# Patient Record
Sex: FEMALE | Race: BLACK OR AFRICAN AMERICAN | NOT HISPANIC OR LATINO | Employment: OTHER | URBAN - NONMETROPOLITAN AREA
[De-identification: names, ages, dates, MRNs, and addresses within clinical notes are randomized per-mention and may not be internally consistent; named-entity substitution may affect disease eponyms.]

---

## 2017-01-01 ENCOUNTER — HOSPITAL ENCOUNTER (OUTPATIENT)
Dept: OTHER | Facility: HOSPITAL | Age: 42
Setting detail: RADIATION/ONCOLOGY SERIES
Discharge: HOME | End: 2017-01-20
Attending: INTERNAL MEDICINE | Admitting: INTERNAL MEDICINE

## 2017-01-06 LAB
ALBUMIN SERPL-MCNC: 3.7 GM/DL (ref 3.4–4.8)
ALP SERPL-CCNC: 129 U/L (ref 38–126)
ALT SERPL-CCNC: 82 U/L (ref 9–52)
ANION GAP SERPL CALCULATED.3IONS-SCNC: 9 MMOL/L (ref 5–15)
AST SERPL-CCNC: 55 U/L (ref 14–36)
BASOPHILS # BLD AUTO: 0.01 X1000/UL (ref 0–0.2)
BASOPHILS NFR BLD AUTO: 0.1 % (ref 0–2)
BILIRUB SERPL-MCNC: 0.3 MG/DL (ref 0.2–1.3)
BUN SERPL-MCNC: 13 MG/DL (ref 7–21)
CALCIUM SERPL-MCNC: 8 MG/DL (ref 8.4–10.2)
CHLORIDE SERPL-SCNC: 103 MMOL/L (ref 95–110)
CO2 SERPL-SCNC: 29 MMOL/L (ref 22–31)
CONV AUTO IG#: 0.08 X1000/UL (ref 0.01–0.02)
CONV AUTO IG%: 1.2 % (ref 0–0.5)
CREAT SERPL-MCNC: 0.9 MG/DL (ref 0.5–1)
EOSINOPHIL # BLD AUTO: 0.04 X1000/UL (ref 0–0.7)
EOSINOPHIL NFR BLD AUTO: 0.6 % (ref 0–7)
ERYTHROCYTE [DISTWIDTH] IN BLOOD: 14.5 % (ref 11.5–14.5)
GLUCOSE SERPL-MCNC: 103 MG/DL (ref 60–100)
GRANULOCYTES # BLD AUTO: 4.3 X1000/UL (ref 2–8.6)
GRANULOCYTES NFR BLD AUTO: 64.3 % (ref 37–80)
HCT VFR BLD CALC: 32.5 % (ref 35–45)
HGB BLD-MCNC: 10.9 GM/DL (ref 12–15.5)
LYMPHOCYTES # BLD AUTO: 1.59 X1000/UL (ref 0.6–4.2)
LYMPHOCYTES NFR BLD AUTO: 23.8 % (ref 10–50)
MCH RBC QN: 29.4 PG (ref 26–34)
MCHC RBC-ENTMCNC: 33.5 GM/DL (ref 31.4–36)
MCV RBC: 87.6 FL (ref 80–98)
MONOCYTES # BLD AUTO: 0.67 X1000/UL (ref 0–0.9)
MONOCYTES NFR BLD AUTO: 10 % (ref 0–12)
PLATELET # BLD: 191 X1000/MM3 (ref 150–450)
PMV BLD: 11.3 FL (ref 8–12)
POTASSIUM SERPL-SCNC: 3.6 MMOL/L (ref 3.5–5.1)
PROT SERPL-MCNC: 6.7 GM/DL (ref 6.3–8.6)
RBC # BLD: 3.71 MEGA/MM3 (ref 3.77–5.16)
SODIUM SERPL-SCNC: 141 MMOL/L (ref 137–145)
WBC # BLD: 6.7 X1000/UL (ref 3.2–9.8)

## 2017-01-09 RX ORDER — INSULIN DETEMIR 100 [IU]/ML
INJECTION, SOLUTION SUBCUTANEOUS
Qty: 10 ML | Refills: 0 | Status: SHIPPED | OUTPATIENT
Start: 2017-01-09 | End: 2017-02-17 | Stop reason: SDUPTHER

## 2017-01-12 LAB
ALBUMIN SERPL-MCNC: 4.1 GM/DL (ref 3.4–4.8)
ALP SERPL-CCNC: 114 U/L (ref 38–126)
ALT SERPL-CCNC: 54 U/L (ref 9–52)
ANION GAP SERPL CALCULATED.3IONS-SCNC: 14 MMOL/L (ref 5–15)
AST SERPL-CCNC: 21 U/L (ref 14–36)
BASOPHILS # BLD AUTO: 0.03 X1000/UL (ref 0–0.2)
BASOPHILS NFR BLD AUTO: 0.3 % (ref 0–2)
BILIRUB SERPL-MCNC: 0.6 MG/DL (ref 0.2–1.3)
BUN SERPL-MCNC: 27 MG/DL (ref 7–21)
CALCIUM SERPL-MCNC: 9.3 MG/DL (ref 8.4–10.2)
CHLORIDE SERPL-SCNC: 100 MMOL/L (ref 95–110)
CO2 SERPL-SCNC: 26 MMOL/L (ref 22–31)
CONV AUTO IG#: 0.12 X1000/UL (ref 0.01–0.02)
CONV AUTO IG%: 1 % (ref 0–0.5)
CREAT SERPL-MCNC: 1 MG/DL (ref 0.5–1)
EOSINOPHIL # BLD AUTO: 0.02 X1000/UL (ref 0–0.7)
EOSINOPHIL NFR BLD AUTO: 0.2 % (ref 0–7)
ERYTHROCYTE [DISTWIDTH] IN BLOOD: 14.4 % (ref 11.5–14.5)
GLUCOSE SERPL-MCNC: 94 MG/DL (ref 60–100)
GRANULOCYTES # BLD AUTO: 7.99 X1000/UL (ref 2–8.6)
GRANULOCYTES NFR BLD AUTO: 67.7 % (ref 37–80)
HCT VFR BLD CALC: 38.3 % (ref 35–45)
HGB BLD-MCNC: 13.1 GM/DL (ref 12–15.5)
LYMPHOCYTES # BLD AUTO: 2.62 X1000/UL (ref 0.6–4.2)
LYMPHOCYTES NFR BLD AUTO: 22.2 % (ref 10–50)
MCH RBC QN: 29.5 PG (ref 26–34)
MCHC RBC-ENTMCNC: 34.2 GM/DL (ref 31.4–36)
MCV RBC: 86.3 FL (ref 80–98)
MONOCYTES # BLD AUTO: 1.01 X1000/UL (ref 0–0.9)
MONOCYTES NFR BLD AUTO: 8.6 % (ref 0–12)
NRBC BLD AUTO-RTO: 0 % (ref 0–0.2)
NRBC SPEC MANUAL: 0 X1000/UL
PLATELET # BLD: 231 X1000/MM3 (ref 150–450)
PMV BLD: 11.2 FL (ref 8–12)
POTASSIUM SERPL-SCNC: 4 MMOL/L (ref 3.5–5.1)
PROT SERPL-MCNC: 7.2 GM/DL (ref 6.3–8.6)
RBC # BLD: 4.44 MEGA/MM3 (ref 3.77–5.16)
SODIUM SERPL-SCNC: 140 MMOL/L (ref 137–145)
WBC # BLD: 11.8 X1000/UL (ref 3.2–9.8)

## 2017-01-17 RX ORDER — IBUPROFEN 200 MG
CAPSULE ORAL
Qty: 90 TABLET | Refills: 0 | Status: SHIPPED | OUTPATIENT
Start: 2017-01-17 | End: 2017-02-15 | Stop reason: SDUPTHER

## 2017-01-19 ENCOUNTER — TELEPHONE (OUTPATIENT)
Dept: ONCOLOGY | Facility: HOSPITAL | Age: 42
End: 2017-01-19

## 2017-01-19 LAB
ALBUMIN SERPL-MCNC: 3.5 GM/DL (ref 3.4–4.8)
ALP SERPL-CCNC: 89 U/L (ref 38–126)
ALT SERPL-CCNC: 59 U/L (ref 9–52)
ANION GAP SERPL CALCULATED.3IONS-SCNC: 10 MMOL/L (ref 5–15)
AST SERPL-CCNC: 38 U/L (ref 14–36)
BASOPHILS # BLD AUTO: 0.02 X1000/UL (ref 0–0.2)
BASOPHILS NFR BLD AUTO: 0.3 % (ref 0–2)
BILIRUB SERPL-MCNC: 0.4 MG/DL (ref 0.2–1.3)
BUN SERPL-MCNC: 13 MG/DL (ref 7–21)
CALCIUM SERPL-MCNC: 7.9 MG/DL (ref 8.4–10.2)
CHLORIDE SERPL-SCNC: 106 MMOL/L (ref 95–110)
CO2 SERPL-SCNC: 25 MMOL/L (ref 22–31)
CONV AUTO IG#: 0.04 X1000/UL (ref 0.01–0.02)
CONV AUTO IG%: 0.6 % (ref 0–0.5)
CREAT SERPL-MCNC: 0.9 MG/DL (ref 0.5–1)
EOSINOPHIL # BLD AUTO: 0.11 X1000/UL (ref 0–0.7)
EOSINOPHIL NFR BLD AUTO: 1.7 % (ref 0–7)
ERYTHROCYTE [DISTWIDTH] IN BLOOD: 14.1 % (ref 11.5–14.5)
GLUCOSE SERPL-MCNC: 124 MG/DL (ref 60–100)
GRANULOCYTES # BLD AUTO: 4.62 X1000/UL (ref 2–8.6)
GRANULOCYTES NFR BLD AUTO: 69.3 % (ref 37–80)
HCT VFR BLD CALC: 33 % (ref 35–45)
HGB BLD-MCNC: 11 GM/DL (ref 12–15.5)
LYMPHOCYTES # BLD AUTO: 1.46 X1000/UL (ref 0.6–4.2)
LYMPHOCYTES NFR BLD AUTO: 21.9 % (ref 10–50)
MCH RBC QN: 29.6 PG (ref 26–34)
MCHC RBC-ENTMCNC: 33.3 GM/DL (ref 31.4–36)
MCV RBC: 88.9 FL (ref 80–98)
MONOCYTES # BLD AUTO: 0.41 X1000/UL (ref 0–0.9)
MONOCYTES NFR BLD AUTO: 6.2 % (ref 0–12)
NRBC BLD AUTO-RTO: 0 % (ref 0–0.2)
NRBC SPEC MANUAL: 0 X1000/UL
PLATELET # BLD: 168 X1000/MM3 (ref 150–450)
PMV BLD: 11.4 FL (ref 8–12)
POTASSIUM SERPL-SCNC: 3.7 MMOL/L (ref 3.5–5.1)
PROT SERPL-MCNC: 6.4 GM/DL (ref 6.3–8.6)
RBC # BLD: 3.71 MEGA/MM3 (ref 3.77–5.16)
SODIUM SERPL-SCNC: 141 MMOL/L (ref 137–145)
WBC # BLD: 6.7 X1000/UL (ref 3.2–9.8)

## 2017-01-23 ENCOUNTER — OFFICE VISIT (OUTPATIENT)
Dept: GASTROENTEROLOGY | Facility: CLINIC | Age: 42
End: 2017-01-23

## 2017-01-23 VITALS
HEIGHT: 65 IN | SYSTOLIC BLOOD PRESSURE: 140 MMHG | BODY MASS INDEX: 36.32 KG/M2 | WEIGHT: 218 LBS | DIASTOLIC BLOOD PRESSURE: 85 MMHG | HEART RATE: 91 BPM

## 2017-01-23 DIAGNOSIS — K74.69 OTHER CIRRHOSIS OF LIVER (HCC): ICD-10-CM

## 2017-01-23 DIAGNOSIS — K59.03 CONSTIPATION DUE TO OPIOID THERAPY: Primary | ICD-10-CM

## 2017-01-23 DIAGNOSIS — K21.9 GASTROESOPHAGEAL REFLUX DISEASE, ESOPHAGITIS PRESENCE NOT SPECIFIED: ICD-10-CM

## 2017-01-23 DIAGNOSIS — K62.5 HEMORRHAGE OF ANUS AND RECTUM: ICD-10-CM

## 2017-01-23 DIAGNOSIS — R93.5 ABNORMAL FINDINGS ON DIAGNOSTIC IMAGING OF ABDOMEN: ICD-10-CM

## 2017-01-23 DIAGNOSIS — K76.0 FATTY (CHANGE OF) LIVER, NOT ELSEWHERE CLASSIFIED: ICD-10-CM

## 2017-01-23 DIAGNOSIS — T40.2X5A CONSTIPATION DUE TO OPIOID THERAPY: Primary | ICD-10-CM

## 2017-01-23 DIAGNOSIS — E71.30 DISORDER OF FATTY-ACID METABOLISM: ICD-10-CM

## 2017-01-23 DIAGNOSIS — E61.1 IRON DEFICIENCY: ICD-10-CM

## 2017-01-23 PROCEDURE — 99204 OFFICE O/P NEW MOD 45 MIN: CPT | Performed by: PHYSICIAN ASSISTANT

## 2017-01-23 RX ORDER — POLYETHYLENE GLYCOL 3350 17 G/17G
17 POWDER, FOR SOLUTION ORAL ONCE
Qty: 250 G | Refills: 0 | Status: SHIPPED | OUTPATIENT
Start: 2017-01-23 | End: 2017-01-23

## 2017-01-23 RX ORDER — RANITIDINE 150 MG/1
TABLET ORAL
Refills: 1 | COMMUNITY
Start: 2016-12-20 | End: 2017-05-16 | Stop reason: SDUPTHER

## 2017-01-23 NOTE — PROGRESS NOTES
Chief Complaint   Patient presents with   • Rectal Bleeding   • Nausea   • Vomiting   • Diarrhea       Subjective    Andra Villareal is a 41 y.o. female. she is being seen for consultation today at the request of Dr. Waddell.    History of Present Illness    ENDO PROCEDURE ORDERED: EGD/colonoscopy with biopsies for nausea, vomiting, diarrhea, blood in the stool, sigmoid colitis, significant anemia, cirrhosis, history of metastatic breast cancer.    This 41-year-old disabled female was sent for consultation by Dr. Waddell who asked that the patient be worked in sooner because of her severe symptoms.  She has had nausea, vomiting, diarrhea with blood in her stool.  She states she's had episodic nausea, vomiting, diarrhea despite taking Protonix 40 mg daily, Zantac 150 ng twice a day.  She's had bright red blood in her stool multiple times, off and on for the past 2 weeks.  She has stopped taking her Xarelto but is still passed blood.  She denies any hemorrhoidal issues.  She is on iron as well as MiraLAX for constipation.  Her weight is been fairly stable.  She has never had a colonoscopy.    Patient has a history of metastatic breast cancer liver biopsy confirmed metastatic ductal carcinoma 1/29/15.  She had an initial biopsy on 1/28/15 showing metastatic adenocarcinoma.  She has a CT scan of the chest on 10/27/16 showed a mediastinal mass, cirrhosis.  She states she's not been told in the past that she had cirrhosis.  She had a stable bone scan 10/26/16.  She had a normal AFP, CA 27, 29 on 12/12/16.    She last saw Dr. Waddell for metastatic right breast cancer on 1/12/17.  She was initially diagnosed as stage III in 2011.  She was diagnosed with metastatic disease in March 2015.  She has been on multiple treatments.  CBC on that date showed white count of 11.8, hemoglobin 13.1, hematocrit 38.3.  231 platelets.    Patient went to the emergency room on 1/16/17 lipase was normal.  CMP showed potassium 3.3, glucose 111,  calcium 7.7, protein 5.9, albumin 3.2, otherwise normal.  INR 1.2.  Urinalysis was normal.  CT scan abdomen pelvis with contrast showed a lobular cirrhotic liver with small masses in the liver.  Contracted gallbladder.  Atrophic pancreas.  Enlarged spleen.  Posterior hysterectomy.  Thickened walls of the sigmoid colon, diverticulosis, normal appendix.  CBC at that time showed anemia with hemoglobin 11.3, hematocrit 34.2.  I did look at the CT scan images with the patient, she appears to have an enlarged nodular liver with some small defects.    Patient had laboratory on 1/19/17 CMP showed glucose 124, calcium 7.9, AST 38, ALT 59, otherwise normal.  CBC showed hemoglobin 11.0, hematocrit 33.0, 168 platelets.    Patient was a previous pack-a-day smoker for a few years, has not smoked in some time.  Denied alcohol, illicit substance use.  She's had a right mastectomy with TRAM flap reconstruction, prior to that she had a lumpectomy with lymph node dissection.  She's had port placement on several occasions.  Previous hysterectomy.  She currently has a PICC line.  She's had liver biopsy.  She has a history of pneumonia, asthma, diabetes, anxiety.    Family history of diabetes, heart disease, ulcers, unknown cancer, stroke, hypertension, nervous proms, allergies.  Father has high blood pressure, mesothelioma, diabetes, liver disease.  Mother is not known.  Lists no spouse.  One sister in good health.  5 children in good health.    A/P: Nausea, vomiting, abdominal pain, blood in stool, colitis, consider further metastatic disease, certainly at risk for an occult lesion.  She has increased risk given her breast cancer, and significant anemia.  Recommend EGD/colonoscopy with biopsies to further evaluate.  Given her cirrhosis would need to evaluate for possible varices.  Recommend further laboratories to evaluate for potential liver disease to include hepatitis diagnostic panel, Dominique fibro-sure, VITOR, AMA, SMA, alpha-1  antitrypsin, ceruloplasmin, iron studies.  We'll see her in follow-up after the above, further pending clinical course and the results of the above.    Thank you very much Dr. Waddell for this consultation and for allowing us to participate in the care of your patient.  We'll keep you informed.     The following portions of the patient's history were reviewed and updated as appropriate:   Past Medical History   Diagnosis Date   • Abnormal breathing sounds    • Abscess of skin      and / or subcutaneous tissue   • Acute bronchitis      unspecified   • Adult body mass index 30+      obesity-BMI 33   • Allergic rhinitis    • Anasarca    • Anxiety      currently on xanax   • Anxiety    • Asthenia    • Asthma      moderate persistent   • Asthmatic bronchitis    • Astigmatism    • Bleeding external hemorrhoids    • Body mass index (BMI) of 34.0-34.9 in adult    • Body mass index (BMI) of 35.0-35.9 in adult    • Body mass index (BMI) of 36.0-36.9 in adult    • Body mass index 40.0-44.9, adult      SEVERELY OBESE   • Cellulitis    • Chemotherapy induced nausea and vomiting    • Chronic back pain    • Chronic cough    • Chronic hypoxemic respiratory failure      on NC at 3 LPM   • Cough    • Depressive disorder    • Diabetes mellitus      no retinopathy   • Dyslipidemia    • Edema of lower extremity    • Encounter for follow-up examination after completed treatment for malignant neoplasm    • Epidermoid cyst of skin      excision with secondary intention healing   • Excessive and frequent menstruation with irregular cycle      Vaginal bleeding after 4 years of no bleeding secondary to chemotherapy for breast cancer; currently being treated for breast cancer for the second time, mets to bone and liver while on chemotherapy      • Flushing    • 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    • Gastroesophageal reflux disease    • H/O tubal ligation    • Hx of echocardiogram      w/ color flow, and w/o color flow   • Hyperglycemia     • Hypermetropia    • Hypertension    • Hypokalemia    • Impaired glucose tolerance      hgbalc 6.2   • Infection of skin      and /or subcutaneous tissue-around the catheter exit site   • Influenza      needs influenza immunization   • Irregular periods    • Lesion of lumbar spine      pain controlled with percocet and lidocaine patches   • Lumbago with sciatica    • Malignant neoplasm of female breast      s/p right mastectomy, mets to bone and liver on chemotherapy      • Menopausal flushing    • Muscle weakness    • Muscle weakness (generalized)    • Nonspecific interstitial pneumonia      symptomatically improved   • Pleural effusion      refractory massive right, most likely malignant effusion   • Primary atypical pneumonia    • Renal failure      acute drug-induced renal failure   • Sinus tachycardia    • Tobacco dependence syndrome    • Tobacco non-user    • Upper respiratory infection    • Wheezing      Past Surgical History   Procedure Laterality Date   • Other surgical history  05/05/2016     central line insertion , PICC line replacement   • Central venous catheter tunneled insertion single lumen       Placement of indwelling Pleurx catheter right   • Venous access device (port) insertion       Ultrasound guided right internal jugular Mediport placement under fluoroscopy   • Lung volume reduction     • Mastectomy       partial   • Pap smear     • Other surgical history       place breast clip percut   • Other surgical history       pulse oximetry   • Other surgical history       remove cc cath w/ sc port or pump, Removal of right internal jugular MediPort   • Other surgical history       spirometry   • Thoracentesis       aspiration of pleural space   • Other surgical history       tubal sterilization   • Hysterectomy       vaginal     Family History   Problem Relation Age of Onset   • Coronary artery disease Other    • Diabetes Other    • Hypertension Other      OB History     No data available     "    Allergies   Allergen Reactions   • Lisinopril Shortness Of Breath   • Flagyl [Metronidazole] Other (See Comments)     pancreatitis   • Fentanyl Anxiety     \"goes out of her mind\"   • Latex Rash     Social History     Social History   • Marital status:      Spouse name: N/A   • Number of children: N/A   • Years of education: N/A     Social History Main Topics   • Smoking status: Former Smoker   • Smokeless tobacco: Never Used   • Alcohol use No   • Drug use: No   • Sexual activity: Defer     Other Topics Concern   • None     Social History Narrative       Current Outpatient Prescriptions:   •  benzonatate (TESSALON) 100 MG capsule, Take 1 capsule by mouth 3 (Three) Times a Day As Needed for cough., Disp: 90 capsule, Rfl: 5  •  calcium carbonate (OS-ONELIA) 1250 (500 CA) MG tablet, TAKE 1 TABLET BY MOUTH THREE TIMES DAILY, Disp: 90 tablet, Rfl: 0  •  CARTIA  MG 24 hr capsule, Take 1 capsule by mouth Daily., Disp: 30 capsule, Rfl: 5  •  celecoxib (CeleBREX) 200 MG capsule, Take 1 capsule by mouth Daily., Disp: 90 capsule, Rfl: 5  •  cetirizine (zyrTEC) 10 MG tablet, Take 1 tablet by mouth Daily., Disp: 30 tablet, Rfl: 5  •  clonazePAM (KlonoPIN) 2 MG tablet, Take 1 tablet by mouth 2 (Two) Times a Day As Needed for anxiety., Disp: 60 tablet, Rfl: 2  •  ferrous sulfate 325 (65 FE) MG tablet, Take 1 tablet by mouth 3 (Three) Times a Day., Disp: 90 tablet, Rfl: 5  •  fluticasone (FLONASE) 50 MCG/ACT nasal spray, 2 sprays into each nostril Daily., Disp: 16 g, Rfl: 12  •  LEVEMIR 100 UNIT/ML injection, INJECT 8 UNITS UNDER THE SKIN EVERY NIGHT AT BEDTIME., Disp: 10 mL, Rfl: 0  •  magnesium oxide (MAGNESIUM-OXIDE) 400 (241.3 MG) MG tablet tablet, Take 1 tablet by mouth 2 (Two) Times a Day., Disp: 60 tablet, Rfl: 11  •  montelukast (SINGULAIR) 10 MG tablet, Take 1 tablet by mouth Every Night., Disp: 30 tablet, Rfl: 5  •  Naloxegol Oxalate (MOVANTIK) 25 MG tablet, Take 25 mg by mouth Daily., Disp: 30 tablet, Rfl: " "5  •  oxyCODONE-acetaminophen (PERCOCET)  MG per tablet, Take 1 tablet by mouth Every 8 (Eight) Hours As Needed for moderate pain (4-6)., Disp: 90 tablet, Rfl: 0  •  pantoprazole (PROTONIX) 40 MG EC tablet, Take 1 tablet by mouth Every Night., Disp: 90 tablet, Rfl: 3  •  polyethylene glycol (MIRALAX) powder, Take 3,350 g by mouth Daily., Disp: , Rfl:   •  potassium chloride (K-DUR,KLOR-CON) 20 MEQ CR tablet, TAKE 1 TABLET BY MOUTH TWICE DAILY, Disp: 60 tablet, Rfl: 0  •  promethazine (PHENERGAN) 25 MG tablet, Take 1 tablet by mouth Every 6 (Six) Hours As Needed for nausea or vomiting., Disp: 120 tablet, Rfl: 5  •  raNITIdine (ZANTAC) 150 MG tablet, TK 1 T PO BID, Disp: , Rfl: 1  •  venlafaxine XR (EFFEXOR-XR) 37.5 MG 24 hr capsule, Take 1 capsule by mouth Daily., Disp: 30 capsule, Rfl: 5  Review of Systems  Review of Systems   HENT: Negative for trouble swallowing.    Gastrointestinal: Positive for abdominal pain, anal bleeding, blood in stool, nausea and vomiting.   All other systems reviewed and are negative.         Objective      Visit Vitals   • /85 (BP Location: Left arm)   • Pulse 91   • Ht 65\" (165.1 cm)   • Wt 218 lb (98.9 kg)   • BMI 36.28 kg/m2     Physical Exam   Constitutional: She is oriented to person, place, and time. She appears well-developed and well-nourished. No distress.   PICC line in left. Chronically ill appearing   HENT:   Head: Normocephalic and atraumatic.   Eyes: EOM are normal. Pupils are equal, round, and reactive to light.   Neck: Normal range of motion.   Cardiovascular: Normal rate, regular rhythm and normal heart sounds.    Pulmonary/Chest: Effort normal and breath sounds normal.   Abdominal: Soft. Bowel sounds are normal. She exhibits no shifting dullness, no distension, no abdominal bruit, no ascites and no mass. There is no hepatosplenomegaly. There is tenderness. There is no rigidity, no rebound, no guarding and no CVA tenderness. No hernia. Hernia confirmed " negative in the ventral area.   Obese, mild diffuse, moderate epigastric, inferior to umbilicus   Musculoskeletal: Normal range of motion.   Neurological: She is alert and oriented to person, place, and time.   Skin: Skin is warm and dry.   Psychiatric: She has a normal mood and affect. Her behavior is normal. Judgment and thought content normal.   Nursing note and vitals reviewed.    Admission on 01/16/2017, Discharged on 01/16/2017   Component Date Value Ref Range Status   • WBC 01/16/2017 7.0  3.2 - 9.8 x1000/uL Final   • RBC 01/16/2017 3.86  3.77 - 5.16 mellissa/mm3 Final   • Hemoglobin 01/16/2017 11.3* 12.0 - 15.5 gm/dl Final   • Hematocrit 01/16/2017 34.2* 35.0 - 45.0 % Final   • MCV 01/16/2017 88.6  80.0 - 98.0 fl Final   • MCH 01/16/2017 29.3  26.0 - 34.0 pg Final   • MCHC 01/16/2017 33.0  31.4 - 36.0 gm/dl Final   • RDW 01/16/2017 14.5  11.5 - 14.5 % Final   • Platelets 01/16/2017 193  150 - 450 x1000/mm3 Final   • MPV 01/16/2017 12.1* 8.0 - 12.0 fl Final   • Neutrophil Rel % 01/16/2017 71.9  37.0 - 80.0 % Final   • Lymphocyte Rel % 01/16/2017 21.8  10.0 - 50.0 % Final   • Monocyte Rel % 01/16/2017 5.0  0.0 - 12.0 % Final   • Eosinophil Rel % 01/16/2017 0.6  0.0 - 7.0 % Final   • Basophil Rel % 01/16/2017 0.3  0.0 - 2.0 % Final   • Immature Granulocyte Rel % 01/16/2017 0.40  0.00 - 0.50 % Final   • Neutrophils Absolute 01/16/2017 5.06  2.00 - 8.60 x1000/uL Final   • Lymphocytes Absolute 01/16/2017 1.53  0.60 - 4.20 x1000/uL Final   • Monocytes Absolute 01/16/2017 0.35  0.00 - 0.90 x1000/uL Final   • Eosinophils Absolute 01/16/2017 0.04  0.00 - 0.70 x1000/uL Final   • Basophils Absolute 01/16/2017 0.02  0.00 - 0.20 x1000/uL Final   • Immature Granulocytes Absolute 01/16/2017 0.030* 0.005 - 0.022 x1000/uL Final   • nRBC 01/16/2017 0.0  0.0 - 0.2 % Final   • nRBC 01/16/2017 0.000  x1000/uL Final   • Sodium 01/16/2017 140  137 - 145 mmol/L Final   • Potassium 01/16/2017 3.3* 3.5 - 5.1 mmol/L Final   • Chloride  01/16/2017 101  95 - 110 mmol/L Final   • CO2 01/16/2017 31  22 - 31 mmol/L Final   • Anion Gap 01/16/2017 8.0  5.0 - 15.0 mmol/L Final   • Glucose 01/16/2017 111* 60 - 100 mg/dl Final   • BUN 01/16/2017 16  7 - 21 mg/dl Final   • Creatinine 01/16/2017 1.0  0.5 - 1.0 mg/dl Final   • GFR MDRD Non  01/16/2017 61  58 - 135 mL/min/1.73 sq.M Final    Comment: Invalid if creatinine is changing or the patient is on dialysis. Use AA  result if patient is -American, non AA result otherwise.     • GFR MDRD  01/16/2017 74  58 - 135 mL/min/1.73 sq.M Final   • Calcium 01/16/2017 7.7* 8.4 - 10.2 mg/dl Final   • Total Protein 01/16/2017 5.9* 6.3 - 8.6 gm/dl Final   • Albumin 01/16/2017 3.2* 3.4 - 4.8 gm/dl Final   • Total Bilirubin 01/16/2017 0.4  0.2 - 1.3 mg/dl Final   • Alkaline Phosphatase 01/16/2017 85  38 - 126 U/L Final   • AST (SGOT) 01/16/2017 33  14 - 36 U/L Final   • ALT (SGPT) 01/16/2017 44  9 - 52 U/L Final   • Lipase 01/16/2017 22* 23 - 300 U/L Final   • DOES A PREVIOUS ABORH EXIST? 01/16/2017 PREVIOUS TYPE ON FILE   Final   • ABO Type 01/16/2017 A   Final   • RH type 01/16/2017 POS   Final   • Antibody Screen Interpretation 01/16/2017 NEG   Final   • Protime 01/16/2017 15.3  11.1 - 15.3 seconds Final   • INR 01/16/2017 1.2  0.8 - 1.2 Final    Comment: Therapeutic range for most indications is 2.0 - 3.0 INR or 2.5 - 3.5 for  mechanical   heart valves.     • PTT 01/16/2017 30.9  20.0 - 40.3 seconds Final    The recommended Heparin therapeutic range is 68 - 97 seconds.   • Color, UA 01/16/2017 YELLOW  STRAW,YELLOW,DK YELLOW,ROGER Final   • Appearance 01/16/2017 CLEAR  CLEAR Final   • Specific Gravity, UA 01/16/2017 1.016  1.003 - 1.030 Final   • pH, UA 01/16/2017 7.5  5.0 - 9.0 pH Units Final    pH Normal: 5.0 - 9.0    • Leukocytes, UA 01/16/2017 NEGATIVE  NEGATIVE Final   • Nitrite, UA 01/16/2017 NEGATIVE  NEGATIVE Final   • Protein, UA 01/16/2017 NEGATIVE  NEGATIVE Final   •  Glucose, Urine 01/16/2017 NEGATIVE  NEGATIVE mg/dl Final   • Ketones, UA 01/16/2017 NEGATIVE  NEGATIVE Final   • Urobilinogen, UA 01/16/2017 0.2  0.2 EU/dl Final   • Blood, UA 01/16/2017 NEGATIVE  NEGATIVE Final     Assessment/Plan      1. Constipation due to opioid therapy    2. Iron deficiency    3. Hemorrhage of anus and rectum    4. Gastroesophageal reflux disease, esophagitis presence not specified    5. Abnormal findings on diagnostic imaging of abdomen    6. Other cirrhosis of liver    7. Fatty (change of) liver, not elsewhere classified     8. Disorder of fatty-acid metabolism     .   Andra was seen today for rectal bleeding, nausea, vomiting and diarrhea.    Diagnoses and all orders for this visit:    Constipation due to opioid therapy  -     Case request  -     polyethylene glycol (MIRALAX) powder; Take 17 g by mouth 1 (One) Time for 1 dose. Instructions per instruction sheet.    Iron deficiency  -     Case request  -     Iron and TIBC  -     Ferritin    Hemorrhage of anus and rectum  -     Case request  -     Iron and TIBC  -     Ferritin    Gastroesophageal reflux disease, esophagitis presence not specified  -     Case request    Abnormal findings on diagnostic imaging of abdomen  Comments:  sigmoid colitis  Orders:  -     Case request  -     Hepatitis Panel, Acute  -     BOWIE Fibrosure  -     Nuclear Antigen Antibody, IFA  -     Anti-Smooth Muscle Antibody Titer  -     Ceruloplasmin  -     Alpha - 1 - Antitrypsin    Other cirrhosis of liver  -     Hepatitis Panel, Acute  -     BOWIE Fibrosure  -     Nuclear Antigen Antibody, IFA  -     Anti-Smooth Muscle Antibody Titer  -     Ceruloplasmin  -     Alpha - 1 - Antitrypsin  -     Iron and TIBC  -     Ferritin    Fatty (change of) liver, not elsewhere classified   -     BOWIE Fibrosure    Disorder of fatty-acid metabolism   -     BOWIE Fibrosure        Orders placed during this encounter include:  Orders Placed This Encounter   Procedures   • Hepatitis Panel,  Acute   • BOWIE Fibrosure   • Nuclear Antigen Antibody, IFA   • Anti-Smooth Muscle Antibody Titer   • Ceruloplasmin   • Alpha - 1 - Antitrypsin   • Iron and TIBC   • Ferritin       Medications prescribed:  New Medications Ordered This Visit   Medications   • polyethylene glycol (MIRALAX) powder     Sig: Take 17 g by mouth 1 (One) Time for 1 dose. Instructions per instruction sheet.     Dispense:  250 g     Refill:  0     Discontinued Medications       Reason for Discontinue    potassium chloride (K-DUR,KLOR-CON) 20 MEQ CR tablet Duplicate order        Requested Prescriptions     Signed Prescriptions Disp Refills   • polyethylene glycol (MIRALAX) powder 250 g 0     Sig: Take 17 g by mouth 1 (One) Time for 1 dose. Instructions per instruction sheet.       Review and/or summary of lab tests, radiology, procedures, medications. Review and summary of old records and obtaining of history. The risks and benefits of my recommendations, as well as other treatment options were discussed with the patient today. Questions were answered.    Follow-up: Return if symptoms worsen or fail to improve, for After the above.           This document has been electronically signed by Jordin Oliveira PA-C on January 25, 2017 5:58 PM      Results for orders placed or performed in visit on 01/23/17   Iron and TIBC   Result Value Ref Range    Iron 19 (L) 37 - 170 mcg/dL    TIBC 304 265 - 497 mcg/dL    Iron Saturation 6 (L) 15 - 50 %   Ferritin   Result Value Ref Range    Ferritin 51.50 6.20 - 137.00 ng/mL   Results for orders placed or performed during the hospital encounter of 01/16/17   Urinalysis   Result Value Ref Range    Color, UA YELLOW STRAW,YELLOW,DK YELLOW,ROGER    Appearance CLEAR CLEAR    Specific Gravity, UA 1.016 1.003 - 1.030    pH, UA 7.5 5.0 - 9.0 pH Units    Leukocytes, UA NEGATIVE NEGATIVE    Nitrite, UA NEGATIVE NEGATIVE    Protein, UA NEGATIVE NEGATIVE    Glucose, Urine NEGATIVE NEGATIVE mg/dl    Ketones, UA NEGATIVE  NEGATIVE    Urobilinogen, UA 0.2 0.2 EU/dl    Blood, UA NEGATIVE NEGATIVE   aPTT   Result Value Ref Range    PTT 30.9 20.0 - 40.3 seconds   Protime-INR   Result Value Ref Range    Protime 15.3 11.1 - 15.3 seconds    INR 1.2 0.8 - 1.2   CBC & Differential   Result Value Ref Range    WBC 7.0 3.2 - 9.8 x1000/uL    RBC 3.86 3.77 - 5.16 mellissa/mm3    Hemoglobin 11.3 (L) 12.0 - 15.5 gm/dl    Hematocrit 34.2 (L) 35.0 - 45.0 %    MCV 88.6 80.0 - 98.0 fl    MCH 29.3 26.0 - 34.0 pg    MCHC 33.0 31.4 - 36.0 gm/dl    RDW 14.5 11.5 - 14.5 %    Platelets 193 150 - 450 x1000/mm3    MPV 12.1 (H) 8.0 - 12.0 fl    Neutrophil Rel % 71.9 37.0 - 80.0 %    Lymphocyte Rel % 21.8 10.0 - 50.0 %    Monocyte Rel % 5.0 0.0 - 12.0 %    Eosinophil Rel % 0.6 0.0 - 7.0 %    Basophil Rel % 0.3 0.0 - 2.0 %    Immature Granulocyte Rel % 0.40 0.00 - 0.50 %    Neutrophils Absolute 5.06 2.00 - 8.60 x1000/uL    Lymphocytes Absolute 1.53 0.60 - 4.20 x1000/uL    Monocytes Absolute 0.35 0.00 - 0.90 x1000/uL    Eosinophils Absolute 0.04 0.00 - 0.70 x1000/uL    Basophils Absolute 0.02 0.00 - 0.20 x1000/uL    Immature Granulocytes Absolute 0.030 (H) 0.005 - 0.022 x1000/uL    nRBC 0.0 0.0 - 0.2 %    nRBC 0.000 x1000/uL   Type & Screen   Result Value Ref Range    DOES A PREVIOUS ABORH EXIST? PREVIOUS TYPE ON FILE     ABO Type A     RH type POS     Antibody Screen Interpretation NEG    Lipase   Result Value Ref Range    Lipase 22 (L) 23 - 300 U/L   Comprehensive Metabolic Panel   Result Value Ref Range    Sodium 140 137 - 145 mmol/L    Potassium 3.3 (L) 3.5 - 5.1 mmol/L    Chloride 101 95 - 110 mmol/L    CO2 31 22 - 31 mmol/L    Anion Gap 8.0 5.0 - 15.0 mmol/L    Glucose 111 (H) 60 - 100 mg/dl    BUN 16 7 - 21 mg/dl    Creatinine 1.0 0.5 - 1.0 mg/dl    GFR MDRD Non  61 58 - 135 mL/min/1.73 sq.M    GFR MDRD  74 58 - 135 mL/min/1.73 sq.M    Calcium 7.7 (L) 8.4 - 10.2 mg/dl    Total Protein 5.9 (L) 6.3 - 8.6 gm/dl    Albumin 3.2 (L) 3.4  - 4.8 gm/dl    Total Bilirubin 0.4 0.2 - 1.3 mg/dl    Alkaline Phosphatase 85 38 - 126 U/L    AST (SGOT) 33 14 - 36 U/L    ALT (SGPT) 44 9 - 52 U/L   Results for orders placed or performed during the hospital encounter of 01/01/17   CBC & Differential   Result Value Ref Range    WBC 6.7 3.2 - 9.8 x1000/uL    RBC 3.71 (L) 3.77 - 5.16 mellissa/mm3    Hemoglobin 11.0 (L) 12.0 - 15.5 gm/dl    Hematocrit 33.0 (L) 35.0 - 45.0 %    MCV 88.9 80.0 - 98.0 fl    MCH 29.6 26.0 - 34.0 pg    MCHC 33.3 31.4 - 36.0 gm/dl    RDW 14.1 11.5 - 14.5 %    Platelets 168 150 - 450 x1000/mm3    MPV 11.4 8.0 - 12.0 fl    Neutrophil Rel % 69.3 37.0 - 80.0 %    Lymphocyte Rel % 21.9 10.0 - 50.0 %    Monocyte Rel % 6.2 0.0 - 12.0 %    Eosinophil Rel % 1.7 0.0 - 7.0 %    Basophil Rel % 0.3 0.0 - 2.0 %    Immature Granulocyte Rel % 0.60 (H) 0.00 - 0.50 %    Neutrophils Absolute 4.62 2.00 - 8.60 x1000/uL    Lymphocytes Absolute 1.46 0.60 - 4.20 x1000/uL    Monocytes Absolute 0.41 0.00 - 0.90 x1000/uL    Eosinophils Absolute 0.11 0.00 - 0.70 x1000/uL    Basophils Absolute 0.02 0.00 - 0.20 x1000/uL    Immature Granulocytes Absolute 0.040 (H) 0.005 - 0.022 x1000/uL    nRBC 0.0 0.0 - 0.2 %    nRBC 0.000 x1000/uL   CBC & Differential   Result Value Ref Range    WBC 11.8 (H) 3.2 - 9.8 x1000/uL    RBC 4.44 3.77 - 5.16 mellissa/mm3    Hemoglobin 13.1 12.0 - 15.5 gm/dl    Hematocrit 38.3 35.0 - 45.0 %    MCV 86.3 80.0 - 98.0 fl    MCH 29.5 26.0 - 34.0 pg    MCHC 34.2 31.4 - 36.0 gm/dl    RDW 14.4 11.5 - 14.5 %    Platelets 231 150 - 450 x1000/mm3    MPV 11.2 8.0 - 12.0 fl    Neutrophil Rel % 67.7 37.0 - 80.0 %    Lymphocyte Rel % 22.2 10.0 - 50.0 %    Monocyte Rel % 8.6 0.0 - 12.0 %    Eosinophil Rel % 0.2 0.0 - 7.0 %    Basophil Rel % 0.3 0.0 - 2.0 %    Immature Granulocyte Rel % 1.00 (H) 0.00 - 0.50 %    Neutrophils Absolute 7.99 2.00 - 8.60 x1000/uL    Lymphocytes Absolute 2.62 0.60 - 4.20 x1000/uL    Monocytes Absolute 1.01 (H) 0.00 - 0.90 x1000/uL     Eosinophils Absolute 0.02 0.00 - 0.70 x1000/uL    Basophils Absolute 0.03 0.00 - 0.20 x1000/uL    Immature Granulocytes Absolute 0.120 (H) 0.005 - 0.022 x1000/uL    nRBC 0.0 0.0 - 0.2 %    nRBC 0.000 x1000/uL     *Note: Due to a large number of results and/or encounters for the requested time period, some results have not been displayed. A complete set of results can be found in Results Review.

## 2017-01-23 NOTE — LETTER
January 25, 2017     Rosi Brown MD  [address] [place] [address] [place] [address]    Patient: Andra Villareal   YOB: 1975   Date of Visit: 1/23/2017       Dear Dr. Stephanie MD:    Thank you for referring Andra Villareal to me for evaluation. Below is a copy of my consult note.    If you have questions, please do not hesitate to call me. I look forward to following Andra along with you.         Sincerely,        Jordin Oliveira PA-C        CC: Joselito Waddell MD    Chief Complaint   Patient presents with   • Rectal Bleeding   • Nausea   • Vomiting   • Diarrhea       Subjective    Andra Villareal is a 41 y.o. female. she is being seen for consultation today at the request of Dr. Waddell.    History of Present Illness    ENDO PROCEDURE ORDERED: EGD/colonoscopy with biopsies for nausea, vomiting, diarrhea, blood in the stool, sigmoid colitis, significant anemia, cirrhosis, history of metastatic breast cancer.    This 41-year-old disabled female was sent for consultation by Dr. Waddell who asked that the patient be worked in sooner because of her severe symptoms.  She has had nausea, vomiting, diarrhea with blood in her stool.  She states she's had episodic nausea, vomiting, diarrhea despite taking Protonix 40 mg daily, Zantac 150 ng twice a day.  She's had bright red blood in her stool multiple times, off and on for the past 2 weeks.  She has stopped taking her Xarelto but is still passed blood.  She denies any hemorrhoidal issues.  She is on iron as well as MiraLAX for constipation.  Her weight is been fairly stable.  She has never had a colonoscopy.    Patient has a history of metastatic breast cancer liver biopsy confirmed metastatic ductal carcinoma 1/29/15.  She had an initial biopsy on 1/28/15 showing metastatic adenocarcinoma.  She has a CT scan of the chest on 10/27/16 showed a mediastinal mass, cirrhosis.  She states she's not been told in the past that she had cirrhosis.  She had a stable  bone scan 10/26/16.  She had a normal AFP, CA 27, 29 on 12/12/16.    She last saw Dr. Waddell for metastatic right breast cancer on 1/12/17.  She was initially diagnosed as stage III in 2011.  She was diagnosed with metastatic disease in March 2015.  She has been on multiple treatments.  CBC on that date showed white count of 11.8, hemoglobin 13.1, hematocrit 38.3.  231 platelets.    Patient went to the emergency room on 1/16/17 lipase was normal.  CMP showed potassium 3.3, glucose 111, calcium 7.7, protein 5.9, albumin 3.2, otherwise normal.  INR 1.2.  Urinalysis was normal.  CT scan abdomen pelvis with contrast showed a lobular cirrhotic liver with small masses in the liver.  Contracted gallbladder.  Atrophic pancreas.  Enlarged spleen.  Posterior hysterectomy.  Thickened walls of the sigmoid colon, diverticulosis, normal appendix.  CBC at that time showed anemia with hemoglobin 11.3, hematocrit 34.2.  I did look at the CT scan images with the patient, she appears to have an enlarged nodular liver with some small defects.    Patient had laboratory on 1/19/17 CMP showed glucose 124, calcium 7.9, AST 38, ALT 59, otherwise normal.  CBC showed hemoglobin 11.0, hematocrit 33.0, 168 platelets.    Patient was a previous pack-a-day smoker for a few years, has not smoked in some time.  Denied alcohol, illicit substance use.  She's had a right mastectomy with TRAM flap reconstruction, prior to that she had a lumpectomy with lymph node dissection.  She's had port placement on several occasions.  Previous hysterectomy.  She currently has a PICC line.  She's had liver biopsy.  She has a history of pneumonia, asthma, diabetes, anxiety.    Family history of diabetes, heart disease, ulcers, unknown cancer, stroke, hypertension, nervous proms, allergies.  Father has high blood pressure, mesothelioma, diabetes, liver disease.  Mother is not known.  Lists no spouse.  One sister in good health.  5 children in good health.    A/P:  Nausea, vomiting, abdominal pain, blood in stool, colitis, consider further metastatic disease, certainly at risk for an occult lesion.  She has increased risk given her breast cancer, and significant anemia.  Recommend EGD/colonoscopy with biopsies to further evaluate.  Given her cirrhosis would need to evaluate for possible varices.  Recommend further laboratories to evaluate for potential liver disease to include hepatitis diagnostic panel, Dominique fibro-sure, VITOR, AMA, SMA, alpha-1 antitrypsin, ceruloplasmin, iron studies.  We'll see her in follow-up after the above, further pending clinical course and the results of the above.    Thank you very much Dr. Waddell for this consultation and for allowing us to participate in the care of your patient.  We'll keep you informed.     The following portions of the patient's history were reviewed and updated as appropriate:   Past Medical History   Diagnosis Date   • Abnormal breathing sounds    • Abscess of skin      and / or subcutaneous tissue   • Acute bronchitis      unspecified   • Adult body mass index 30+      obesity-BMI 33   • Allergic rhinitis    • Anasarca    • Anxiety      currently on xanax   • Anxiety    • Asthenia    • Asthma      moderate persistent   • Asthmatic bronchitis    • Astigmatism    • Bleeding external hemorrhoids    • Body mass index (BMI) of 34.0-34.9 in adult    • Body mass index (BMI) of 35.0-35.9 in adult    • Body mass index (BMI) of 36.0-36.9 in adult    • Body mass index 40.0-44.9, adult      SEVERELY OBESE   • Cellulitis    • Chemotherapy induced nausea and vomiting    • Chronic back pain    • Chronic cough    • Chronic hypoxemic respiratory failure      on NC at 3 LPM   • Cough    • Depressive disorder    • Diabetes mellitus      no retinopathy   • Dyslipidemia    • Edema of lower extremity    • Encounter for follow-up examination after completed treatment for malignant neoplasm    • Epidermoid cyst of skin      excision with secondary  intention healing   • Excessive and frequent menstruation with irregular cycle      Vaginal bleeding after 4 years of no bleeding secondary to chemotherapy for breast cancer; currently being treated for breast cancer for the second time, mets to bone and liver while on chemotherapy      • Flushing    • 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    • Gastroesophageal reflux disease    • H/O tubal ligation    • Hx of echocardiogram      w/ color flow, and w/o color flow   • Hyperglycemia    • Hypermetropia    • Hypertension    • Hypokalemia    • Impaired glucose tolerance      hgbalc 6.2   • Infection of skin      and /or subcutaneous tissue-around the catheter exit site   • Influenza      needs influenza immunization   • Irregular periods    • Lesion of lumbar spine      pain controlled with percocet and lidocaine patches   • Lumbago with sciatica    • Malignant neoplasm of female breast      s/p right mastectomy, mets to bone and liver on chemotherapy      • Menopausal flushing    • Muscle weakness    • Muscle weakness (generalized)    • Nonspecific interstitial pneumonia      symptomatically improved   • Pleural effusion      refractory massive right, most likely malignant effusion   • Primary atypical pneumonia    • Renal failure      acute drug-induced renal failure   • Sinus tachycardia    • Tobacco dependence syndrome    • Tobacco non-user    • Upper respiratory infection    • Wheezing      Past Surgical History   Procedure Laterality Date   • Other surgical history  05/05/2016     central line insertion , PICC line replacement   • Central venous catheter tunneled insertion single lumen       Placement of indwelling Pleurx catheter right   • Venous access device (port) insertion       Ultrasound guided right internal jugular Mediport placement under fluoroscopy   • Lung volume reduction     • Mastectomy       partial   • Pap smear     • Other surgical history       place breast clip percut   • Other surgical  "history       pulse oximetry   • Other surgical history       remove cc cath w/ sc port or pump, Removal of right internal jugular MediPort   • Other surgical history       spirometry   • Thoracentesis       aspiration of pleural space   • Other surgical history       tubal sterilization   • Hysterectomy       vaginal     Family History   Problem Relation Age of Onset   • Coronary artery disease Other    • Diabetes Other    • Hypertension Other      OB History     No data available        Allergies   Allergen Reactions   • Lisinopril Shortness Of Breath   • Flagyl [Metronidazole] Other (See Comments)     pancreatitis   • Fentanyl Anxiety     \"goes out of her mind\"   • Latex Rash     Social History     Social History   • Marital status:      Spouse name: N/A   • Number of children: N/A   • Years of education: N/A     Social History Main Topics   • Smoking status: Former Smoker   • Smokeless tobacco: Never Used   • Alcohol use No   • Drug use: No   • Sexual activity: Defer     Other Topics Concern   • None     Social History Narrative       Current Outpatient Prescriptions:   •  benzonatate (TESSALON) 100 MG capsule, Take 1 capsule by mouth 3 (Three) Times a Day As Needed for cough., Disp: 90 capsule, Rfl: 5  •  calcium carbonate (OS-ONELIA) 1250 (500 CA) MG tablet, TAKE 1 TABLET BY MOUTH THREE TIMES DAILY, Disp: 90 tablet, Rfl: 0  •  CARTIA  MG 24 hr capsule, Take 1 capsule by mouth Daily., Disp: 30 capsule, Rfl: 5  •  celecoxib (CeleBREX) 200 MG capsule, Take 1 capsule by mouth Daily., Disp: 90 capsule, Rfl: 5  •  cetirizine (zyrTEC) 10 MG tablet, Take 1 tablet by mouth Daily., Disp: 30 tablet, Rfl: 5  •  clonazePAM (KlonoPIN) 2 MG tablet, Take 1 tablet by mouth 2 (Two) Times a Day As Needed for anxiety., Disp: 60 tablet, Rfl: 2  •  ferrous sulfate 325 (65 FE) MG tablet, Take 1 tablet by mouth 3 (Three) Times a Day., Disp: 90 tablet, Rfl: 5  •  fluticasone (FLONASE) 50 MCG/ACT nasal spray, 2 sprays into " "each nostril Daily., Disp: 16 g, Rfl: 12  •  LEVEMIR 100 UNIT/ML injection, INJECT 8 UNITS UNDER THE SKIN EVERY NIGHT AT BEDTIME., Disp: 10 mL, Rfl: 0  •  magnesium oxide (MAGNESIUM-OXIDE) 400 (241.3 MG) MG tablet tablet, Take 1 tablet by mouth 2 (Two) Times a Day., Disp: 60 tablet, Rfl: 11  •  montelukast (SINGULAIR) 10 MG tablet, Take 1 tablet by mouth Every Night., Disp: 30 tablet, Rfl: 5  •  Naloxegol Oxalate (MOVANTIK) 25 MG tablet, Take 25 mg by mouth Daily., Disp: 30 tablet, Rfl: 5  •  oxyCODONE-acetaminophen (PERCOCET)  MG per tablet, Take 1 tablet by mouth Every 8 (Eight) Hours As Needed for moderate pain (4-6)., Disp: 90 tablet, Rfl: 0  •  pantoprazole (PROTONIX) 40 MG EC tablet, Take 1 tablet by mouth Every Night., Disp: 90 tablet, Rfl: 3  •  polyethylene glycol (MIRALAX) powder, Take 3,350 g by mouth Daily., Disp: , Rfl:   •  potassium chloride (K-DUR,KLOR-CON) 20 MEQ CR tablet, TAKE 1 TABLET BY MOUTH TWICE DAILY, Disp: 60 tablet, Rfl: 0  •  promethazine (PHENERGAN) 25 MG tablet, Take 1 tablet by mouth Every 6 (Six) Hours As Needed for nausea or vomiting., Disp: 120 tablet, Rfl: 5  •  raNITIdine (ZANTAC) 150 MG tablet, TK 1 T PO BID, Disp: , Rfl: 1  •  venlafaxine XR (EFFEXOR-XR) 37.5 MG 24 hr capsule, Take 1 capsule by mouth Daily., Disp: 30 capsule, Rfl: 5  Review of Systems  Review of Systems   HENT: Negative for trouble swallowing.    Gastrointestinal: Positive for abdominal pain, anal bleeding, blood in stool, nausea and vomiting.   All other systems reviewed and are negative.         Objective      Visit Vitals   • /85 (BP Location: Left arm)   • Pulse 91   • Ht 65\" (165.1 cm)   • Wt 218 lb (98.9 kg)   • BMI 36.28 kg/m2     Physical Exam   Constitutional: She is oriented to person, place, and time. She appears well-developed and well-nourished. No distress.   PICC line in left. Chronically ill appearing   HENT:   Head: Normocephalic and atraumatic.   Eyes: EOM are normal. Pupils are " equal, round, and reactive to light.   Neck: Normal range of motion.   Cardiovascular: Normal rate, regular rhythm and normal heart sounds.    Pulmonary/Chest: Effort normal and breath sounds normal.   Abdominal: Soft. Bowel sounds are normal. She exhibits no shifting dullness, no distension, no abdominal bruit, no ascites and no mass. There is no hepatosplenomegaly. There is tenderness. There is no rigidity, no rebound, no guarding and no CVA tenderness. No hernia. Hernia confirmed negative in the ventral area.   Obese, mild diffuse, moderate epigastric, inferior to umbilicus   Musculoskeletal: Normal range of motion.   Neurological: She is alert and oriented to person, place, and time.   Skin: Skin is warm and dry.   Psychiatric: She has a normal mood and affect. Her behavior is normal. Judgment and thought content normal.   Nursing note and vitals reviewed.    Admission on 01/16/2017, Discharged on 01/16/2017   Component Date Value Ref Range Status   • WBC 01/16/2017 7.0  3.2 - 9.8 x1000/uL Final   • RBC 01/16/2017 3.86  3.77 - 5.16 mellissa/mm3 Final   • Hemoglobin 01/16/2017 11.3* 12.0 - 15.5 gm/dl Final   • Hematocrit 01/16/2017 34.2* 35.0 - 45.0 % Final   • MCV 01/16/2017 88.6  80.0 - 98.0 fl Final   • MCH 01/16/2017 29.3  26.0 - 34.0 pg Final   • MCHC 01/16/2017 33.0  31.4 - 36.0 gm/dl Final   • RDW 01/16/2017 14.5  11.5 - 14.5 % Final   • Platelets 01/16/2017 193  150 - 450 x1000/mm3 Final   • MPV 01/16/2017 12.1* 8.0 - 12.0 fl Final   • Neutrophil Rel % 01/16/2017 71.9  37.0 - 80.0 % Final   • Lymphocyte Rel % 01/16/2017 21.8  10.0 - 50.0 % Final   • Monocyte Rel % 01/16/2017 5.0  0.0 - 12.0 % Final   • Eosinophil Rel % 01/16/2017 0.6  0.0 - 7.0 % Final   • Basophil Rel % 01/16/2017 0.3  0.0 - 2.0 % Final   • Immature Granulocyte Rel % 01/16/2017 0.40  0.00 - 0.50 % Final   • Neutrophils Absolute 01/16/2017 5.06  2.00 - 8.60 x1000/uL Final   • Lymphocytes Absolute 01/16/2017 1.53  0.60 - 4.20 x1000/uL Final    • Monocytes Absolute 01/16/2017 0.35  0.00 - 0.90 x1000/uL Final   • Eosinophils Absolute 01/16/2017 0.04  0.00 - 0.70 x1000/uL Final   • Basophils Absolute 01/16/2017 0.02  0.00 - 0.20 x1000/uL Final   • Immature Granulocytes Absolute 01/16/2017 0.030* 0.005 - 0.022 x1000/uL Final   • nRBC 01/16/2017 0.0  0.0 - 0.2 % Final   • nRBC 01/16/2017 0.000  x1000/uL Final   • Sodium 01/16/2017 140  137 - 145 mmol/L Final   • Potassium 01/16/2017 3.3* 3.5 - 5.1 mmol/L Final   • Chloride 01/16/2017 101  95 - 110 mmol/L Final   • CO2 01/16/2017 31  22 - 31 mmol/L Final   • Anion Gap 01/16/2017 8.0  5.0 - 15.0 mmol/L Final   • Glucose 01/16/2017 111* 60 - 100 mg/dl Final   • BUN 01/16/2017 16  7 - 21 mg/dl Final   • Creatinine 01/16/2017 1.0  0.5 - 1.0 mg/dl Final   • GFR MDRD Non  01/16/2017 61  58 - 135 mL/min/1.73 sq.M Final    Comment: Invalid if creatinine is changing or the patient is on dialysis. Use AA  result if patient is -American, non AA result otherwise.     • GFR MDRD  01/16/2017 74  58 - 135 mL/min/1.73 sq.M Final   • Calcium 01/16/2017 7.7* 8.4 - 10.2 mg/dl Final   • Total Protein 01/16/2017 5.9* 6.3 - 8.6 gm/dl Final   • Albumin 01/16/2017 3.2* 3.4 - 4.8 gm/dl Final   • Total Bilirubin 01/16/2017 0.4  0.2 - 1.3 mg/dl Final   • Alkaline Phosphatase 01/16/2017 85  38 - 126 U/L Final   • AST (SGOT) 01/16/2017 33  14 - 36 U/L Final   • ALT (SGPT) 01/16/2017 44  9 - 52 U/L Final   • Lipase 01/16/2017 22* 23 - 300 U/L Final   • DOES A PREVIOUS ABORH EXIST? 01/16/2017 PREVIOUS TYPE ON FILE   Final   • ABO Type 01/16/2017 A   Final   • RH type 01/16/2017 POS   Final   • Antibody Screen Interpretation 01/16/2017 NEG   Final   • Protime 01/16/2017 15.3  11.1 - 15.3 seconds Final   • INR 01/16/2017 1.2  0.8 - 1.2 Final    Comment: Therapeutic range for most indications is 2.0 - 3.0 INR or 2.5 - 3.5 for  mechanical   heart valves.     • PTT 01/16/2017 30.9  20.0 - 40.3 seconds  Final    The recommended Heparin therapeutic range is 68 - 97 seconds.   • Color, UA 01/16/2017 YELLOW  STRAW,YELLOW,DK YELLOW,ROGER Final   • Appearance 01/16/2017 CLEAR  CLEAR Final   • Specific Gravity, UA 01/16/2017 1.016  1.003 - 1.030 Final   • pH, UA 01/16/2017 7.5  5.0 - 9.0 pH Units Final    pH Normal: 5.0 - 9.0    • Leukocytes, UA 01/16/2017 NEGATIVE  NEGATIVE Final   • Nitrite, UA 01/16/2017 NEGATIVE  NEGATIVE Final   • Protein, UA 01/16/2017 NEGATIVE  NEGATIVE Final   • Glucose, Urine 01/16/2017 NEGATIVE  NEGATIVE mg/dl Final   • Ketones, UA 01/16/2017 NEGATIVE  NEGATIVE Final   • Urobilinogen, UA 01/16/2017 0.2  0.2 EU/dl Final   • Blood, UA 01/16/2017 NEGATIVE  NEGATIVE Final     Assessment/Plan      1. Constipation due to opioid therapy    2. Iron deficiency    3. Hemorrhage of anus and rectum    4. Gastroesophageal reflux disease, esophagitis presence not specified    5. Abnormal findings on diagnostic imaging of abdomen    6. Other cirrhosis of liver    7. Fatty (change of) liver, not elsewhere classified     8. Disorder of fatty-acid metabolism     .   Andra was seen today for rectal bleeding, nausea, vomiting and diarrhea.    Diagnoses and all orders for this visit:    Constipation due to opioid therapy  -     Case request  -     polyethylene glycol (MIRALAX) powder; Take 17 g by mouth 1 (One) Time for 1 dose. Instructions per instruction sheet.    Iron deficiency  -     Case request  -     Iron and TIBC  -     Ferritin    Hemorrhage of anus and rectum  -     Case request  -     Iron and TIBC  -     Ferritin    Gastroesophageal reflux disease, esophagitis presence not specified  -     Case request    Abnormal findings on diagnostic imaging of abdomen  Comments:  sigmoid colitis  Orders:  -     Case request  -     Hepatitis Panel, Acute  -     BOWIE Fibrosure  -     Nuclear Antigen Antibody, IFA  -     Anti-Smooth Muscle Antibody Titer  -     Ceruloplasmin  -     Alpha - 1 - Antitrypsin    Other  cirrhosis of liver  -     Hepatitis Panel, Acute  -     BOWIE Fibrosure  -     Nuclear Antigen Antibody, IFA  -     Anti-Smooth Muscle Antibody Titer  -     Ceruloplasmin  -     Alpha - 1 - Antitrypsin  -     Iron and TIBC  -     Ferritin    Fatty (change of) liver, not elsewhere classified   -     BOWIE Fibrosure    Disorder of fatty-acid metabolism   -     BOWIE Fibrosure        Orders placed during this encounter include:  Orders Placed This Encounter   Procedures   • Hepatitis Panel, Acute   • BOWIE Fibrosure   • Nuclear Antigen Antibody, IFA   • Anti-Smooth Muscle Antibody Titer   • Ceruloplasmin   • Alpha - 1 - Antitrypsin   • Iron and TIBC   • Ferritin       Medications prescribed:  New Medications Ordered This Visit   Medications   • polyethylene glycol (MIRALAX) powder     Sig: Take 17 g by mouth 1 (One) Time for 1 dose. Instructions per instruction sheet.     Dispense:  250 g     Refill:  0     Discontinued Medications       Reason for Discontinue    potassium chloride (K-DUR,KLOR-CON) 20 MEQ CR tablet Duplicate order        Requested Prescriptions     Signed Prescriptions Disp Refills   • polyethylene glycol (MIRALAX) powder 250 g 0     Sig: Take 17 g by mouth 1 (One) Time for 1 dose. Instructions per instruction sheet.       Review and/or summary of lab tests, radiology, procedures, medications. Review and summary of old records and obtaining of history. The risks and benefits of my recommendations, as well as other treatment options were discussed with the patient today. Questions were answered.    Follow-up: Return if symptoms worsen or fail to improve, for After the above.           This document has been electronically signed by Jordin Oliveira PA-C on January 25, 2017 5:58 PM      Results for orders placed or performed in visit on 01/23/17   Iron and TIBC   Result Value Ref Range    Iron 19 (L) 37 - 170 mcg/dL    TIBC 304 265 - 497 mcg/dL    Iron Saturation 6 (L) 15 - 50 %   Ferritin   Result Value  Ref Range    Ferritin 51.50 6.20 - 137.00 ng/mL   Results for orders placed or performed during the hospital encounter of 01/16/17   Urinalysis   Result Value Ref Range    Color, UA YELLOW STRAW,YELLOW,DK YELLOW,ROGER    Appearance CLEAR CLEAR    Specific Gravity, UA 1.016 1.003 - 1.030    pH, UA 7.5 5.0 - 9.0 pH Units    Leukocytes, UA NEGATIVE NEGATIVE    Nitrite, UA NEGATIVE NEGATIVE    Protein, UA NEGATIVE NEGATIVE    Glucose, Urine NEGATIVE NEGATIVE mg/dl    Ketones, UA NEGATIVE NEGATIVE    Urobilinogen, UA 0.2 0.2 EU/dl    Blood, UA NEGATIVE NEGATIVE   aPTT   Result Value Ref Range    PTT 30.9 20.0 - 40.3 seconds   Protime-INR   Result Value Ref Range    Protime 15.3 11.1 - 15.3 seconds    INR 1.2 0.8 - 1.2   CBC & Differential   Result Value Ref Range    WBC 7.0 3.2 - 9.8 x1000/uL    RBC 3.86 3.77 - 5.16 mellissa/mm3    Hemoglobin 11.3 (L) 12.0 - 15.5 gm/dl    Hematocrit 34.2 (L) 35.0 - 45.0 %    MCV 88.6 80.0 - 98.0 fl    MCH 29.3 26.0 - 34.0 pg    MCHC 33.0 31.4 - 36.0 gm/dl    RDW 14.5 11.5 - 14.5 %    Platelets 193 150 - 450 x1000/mm3    MPV 12.1 (H) 8.0 - 12.0 fl    Neutrophil Rel % 71.9 37.0 - 80.0 %    Lymphocyte Rel % 21.8 10.0 - 50.0 %    Monocyte Rel % 5.0 0.0 - 12.0 %    Eosinophil Rel % 0.6 0.0 - 7.0 %    Basophil Rel % 0.3 0.0 - 2.0 %    Immature Granulocyte Rel % 0.40 0.00 - 0.50 %    Neutrophils Absolute 5.06 2.00 - 8.60 x1000/uL    Lymphocytes Absolute 1.53 0.60 - 4.20 x1000/uL    Monocytes Absolute 0.35 0.00 - 0.90 x1000/uL    Eosinophils Absolute 0.04 0.00 - 0.70 x1000/uL    Basophils Absolute 0.02 0.00 - 0.20 x1000/uL    Immature Granulocytes Absolute 0.030 (H) 0.005 - 0.022 x1000/uL    nRBC 0.0 0.0 - 0.2 %    nRBC 0.000 x1000/uL   Type & Screen   Result Value Ref Range    DOES A PREVIOUS ABORH EXIST? PREVIOUS TYPE ON FILE     ABO Type A     RH type POS     Antibody Screen Interpretation NEG    Lipase   Result Value Ref Range    Lipase 22 (L) 23 - 300 U/L   Comprehensive Metabolic Panel    Result Value Ref Range    Sodium 140 137 - 145 mmol/L    Potassium 3.3 (L) 3.5 - 5.1 mmol/L    Chloride 101 95 - 110 mmol/L    CO2 31 22 - 31 mmol/L    Anion Gap 8.0 5.0 - 15.0 mmol/L    Glucose 111 (H) 60 - 100 mg/dl    BUN 16 7 - 21 mg/dl    Creatinine 1.0 0.5 - 1.0 mg/dl    GFR MDRD Non  61 58 - 135 mL/min/1.73 sq.M    GFR MDRD  74 58 - 135 mL/min/1.73 sq.M    Calcium 7.7 (L) 8.4 - 10.2 mg/dl    Total Protein 5.9 (L) 6.3 - 8.6 gm/dl    Albumin 3.2 (L) 3.4 - 4.8 gm/dl    Total Bilirubin 0.4 0.2 - 1.3 mg/dl    Alkaline Phosphatase 85 38 - 126 U/L    AST (SGOT) 33 14 - 36 U/L    ALT (SGPT) 44 9 - 52 U/L   Results for orders placed or performed during the hospital encounter of 01/01/17   CBC & Differential   Result Value Ref Range    WBC 6.7 3.2 - 9.8 x1000/uL    RBC 3.71 (L) 3.77 - 5.16 mellissa/mm3    Hemoglobin 11.0 (L) 12.0 - 15.5 gm/dl    Hematocrit 33.0 (L) 35.0 - 45.0 %    MCV 88.9 80.0 - 98.0 fl    MCH 29.6 26.0 - 34.0 pg    MCHC 33.3 31.4 - 36.0 gm/dl    RDW 14.1 11.5 - 14.5 %    Platelets 168 150 - 450 x1000/mm3    MPV 11.4 8.0 - 12.0 fl    Neutrophil Rel % 69.3 37.0 - 80.0 %    Lymphocyte Rel % 21.9 10.0 - 50.0 %    Monocyte Rel % 6.2 0.0 - 12.0 %    Eosinophil Rel % 1.7 0.0 - 7.0 %    Basophil Rel % 0.3 0.0 - 2.0 %    Immature Granulocyte Rel % 0.60 (H) 0.00 - 0.50 %    Neutrophils Absolute 4.62 2.00 - 8.60 x1000/uL    Lymphocytes Absolute 1.46 0.60 - 4.20 x1000/uL    Monocytes Absolute 0.41 0.00 - 0.90 x1000/uL    Eosinophils Absolute 0.11 0.00 - 0.70 x1000/uL    Basophils Absolute 0.02 0.00 - 0.20 x1000/uL    Immature Granulocytes Absolute 0.040 (H) 0.005 - 0.022 x1000/uL    nRBC 0.0 0.0 - 0.2 %    nRBC 0.000 x1000/uL   CBC & Differential   Result Value Ref Range    WBC 11.8 (H) 3.2 - 9.8 x1000/uL    RBC 4.44 3.77 - 5.16 mellissa/mm3    Hemoglobin 13.1 12.0 - 15.5 gm/dl    Hematocrit 38.3 35.0 - 45.0 %    MCV 86.3 80.0 - 98.0 fl    MCH 29.5 26.0 - 34.0 pg    MCHC 34.2 31.4 -  36.0 gm/dl    RDW 14.4 11.5 - 14.5 %    Platelets 231 150 - 450 x1000/mm3    MPV 11.2 8.0 - 12.0 fl    Neutrophil Rel % 67.7 37.0 - 80.0 %    Lymphocyte Rel % 22.2 10.0 - 50.0 %    Monocyte Rel % 8.6 0.0 - 12.0 %    Eosinophil Rel % 0.2 0.0 - 7.0 %    Basophil Rel % 0.3 0.0 - 2.0 %    Immature Granulocyte Rel % 1.00 (H) 0.00 - 0.50 %    Neutrophils Absolute 7.99 2.00 - 8.60 x1000/uL    Lymphocytes Absolute 2.62 0.60 - 4.20 x1000/uL    Monocytes Absolute 1.01 (H) 0.00 - 0.90 x1000/uL    Eosinophils Absolute 0.02 0.00 - 0.70 x1000/uL    Basophils Absolute 0.03 0.00 - 0.20 x1000/uL    Immature Granulocytes Absolute 0.120 (H) 0.005 - 0.022 x1000/uL    nRBC 0.0 0.0 - 0.2 %    nRBC 0.000 x1000/uL     *Note: Due to a large number of results and/or encounters for the requested time period, some results have not been displayed. A complete set of results can be found in Results Review.

## 2017-01-23 NOTE — MR AVS SNAPSHOT
Andra Villareal   1/23/2017 2:15 PM   Office Visit    Dept Phone:  [phone]   Encounter #:  [id number]    Provider:  Jordin Oliveira PA-C   Department:  [facility] GASTROENTEROLOGY                Your Full Care Plan              Today's Medication Changes          These changes are accurate as of: 1/23/17  3:20 PM.  If you have any questions, ask your nurse or doctor.               Medication(s)that have changed:     * polyethylene glycol powder   Commonly known as:  MIRALAX   Take 3,350 g by mouth Daily.   What changed:  Another medication with the same name was added. Make sure you understand how and when to take each.   Changed by:  Rosi Brown MD       * polyethylene glycol powder   Commonly known as:  MIRALAX   Take 17 g by mouth 1 (One) Time for 1 dose. Instructions per instruction sheet.   What changed:  You were already taking a medication with the same name, and this prescription was added. Make sure you understand how and when to take each.   Changed by:  Jordin Oliveira PA-C       potassium chloride 20 MEQ CR tablet   Commonly known as:  K-DUR,KLOR-CON   TAKE 1 TABLET BY MOUTH TWICE DAILY   What changed:  Another medication with the same name was removed. Continue taking this medication, and follow the directions you see here.   Changed by:  Rosi Brown MD       * Notice:  This list has 2 medication(s) that are the same as other medications prescribed for you. Read the directions carefully, and ask your doctor or other care provider to review them with you.         Where to Get Your Medications      These medications were sent to [facility] [id number] [place], [place] [address] [facility] [phone] [facility] [phone]   [address] [facility] [address]     Phone:  [phone]     polyethylene glycol powder                  Your Updated Medication List          This list is accurate as of:  1/23/17  3:20 PM.  Always use your most recent med list.                benzonatate 100 MG capsule   Commonly known as:  TESSALON   Take 1 capsule by mouth 3 (Three) Times a Day As Needed for cough.       calcium carbonate 1250 (500 CA) MG tablet   Commonly known as:  OS-ONELIA   TAKE 1 TABLET BY MOUTH THREE TIMES DAILY       CARTIA  MG 24 hr capsule   Generic drug:  diltiaZEM CD   Take 1 capsule by mouth Daily.       celecoxib 200 MG capsule   Commonly known as:  CeleBREX   Take 1 capsule by mouth Daily.       cetirizine 10 MG tablet   Commonly known as:  zyrTEC   Take 1 tablet by mouth Daily.       clonazePAM 2 MG tablet   Commonly known as:  KLONOPIN   Take 1 tablet by mouth 2 (Two) Times a Day As Needed for anxiety.       ferrous sulfate 325 (65 FE) MG tablet   Take 1 tablet by mouth 3 (Three) Times a Day.       fluticasone 50 MCG/ACT nasal spray   Commonly known as:  FLONASE   2 sprays into each nostril Daily.       LEVEMIR 100 UNIT/ML injection   Generic drug:  insulin detemir   INJECT 8 UNITS UNDER THE SKIN EVERY NIGHT AT BEDTIME.       magnesium oxide 400 (241.3 MG) MG tablet tablet   Commonly known as:  MAGNESIUM-OXIDE   Take 1 tablet by mouth 2 (Two) Times a Day.       montelukast 10 MG tablet   Commonly known as:  SINGULAIR   Take 1 tablet by mouth Every Night.       Naloxegol Oxalate 25 MG tablet   Commonly known as:  MOVANTIK   Take 25 mg by mouth Daily.       oxyCODONE-acetaminophen  MG per tablet   Commonly known as:  PERCOCET   Take 1 tablet by mouth Every 8 (Eight) Hours As Needed for moderate pain (4-6).       pantoprazole 40 MG EC tablet   Commonly known as:  PROTONIX   Take 1 tablet by mouth Every Night.       * polyethylene glycol powder   Commonly known as:  MIRALAX       * polyethylene glycol powder   Commonly known as:  MIRALAX   Take 17 g by mouth 1 (One) Time for 1 dose. Instructions per instruction sheet.       potassium chloride 20 MEQ CR tablet   Commonly known as:   K-DUR,KLOR-CON   TAKE 1 TABLET BY MOUTH TWICE DAILY       promethazine 25 MG tablet   Commonly known as:  PHENERGAN   Take 1 tablet by mouth Every 6 (Six) Hours As Needed for nausea or vomiting.       raNITIdine 150 MG tablet   Commonly known as:  ZANTAC       venlafaxine XR 37.5 MG 24 hr capsule   Commonly known as:  EFFEXOR-XR   Take 1 capsule by mouth Daily.       XARELTO 20 MG tablet   Generic drug:  rivaroxaban       * Notice:  This list has 2 medication(s) that are the same as other medications prescribed for you. Read the directions carefully, and ask your doctor or other care provider to review them with you.            We Performed the Following     [place] - 1 - Antitrypsin     Anti-Smooth Muscle Antibody Titer     Case request     Ceruloplasmin     Ferritin     Hepatitis Panel, Acute     Iron and TIBC     BOWIE Fibrosure     Nuclear Antigen Antibody, IFA       You Were Diagnosed With        Codes Comments    Constipation due to opioid therapy    -  Primary ICD-10-CM: K59.03, T40.2X5A  ICD-9-CM: 564.09, E935.2     Iron deficiency     ICD-10-CM: E61.1  ICD-9-CM: 280.9     Hemorrhage of anus and rectum     ICD-10-CM: K62.5  ICD-9-CM: 569.3     Gastroesophageal reflux disease, esophagitis presence not specified     ICD-10-CM: K21.9  ICD-9-CM: 530.81     Abnormal findings on diagnostic imaging of abdomen     ICD-10-CM: R93.5  ICD-9-CM: 793.6 sigmoid colitis    Other cirrhosis of liver     ICD-10-CM: K74.69  ICD-9-CM: 571.5     Fatty (change of) liver, not elsewhere classified     ICD-10-CM: K76.0  ICD-9-CM: 571.8     Disorder of fatty-acid metabolism     ICD-10-CM: E71.30  ICD-9-CM: 277.85       Instructions     None    Patient Instructions History      Upcoming Appointments     Visit Type Date Time Department    NEW PATIENT 1/23/2017  2:15 PM [facility] GASTROENT  [facility]    FOLLOW UP 1/25/2017  9:15 AM [facility] PC [facility]    LAB 1/26/2017 11:00 AM  [facility] OP INFUSION    LAB 2/2/2017 11:00 AM  [facility] OP INFUSION    CT [facility]AD ABD  PELVIS W CONTRAST 2/2/2017  8:00 AM  MAD CT    CT MAD CHEST W CONTRAST 2/2/2017  9:30 AM  MAD CT    FOLLOW UP 2/9/2017  9:00 AM [facility] ONC [place]    LAB 2/9/2017  8:30 AM  [facility] OP INFUSION    INFUSION 3 HR 2/9/2017  9:30 AM  [facility] OP INFUSION      MyChart Signup     Our records indicate that you have declined [facility]. If you would like to sign up for MyChart, please email [email or web address] or call [phone] to obtain an activation code.             Other Info from Your Visit           Your Appointments     Jan 25, 2017  9:15 AM CST   Follow Up with Rosi Brown MD   [facility] MEDICAL [facility] PRIMARY [facility] (--)    [address] [place] [address] [place] [address] [phone]           Arrive 15 minutes prior to appointment.            Jan 26, 2017 11:00 AM CST   LAB with  [facility] OP INFU PROCEDURE CHAIR   [facility] OP INFUSION ([place])    [address] [facility] [address] [phone]            Feb 02, 2017  8:00 AM CST   CT mad abd pelvis w contrast with MAD CT 1   [facility] CT ([place])    [address] [facility] [address] [phone]           NPO 4 HOURS PRIOR TO EXAM            Feb 02, 2017  9:30 AM CST   CT mad chest w contrast with MAD CT 1   [facility] CT ([place])    [address] [facility] [address] [phone]           NPO 4 HOURS PRIOR TO EXAM            Feb 02, 2017 11:00 AM CST   LAB with  [facility] OP INFU PROCEDURE CHAIR   [facility] OP INFUSION ([place])    [address] [facility] [address] [phone]              Allergies     Fentanyl      Flagyl [Metronidazole]      Latex      Lisinopril        Reason for Visit     Rectal Bleeding     Nausea     Vomiting     Diarrhea           Vital Signs     Blood Pressure Pulse Height Weight Body Mass Index Smoking Status    140/85 (BP Location: Left arm)  "91 65\" (165.1 cm) 218 lb (98.9 kg) 36.28 kg/m2 Former Smoker      Problems and Diagnoses Noted     Constipation due to opioid therapy    Iron deficiency    Hemorrhage of anus and rectum        Acid reflux disease        Abnormal findings on diagnostic imaging of abdomen        Other cirrhosis of liver        Fatty (change of) liver, not elsewhere classified        Disorder of fatty-acid metabolism            "

## 2017-01-25 ENCOUNTER — APPOINTMENT (OUTPATIENT)
Dept: LAB | Facility: HOSPITAL | Age: 42
End: 2017-01-25

## 2017-01-25 LAB
FERRITIN SERPL-MCNC: 51.5 NG/ML (ref 6.2–137)
IRON 24H UR-MRATE: 19 MCG/DL (ref 37–170)
IRON SATN MFR SERPL: 6 % (ref 15–50)
TIBC SERPL-MCNC: 304 MCG/DL (ref 265–497)

## 2017-01-25 PROCEDURE — 83550 IRON BINDING TEST: CPT | Performed by: PHYSICIAN ASSISTANT

## 2017-01-25 PROCEDURE — 36415 COLL VENOUS BLD VENIPUNCTURE: CPT | Performed by: PHYSICIAN ASSISTANT

## 2017-01-25 PROCEDURE — 82977 ASSAY OF GGT: CPT | Performed by: PHYSICIAN ASSISTANT

## 2017-01-25 PROCEDURE — 82947 ASSAY GLUCOSE BLOOD QUANT: CPT | Performed by: PHYSICIAN ASSISTANT

## 2017-01-25 PROCEDURE — 83883 ASSAY NEPHELOMETRY NOT SPEC: CPT | Performed by: PHYSICIAN ASSISTANT

## 2017-01-25 PROCEDURE — 82390 ASSAY OF CERULOPLASMIN: CPT | Performed by: PHYSICIAN ASSISTANT

## 2017-01-25 PROCEDURE — 83010 ASSAY OF HAPTOGLOBIN QUANT: CPT | Performed by: PHYSICIAN ASSISTANT

## 2017-01-25 PROCEDURE — 84460 ALANINE AMINO (ALT) (SGPT): CPT | Performed by: PHYSICIAN ASSISTANT

## 2017-01-25 PROCEDURE — 86038 ANTINUCLEAR ANTIBODIES: CPT | Performed by: PHYSICIAN ASSISTANT

## 2017-01-25 PROCEDURE — 80074 ACUTE HEPATITIS PANEL: CPT | Performed by: PHYSICIAN ASSISTANT

## 2017-01-25 PROCEDURE — 83516 IMMUNOASSAY NONANTIBODY: CPT | Performed by: PHYSICIAN ASSISTANT

## 2017-01-25 PROCEDURE — 83540 ASSAY OF IRON: CPT | Performed by: PHYSICIAN ASSISTANT

## 2017-01-25 PROCEDURE — 82103 ALPHA-1-ANTITRYPSIN TOTAL: CPT | Performed by: PHYSICIAN ASSISTANT

## 2017-01-25 PROCEDURE — 82728 ASSAY OF FERRITIN: CPT | Performed by: PHYSICIAN ASSISTANT

## 2017-01-25 PROCEDURE — 82247 BILIRUBIN TOTAL: CPT | Performed by: PHYSICIAN ASSISTANT

## 2017-01-25 PROCEDURE — 84450 TRANSFERASE (AST) (SGOT): CPT | Performed by: PHYSICIAN ASSISTANT

## 2017-01-25 PROCEDURE — 84478 ASSAY OF TRIGLYCERIDES: CPT | Performed by: PHYSICIAN ASSISTANT

## 2017-01-25 PROCEDURE — 82465 ASSAY BLD/SERUM CHOLESTEROL: CPT | Performed by: PHYSICIAN ASSISTANT

## 2017-01-26 ENCOUNTER — INFUSION (OUTPATIENT)
Dept: ONCOLOGY | Facility: HOSPITAL | Age: 42
End: 2017-01-26

## 2017-01-26 ENCOUNTER — ANESTHESIA (OUTPATIENT)
Dept: GASTROENTEROLOGY | Facility: HOSPITAL | Age: 42
End: 2017-01-26

## 2017-01-26 ENCOUNTER — HOSPITAL ENCOUNTER (OUTPATIENT)
Facility: HOSPITAL | Age: 42
Setting detail: HOSPITAL OUTPATIENT SURGERY
Discharge: HOME | End: 2017-01-26
Attending: INTERNAL MEDICINE | Admitting: INTERNAL MEDICINE

## 2017-01-26 ENCOUNTER — ANESTHESIA EVENT (OUTPATIENT)
Dept: GASTROENTEROLOGY | Facility: HOSPITAL | Age: 42
End: 2017-01-26

## 2017-01-26 VITALS
WEIGHT: 211.86 LBS | HEART RATE: 80 BPM | BODY MASS INDEX: 36.17 KG/M2 | OXYGEN SATURATION: 98 % | RESPIRATION RATE: 18 BRPM | SYSTOLIC BLOOD PRESSURE: 122 MMHG | DIASTOLIC BLOOD PRESSURE: 69 MMHG | HEIGHT: 64 IN | TEMPERATURE: 97.5 F

## 2017-01-26 DIAGNOSIS — E61.1 DIETARY IRON DEFICIENCY: ICD-10-CM

## 2017-01-26 DIAGNOSIS — K59.03 CONSTIPATION DUE TO OPIOID THERAPY: ICD-10-CM

## 2017-01-26 DIAGNOSIS — K21.9 ACID REFLUX DISEASE WITH ULCER: ICD-10-CM

## 2017-01-26 DIAGNOSIS — C79.51 BREAST CANCER METASTASIZED TO BONE, LEFT (HCC): Primary | ICD-10-CM

## 2017-01-26 DIAGNOSIS — C50.912 BREAST CANCER METASTASIZED TO BONE, LEFT (HCC): Primary | ICD-10-CM

## 2017-01-26 DIAGNOSIS — T40.2X5A CONSTIPATION DUE TO OPIOID THERAPY: ICD-10-CM

## 2017-01-26 LAB
A1AT SERPL-MCNC: 156 MG/DL (ref 90–200)
ACTIN IGG SERPL-ACNC: 4 UNITS (ref 0–19)
ANA SER QL IA: NEGATIVE
CERULOPLASMIN SERPL-MCNC: 42 MG/DL (ref 19–39)
GLUCOSE BLDC GLUCOMTR-MCNC: 80 MG/DL (ref 70–130)

## 2017-01-26 PROCEDURE — 88342 IMHCHEM/IMCYTCHM 1ST ANTB: CPT | Performed by: PATHOLOGY

## 2017-01-26 PROCEDURE — 82962 GLUCOSE BLOOD TEST: CPT

## 2017-01-26 PROCEDURE — 43239 EGD BIOPSY SINGLE/MULTIPLE: CPT | Performed by: INTERNAL MEDICINE

## 2017-01-26 PROCEDURE — 88342 IMHCHEM/IMCYTCHM 1ST ANTB: CPT | Performed by: INTERNAL MEDICINE

## 2017-01-26 PROCEDURE — 25010000002 FENTANYL CITRATE (PF) 100 MCG/2ML SOLUTION: Performed by: NURSE ANESTHETIST, CERTIFIED REGISTERED

## 2017-01-26 PROCEDURE — 88305 TISSUE EXAM BY PATHOLOGIST: CPT | Performed by: PATHOLOGY

## 2017-01-26 PROCEDURE — 88305 TISSUE EXAM BY PATHOLOGIST: CPT | Performed by: INTERNAL MEDICINE

## 2017-01-26 PROCEDURE — 25010000002 MIDAZOLAM PER 1 MG: Performed by: NURSE ANESTHETIST, CERTIFIED REGISTERED

## 2017-01-26 PROCEDURE — 45380 COLONOSCOPY AND BIOPSY: CPT | Performed by: INTERNAL MEDICINE

## 2017-01-26 PROCEDURE — 96523 IRRIG DRUG DELIVERY DEVICE: CPT

## 2017-01-26 PROCEDURE — 25010000002 PROPOFOL 10 MG/ML EMULSION: Performed by: NURSE ANESTHETIST, CERTIFIED REGISTERED

## 2017-01-26 RX ORDER — SODIUM CHLORIDE 0.9 % (FLUSH) 0.9 %
10 SYRINGE (ML) INJECTION AS NEEDED
Status: CANCELLED | OUTPATIENT
Start: 2017-01-26

## 2017-01-26 RX ORDER — PROMETHAZINE HYDROCHLORIDE 25 MG/1
25 SUPPOSITORY RECTAL ONCE AS NEEDED
Status: DISCONTINUED | OUTPATIENT
Start: 2017-01-26 | End: 2017-01-26 | Stop reason: HOSPADM

## 2017-01-26 RX ORDER — PROMETHAZINE HYDROCHLORIDE 25 MG/ML
12.5 INJECTION, SOLUTION INTRAMUSCULAR; INTRAVENOUS ONCE AS NEEDED
Status: DISCONTINUED | OUTPATIENT
Start: 2017-01-26 | End: 2017-01-26 | Stop reason: HOSPADM

## 2017-01-26 RX ORDER — DEXAMETHASONE SODIUM PHOSPHATE 4 MG/ML
8 INJECTION, SOLUTION INTRA-ARTICULAR; INTRALESIONAL; INTRAMUSCULAR; INTRAVENOUS; SOFT TISSUE ONCE AS NEEDED
Status: DISCONTINUED | OUTPATIENT
Start: 2017-01-26 | End: 2017-01-26 | Stop reason: HOSPADM

## 2017-01-26 RX ORDER — ONDANSETRON 2 MG/ML
4 INJECTION INTRAMUSCULAR; INTRAVENOUS ONCE AS NEEDED
Status: DISCONTINUED | OUTPATIENT
Start: 2017-01-26 | End: 2017-01-26 | Stop reason: SDUPTHER

## 2017-01-26 RX ORDER — MIDAZOLAM HYDROCHLORIDE 1 MG/ML
INJECTION INTRAMUSCULAR; INTRAVENOUS AS NEEDED
Status: DISCONTINUED | OUTPATIENT
Start: 2017-01-26 | End: 2017-01-26 | Stop reason: SURG

## 2017-01-26 RX ORDER — DEXTROSE AND SODIUM CHLORIDE 5; .45 G/100ML; G/100ML
20 INJECTION, SOLUTION INTRAVENOUS CONTINUOUS
Status: DISCONTINUED | OUTPATIENT
Start: 2017-01-26 | End: 2017-01-26 | Stop reason: HOSPADM

## 2017-01-26 RX ORDER — PROMETHAZINE HYDROCHLORIDE 25 MG/1
25 TABLET ORAL ONCE AS NEEDED
Status: DISCONTINUED | OUTPATIENT
Start: 2017-01-26 | End: 2017-01-26 | Stop reason: HOSPADM

## 2017-01-26 RX ORDER — SODIUM CHLORIDE 0.9 % (FLUSH) 0.9 %
10 SYRINGE (ML) INJECTION AS NEEDED
Status: DISPENSED | OUTPATIENT
Start: 2017-01-26

## 2017-01-26 RX ORDER — ONDANSETRON 2 MG/ML
4 INJECTION INTRAMUSCULAR; INTRAVENOUS ONCE AS NEEDED
Status: DISCONTINUED | OUTPATIENT
Start: 2017-01-26 | End: 2017-01-26 | Stop reason: HOSPADM

## 2017-01-26 RX ORDER — FENTANYL CITRATE 50 UG/ML
INJECTION, SOLUTION INTRAMUSCULAR; INTRAVENOUS AS NEEDED
Status: DISCONTINUED | OUTPATIENT
Start: 2017-01-26 | End: 2017-01-26 | Stop reason: SURG

## 2017-01-26 RX ORDER — LIDOCAINE HYDROCHLORIDE 10 MG/ML
INJECTION, SOLUTION INFILTRATION; PERINEURAL AS NEEDED
Status: DISCONTINUED | OUTPATIENT
Start: 2017-01-26 | End: 2017-01-26 | Stop reason: SURG

## 2017-01-26 RX ORDER — PROPOFOL 10 MG/ML
VIAL (ML) INTRAVENOUS AS NEEDED
Status: DISCONTINUED | OUTPATIENT
Start: 2017-01-26 | End: 2017-01-26 | Stop reason: SURG

## 2017-01-26 RX ADMIN — PROPOFOL 40 MG: 10 INJECTION, EMULSION INTRAVENOUS at 14:25

## 2017-01-26 RX ADMIN — LIDOCAINE HYDROCHLORIDE 60 MG: 10 INJECTION, SOLUTION INFILTRATION; PERINEURAL at 14:15

## 2017-01-26 RX ADMIN — MIDAZOLAM 2 MG: 1 INJECTION INTRAMUSCULAR; INTRAVENOUS at 14:25

## 2017-01-26 RX ADMIN — Medication 10 ML: at 11:29

## 2017-01-26 RX ADMIN — DEXTROSE AND SODIUM CHLORIDE 20 ML/HR: 5; 450 INJECTION, SOLUTION INTRAVENOUS at 14:00

## 2017-01-26 RX ADMIN — FENTANYL CITRATE 50 MCG: 50 INJECTION, SOLUTION INTRAMUSCULAR; INTRAVENOUS at 14:35

## 2017-01-26 RX ADMIN — FENTANYL CITRATE 50 MCG: 50 INJECTION, SOLUTION INTRAMUSCULAR; INTRAVENOUS at 14:25

## 2017-01-26 RX ADMIN — PROPOFOL 30 MG: 10 INJECTION, EMULSION INTRAVENOUS at 14:29

## 2017-01-26 NOTE — ANESTHESIA PREPROCEDURE EVALUATION
Anesthesia Evaluation      Airway   Mallampati: I  TM distance: >3 FB  Neck ROM: full  no difficulty expected  Dental      Pulmonary - normal exam    breath sounds clear to auscultation  (+) asthma,   Cardiovascular   (+) hypertension,     Rhythm: regular  Rate: normal    Neuro/Psych  (+) psychiatric history,    GI/Hepatic/Renal/Endo    (+)  GERD, chronic renal disease, diabetes mellitus,     Musculoskeletal     Abdominal    Substance History      OB/GYN          Other                             Anesthesia Plan    ASA 3     MAC     intravenous induction   Anesthetic plan and risks discussed with patient.

## 2017-01-26 NOTE — ANESTHESIA POSTPROCEDURE EVALUATION
Patient: Andra Villareal    Procedure Summary     Date Anesthesia Start Anesthesia Stop Room / Location    01/26/17 1431 1446 [facility] ENDOSCOPY 2 / [facility] ENDOSCOPY       Procedure Diagnosis Surgeon Provider    ESOPHAGOGASTRODUODENOSCOPY (N/A Esophagus); COLONOSCOPY (N/A ) Constipation due to opioid therapy; Dietary iron deficiency; Acid reflux disease with ulcer  (Constipation due to opioid therapy [K59.03, T40.2X5A]; Dietary iron deficiency [E61.1]; Acid reflux disease with ulcer [K21.9]) MD Lissy Alcantar CRNA          Anesthesia Type: MAC  Last vitals  /82 (01/26/17 1350)    Temp 97.8 °F (36.6 °C) (01/26/17 1350)    Pulse 78 (01/26/17 1350)   Resp 18 (01/26/17 1350)    SpO2 98 % (01/26/17 1350)      Post Anesthesia Care and Evaluation    Patient location during evaluation: bedside  Level of consciousness: awake  Airway patency: patent  Anesthetic complications: No anesthetic complications  PONV Status: NARespiratory status: acceptable

## 2017-01-26 NOTE — DISCHARGE INSTRUCTIONS
Outpatient Instructions for Monitored Anesthesia Care (MAC)    1. You will be released from the hospital in the care of a responsible adult who should remain with you for at least 6 hours.    2. You are at an increased risk for falls following anesthesia. Use care when changing positions from a lying to a sitting position.     3. You must NOT drive a car, climb high places such as ladders, or operate equipment such as electric knives, stoves, , chain saw etc...for at least 12 hours. If you are dizzy for longer than 24 hours, notify your doctor.    4. DO NOT drink any alcoholic beverages for at least 24 hours. Anesthesia may impair your judgement.     5. If you smoke, do not smoke alone due to increased risk of burns/fires.    6. DO NOT undertake any legally binding commitment for at least 24 hours. Anesthesia may impair your judgement.

## 2017-01-26 NOTE — PLAN OF CARE
Problem: Patient Care Overview (Adult)  Goal: Plan of Care Review    01/26/17 1447   Coping/Psychosocial Response Interventions   Plan Of Care Reviewed With patient   Patient Care Overview   Progress no change   Outcome Evaluation   Outcome Summary/Follow up Plan vital signs stable, pt tolerated well         Problem: GI Endoscopy (Adult)  Goal: Signs and Symptoms of Listed Potential Problems Will be Absent or Manageable (GI Endoscopy)  Outcome: Ongoing (interventions implemented as appropriate)    01/26/17 1447   GI Endoscopy   Problems Assessed (GI Endoscopy) all   Problems Present (GI Endoscopy) none

## 2017-01-26 NOTE — IP AVS SNAPSHOT
AFTER VISIT SUMMARY             Andra Villareal           About your hospitalization     You were admitted on:  January 26, 2017 You last received care in the:  [facility] END[facility]       Procedures & Surgeries      Procedure(s) (LRB):  ESOPHAGOGASTRODUODENOSCOPY (N/A)  COLONOSCOPY (N/A)     1/26/2017     Surgeon(s):  Robert Clifton MD  -------------------      Medications    If you or your caregiver advised us that you are currently taking a medication and that medication is marked below as “Resume”, this simply indicates that we have reviewed those medications to make sure our new therapy recommendations do not interfere.  If you have concerns about medications other than those new ones which we are prescribing today, please consult the physician who prescribed them (or your primary physician).  Our review of your home medications is not meant to indicate that we are directing their use.             Your Medications      CHANGE how you take these medications     LEVEMIR 100 UNIT/ML injection   INJECT 8 UNITS UNDER THE SKIN EVERY NIGHT AT BEDTIME.   According to our records, you may have been taking this medication differently.   Generic drug:  insulin detemir   What changed:  See the new instructions.             CONTINUE taking these medications     benzonatate 100 MG capsule   Take 1 capsule by mouth 3 (Three) Times a Day As Needed for cough.   Commonly known as:  TESSALON           calcium carbonate 1250 (500 CA) MG tablet   TAKE 1 TABLET BY MOUTH THREE TIMES DAILY   Commonly known as:  OS-ONELIA           CARTIA  MG 24 hr capsule   Take 1 capsule by mouth Daily.   Generic drug:  diltiaZEM CD           celecoxib 200 MG capsule   Take 1 capsule by mouth Daily.   Commonly known as:  CeleBREX           cetirizine 10 MG tablet   Take 1 tablet by mouth Daily.   Commonly known as:  zyrTEC           clonazePAM 2 MG tablet   Take 1 tablet by mouth 2 (Two) Times a Day As Needed for anxiety.    Commonly known as:  KLONOPIN           ferrous sulfate 325 (65 FE) MG tablet   Take 1 tablet by mouth 3 (Three) Times a Day.           fluticasone 50 MCG/ACT nasal spray   2 sprays into each nostril Daily.   Commonly known as:  FLONASE           magnesium oxide 400 (241.3 MG) MG tablet tablet   Take 1 tablet by mouth 2 (Two) Times a Day.   Commonly known as:  MAGNESIUM-OXIDE           montelukast 10 MG tablet   Take 1 tablet by mouth Every Night.   Commonly known as:  SINGULAIR           Naloxegol Oxalate 25 MG tablet   Take 25 mg by mouth Daily.   Commonly known as:  MOVANTIK           oxyCODONE-acetaminophen  MG per tablet   Take 1 tablet by mouth Every 8 (Eight) Hours As Needed for moderate pain (4-6).   Commonly known as:  PERCOCET           pantoprazole 40 MG EC tablet   Take 1 tablet by mouth Every Night.   Commonly known as:  PROTONIX           polyethylene glycol powder   Take 3,350 g by mouth Daily.   Commonly known as:  MIRALAX           potassium chloride 20 MEQ CR tablet   TAKE 1 TABLET BY MOUTH TWICE DAILY   Commonly known as:  K-DUR,KLOR-CON           promethazine 25 MG tablet   Take 1 tablet by mouth Every 6 (Six) Hours As Needed for nausea or vomiting.   Commonly known as:  PHENERGAN           raNITIdine 150 MG tablet   TK 1 T PO BID   Commonly known as:  ZANTAC           venlafaxine XR 37.5 MG 24 hr capsule   Take 1 capsule by mouth Daily.   Commonly known as:  EFFEXOR-XR                      Your Medications      Your Medication List           Morning Noon Evening Bedtime As Needed    benzonatate 100 MG capsule   Take 1 capsule by mouth 3 (Three) Times a Day As Needed for cough.   Commonly known as:  TESSALON                                calcium carbonate 1250 (500 CA) MG tablet   TAKE 1 TABLET BY MOUTH THREE TIMES DAILY   Commonly known as:  OS-ONELIA                                CARTIA  MG 24 hr capsule   Take 1 capsule by mouth Daily.   Generic drug:  diltiaZEM CD                                 celecoxib 200 MG capsule   Take 1 capsule by mouth Daily.   Commonly known as:  CeleBREX                                cetirizine 10 MG tablet   Take 1 tablet by mouth Daily.   Commonly known as:  zyrTEC                                clonazePAM 2 MG tablet   Take 1 tablet by mouth 2 (Two) Times a Day As Needed for anxiety.   Commonly known as:  KLONOPIN                                ferrous sulfate 325 (65 FE) MG tablet   Take 1 tablet by mouth 3 (Three) Times a Day.                                fluticasone 50 MCG/ACT nasal spray   2 sprays into each nostril Daily.   Commonly known as:  FLONASE                                LEVEMIR 100 UNIT/ML injection   INJECT 8 UNITS UNDER THE SKIN EVERY NIGHT AT BEDTIME.   Generic drug:  insulin detemir                                magnesium oxide 400 (241.3 MG) MG tablet tablet   Take 1 tablet by mouth 2 (Two) Times a Day.   Commonly known as:  MAGNESIUM-OXIDE                                montelukast 10 MG tablet   Take 1 tablet by mouth Every Night.   Commonly known as:  SINGULAIR                                Naloxegol Oxalate 25 MG tablet   Take 25 mg by mouth Daily.   Commonly known as:  MOVANTIK                                oxyCODONE-acetaminophen  MG per tablet   Take 1 tablet by mouth Every 8 (Eight) Hours As Needed for moderate pain (4-6).   Commonly known as:  PERCOCET                                pantoprazole 40 MG EC tablet   Take 1 tablet by mouth Every Night.   Commonly known as:  PROTONIX                                polyethylene glycol powder   Take 3,350 g by mouth Daily.   Commonly known as:  MIRALAX                                potassium chloride 20 MEQ CR tablet   TAKE 1 TABLET BY MOUTH TWICE DAILY   Commonly known as:  K-DUR,KLOR-CON                                promethazine 25 MG tablet   Take 1 tablet by mouth Every 6 (Six) Hours As Needed for nausea or vomiting.   Commonly known as:  PHENERGAN                                 raNITIdine 150 MG tablet   TK 1 T PO BID   Commonly known as:  ZANTAC                                venlafaxine XR 37.5 MG 24 hr capsule   Take 1 capsule by mouth Daily.   Commonly known as:  EFFEXOR-XR                                         Instructions for After Discharge          Discharge Instructions       Outpatient Instructions for Monitored Anesthesia Care (MAC)    1. You will be released from the hospital in the care of a responsible adult who should remain with you for at least 6 hours.    2. You are at an increased risk for falls following anesthesia. Use care when changing positions from a lying to a sitting position.     3. You must NOT drive a car, climb high places such as ladders, or operate equipment such as electric knives, stoves, , chain saw etc...for at least 12 hours. If you are dizzy for longer than 24 hours, notify your doctor.    4. DO NOT drink any alcoholic beverages for at least 24 hours. Anesthesia may impair your judgement.     5. If you smoke, do not smoke alone due to increased risk of burns/fires.    6. DO NOT undertake any legally binding commitment for at least 24 hours. Anesthesia may impair your judgement.     Discharge References/Attachments     GASTRITIS, ADULT, EASY-TO-READ (ENGLISH)    HEMORRHOIDS, EASY-TO-READ (ENGLISH)       Follow-ups for After Discharge        Scheduled Appointments     Feb 01, 2017 11:30 AM CST   Office Visit with Jordin Oliveira PA-C   [facility] (--)    [address] [place] [address] Dr  [facility] [address] [place] [id number] [phone]           Arrive 15 minutes prior to appointment.            Feb 02, 2017  8:00 AM CST   CT mad abd pelvis w contrast with MAD CT 1   [facility] ([address] [address] [phone]           NPO 4 HOURS PRIOR TO EXAM            Feb 02, 2017  9:30 AM CST   CT mad chest w contrast with MAD CT 1   [facility]  "[facility] ([place])    [address] [facility] [address] [phone]           NPO 4 HOURS PRIOR TO EXAM            Feb 02, 2017 11:00 AM CST   LAB with LAKISHA NÚÑEZ OP INFU PROCEDURE CHAIR   [facility] ([place])    [address] [place] [id number] [phone]            Feb 09, 2017  8:30 AM CST   LAB with NURSE LAKISHA NÚÑEZ   [facility] ([place])    [address] [place] [id number] [phone]            Feb 09, 2017  9:00 AM CST   Follow Up with Joselito Waddell MD   [facility] ([address] [address] [phone]           Arrive 15 minutes prior to appointment.            Feb 09, 2017  9:30 AM CST   INFUSION with LAKISHA NÚÑEZ OP INFU CHAIR 7   [facility] ([address] [address] [phone]              MyChart Signup     Our records indicate that you have declined [facility] MyChart signup. If you would like to sign up for [employer], please email [email or web address] or call [phone] to obtain an activation code.         Summary of Your Hospitalization        Reason for Hospitalization     Your primary diagnosis was:  Not on File      Care Providers     Provider Service Role Specialty    Robert Clifton MD -- Attending Provider Gastroenterology    Robert Clifton MD -- Surgeon  Gastroenterology      Your Allergies  Date Reviewed: 1/26/2017    Allergen Reactions    Lisinopril Shortness Of Breath         Flagyl (Metronidazole) Other (See Comments)    pancreatitis         Fentanyl Anxiety    \"goes out of her mind\"         Latex Rash      Pending Labs     Order Current Status    Tissue Exam Collected (01/26/17 1440)      Patient Belongings Returned     Document Return of Belongings Flowsheet     Were the patient bedside belongings sent " home?   --   Belongings Retrieved from Security [facility] Home   --    Belongings Sent to Safe   --   Medications Retrieved from Pharmacy [facility] Home   --              More Information      Gastritis, Adult  Gastritis is soreness and puffiness (inflammation) of the lining of the stomach. If you do not get help, gastritis can cause bleeding and sores (ulcers) in the stomach.  HOME CARE   · Only take medicine as told by your doctor.  · If you were given antibiotic medicines, take them as told. Finish the medicines even if you start to feel better.  · Drink enough fluids to keep your pee (urine) clear or pale yellow.  · Avoid foods and drinks that make your problems worse. Foods you may want to avoid include:    Caffeine or alcohol.    Chocolate.    Mint.    Garlic and onions.    Spicy foods.    Citrus fruits, including oranges, mickie, or limes.    Food containing tomatoes, including sauce, chili, salsa, and pizza.    Fried and fatty foods.  · Eat small meals throughout the day instead of large meals.  GET HELP RIGHT AWAY IF:   · You have black or dark red poop (stools).  · You throw up (vomit) blood. It may look like coffee grounds.  · You cannot keep fluids down.  · Your belly (abdominal) pain gets worse.  · You have a fever.  · You do not feel better after 1 week.  · You have any other questions or concerns.  MAKE SURE YOU:   · Understand these instructions.  · Will watch your condition.  · Will get help right away if you are not doing well or get worse.     This information is not intended to replace advice given to you by your health care provider. Make sure you discuss any questions you have with your health care provider.     Document Released: 06/05/2009 Document Revised: 03/11/2013 Document Reviewed: 01/30/2013  [employer] Interactive Patient Education ©2016 [employer]          Hemorrhoids  Hemorrhoids are puffy (swollen) veins around the rectum or anus. Hemorrhoids can cause pain, itching, bleeding, or  irritation.  HOME CARE  · Eat foods with fiber, such as whole grains, beans, nuts, fruits, and vegetables. Ask your doctor about taking products with added fiber in them (fiber supplements).   · Drink enough fluid to keep your pee (urine) clear or pale yellow.  · Exercise often.  · Go to the bathroom when you have the urge to poop. Do not wait.  · Avoid straining to poop (bowel movement).  · Keep the butt area dry and clean. Use wet toilet paper or moist paper towels.  · Medicated creams and medicine inserted into the anus (anal suppository) may be used or applied as told.  · Only take medicine as told by your doctor.  · Take a warm water bath (sitz bath) for 15-20 minutes to ease pain. Do this 3-4 times a day.  · Place ice packs on the area if it is tender or puffy. Use the ice packs between the warm water baths.    Put ice in a plastic bag.    Place a towel between your skin and the bag.    Leave the ice on for 15-20 minutes, 03-04 times a day.  · Do not use a donut-shaped pillow or sit on the toilet for a long time.  GET HELP RIGHT AWAY IF:   · You have more pain that is not controlled by treatment or medicine.  · You have bleeding that will not stop.  · You have trouble or are unable to poop (bowel movement).  · You have pain or puffiness outside the area of the hemorrhoids.  MAKE SURE YOU:   · Understand these instructions.  · Will watch your condition.  · Will get help right away if you are not doing well or get worse.     This information is not intended to replace advice given to you by your health care provider. Make sure you discuss any questions you have with your health care provider.     Document Released: 09/26/2009 Document Revised: 12/04/2013 Document Reviewed: 10/29/2013  [employer] Interactive Patient Education ©2016 [employer]            SYMPTOMS OF A STROKE    Call 911 or have someone take you to the Emergency Department if you have any of the following:    · Sudden numbness or weakness of your  face, arm or leg especially on one side of the body  · Sudden confusion, diffiiculty speaking or trouble understanding   · Changes in your vision or loss of sight in one eye  · Sudden severe headache with no known cause  · sudden dizziness, trouble walking, loss of balance or coordination    It is important to seek emergency care right away if you have further stroke symptoms. If you get emergency help quickly, the powerful clot-dissolving medicines can reduce the disabilities caused by a stroke.     For more information:    American Stroke Association  5-719-6-STROKE  www.strokeassociation.org           IF YOU SMOKE OR USE TOBACCO PLEASE READ THE FOLLOWING:    Why is smoking bad for me?  Smoking increases the risk of heart disease, lung disease, vascular disease, stroke, and cancer.     If you smoke, STOP!    If you would like more information on quitting smoking, please visit the [employer] website: [email or web address]   This link will provide additional resources including the QUIT line and the [employer] support groups.     For more information:    American Cancer Society  (818) 798-8221    American Heart Association  1-521.367.2925               YOU ARE THE MOST IMPORTANT FACTOR IN YOUR RECOVERY.     Follow all instructions carefully.     I have reviewed my discharge instructions with my nurse, including the following information, if applicable:     Information about my illness and diagnosis   Follow up appointments (including lab draws)   Wound Care   Equipment Needs   Medications (new and continuing) along with side effects   Preventative information such as vaccines and smoking cessations   Diet   Pain   I know when to contact my Doctor's office or seek emergency care      I want my nurse to describe the side effects of my medications: YES NO   If the answer is no, I understand the side effects of my medications: YES NO   My nurse described the side effects  of my medications in a way that I could understand: YES NO   I have taken my personal belongings and my own medications with me at discharge: YES NO            I have received this information and my questions have been answered. I have discussed any concerns I see with this plan with the nurse or physician. I understand these instructions.    Signature of Patient or Responsible Person: _____________________________________    Date: _________________  Time: __________________    Signature of Healthcare Provider: _______________________________________  Date: _________________  Time: __________________

## 2017-01-26 NOTE — PLAN OF CARE
Problem: Patient Care Overview (Adult)  Goal: Plan of Care Review    01/26/17 1518   Coping/Psychosocial Response Interventions   Plan Of Care Reviewed With patient   Patient Care Overview   Progress progress toward functional goals as expected         Problem: GI Endoscopy (Adult)  Goal: Signs and Symptoms of Listed Potential Problems Will be Absent or Manageable (GI Endoscopy)    01/26/17 1518   GI Endoscopy   Problems Assessed (GI Endoscopy) all   Problems Present (GI Endoscopy) none

## 2017-01-27 LAB
A2 MACROGLOB SERPL-MCNC: 227 MG/DL (ref 110–276)
ALT SERPL W P-5'-P-CCNC: 46 IU/L (ref 0–40)
APO A-I SERPL-MCNC: 169 MG/DL (ref 116–209)
AST SERPL W P-5'-P-CCNC: 52 IU/L (ref 0–40)
BILIRUB SERPL-MCNC: 0.2 MG/DL (ref 0–1.2)
CHOLEST SERPL-MCNC: 281 MG/DL (ref 100–199)
FIBROSIS SCORING:: ABNORMAL
FIBROSIS STAGE SERPL QL: ABNORMAL
GGT SERPL-CCNC: 160 IU/L (ref 0–60)
GLUCOSE SERPL-MCNC: 97 MG/DL (ref 65–99)
HAPTOGLOB SERPL-MCNC: 157 MG/DL (ref 34–200)
HAV IGM SERPL QL IA: NEGATIVE
HBV CORE IGM SERPL QL IA: NEGATIVE
HBV SURFACE AG SERPL QL IA: NEGATIVE
HCV AB SER DONR QL: NEGATIVE
HCV S/C RATIO: 0.01 (ref 0–0.89)
INTERPRETATIONS: (REFERENCE): ABNORMAL
LABORATORY COMMENT REPORT: ABNORMAL
LIMITATIONS: (REFERENCE): ABNORMAL
LIVER FIBR SCORE SERPL CALC.FIBROSURE: 0.13 (ref 0–0.21)
NASH GRADE (REFERENCE): ABNORMAL
NASH SCORE (REFERENCE): 0.5
NASH SCORING (REFERENCE): ABNORMAL
STEATOSIS GRADE (REFERENCE): ABNORMAL
STEATOSIS GRADING (REFERENCE): ABNORMAL
STEATOSIS SCORE (REFERENCE): 0.7 (ref 0–0.3)
TRIGL SERPL-MCNC: 169 MG/DL (ref 0–149)
WEIGHT: (REFERENCE): 163 LBS

## 2017-01-30 DIAGNOSIS — C50.912 BREAST CANCER METASTASIZED TO BONE, LEFT (HCC): Primary | ICD-10-CM

## 2017-01-30 DIAGNOSIS — C79.51 BREAST CANCER METASTASIZED TO BONE, LEFT (HCC): Primary | ICD-10-CM

## 2017-01-30 LAB
LAB AP CASE REPORT: NORMAL
LAB AP DIAGNOSIS COMMENT: NORMAL
Lab: NORMAL
PATH REPORT.FINAL DX SPEC: NORMAL
PATH REPORT.GROSS SPEC: NORMAL

## 2017-02-01 ENCOUNTER — OFFICE VISIT (OUTPATIENT)
Dept: GASTROENTEROLOGY | Facility: CLINIC | Age: 42
End: 2017-02-01

## 2017-02-01 VITALS
HEART RATE: 86 BPM | SYSTOLIC BLOOD PRESSURE: 142 MMHG | DIASTOLIC BLOOD PRESSURE: 82 MMHG | WEIGHT: 214.6 LBS | BODY MASS INDEX: 36.64 KG/M2 | HEIGHT: 64 IN

## 2017-02-01 DIAGNOSIS — E61.1 IRON DEFICIENCY: ICD-10-CM

## 2017-02-01 DIAGNOSIS — K74.69 OTHER CIRRHOSIS OF LIVER (HCC): ICD-10-CM

## 2017-02-01 DIAGNOSIS — D50.0 IRON DEFICIENCY ANEMIA DUE TO CHRONIC BLOOD LOSS: Primary | ICD-10-CM

## 2017-02-01 DIAGNOSIS — K21.9 GASTROESOPHAGEAL REFLUX DISEASE, ESOPHAGITIS PRESENCE NOT SPECIFIED: ICD-10-CM

## 2017-02-01 DIAGNOSIS — R93.5 ABNORMAL FINDINGS ON DIAGNOSTIC IMAGING OF ABDOMEN: ICD-10-CM

## 2017-02-01 PROCEDURE — 99214 OFFICE O/P EST MOD 30 MIN: CPT | Performed by: PHYSICIAN ASSISTANT

## 2017-02-01 RX ORDER — SUCRALFATE ORAL 1 G/10ML
1 SUSPENSION ORAL 2 TIMES DAILY
Qty: 420 ML | Refills: 1 | Status: SHIPPED | OUTPATIENT
Start: 2017-02-01 | End: 2017-05-16

## 2017-02-01 NOTE — PROGRESS NOTES
Chief Complaint   Patient presents with   • Iron Deficiency   • Constipation   • Hemorrhage of Anus   • Heartburn       ENDO PROCEDURE ORDERED: EGD, MITZI ANGIOECtasis gastric body. D/w Dr. Martines. Urgent    Subjective    Andra Villareal is a 41 y.o. female. she is here today for follow-up.    History of Present Illness    Patient seen on a recheck of her iron deficiency anemia, GERD, abdominal pain, probable cirrhosis.  Last seen 1/23/17.  Patient has a history of metastatic breast disease to the liver.  She currently denies abdominal pain.  She is on Protonix, Zantac for chronic GERD.  She denied nausea, vomiting, dysphagia.  Bowel movements are fairly regular, she is on iron.  Weight is down 3.4 pounds since last visit.    Patient underwent EGD/colonoscopy on 1/26/17 showed a diffuse gastritis.  There was a single large nonbleeding angiectasia in the gastric body.  Duodenum appears normal.  Biopsy of this area showed mild chronic gastritis, negative H. pylori.  Antral biopsy showed mild chronic gastritis, negative H. pylori.  Duodenal biopsy showed mild chronic inflammation.  Colonoscopy showed internal/external hemorrhoids, fair prep.  Random colon biopsy was negative.  Recommend repeat colonoscopy in 5 years.    Laboratory on 1/25/17 showed normal ferritin.  Iron 19.  Ceruloplasmin elevated at 42 but of unlikely significance.  Alpha-1 antitrypsin, SMA, VITOR, hepatitis diagnostic panel were normal or negative.  Dominique fibro-sure 0.13/F0, steatosis 0.70/S3, N1.  Prior CT imaging on 1/16/17 showed a lobular cirrhotic liver.    A/P: Iron deficiency anemia presumed secondary to the large angiectasia in her stomach.  I discussed with Dr. Martines.  Recommend repeat EGD with Mitzi laser treatment.  We'll start her on Carafate to hopefully decrease the chance of bleeding from this area and I have told the patient likely this may also rate somewhat with treatment.  Given her persistent iron deficiency anemia despite oral  supplementation I have recommended iron infusions ×2.  We'll repeat laboratories after her infusions with follow-up in 4-6 weeks.  Consider further workup on her liver.  She had a previous liver biopsy of her breast cancer.  I am reluctant to put her through a repeat liver biopsy to evaluate the potential source of her underlying cirrhosis with normal or negative laboratories less far.  Further pending clinical course and the results of the above.     The following portions of the patient's history were reviewed and updated as appropriate:   Past Medical History   Diagnosis Date   • Abnormal breathing sounds    • Abscess of skin      and / or subcutaneous tissue   • Acute bronchitis      unspecified   • Adult body mass index 30+      obesity-BMI 33   • Allergic rhinitis    • Anasarca    • Anxiety      currently on xanax   • Anxiety    • Asthenia    • Asthma      moderate persistent   • Asthmatic bronchitis    • Astigmatism    • Bleeding external hemorrhoids    • Body mass index (BMI) of 34.0-34.9 in adult    • Body mass index (BMI) of 35.0-35.9 in adult    • Body mass index (BMI) of 36.0-36.9 in adult    • Body mass index 40.0-44.9, adult      SEVERELY OBESE   • Cellulitis    • Chemotherapy induced nausea and vomiting    • Chronic back pain    • Chronic cough    • Chronic hypoxemic respiratory failure      on NC at 3 LPM   • Cough    • Depressive disorder    • Diabetes mellitus      no retinopathy   • Dyslipidemia    • Edema of lower extremity    • Encounter for follow-up examination after completed treatment for malignant neoplasm    • Epidermoid cyst of skin      excision with secondary intention healing   • Excessive and frequent menstruation with irregular cycle      Vaginal bleeding after 4 years of no bleeding secondary to chemotherapy for breast cancer; currently being treated for breast cancer for the second time, mets to bone and liver while on chemotherapy      • Flushing    • Fracture of thoracic spine  with cord lesion    • Gastroesophageal reflux disease    • H/O tubal ligation    • Hx of echocardiogram      w/ color flow, and w/o color flow   • Hyperglycemia    • Hypermetropia    • Hypertension    • Hypokalemia    • Impaired glucose tolerance      hgbalc 6.2   • Infection of skin      and /or subcutaneous tissue-around the catheter exit site   • Influenza      needs influenza immunization   • Irregular periods    • Lesion of lumbar spine      pain controlled with percocet and lidocaine patches   • Lumbago with sciatica    • Malignant neoplasm of female breast      s/p right mastectomy, mets to bone and liver on chemotherapy      • Menopausal flushing    • Muscle weakness    • Muscle weakness (generalized)    • Nonspecific interstitial pneumonia      symptomatically improved   • Pleural effusion      refractory massive right, most likely malignant effusion   • Primary atypical pneumonia    • Renal failure      acute drug-induced renal failure   • Sinus tachycardia    • Tobacco dependence syndrome    • Tobacco non-user    • Upper respiratory infection    • Wheezing      Past Surgical History   Procedure Laterality Date   • Other surgical history  05/05/2016     central line insertion , PICC line replacement   • Central venous catheter tunneled insertion single lumen       Placement of indwelling Pleurx catheter right   • Venous access device (port) insertion       Ultrasound guided right internal jugular Mediport placement under fluoroscopy   • Lung volume reduction     • Mastectomy       partial   • Pap smear     • Other surgical history       place breast clip percut   • Other surgical history       pulse oximetry   • Other surgical history       remove cc cath w/ sc port or pump, Removal of right internal jugular MediPort   • Other surgical history       spirometry   • Thoracentesis       aspiration of pleural space   • Other surgical history       tubal sterilization   • Hysterectomy       vaginal   •  "Endoscopy N/A 1/26/2017     Procedure: ESOPHAGOGASTRODUODENOSCOPY;  Surgeon: Robert Clifton MD;  Location: [facility] ENDOSCOPY;  Service:    • Colonoscopy N/A 1/26/2017     Procedure: COLONOSCOPY;  Surgeon: Robert Clifton MD;  Location: [facility] ENDOSCOPY;  Service:    • Upper gastrointestinal endoscopy  01/26/2017     Family History   Problem Relation Age of Onset   • Coronary artery disease Other    • Diabetes Other    • Hypertension Other      OB History     No data available        Allergies   Allergen Reactions   • Lisinopril Shortness Of Breath   • Flagyl [Metronidazole] Other (See Comments)     pancreatitis   • Fentanyl Anxiety     \"goes out of her mind\"   • Latex Rash     Social History     Social History   • Marital status:      Spouse name: N/A   • Number of children: N/A   • Years of education: N/A     Social History Main Topics   • Smoking status: Former Smoker   • Smokeless tobacco: Never Used   • Alcohol use No   • Drug use: No   • Sexual activity: Defer     Other Topics Concern   • None     Social History Narrative       Current Outpatient Prescriptions:   •  benzonatate (TESSALON) 100 MG capsule, Take 1 capsule by mouth 3 (Three) Times a Day As Needed for cough., Disp: 90 capsule, Rfl: 5  •  calcium carbonate (OS-ONELIA) 1250 (500 CA) MG tablet, TAKE 1 TABLET BY MOUTH THREE TIMES DAILY, Disp: 90 tablet, Rfl: 0  •  CARTIA  MG 24 hr capsule, Take 1 capsule by mouth Daily., Disp: 30 capsule, Rfl: 5  •  celecoxib (CeleBREX) 200 MG capsule, Take 1 capsule by mouth Daily., Disp: 90 capsule, Rfl: 5  •  cetirizine (zyrTEC) 10 MG tablet, Take 1 tablet by mouth Daily., Disp: 30 tablet, Rfl: 5  •  clonazePAM (KlonoPIN) 2 MG tablet, Take 1 tablet by mouth 2 (Two) Times a Day As Needed for anxiety., Disp: 60 tablet, Rfl: 2  •  doxycycline (MONODOX) 100 MG capsule, Take 1 capsule by mouth 2 (Two) Times a Day for 10 days., Disp: 20 capsule, Rfl: 0  •  ferrous sulfate 325 (65 FE) MG tablet, Take 1 tablet by " mouth 3 (Three) Times a Day., Disp: 90 tablet, Rfl: 5  •  fluticasone (FLONASE) 50 MCG/ACT nasal spray, 2 sprays into each nostril Daily., Disp: 16 g, Rfl: 12  •  LEVEMIR 100 UNIT/ML injection, INJECT 8 UNITS UNDER THE SKIN EVERY NIGHT AT BEDTIME. (Patient taking differently: INJECT 8 UNITS UNDER THE SKIN EVERY NIGHT AT BEDTIME.,prn sliding scale), Disp: 10 mL, Rfl: 0  •  magnesium oxide (MAGNESIUM-OXIDE) 400 (241.3 MG) MG tablet tablet, Take 1 tablet by mouth 2 (Two) Times a Day., Disp: 60 tablet, Rfl: 11  •  montelukast (SINGULAIR) 10 MG tablet, Take 1 tablet by mouth Every Night., Disp: 30 tablet, Rfl: 5  •  Naloxegol Oxalate (MOVANTIK) 25 MG tablet, Take 25 mg by mouth Daily., Disp: 30 tablet, Rfl: 5  •  oxyCODONE-acetaminophen (PERCOCET)  MG per tablet, Take 1 tablet by mouth Every 8 (Eight) Hours As Needed for moderate pain (4-6)., Disp: 90 tablet, Rfl: 0  •  pantoprazole (PROTONIX) 40 MG EC tablet, Take 1 tablet by mouth Every Night., Disp: 90 tablet, Rfl: 3  •  polyethylene glycol (MIRALAX) powder, Take 3,350 g by mouth Daily., Disp: , Rfl:   •  potassium chloride (K-DUR,KLOR-CON) 20 MEQ CR tablet, TAKE 1 TABLET BY MOUTH TWICE DAILY, Disp: 60 tablet, Rfl: 0  •  promethazine (PHENERGAN) 25 MG tablet, Take 1 tablet by mouth Every 6 (Six) Hours As Needed for nausea or vomiting., Disp: 120 tablet, Rfl: 5  •  raNITIdine (ZANTAC) 150 MG tablet, TK 1 T PO BID, Disp: , Rfl: 1  •  venlafaxine XR (EFFEXOR-XR) 37.5 MG 24 hr capsule, Take 1 capsule by mouth Daily., Disp: 30 capsule, Rfl: 5  •  sucralfate (CARAFATE) 1 GM/10ML suspension, Take 10 mL by mouth 2 (Two) Times a Day., Disp: 420 mL, Rfl: 1  No current facility-administered medications for this visit.     Facility-Administered Medications Ordered in Other Visits:   •  sodium chloride 0.9 % flush 10 mL, 10 mL, Intravenous, PRN, Joselito Waddell MD, 10 mL at 01/26/17 1129  Review of Systems  Review of Systems       Objective      Visit Vitals   • /82 (BP  "Location: Left arm)   • Pulse 86   • Ht 64\" (162.6 cm)   • Wt 214 lb 9.6 oz (97.3 kg)   • BMI 36.84 kg/m2     Physical Exam   Constitutional: She is oriented to person, place, and time. She appears well-developed and well-nourished. No distress.   PICC line in left. Chronically ill appearing   HENT:   Head: Normocephalic and atraumatic.   Eyes: EOM are normal. Pupils are equal, round, and reactive to light.   Neck: Normal range of motion.   Cardiovascular: Normal rate, regular rhythm and normal heart sounds.    Pulmonary/Chest: Effort normal and breath sounds normal.   Abdominal: Soft. Bowel sounds are normal. She exhibits no shifting dullness, no distension, no abdominal bruit, no ascites and no mass. There is no hepatosplenomegaly. There is tenderness. There is no rigidity, no rebound, no guarding and no CVA tenderness. No hernia. Hernia confirmed negative in the ventral area.   Obese, mild diffuse, moderate epigastric, inferior to umbilicus   Musculoskeletal: Normal range of motion.   Neurological: She is alert and oriented to person, place, and time.   Skin: Skin is warm and dry.   Psychiatric: She has a normal mood and affect. Her behavior is normal. Judgment and thought content normal.   Nursing note and vitals reviewed.    Admission on 01/26/2017, Discharged on 01/26/2017   Component Date Value Ref Range Status   • Glucose 01/26/2017 80  70 - 130 mg/dL Final    Meter: [id number]: [id number] OMI HARRY   • Case Report 01/26/2017    Final                    Value:Surgical Pathology Report                         Case: [id number]                                  Authorizing Provider:  Robert Clifton MD         Collected:           01/26/2017 02:40 PM          Ordering Location:     [facility]             Received:            01/27/2017 08:52 AM                                 [place] [facility]                                                     Pathologist:           Rich Gracia MD   "                                                       Specimens:   1) - Gastric, Antrum                                                                                2) - Small Intestine, Duodenum                                                                      3) - Gastric, Body                                                                                  4) - Large Intestine, colonic mucosa                                                      • Final Diagnosis 01/26/2017    Final                    Value:This result contains rich text formatting which cannot be displayed here.   • Comment 01/26/2017    Final                    Value:This result contains rich text formatting which cannot be displayed here.   • Gross Description 01/26/2017    Final                    Value:This result contains rich text formatting which cannot be displayed here.     Assessment/Plan      1. Iron deficiency anemia due to chronic blood loss    2. Other cirrhosis of liver    3. Iron deficiency    4. Gastroesophageal reflux disease, esophagitis presence not specified    5. Abnormal findings on diagnostic imaging of abdomen    .   Andra was seen today for iron deficiency, constipation, hemorrhage of anus and heartburn.    Diagnoses and all orders for this visit:    Iron deficiency anemia due to chronic blood loss  -     Cancel: Case request  -     Case request  -     Iron; Future  -     Hemoglobin & Hematocrit, Blood; Future    Other cirrhosis of liver  -     Case request    Iron deficiency  -     Cancel: Case request  -     Case request  -     Iron; Future  -     Hemoglobin & Hematocrit, Blood; Future    Gastroesophageal reflux disease, esophagitis presence not specified  -     Cancel: Case request  -     Case request    Abnormal findings on diagnostic imaging of abdomen  -     Cancel: Case request  -     Case request    Other orders  -     sucralfate (CARAFATE) 1 GM/10ML suspension; Take 10 mL by mouth 2 (Two) Times a  Day.        Orders placed during this encounter include:  Orders Placed This Encounter   Procedures   • Iron     Due prior to follow up in March     Standing Status:   Future     Standing Expiration Date:   3/24/2017   • Hemoglobin & Hematocrit, Blood     Due prior to follow up in March     Standing Status:   Future     Standing Expiration Date:   3/24/2017       Medications prescribed:  New Medications Ordered This Visit   Medications   • sucralfate (CARAFATE) 1 GM/10ML suspension     Sig: Take 10 mL by mouth 2 (Two) Times a Day.     Dispense:  420 mL     Refill:  1       Requested Prescriptions     Signed Prescriptions Disp Refills   • sucralfate (CARAFATE) 1 GM/10ML suspension 420 mL 1     Sig: Take 10 mL by mouth 2 (Two) Times a Day.       Review and/or summary of lab tests, radiology, procedures, medications. Review and summary of old records and obtaining of history. The risks and benefits of my recommendations, as well as other treatment options were discussed with the patient today. Questions were answered.    Follow-up: Return in about 6 weeks (around 3/15/2017), or if symptoms worsen or fail to improve.           This document has been electronically signed by Jordin Oliveira PA-C on February 1, 2017 6:19 PM      Results for orders placed or performed during the hospital encounter of 01/26/17   Tissue Exam   Result Value Ref Range    Case Report       Surgical Pathology Report                         Case: [id number]                                  Authorizing Provider:  Robert Clifton MD         Collected:           01/26/2017 02:40 PM          Ordering Location:     [facility]             Received:            01/27/2017 08:52 AM                                 [place] [facility]                                                     Pathologist:           Rich Gracia MD                                                         Specimens:   1) - Gastric, Antrum                                                                                 2) - Small Intestine, Duodenum                                                                      3) - Gastric, Body                                                                                  4) - Large Intestine, colonic mucosa                                                       Final Diagnosis       1.  GASTRIC ANTRUM, MUCOSAL BIOPSY:              MILD CHRONIC GASTRITIS.              NO EVIDENCE OF HELICOBACTER PYLORI (IP STAIN PERFORMED).     2.  DUODENUM, MUCOSAL BIOPSY:             MILD CHRONIC INFLAMMATION.     3.  GASTRIC BODY, MUCOSAL BIOPSY:              MILD CHRONIC GASTRITIS.              NO EVIDENCE OF HELICOBACTER PYLORI (IP STAIN PERFORMED).     4.  COLONIC MUCOSAL BIOPSY, RANDOM:              NO SIGNIFICANT PATHOLOGIC DIAGNOSIS.                    Comment       HP immunostain was developed and its performance characteristics determined by [facility].  It has not been cleared or approved by the U.S.Food and Drug Administration.  The FDA has determined that such clearance of approval is not necessary.  This test is used for clinical purposes.  It should not be regarded as investigational or for research.  This laboratory is certified under the Clinical Laboratory Amendments of 1988 (CLIA-88) as qualified to perform high complexity clinical laboratory testing.         Gross Description       In 3 containers, each of these show mucosal biopsies measuring up to 0.3 cm in greatest dimension.  Embedded accordingly:  1A antrum, 2A duodenum, 3A gastric body, 4A colonic mucosa.           Embedded Images     POC Glucose Fingerstick   Result Value Ref Range    Glucose 80 70 - 130 mg/dL   Results for orders placed or performed in visit on 01/23/17   BOWIE Fibrosure   Result Value Ref Range    Fibrosis Score (References) 0.13 0.00 - 0.21    Fibrosis Stage (Reference) Comment     Steatosis Score (Reference) 0.70 (H) 0.00 -  0.30    Steatosis Grade (Reference) Comment     BOWIE Score (Reference) 0.50 0.25    Bowie Grade (Reference) Comment     Height: (Reference) 64 Inches    Weight: (Reference) 163 LBS    Alpha 2-Macroglobulins, Qn 227 110 - 276 mg/dL    Haptoglobin 157 34 - 200 mg/dL    Apolipoprotein A-1 169 116 - 209 mg/dL    Total Bilirubin 0.2 0.0 - 1.2 mg/dL     (H) 0 - 60 IU/L    ALT (SGPT) 46 (H) 0 - 40 IU/L    AST (SGOT) P5P (Reference) 52 (H) 0 - 40 IU/L    Cholesterol, Total (Reference) 281 (H) 100 - 199 mg/dL    Glucose, Serum (Reference) 97 65 - 99 mg/dL    Triglycerides 169 (H) 0 - 149 mg/dL    Interpretations: (Reference) Comment     Fibrosis Scoring: Comment     Steatosis Grading (Reference) Comment     Bowie Scoring (Reference) Comment     Limitations: (Reference) Comment     Comment (Reference) Comment    Nuclear Antigen Antibody, IFA   Result Value Ref Range    VITOR Negative    Iron and TIBC   Result Value Ref Range    Iron 19 (L) 37 - 170 mcg/dL    TIBC 304 265 - 497 mcg/dL    Iron Saturation 6 (L) 15 - 50 %   Alpha - 1 - Antitrypsin   Result Value Ref Range    A-1 Antitrypsin 156 90 - 200 mg/dL   Ceruloplasmin   Result Value Ref Range    Ceruloplasmin 42.0 (H) 19.0 - 39.0 mg/dL   Hepatitis Panel, Acute   Result Value Ref Range    HCV S/C Ratio 0.01 0.00 - 0.89    Hepatitis C Ab Negative Negative    Hep A IgM Negative Negative    Hep B C IgM Negative Negative    Hepatitis B Surface Ag Negative Negative   Anti-Smooth Muscle Antibody Titer   Result Value Ref Range    Smooth Muscle Ab 4 0 - 19 Units   Ferritin   Result Value Ref Range    Ferritin 51.50 6.20 - 137.00 ng/mL   Results for orders placed or performed during the hospital encounter of 01/16/17   Urinalysis   Result Value Ref Range    Color, UA YELLOW STRAW,YELLOW,DK YELLOW,ROGER    Appearance CLEAR CLEAR    Specific Gravity, UA 1.016 1.003 - 1.030    pH, UA 7.5 5.0 - 9.0 pH Units    Leukocytes, UA NEGATIVE NEGATIVE    Nitrite, UA NEGATIVE NEGATIVE     Protein, UA NEGATIVE NEGATIVE    Glucose, Urine NEGATIVE NEGATIVE mg/dl    Ketones, UA NEGATIVE NEGATIVE    Urobilinogen, UA 0.2 0.2 EU/dl    Blood, UA NEGATIVE NEGATIVE   aPTT   Result Value Ref Range    PTT 30.9 20.0 - 40.3 seconds   Protime-INR   Result Value Ref Range    Protime 15.3 11.1 - 15.3 seconds    INR 1.2 0.8 - 1.2   CBC & Differential   Result Value Ref Range    WBC 7.0 3.2 - 9.8 x1000/uL    RBC 3.86 3.77 - 5.16 mellissa/mm3    Hemoglobin 11.3 (L) 12.0 - 15.5 gm/dl    Hematocrit 34.2 (L) 35.0 - 45.0 %    MCV 88.6 80.0 - 98.0 fl    MCH 29.3 26.0 - 34.0 pg    MCHC 33.0 31.4 - 36.0 gm/dl    RDW 14.5 11.5 - 14.5 %    Platelets 193 150 - 450 x1000/mm3    MPV 12.1 (H) 8.0 - 12.0 fl    Neutrophil Rel % 71.9 37.0 - 80.0 %    Lymphocyte Rel % 21.8 10.0 - 50.0 %    Monocyte Rel % 5.0 0.0 - 12.0 %    Eosinophil Rel % 0.6 0.0 - 7.0 %    Basophil Rel % 0.3 0.0 - 2.0 %    Immature Granulocyte Rel % 0.40 0.00 - 0.50 %    Neutrophils Absolute 5.06 2.00 - 8.60 x1000/uL    Lymphocytes Absolute 1.53 0.60 - 4.20 x1000/uL    Monocytes Absolute 0.35 0.00 - 0.90 x1000/uL    Eosinophils Absolute 0.04 0.00 - 0.70 x1000/uL    Basophils Absolute 0.02 0.00 - 0.20 x1000/uL    Immature Granulocytes Absolute 0.030 (H) 0.005 - 0.022 x1000/uL    nRBC 0.0 0.0 - 0.2 %    nRBC 0.000 x1000/uL   Type & Screen   Result Value Ref Range    DOES A PREVIOUS ABORH EXIST? PREVIOUS TYPE ON FILE     ABO Type A     RH type POS     Antibody Screen Interpretation NEG    Lipase   Result Value Ref Range    Lipase 22 (L) 23 - 300 U/L   Comprehensive Metabolic Panel   Result Value Ref Range    Sodium 140 137 - 145 mmol/L    Potassium 3.3 (L) 3.5 - 5.1 mmol/L    Chloride 101 95 - 110 mmol/L    CO2 31 22 - 31 mmol/L    Anion Gap 8.0 5.0 - 15.0 mmol/L    Glucose 111 (H) 60 - 100 mg/dl    BUN 16 7 - 21 mg/dl    Creatinine 1.0 0.5 - 1.0 mg/dl    GFR MDRD Non  61 58 - 135 mL/min/1.73 sq.M    GFR MDRD  74 58 - 135 mL/min/1.73 sq.M     Calcium 7.7 (L) 8.4 - 10.2 mg/dl    Total Protein 5.9 (L) 6.3 - 8.6 gm/dl    Albumin 3.2 (L) 3.4 - 4.8 gm/dl    Total Bilirubin 0.4 0.2 - 1.3 mg/dl    Alkaline Phosphatase 85 38 - 126 U/L    AST (SGOT) 33 14 - 36 U/L    ALT (SGPT) 44 9 - 52 U/L     *Note: Due to a large number of results and/or encounters for the requested time period, some results have not been displayed. A complete set of results can be found in Results Review.       Some portions of this note have been dictated using voice recognition software and may contain errors or omissions.

## 2017-02-02 ENCOUNTER — HOSPITAL ENCOUNTER (OUTPATIENT)
Dept: CT IMAGING | Facility: HOSPITAL | Age: 42
Discharge: HOME | End: 2017-02-02
Admitting: INTERNAL MEDICINE

## 2017-02-02 ENCOUNTER — HOSPITAL ENCOUNTER (OUTPATIENT)
Dept: GENERAL RADIOLOGY | Facility: HOSPITAL | Age: 42
Discharge: HOME | End: 2017-02-02

## 2017-02-02 ENCOUNTER — HOSPITAL ENCOUNTER (OUTPATIENT)
Dept: INTERVENTIONAL RADIOLOGY/VASCULAR | Facility: HOSPITAL | Age: 42
Discharge: HOME | End: 2017-02-02
Admitting: INTERNAL MEDICINE

## 2017-02-02 ENCOUNTER — HOSPITAL ENCOUNTER (OUTPATIENT)
Dept: CT IMAGING | Facility: HOSPITAL | Age: 42
Discharge: HOME | End: 2017-02-02

## 2017-02-02 ENCOUNTER — LAB (OUTPATIENT)
Dept: ONCOLOGY | Facility: HOSPITAL | Age: 42
End: 2017-02-02

## 2017-02-02 DIAGNOSIS — C78.7 SECONDARY MALIGNANT NEOPLASM OF LIVER (HCC): ICD-10-CM

## 2017-02-02 DIAGNOSIS — C50.912 BREAST CANCER METASTASIZED TO BONE, LEFT (HCC): ICD-10-CM

## 2017-02-02 DIAGNOSIS — C79.51 BREAST CANCER METASTASIZED TO BONE, LEFT (HCC): ICD-10-CM

## 2017-02-02 PROCEDURE — 71260 CT THORAX DX C+: CPT

## 2017-02-02 PROCEDURE — 0 IOPAMIDOL 61 % SOLUTION: Performed by: INTERNAL MEDICINE

## 2017-02-02 PROCEDURE — 74177 CT ABD & PELVIS W/CONTRAST: CPT

## 2017-02-02 PROCEDURE — 71010 HC CHEST PA OR AP: CPT

## 2017-02-02 RX ADMIN — IOPAMIDOL 45 ML: 612 INJECTION, SOLUTION INTRAVENOUS at 09:30

## 2017-02-02 RX ADMIN — IOPAMIDOL 50 ML: 612 INJECTION, SOLUTION INTRAVENOUS at 09:15

## 2017-02-02 NOTE — PROGRESS NOTES
THE PATIENT WAS DRAPED AND PREPPED IN STERILE TECHNIQUE. AN 0.018 WIRE WAS THREADED THROUGH THE PATIENT'S EXISTING  PICC LINE. THE PICC LINE WAS WITHDRAWN OVER THE WIRE. A 5FR   SHEATH WAS THREADED OVER THE 0.018 WIRE INTO THE LEFT  ARM. THE PICC LINE WAS TRIMMED AT 38 CM. THE PICC LINE WAS THEN THREADED OVER THE WIRE THROUGH THE SHEATH INTO THE CENTRAL VENOUS SYSTEM. THE SHEATH WAS PEELED AWAY AND WIRE WAS REMOVED. PICC LINE WAS FLUSHED WITH NORMAL SALINE. TIP AND BIOPATCH APPLIED. PICC WAS SECURED WITH STAT LOCK AND TEGADERM. PATIENT TOLERATED PROCEDURE WELL. PROCEDURE WAS DONE IN ANGIO SUITE      IMPRESSION: SUCCESSFUL RE-PLACEMENT  OF SINGLE  LUMEN PICC LINE          Daniella Soriano  2/2/2017  12:08 PM

## 2017-02-02 NOTE — PROGRESS NOTES
Patient here to get a PICC dressing changed. PICC has advanced out. Order placed to have the PICC replaced. Patient to have the PICC replaced today.

## 2017-02-03 ENCOUNTER — OFFICE VISIT (OUTPATIENT)
Dept: PODIATRY | Facility: CLINIC | Age: 42
End: 2017-02-03

## 2017-02-03 VITALS — BODY MASS INDEX: 34.66 KG/M2 | WEIGHT: 208 LBS | HEIGHT: 65 IN

## 2017-02-03 DIAGNOSIS — S92.254A CLOSED NONDISPLACED FRACTURE OF NAVICULAR BONE OF RIGHT FOOT, INITIAL ENCOUNTER: ICD-10-CM

## 2017-02-03 DIAGNOSIS — S93.411A SPRAIN OF CALCANEOFIBULAR LIGAMENT OF RIGHT ANKLE, INITIAL ENCOUNTER: Primary | ICD-10-CM

## 2017-02-03 DIAGNOSIS — M25.571 CHRONIC PAIN OF RIGHT ANKLE: ICD-10-CM

## 2017-02-03 DIAGNOSIS — M79.671 RIGHT FOOT PAIN: ICD-10-CM

## 2017-02-03 DIAGNOSIS — G89.29 CHRONIC PAIN OF RIGHT ANKLE: ICD-10-CM

## 2017-02-03 PROCEDURE — 99203 OFFICE O/P NEW LOW 30 MIN: CPT | Performed by: PODIATRIST

## 2017-02-03 PROCEDURE — 28450 TX TARSAL B1 FX W/O MNPJ EA: CPT | Performed by: PODIATRIST

## 2017-02-03 RX ORDER — METHYLPREDNISOLONE 4 MG/1
TABLET ORAL
Qty: 21 TABLET | Refills: 0 | Status: ON HOLD | OUTPATIENT
Start: 2017-02-03 | End: 2017-02-24

## 2017-02-03 NOTE — PROGRESS NOTES
Andra Villareal  1975  41 y.o. female    02/03/2017  Chief Complaint   Patient presents with   • Right Ankle - Pain, Ankle Injury           History of Present Illness    Ms Villareal is a 40 y/o female who presents for evaluation of right ankle pain, possible navicular fracture. States the pain has been ongoing for approximately 7 months. Describes the pain as achy and sharp at times, worse with weightbearing. She uses crutches to ambulate most of the time. She denies any significant trauma, but states that she may have rolled her ankle in a pothole at some point. She does also have history of breast cancer, not currently on chemotherapy.        Past Medical History   Diagnosis Date   • Abnormal breathing sounds    • Abscess of skin      and / or subcutaneous tissue   • Acute bronchitis      unspecified   • Adult body mass index 30+      obesity-BMI 33   • Allergic rhinitis    • Anasarca    • Anxiety      currently on xanax   • Anxiety    • Asthenia    • Asthma      moderate persistent   • Asthmatic bronchitis    • Astigmatism    • Bleeding external hemorrhoids    • Body mass index (BMI) of 34.0-34.9 in adult    • Body mass index (BMI) of 35.0-35.9 in adult    • Body mass index (BMI) of 36.0-36.9 in adult    • Body mass index 40.0-44.9, adult      SEVERELY OBESE   • Cellulitis    • Chemotherapy induced nausea and vomiting    • Chronic back pain    • Chronic cough    • Chronic hypoxemic respiratory failure      on NC at 3 LPM   • Cough    • Depressive disorder    • Diabetes mellitus      no retinopathy   • Dyslipidemia    • Edema of lower extremity    • Encounter for follow-up examination after completed treatment for malignant neoplasm    • Epidermoid cyst of skin      excision with secondary intention healing   • Excessive and frequent menstruation with irregular cycle      Vaginal bleeding after 4 years of no bleeding secondary to chemotherapy for breast cancer; currently being treated for breast cancer for  the second time, mets to bone and liver while on chemotherapy      • Flushing    • 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    • Gastroesophageal reflux disease    • H/O tubal ligation    • Hx of echocardiogram      w/ color flow, and w/o color flow   • Hyperglycemia    • Hypermetropia    • Hypertension    • Hypokalemia    • Impaired glucose tolerance      hgbalc 6.2   • Infection of skin      and /or subcutaneous tissue-around the catheter exit site   • Influenza      needs influenza immunization   • Irregular periods    • Lesion of lumbar spine      pain controlled with percocet and lidocaine patches   • Lumbago with sciatica    • Malignant neoplasm of female breast      s/p right mastectomy, mets to bone and liver on chemotherapy      • Menopausal flushing    • Muscle weakness    • Muscle weakness (generalized)    • Nonspecific interstitial pneumonia      symptomatically improved   • Pleural effusion      refractory massive right, most likely malignant effusion   • Primary atypical pneumonia    • Renal failure      acute drug-induced renal failure   • Sinus tachycardia    • Tobacco dependence syndrome    • Tobacco non-user    • Upper respiratory infection    • Wheezing          Past Surgical History   Procedure Laterality Date   • Other surgical history  05/05/2016     central line insertion , PICC line replacement   • Central venous catheter tunneled insertion single lumen       Placement of indwelling Pleurx catheter right   • Venous access device (port) insertion       Ultrasound guided right internal jugular Mediport placement under fluoroscopy   • Lung volume reduction     • Mastectomy       partial   • Pap smear     • Other surgical history       place breast clip percut   • Other surgical history       pulse oximetry   • Other surgical history       remove cc cath w/ sc port or pump, Removal of right internal jugular MediPort   • Other surgical history       spirometry   • Thoracentesis       aspiration  of pleural space   • Other surgical history       tubal sterilization   • Hysterectomy       vaginal   • Endoscopy N/A 1/26/2017     Procedure: ESOPHAGOGASTRODUODENOSCOPY;  Surgeon: Robert Clifton MD;  Location: [facility] ENDOSCOPY;  Service:    • Colonoscopy N/A 1/26/2017     Procedure: COLONOSCOPY;  Surgeon: Robert Clifton MD;  Location: [facility] ENDOSCOPY;  Service:    • Upper gastrointestinal endoscopy  01/26/2017         Family History   Problem Relation Age of Onset   • Coronary artery disease Other    • Diabetes Other    • Hypertension Other          Social History     Social History   • Marital status:      Spouse name: N/A   • Number of children: N/A   • Years of education: N/A     Occupational History   • Not on file.     Social History Main Topics   • Smoking status: Former Smoker   • Smokeless tobacco: Never Used   • Alcohol use No   • Drug use: No   • Sexual activity: Defer     Other Topics Concern   • Not on file     Social History Narrative         Current Outpatient Prescriptions   Medication Sig Dispense Refill   • benzonatate (TESSALON) 100 MG capsule Take 1 capsule by mouth 3 (Three) Times a Day As Needed for cough. 90 capsule 5   • calcium carbonate (OS-ONELIA) 1250 (500 CA) MG tablet TAKE 1 TABLET BY MOUTH THREE TIMES DAILY 90 tablet 0   • CARTIA  MG 24 hr capsule Take 1 capsule by mouth Daily. 30 capsule 5   • cetirizine (zyrTEC) 10 MG tablet Take 1 tablet by mouth Daily. 30 tablet 5   • clonazePAM (KlonoPIN) 2 MG tablet Take 1 tablet by mouth 2 (Two) Times a Day As Needed for anxiety. 60 tablet 2   • doxycycline (MONODOX) 100 MG capsule Take 1 capsule by mouth 2 (Two) Times a Day for 10 days. 20 capsule 0   • ferrous sulfate 325 (65 FE) MG tablet Take 1 tablet by mouth 3 (Three) Times a Day. 90 tablet 5   • fluticasone (FLONASE) 50 MCG/ACT nasal spray 2 sprays into each nostril Daily. 16 g 12   • LEVEMIR 100 UNIT/ML injection INJECT 8 UNITS UNDER THE SKIN EVERY NIGHT AT BEDTIME.  "(Patient taking differently: INJECT 8 UNITS UNDER THE SKIN EVERY NIGHT AT BEDTIME.,prn sliding scale) 10 mL 0   • magnesium oxide (MAGNESIUM-OXIDE) 400 (241.3 MG) MG tablet tablet Take 1 tablet by mouth 2 (Two) Times a Day. 60 tablet 11   • montelukast (SINGULAIR) 10 MG tablet Take 1 tablet by mouth Every Night. 30 tablet 5   • Naloxegol Oxalate (MOVANTIK) 25 MG tablet Take 25 mg by mouth Daily. 30 tablet 5   • oxyCODONE-acetaminophen (PERCOCET)  MG per tablet Take 1 tablet by mouth Every 8 (Eight) Hours As Needed for moderate pain (4-6). 90 tablet 0   • pantoprazole (PROTONIX) 40 MG EC tablet Take 1 tablet by mouth Every Night. 90 tablet 3   • polyethylene glycol (MIRALAX) powder Take 3,350 g by mouth Daily.     • potassium chloride (K-DUR,KLOR-CON) 20 MEQ CR tablet TAKE 1 TABLET BY MOUTH TWICE DAILY 60 tablet 0   • promethazine (PHENERGAN) 25 MG tablet Take 1 tablet by mouth Every 6 (Six) Hours As Needed for nausea or vomiting. 120 tablet 5   • raNITIdine (ZANTAC) 150 MG tablet TK 1 T PO BID  1   • sucralfate (CARAFATE) 1 GM/10ML suspension Take 10 mL by mouth 2 (Two) Times a Day. 420 mL 1   • venlafaxine XR (EFFEXOR-XR) 37.5 MG 24 hr capsule Take 1 capsule by mouth Daily. 30 capsule 5   • MethylPREDNISolone (MEDROL, NOY,) 4 MG tablet Take as directed 21 tablet 0     No current facility-administered medications for this visit.      Facility-Administered Medications Ordered in Other Visits   Medication Dose Route Frequency Provider Last Rate Last Dose   • sodium chloride 0.9 % flush 10 mL  10 mL Intravenous PRN Joselito Waddell MD   10 mL at 01/26/17 1129         OBJECTIVE    Visit Vitals   • Ht 65\" (165.1 cm)   • Wt 208 lb (94.3 kg)   • BMI 34.61 kg/m2         Review of Systems   Constitutional:  Denies recent weight loss, weight gain, fever or chills, no change in exercise tolerance  Musculoskeletal: Toe/foot pain.   Skin: No wounds or lesions  Neurological: Denies paresthesias.  Psychiatric/Behavioral: Denies " depression    Physical Exam   Constitutional: he appears well-developed and well-nourished.   HEENT: Normocephalic. Atraumatic.  CV: No CP. RRR  Resp: Non-labored respirations.  Psychiatric: he has a normal mood and affect. his behavior is normal.         Lower Extremity Exam:  Vascular: DP/PT pulses palpable 2+.   Mild right lateral ankle edema  Foot warm  Neuro: Protective sensation intact, b/l.  Light touch sensation intact, b/l  DTRs intact  Integument: No open wounds or lesions.  No erythema, scaling  Musculoskeletal:  RLE muscle strength 5/5.   LLE dorsiflexion, plantarflexion, inversion, eversion intact; guarded.  Achilles, TA, TP, Peroneal tendons intact  No gross instability, exam limited by pain  Gait assisted with crutches  Tenderness to palpation over ATFL, CFL, dorsal TN joint        Right foot and ankle radiographs- Talar beaking, suspected avulsion type fracture of dorsal navicular. Mild narrowing of tibiotalar articulation with possible irregularity/OCD of medial talar shoulder. No other acute abnormalities noted.         ASSESSMENT AND PLAN    Andra was seen today for pain and ankle injury.    Diagnoses and all orders for this visit:    Sprain of calcaneofibular ligament of right ankle, initial encounter    Right foot pain  -     XR Foot 3+ View Right    Chronic pain of right ankle  -     XR Ankle 3+ View Right    Closed nondisplaced fracture of navicular bone of right foot, initial encounter    Other orders  -     MethylPREDNISolone (MEDROL, NOY,) 4 MG tablet; Take as directed    -Comprehensive foot and ankle exam performed  -Radiographs ordered and reviewed  -Educated pt on diagnosis, etiology and treatment of moderate ankle sprain, suspected navicular avulsion fracture  -Lace up ankle brace, Surgigrip dispensed  -RICE therapy  -Cont crutches as needed  -Recheck 2 weeks          This document has been electronically signed by Armani Oliveira DPM on February 5, 2017 8:55 PM     Armani CARNEY  CRISTOPHER Oliveira  2/5/2017  8:35 PM

## 2017-02-08 RX ORDER — FAMOTIDINE 10 MG/ML
20 INJECTION, SOLUTION INTRAVENOUS ONCE
Status: CANCELLED | OUTPATIENT
Start: 2017-02-16

## 2017-02-09 ENCOUNTER — OFFICE VISIT (OUTPATIENT)
Dept: ONCOLOGY | Facility: CLINIC | Age: 42
End: 2017-02-09

## 2017-02-09 ENCOUNTER — INFUSION (OUTPATIENT)
Dept: ONCOLOGY | Facility: HOSPITAL | Age: 42
End: 2017-02-09

## 2017-02-09 VITALS
WEIGHT: 216.3 LBS | RESPIRATION RATE: 16 BRPM | TEMPERATURE: 97.2 F | BODY MASS INDEX: 36.04 KG/M2 | DIASTOLIC BLOOD PRESSURE: 91 MMHG | HEIGHT: 65 IN | HEART RATE: 79 BPM | SYSTOLIC BLOOD PRESSURE: 144 MMHG

## 2017-02-09 DIAGNOSIS — E61.1 IRON DEFICIENCY: ICD-10-CM

## 2017-02-09 DIAGNOSIS — C79.51 BREAST CANCER METASTASIZED TO BONE, RIGHT (HCC): ICD-10-CM

## 2017-02-09 DIAGNOSIS — C50.912 BREAST CANCER METASTASIZED TO BONE, LEFT (HCC): Primary | ICD-10-CM

## 2017-02-09 DIAGNOSIS — C79.51 BREAST CANCER METASTASIZED TO BONE, LEFT (HCC): ICD-10-CM

## 2017-02-09 DIAGNOSIS — C50.911 BREAST CANCER METASTASIZED TO BONE, RIGHT (HCC): ICD-10-CM

## 2017-02-09 DIAGNOSIS — C79.51 BREAST CANCER METASTASIZED TO BONE, LEFT (HCC): Primary | ICD-10-CM

## 2017-02-09 DIAGNOSIS — Z45.2 ENCOUNTER FOR PERIPHERALLY INSERTED CENTRAL CATHETER FLUSH: ICD-10-CM

## 2017-02-09 DIAGNOSIS — C50.912 BREAST CANCER METASTASIZED TO BONE, LEFT (HCC): ICD-10-CM

## 2017-02-09 DIAGNOSIS — D50.0 IRON DEFICIENCY ANEMIA DUE TO CHRONIC BLOOD LOSS: ICD-10-CM

## 2017-02-09 LAB
ALBUMIN SERPL-MCNC: 4.3 G/DL (ref 3.4–4.8)
ALBUMIN/GLOB SERPL: 1.3 G/DL (ref 1.1–1.8)
ALP SERPL-CCNC: 102 U/L (ref 38–126)
ALT SERPL W P-5'-P-CCNC: 36 U/L (ref 9–52)
ANION GAP SERPL CALCULATED.3IONS-SCNC: 12 MMOL/L (ref 5–15)
AST SERPL-CCNC: 18 U/L (ref 14–36)
BASOPHILS # BLD AUTO: 0.02 10*3/MM3 (ref 0–0.2)
BASOPHILS NFR BLD AUTO: 0.1 % (ref 0–2)
BILIRUB SERPL-MCNC: 0.4 MG/DL (ref 0.2–1.3)
BUN BLD-MCNC: 20 MG/DL (ref 7–21)
BUN/CREAT SERPL: 23 (ref 7–25)
CALCIUM SPEC-SCNC: 9.6 MG/DL (ref 8.4–10.2)
CHLORIDE SERPL-SCNC: 101 MMOL/L (ref 95–110)
CO2 SERPL-SCNC: 30 MMOL/L (ref 22–31)
CREAT BLD-MCNC: 0.87 MG/DL (ref 0.5–1)
DEPRECATED RDW RBC AUTO: 45.6 FL (ref 36.4–46.3)
EOSINOPHIL # BLD AUTO: 0.01 10*3/MM3 (ref 0–0.7)
EOSINOPHIL NFR BLD AUTO: 0.1 % (ref 0–7)
ERYTHROCYTE [DISTWIDTH] IN BLOOD BY AUTOMATED COUNT: 14.2 % (ref 11.5–14.5)
GFR SERPL CREATININE-BSD FRML MDRD: 87 ML/MIN/1.73 (ref 58–135)
GLOBULIN UR ELPH-MCNC: 3.2 GM/DL (ref 2.3–3.5)
GLUCOSE BLD-MCNC: 139 MG/DL (ref 60–100)
HCT VFR BLD AUTO: 39.2 % (ref 35–45)
HGB BLD-MCNC: 13 G/DL (ref 12–15.5)
IMM GRANULOCYTES # BLD: 0.11 10*3/MM3 (ref 0–0.02)
IMM GRANULOCYTES NFR BLD: 0.8 % (ref 0–0.5)
IRON 24H UR-MRATE: 106 MCG/DL (ref 37–170)
LYMPHOCYTES # BLD AUTO: 2.5 10*3/MM3 (ref 0.6–4.2)
LYMPHOCYTES NFR BLD AUTO: 18.6 % (ref 10–50)
MCH RBC QN AUTO: 29 PG (ref 26.5–34)
MCHC RBC AUTO-ENTMCNC: 33.2 G/DL (ref 31.4–36)
MCV RBC AUTO: 87.5 FL (ref 80–98)
MONOCYTES # BLD AUTO: 0.73 10*3/MM3 (ref 0–0.9)
MONOCYTES NFR BLD AUTO: 5.4 % (ref 0–12)
NEUTROPHILS # BLD AUTO: 10.04 10*3/MM3 (ref 2–8.6)
NEUTROPHILS NFR BLD AUTO: 75 % (ref 37–80)
PLATELET # BLD AUTO: 252 10*3/MM3 (ref 150–450)
PMV BLD AUTO: 10.8 FL (ref 8–12)
POTASSIUM BLD-SCNC: 3.6 MMOL/L (ref 3.5–5.1)
PROT SERPL-MCNC: 7.5 G/DL (ref 6.3–8.6)
RBC # BLD AUTO: 4.48 10*6/MM3 (ref 3.77–5.16)
SODIUM BLD-SCNC: 143 MMOL/L (ref 137–145)
WBC NRBC COR # BLD: 13.41 10*3/MM3 (ref 3.2–9.8)

## 2017-02-09 PROCEDURE — 25010000002 TRASTUZUMAB PER 10 MG: Performed by: INTERNAL MEDICINE

## 2017-02-09 PROCEDURE — 25010000002 DIPHENHYDRAMINE PER 50 MG: Performed by: INTERNAL MEDICINE

## 2017-02-09 PROCEDURE — 85025 COMPLETE CBC W/AUTO DIFF WBC: CPT | Performed by: INTERNAL MEDICINE

## 2017-02-09 PROCEDURE — 96367 TX/PROPH/DG ADDL SEQ IV INF: CPT

## 2017-02-09 PROCEDURE — 25010000002 PERTUZUMAB 420 MG/14ML SOLUTION 420 MG VIAL: Performed by: INTERNAL MEDICINE

## 2017-02-09 PROCEDURE — 83540 ASSAY OF IRON: CPT | Performed by: PHYSICIAN ASSISTANT

## 2017-02-09 PROCEDURE — 96375 TX/PRO/DX INJ NEW DRUG ADDON: CPT | Performed by: INTERNAL MEDICINE

## 2017-02-09 PROCEDURE — 96413 CHEMO IV INFUSION 1 HR: CPT

## 2017-02-09 PROCEDURE — 36415 COLL VENOUS BLD VENIPUNCTURE: CPT | Performed by: INTERNAL MEDICINE

## 2017-02-09 PROCEDURE — 36593 DECLOT VASCULAR DEVICE: CPT | Performed by: INTERNAL MEDICINE

## 2017-02-09 PROCEDURE — A9270 NON-COVERED ITEM OR SERVICE: HCPCS | Performed by: INTERNAL MEDICINE

## 2017-02-09 PROCEDURE — 63710000001 ACETAMINOPHEN 325 MG TABLET: Performed by: INTERNAL MEDICINE

## 2017-02-09 PROCEDURE — 86300 IMMUNOASSAY TUMOR CA 15-3: CPT | Performed by: INTERNAL MEDICINE

## 2017-02-09 PROCEDURE — 80053 COMPREHEN METABOLIC PANEL: CPT | Performed by: INTERNAL MEDICINE

## 2017-02-09 PROCEDURE — 99214 OFFICE O/P EST MOD 30 MIN: CPT | Performed by: INTERNAL MEDICINE

## 2017-02-09 PROCEDURE — 25010000002 ALTEPLASE 2 MG RECONSTITUTED SOLUTION: Performed by: INTERNAL MEDICINE

## 2017-02-09 PROCEDURE — 96417 CHEMO IV INFUS EACH ADDL SEQ: CPT

## 2017-02-09 RX ORDER — SODIUM CHLORIDE 9 MG/ML
250 INJECTION, SOLUTION INTRAVENOUS ONCE
Status: COMPLETED | OUTPATIENT
Start: 2017-02-09 | End: 2017-02-09

## 2017-02-09 RX ORDER — ACETAMINOPHEN 325 MG/1
650 TABLET ORAL ONCE
Status: CANCELLED
Start: 2017-02-09 | End: 2017-02-09

## 2017-02-09 RX ORDER — DIPHENHYDRAMINE HYDROCHLORIDE 50 MG/ML
50 INJECTION INTRAMUSCULAR; INTRAVENOUS ONCE
Status: CANCELLED
Start: 2017-02-09 | End: 2017-02-09

## 2017-02-09 RX ORDER — SODIUM CHLORIDE 0.9 % (FLUSH) 0.9 %
10 SYRINGE (ML) INJECTION AS NEEDED
Status: CANCELLED | OUTPATIENT
Start: 2017-02-09

## 2017-02-09 RX ORDER — SODIUM CHLORIDE 0.9 % (FLUSH) 0.9 %
10 SYRINGE (ML) INJECTION AS NEEDED
Status: DISCONTINUED | OUTPATIENT
Start: 2017-02-09 | End: 2017-02-09 | Stop reason: HOSPADM

## 2017-02-09 RX ORDER — SODIUM CHLORIDE 9 MG/ML
250 INJECTION, SOLUTION INTRAVENOUS ONCE
Status: CANCELLED | OUTPATIENT
Start: 2017-02-09

## 2017-02-09 RX ORDER — FAMOTIDINE 10 MG/ML
20 INJECTION, SOLUTION INTRAVENOUS ONCE
Status: CANCELLED
Start: 2017-02-09 | End: 2017-02-09

## 2017-02-09 RX ORDER — FAMOTIDINE 10 MG/ML
20 INJECTION, SOLUTION INTRAVENOUS ONCE
Status: COMPLETED | OUTPATIENT
Start: 2017-02-09 | End: 2017-02-09

## 2017-02-09 RX ORDER — ACETAMINOPHEN 325 MG/1
650 TABLET ORAL ONCE
Status: COMPLETED | OUTPATIENT
Start: 2017-02-09 | End: 2017-02-09

## 2017-02-09 RX ADMIN — SODIUM CHLORIDE 250 ML: 9 INJECTION, SOLUTION INTRAVENOUS at 12:16

## 2017-02-09 RX ADMIN — PERTUZUMAB 420 MG: 30 INJECTION, SOLUTION, CONCENTRATE INTRAVENOUS at 13:53

## 2017-02-09 RX ADMIN — Medication 10 ML: at 08:42

## 2017-02-09 RX ADMIN — FAMOTIDINE 20 MG: 10 INJECTION, SOLUTION INTRAVENOUS at 12:16

## 2017-02-09 RX ADMIN — ALTEPLASE 2 MG: 2.2 INJECTION, POWDER, LYOPHILIZED, FOR SOLUTION INTRAVENOUS at 09:32

## 2017-02-09 RX ADMIN — TRASTUZUMAB 590 MG: KIT at 14:39

## 2017-02-09 RX ADMIN — DIPHENHYDRAMINE HYDROCHLORIDE: 50 INJECTION INTRAMUSCULAR; INTRAVENOUS at 12:27

## 2017-02-09 RX ADMIN — Medication 10 ML: at 15:40

## 2017-02-09 RX ADMIN — ACETAMINOPHEN 650 MG: 325 TABLET ORAL at 12:15

## 2017-02-09 NOTE — PROGRESS NOTES
DATE OF VISIT: 2/9/2017    REASON FOR VISIT:  Metastatic right breast cancer    HISTORY OF PRESENT ILLNESS:    41-year-old female with a past medical history significant for metastatic right breast cancer with liver and bone metastases currently on treatment with a Taxol, Herceptin and perjeta.  Her last treatment was on January 12, 2017.  After that chemotherapy was held in view of having bright red bleeding per rectum.  Patient subsequently underwent upper endoscopy and colonoscopy 2 weeks ago.  Denies any more bleeding per rectum.  She is here to resume her treatment today.  Complains of swelling in right ankle for which she has been started on prednisone by orthopedic doctor.  Denies any fever or chills.    PAST MEDICAL HISTORY:    1.  Metastatic right breast cancer with liver and bone metastasis  2.  History of pneumonia  3.  Right internal jugular vein DVT status post around to  4.  Rectal bleeding  5.  Dyslipidemia  6.  Diabetes mellitus  7.  Anxiety  8.  Bone metastasis    SOCIAL HISTORY:    Social History   Substance Use Topics   • Smoking status: Former Smoker   • Smokeless tobacco: Never Used   • Alcohol use No       Surgical History :  Past Surgical History   Procedure Laterality Date   • Other surgical history  05/05/2016     central line insertion , PICC line replacement   • Central venous catheter tunneled insertion single lumen       Placement of indwelling Pleurx catheter right   • Venous access device (port) insertion       Ultrasound guided right internal jugular Mediport placement under fluoroscopy   • Lung volume reduction     • Mastectomy       partial   • Pap smear     • Other surgical history       place breast clip percut   • Other surgical history       pulse oximetry   • Other surgical history       remove cc cath w/ sc port or pump, Removal of right internal jugular MediPort   • Other surgical history       spirometry   • Thoracentesis       aspiration of pleural space   • Other surgical  "history       tubal sterilization   • Hysterectomy       vaginal   • Endoscopy N/A 1/26/2017     Procedure: ESOPHAGOGASTRODUODENOSCOPY;  Surgeon: Robert Clifton MD;  Location: [facility] ENDOSCOPY;  Service:    • Colonoscopy N/A 1/26/2017     Procedure: COLONOSCOPY;  Surgeon: Robert Clifton MD;  Location: [facility] ENDOSCOPY;  Service:    • Upper gastrointestinal endoscopy  01/26/2017       ALLERGIES:    Allergies   Allergen Reactions   • Lisinopril Shortness Of Breath   • Flagyl [Metronidazole] Other (See Comments)     pancreatitis   • Fentanyl Anxiety     \"goes out of her mind\"   • Latex Rash       REVIEW OF SYSTEMS:      CONSTITUTIONAL: Positive for fatigue.  No fever, chills, or night sweats.     HEENT:  No epistaxis, mouth sores, or difficulty swallowing.    RESPIRATORY:  No new shortness of breath or cough at present.    CARDIOVASCULAR:  No chest pain or palpitations.    GASTROINTESTINAL:  No abdominal pain, nausea, vomiting, or blood in the stool.    GENITOURINARY:  No dysuria or hematuria.    MUSCULOSKELETAL: Plans of swelling of right ankle.    NEUROLOGICAL: Positive for intermittent tingling and numbness.. No new headache or dizziness.     LYMPHATICS:  Denies any abnormal swollen and anywhere in the body.    SKIN:  Denies any new skin rash.    PHYSICAL EXAMINATION:      VITAL SIGNS:    Visit Vitals   • /91   • Pulse 79   • Temp 97.2 °F (36.2 °C)   • Resp 16   • Ht 65\" (165.1 cm)   • Wt 216 lb 4.8 oz (98.1 kg)   • BMI 35.99 kg/m2       GENERAL:  Not in any distress.     EYES:  Mild pallor. No icterus.    NECK:  No adenopathy.    RESPIRATORY:  Fair air entry bilaterally. No rhonchi or wheezing.    CARDIOVASCULAR:  S1, S2. Regular rate and rhythm.    ABDOMEN:  Soft, nontender. Bowel sounds present.    EXTREMITIES:  No edema.    NEUROLOGICAL:  Alert, awake, oriented x3.  No motor or sensory deficit.  Cranial nerves II-12 grossly intact.    DIAGNOSTIC DATA:    GLUCOSE   Date Value Ref Range Status   02/09/2017 " 139 (H) 60 - 100 mg/dL Final   01/19/2017 124 (H) 60 - 100 mg/dl Final     SODIUM   Date Value Ref Range Status   02/09/2017 143 137 - 145 mmol/L Final   01/19/2017 141 137 - 145 mmol/L Final     POTASSIUM   Date Value Ref Range Status   02/09/2017 3.6 3.5 - 5.1 mmol/L Final   01/19/2017 3.7 3.5 - 5.1 mmol/L Final     CO2   Date Value Ref Range Status   02/09/2017 30.0 22.0 - 31.0 mmol/L Final   01/19/2017 25 22 - 31 mmol/L Final     CHLORIDE   Date Value Ref Range Status   02/09/2017 101 95 - 110 mmol/L Final   01/19/2017 106 95 - 110 mmol/L Final     ANION GAP   Date Value Ref Range Status   02/09/2017 12.0 5.0 - 15.0 mmol/L Final   01/19/2017 10.0 5.0 - 15.0 mmol/L Final     CREATININE   Date Value Ref Range Status   02/09/2017 0.87 0.50 - 1.00 mg/dL Final   01/19/2017 0.9 0.5 - 1.0 mg/dl Final     BUN   Date Value Ref Range Status   02/09/2017 20 7 - 21 mg/dL Final   01/19/2017 13 7 - 21 mg/dl Final     BUN/CREATININE RATIO   Date Value Ref Range Status   02/09/2017 23.0 7.0 - 25.0 Final     CALCIUM   Date Value Ref Range Status   02/09/2017 9.6 8.4 - 10.2 mg/dL Final   01/19/2017 7.9 (L) 8.4 - 10.2 mg/dl Final     ALKALINE PHOSPHATASE   Date Value Ref Range Status   02/09/2017 102 38 - 126 U/L Final   01/19/2017 89 38 - 126 U/L Final     TOTAL PROTEIN   Date Value Ref Range Status   02/09/2017 7.5 6.3 - 8.6 g/dL Final   01/19/2017 6.4 6.3 - 8.6 gm/dl Final     ALT (SGPT)   Date Value Ref Range Status   02/09/2017 36 9 - 52 U/L Final   01/19/2017 59 (H) 9 - 52 U/L Final     AST (SGOT)   Date Value Ref Range Status   02/09/2017 18 14 - 36 U/L Final   01/19/2017 38 (H) 14 - 36 U/L Final     TOTAL BILIRUBIN   Date Value Ref Range Status   02/09/2017 0.4 0.2 - 1.3 mg/dL Final   01/19/2017 0.4 0.2 - 1.3 mg/dl Final     ALBUMIN   Date Value Ref Range Status   02/09/2017 4.30 3.40 - 4.80 g/dL Final   01/19/2017 3.5 3.4 - 4.8 gm/dl Final     GLOBULIN   Date Value Ref Range Status   02/09/2017 3.2 2.3 - 3.5 gm/dL Final      A/G RATIO   Date Value Ref Range Status   02/09/2017 1.3 1.1 - 1.8 g/dL Final     Lab Results   Component Value Date    WBC 13.41 (H) 02/09/2017    HGB 13.0 02/09/2017    HCT 39.2 02/09/2017    MCV 87.5 02/09/2017     02/09/2017     Lab Results   Component Value Date    NEUTROABS 10.04 (H) 02/09/2017    IRON 106 02/09/2017    TIBC 304 01/25/2017    LABIRON 6 (L) 01/25/2017    FERRITIN 51.50 01/25/2017    [id number] 559 06/17/2016    FOLATE 14.10 06/17/2016     Lab Results   Component Value Date     17.0 09/22/2015    LABCA2 17.5 12/12/2016    AFPTM 156 01/25/2017    HCGQUANT 1 12/12/2016    CEA 3.2 09/22/2015   ]    PATHOLOGY:  Pathology result from EGD and colonoscopy biopsy on January 26, 2017 shows:  Final Diagnosis   1. GASTRIC ANTRUM, MUCOSAL BIOPSY:   MILD CHRONIC GASTRITIS.   NO EVIDENCE OF HELICOBACTER PYLORI (IP STAIN PERFORMED).      2. DUODENUM, MUCOSAL BIOPSY:   MILD CHRONIC INFLAMMATION.      3. GASTRIC BODY, MUCOSAL BIOPSY:   MILD CHRONIC GASTRITIS.   NO EVIDENCE OF HELICOBACTER PYLORI (IP STAIN PERFORMED).      4. COLONIC MUCOSAL BIOPSY, RANDOM:   NO SIGNIFICANT PATHOLOGIC DIAGNOSIS.          RADIOLOGY DATA :  Restaging CT chest abdomen and pelvis with contrast was done on February 2, 2017 it was reviewed and discussed with patient it showed:  CONCLUSION:   1. Subtle low-density area in the right hepatic dome that appears slightly increased in size. While this could be related to an area of fibrosis from the patient's cirrhosis this raises a question of metastatic disease and consider follow-up liver   protocol MRI with contrast in this patient with history of breast cancer.  2. Changes of cirrhosis of the liver again noted.  3. Decrease in size of anterior mediastinal soft tissue density that on the previous examination had an appearance more suggestive of thymic tissue than definite metastatic disease. This has nearly resolved.      ASSESSMENT AND PLAN:      1.  Stage IV metastatic  right breast cancer with liver and bone metastasis.  Patient was initially diagnosed in 2011 with a stage III disease.  Initially patient had a Taxotere carboplatin and Herceptin for 6 cycles followed by 1 year of Herceptin maintenance.  After that patient was getting Lupron and tamoxifen.  In March 2015 she was diagnosed with a metastatic disease involving liver and bone.  Patient was again started on Taxotere Herceptin and perjeta.  She could not tolerate Taxotere at that point she had a recurrent pneumonias and cytopenias.  Subsequently she was maintained on Herceptin and perjeta until October 2016, at that point restaging CAT scan showed there was anterior mediastinal mass for which patient was started on Taxol Herceptin and perjeta.  Patient received her last dose of Taxol on January 12, 2017.  After that in view of rectal bleeding chemotherapy was on hold.  Recently done restaging CAT scan shows the mediastinal mass has significantly decreased in size.  We will hold Taxol for now and continue with Herceptin and perjeta.  Plan is to  repeat a scan after 6 weeks.  Also started Faslodex with the next Herceptin and perjeta.    2.  Rectal bleeding: Patient had a EGD and colonoscopy done on January 26, 2017.  EGD did show there was evidence of angiectasia without any active bleeding in the stomach for which patient is undergoing laser treatment on February 23.  We will continue holding Xarelto for now until she gets done with laser treatment.    3.  ATG DVT: Patient was on Xarelto which was held secondary to rectal bleeding.  On EGD she does have angiectasia in the stomach for which she is undergoing laser treatment on February 23, 2017.  At this point it would be advisable to hold Xarelto for now.    4.  Bone metastasis: Her last dose of Zometa was on October 27, 2016.  After that in view of teeth extraction it was held.  We will resume Zometa in 3 weeks with her next Herceptin and perjeta.    5.  Health  maintenance: Patient does not smoke.  She just had a colonoscopy done on January 2017 which was negative for any malignancy.  She remains full code.    6.  Anemia: Anemia workup has been done today we will follow up on the result if she has a significant iron deficiency she might need some intravenous Feraheme as she was not able to tolerate oral iron in the past.   Joselito Waddell MD  2/9/2017  10:25 AM
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
## 2017-02-10 LAB
CANCER AG15-3 SERPL-ACNC: 22.1 U/ML (ref 0–25)
CANCER AG27-29 SERPL-ACNC: 23.6 U/ML (ref 0–38.6)

## 2017-02-15 ENCOUNTER — OFFICE VISIT (OUTPATIENT)
Dept: FAMILY MEDICINE CLINIC | Facility: CLINIC | Age: 42
End: 2017-02-15

## 2017-02-15 VITALS
TEMPERATURE: 96.4 F | OXYGEN SATURATION: 97 % | DIASTOLIC BLOOD PRESSURE: 80 MMHG | HEART RATE: 90 BPM | SYSTOLIC BLOOD PRESSURE: 120 MMHG | HEIGHT: 65 IN

## 2017-02-15 DIAGNOSIS — F32.5 MAJOR DEPRESSIVE DISORDER WITH SINGLE EPISODE, IN FULL REMISSION (HCC): ICD-10-CM

## 2017-02-15 DIAGNOSIS — C50.912 BREAST CANCER METASTASIZED TO BONE, LEFT (HCC): ICD-10-CM

## 2017-02-15 DIAGNOSIS — R23.2 HOT FLASHES: ICD-10-CM

## 2017-02-15 DIAGNOSIS — R05.9 COUGH: ICD-10-CM

## 2017-02-15 DIAGNOSIS — K59.09 CHRONIC CONSTIPATION: Primary | ICD-10-CM

## 2017-02-15 DIAGNOSIS — C79.51 BREAST CANCER METASTASIZED TO BONE, LEFT (HCC): ICD-10-CM

## 2017-02-15 PROCEDURE — 99214 OFFICE O/P EST MOD 30 MIN: CPT | Performed by: FAMILY MEDICINE

## 2017-02-15 RX ORDER — IBUPROFEN 200 MG
1 CAPSULE ORAL 3 TIMES DAILY
Qty: 90 TABLET | Refills: 5 | Status: SHIPPED | OUTPATIENT
Start: 2017-02-15 | End: 2017-04-20 | Stop reason: SDUPTHER

## 2017-02-15 RX ORDER — POTASSIUM CHLORIDE 20 MEQ/1
20 TABLET, EXTENDED RELEASE ORAL 2 TIMES DAILY
Qty: 60 TABLET | Refills: 5 | Status: SHIPPED | OUTPATIENT
Start: 2017-02-15 | End: 2017-07-17 | Stop reason: SDUPTHER

## 2017-02-15 RX ORDER — LUBIPROSTONE 24 UG/1
24 CAPSULE ORAL 2 TIMES DAILY WITH MEALS
Qty: 60 CAPSULE | Refills: 5 | Status: SHIPPED | OUTPATIENT
Start: 2017-02-15 | End: 2017-10-16 | Stop reason: SDUPTHER

## 2017-02-15 RX ORDER — BENZONATATE 200 MG/1
200 CAPSULE ORAL 3 TIMES DAILY PRN
Qty: 90 CAPSULE | Refills: 2 | Status: SHIPPED | OUTPATIENT
Start: 2017-02-15 | End: 2017-03-14

## 2017-02-15 RX ORDER — OXYCODONE AND ACETAMINOPHEN 10; 325 MG/1; MG/1
1 TABLET ORAL EVERY 8 HOURS PRN
Qty: 90 TABLET | Refills: 0 | Status: SHIPPED | OUTPATIENT
Start: 2017-02-15 | End: 2017-03-14 | Stop reason: SDUPTHER

## 2017-02-15 RX ORDER — VENLAFAXINE HYDROCHLORIDE 37.5 MG/1
37.5 CAPSULE, EXTENDED RELEASE ORAL DAILY
Qty: 30 CAPSULE | Refills: 5 | Status: SHIPPED | OUTPATIENT
Start: 2017-02-15 | End: 2017-04-11 | Stop reason: SDUPTHER

## 2017-02-15 NOTE — PROGRESS NOTES
Subjective   Chief Complaint   Patient presents with   • Med Refill     cancer patient, oxycododone last dose 2 days ago     Andra Villareal is a 41 y.o. female.   Med Refill (cancer patient, oxycododone last dose 2 days ago)    History of Present Illness     Recent hospitalization due to GI bleeding  Jordin Oliveira alona - diagnosed with gerd and chronic gastritis  Ceruloplasmin was elevated   Recheck scheduled 3/15/17    Podiatry - pain in the right ankle  Currently wearing ankle brace  Recheck scheduled 2/17    Chronic pain relating to metastatic breast cancer to bone  Controlled with percocet     Metastatic breast cancer s/p right mastectomy with breast reconstructive surgery with metastases to liver and bone with new lesion noted on CT 10/26/16 of anterior mediastinal mass measuring 4.9x5.1x3.3cm  Followed at [facility] with Dr Waddell  Treatment for new lesion to restart chemotherapy    STEVEN - currently controlled with klonopin PRN    C/o uncontrolled constipation  Using miralax daily which causes her rebound diarrhea  Failed movantik - caused her diarrhea    Acute bronchitis - not improved with tessalon perles or inhaler  Using respiclick inhaler as needed - expensive, requiring PA  This improved her breathing better than ventolin, proair    The following portions of the patient's history were reviewed and updated as appropriate: allergies, current medications, past family history, past medical history, past social history, past surgical history and problem list.    Review of Systems   Constitutional: Negative for appetite change, chills, fatigue and fever.   HENT: Negative for congestion, ear pain, rhinorrhea and sore throat.    Eyes: Negative for pain.   Respiratory: Positive for cough. Negative for shortness of breath.    Cardiovascular: Negative for chest pain and palpitations.   Gastrointestinal: Positive for constipation. Negative for abdominal pain, diarrhea and nausea.   Genitourinary: Negative for  "dysuria.   Musculoskeletal: Negative for back pain, joint swelling and neck pain.   Skin: Negative for rash.   Neurological: Negative for dizziness and headaches.       Objective   Visit Vitals   • /80   • Pulse 90   • Temp 96.4 °F (35.8 °C)   • Ht 65\" (165.1 cm)   • SpO2 97%     Physical Exam   Constitutional: She is oriented to person, place, and time. She appears well-developed and well-nourished.   HENT:   Head: Normocephalic and atraumatic.   Eyes: Pupils are equal, round, and reactive to light.   Neck: Normal range of motion. Neck supple.   Cardiovascular: Normal rate, regular rhythm and normal heart sounds.    Pulmonary/Chest: Effort normal. No respiratory distress. She has wheezes. She has no rales.   Abdominal: Soft. Bowel sounds are normal.   Musculoskeletal:   R ankle brace   Neurological: She is alert and oriented to person, place, and time.   Skin: Skin is warm and dry.   picc line on the left upper extremity   Psychiatric: She has a normal mood and affect.   Nursing note and vitals reviewed.      Assessment/Plan   Problems Addressed this Visit        Respiratory    Cough    Relevant Medications    benzonatate (TESSALON) 200 MG capsule       Musculoskeletal and Integument    Breast cancer metastasized to bone    Relevant Medications    oxyCODONE-acetaminophen (PERCOCET)  MG per tablet       Genitourinary    Hot flashes    Relevant Medications    venlafaxine XR (EFFEXOR-XR) 37.5 MG 24 hr capsule       Other    Major depressive disorder with single episode, in full remission    Relevant Medications    venlafaxine XR (EFFEXOR-XR) 37.5 MG 24 hr capsule      Other Visit Diagnoses     Chronic constipation    -  Primary    Relevant Medications    lubiprostone (AMITIZA) 24 MCG capsule        Start amitiza  Samples provided    Refilled percocet    Refilled respiclick    Refilled effexor    Recheck in 4 weeks or sooner as needed       "

## 2017-02-16 ENCOUNTER — INFUSION (OUTPATIENT)
Dept: ONCOLOGY | Facility: HOSPITAL | Age: 42
End: 2017-02-16

## 2017-02-16 DIAGNOSIS — C79.51 BREAST CANCER METASTASIZED TO BONE, LEFT (HCC): Primary | ICD-10-CM

## 2017-02-16 DIAGNOSIS — C50.912 BREAST CANCER METASTASIZED TO BONE, LEFT (HCC): Primary | ICD-10-CM

## 2017-02-16 RX ORDER — SODIUM CHLORIDE 0.9 % (FLUSH) 0.9 %
10 SYRINGE (ML) INJECTION AS NEEDED
Status: CANCELLED | OUTPATIENT
Start: 2017-02-16

## 2017-02-16 RX ORDER — SODIUM CHLORIDE 0.9 % (FLUSH) 0.9 %
10 SYRINGE (ML) INJECTION AS NEEDED
Status: DISCONTINUED | OUTPATIENT
Start: 2017-02-16 | End: 2017-02-16 | Stop reason: HOSPADM

## 2017-02-17 RX ORDER — IBUPROFEN 200 MG
CAPSULE ORAL
Qty: 90 TABLET | Refills: 0 | Status: ON HOLD | OUTPATIENT
Start: 2017-02-17 | End: 2017-02-24 | Stop reason: SDUPTHER

## 2017-02-17 RX ORDER — INSULIN DETEMIR 100 [IU]/ML
INJECTION, SOLUTION SUBCUTANEOUS
Qty: 10 ML | Refills: 0 | Status: ON HOLD | OUTPATIENT
Start: 2017-02-17 | End: 2017-05-24 | Stop reason: SDUPTHER

## 2017-02-23 ENCOUNTER — INFUSION (OUTPATIENT)
Dept: ONCOLOGY | Facility: HOSPITAL | Age: 42
End: 2017-02-23

## 2017-02-23 ENCOUNTER — OFFICE VISIT (OUTPATIENT)
Dept: PODIATRY | Facility: CLINIC | Age: 42
End: 2017-02-23

## 2017-02-23 VITALS — BODY MASS INDEX: 34.66 KG/M2 | HEIGHT: 65 IN | WEIGHT: 208 LBS

## 2017-02-23 DIAGNOSIS — G89.29 CHRONIC PAIN OF RIGHT ANKLE: ICD-10-CM

## 2017-02-23 DIAGNOSIS — S92.254G CLOSED NONDISPLACED FRACTURE OF NAVICULAR BONE OF RIGHT FOOT WITH DELAYED HEALING, SUBSEQUENT ENCOUNTER: ICD-10-CM

## 2017-02-23 DIAGNOSIS — M79.671 RIGHT FOOT PAIN: ICD-10-CM

## 2017-02-23 DIAGNOSIS — M25.571 CHRONIC PAIN OF RIGHT ANKLE: ICD-10-CM

## 2017-02-23 DIAGNOSIS — C79.51 BREAST CANCER METASTASIZED TO BONE, LEFT (HCC): Primary | ICD-10-CM

## 2017-02-23 DIAGNOSIS — S93.411D SPRAIN OF CALCANEOFIBULAR LIGAMENT OF RIGHT ANKLE, SUBSEQUENT ENCOUNTER: Primary | ICD-10-CM

## 2017-02-23 DIAGNOSIS — C50.912 BREAST CANCER METASTASIZED TO BONE, LEFT (HCC): Primary | ICD-10-CM

## 2017-02-23 DIAGNOSIS — R52 PAIN: ICD-10-CM

## 2017-02-23 PROCEDURE — 20600 DRAIN/INJ JOINT/BURSA W/O US: CPT | Performed by: PODIATRIST

## 2017-02-23 PROCEDURE — 96523 IRRIG DRUG DELIVERY DEVICE: CPT

## 2017-02-23 PROCEDURE — 99213 OFFICE O/P EST LOW 20 MIN: CPT | Performed by: PODIATRIST

## 2017-02-23 RX ORDER — SODIUM CHLORIDE 0.9 % (FLUSH) 0.9 %
10 SYRINGE (ML) INJECTION AS NEEDED
Status: CANCELLED | OUTPATIENT
Start: 2017-02-23

## 2017-02-23 RX ORDER — BUPIVACAINE HYDROCHLORIDE 5 MG/ML
1 INJECTION, SOLUTION EPIDURAL; INTRACAUDAL ONCE
Status: COMPLETED | OUTPATIENT
Start: 2017-02-23 | End: 2017-02-23

## 2017-02-23 RX ORDER — SODIUM CHLORIDE 0.9 % (FLUSH) 0.9 %
10 SYRINGE (ML) INJECTION AS NEEDED
Status: DISCONTINUED | OUTPATIENT
Start: 2017-02-23 | End: 2017-02-23 | Stop reason: HOSPADM

## 2017-02-23 RX ORDER — BETAMETHASONE SODIUM PHOSPHATE AND BETAMETHASONE ACETATE 3; 3 MG/ML; MG/ML
6 INJECTION, SUSPENSION INTRA-ARTICULAR; INTRALESIONAL; INTRAMUSCULAR; SOFT TISSUE ONCE
Status: COMPLETED | OUTPATIENT
Start: 2017-02-23 | End: 2017-02-23

## 2017-02-23 RX ADMIN — Medication 10 ML: at 10:11

## 2017-02-23 RX ADMIN — BUPIVACAINE HYDROCHLORIDE 1 ML: 5 INJECTION, SOLUTION EPIDURAL; INTRACAUDAL at 11:20

## 2017-02-23 RX ADMIN — BETAMETHASONE SODIUM PHOSPHATE AND BETAMETHASONE ACETATE 6 MG: 3; 3 INJECTION, SUSPENSION INTRA-ARTICULAR; INTRALESIONAL; INTRAMUSCULAR; SOFT TISSUE at 11:20

## 2017-02-23 NOTE — PROGRESS NOTES
Andra Villareal  1975  41 y.o. female     Patient presents today for recheck of her right ankle.    02/23/2017  Chief Complaint   Patient presents with   • Right Ankle - Follow-up           History of Present Illness    Ms Villareal is a 42 y/o female who presents for f/u evaluation of right ankle sprain and navicular avulsion fracture. She has been ambulating in a lace up ankle brace which helps some. She completed 1 week medrol dosepak with minimal relief. She continues to complain of significant pain.    Past Medical History   Diagnosis Date   • Abnormal breathing sounds    • Abscess of skin      and / or subcutaneous tissue   • Acute bronchitis      unspecified   • Adult body mass index 30+      obesity-BMI 33   • Allergic rhinitis    • Anasarca    • Anxiety      currently on xanax   • Anxiety    • Asthenia    • Asthma      moderate persistent   • Asthmatic bronchitis    • Astigmatism    • Bleeding external hemorrhoids    • Body mass index (BMI) of 34.0-34.9 in adult    • Body mass index (BMI) of 35.0-35.9 in adult    • Body mass index (BMI) of 36.0-36.9 in adult    • Body mass index 40.0-44.9, adult      SEVERELY OBESE   • Cellulitis    • Chemotherapy induced nausea and vomiting    • Chronic back pain    • Chronic cough    • Chronic hypoxemic respiratory failure      on NC at 3 LPM   • Cough    • Depressive disorder    • Diabetes mellitus      no retinopathy   • Dyslipidemia    • Edema of lower extremity    • Encounter for follow-up examination after completed treatment for malignant neoplasm    • Epidermoid cyst of skin      excision with secondary intention healing   • Excessive and frequent menstruation with irregular cycle      Vaginal bleeding after 4 years of no bleeding secondary to chemotherapy for breast cancer; currently being treated for breast cancer for the second time, mets to bone and liver while on chemotherapy      • Flushing    • 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    •  Gastroesophageal reflux disease    • H/O tubal ligation    • Hx of echocardiogram      w/ color flow, and w/o color flow   • Hyperglycemia    • Hypermetropia    • Hypertension    • Hypokalemia    • Impaired glucose tolerance      hgbalc 6.2   • Infection of skin      and /or subcutaneous tissue-around the catheter exit site   • Influenza      needs influenza immunization   • Irregular periods    • Lesion of lumbar spine      pain controlled with percocet and lidocaine patches   • Lumbago with sciatica    • Malignant neoplasm of female breast      s/p right mastectomy, mets to bone and liver on chemotherapy      • Menopausal flushing    • Muscle weakness    • Muscle weakness (generalized)    • Nonspecific interstitial pneumonia      symptomatically improved   • Pleural effusion      refractory massive right, most likely malignant effusion   • Primary atypical pneumonia    • Renal failure      acute drug-induced renal failure   • Sinus tachycardia    • Tobacco dependence syndrome    • Tobacco non-user    • Upper respiratory infection    • Wheezing          Past Surgical History   Procedure Laterality Date   • Other surgical history  05/05/2016     central line insertion , PICC line replacement   • Central venous catheter tunneled insertion single lumen       Placement of indwelling Pleurx catheter right   • Venous access device (port) insertion       Ultrasound guided right internal jugular Mediport placement under fluoroscopy   • Lung volume reduction     • Mastectomy       partial   • Pap smear     • Other surgical history       place breast clip percut   • Other surgical history       pulse oximetry   • Other surgical history       remove cc cath w/ sc port or pump, Removal of right internal jugular MediPort   • Other surgical history       spirometry   • Thoracentesis       aspiration of pleural space   • Other surgical history       tubal sterilization   • Hysterectomy       vaginal   • Endoscopy N/A 1/26/2017      Procedure: ESOPHAGOGASTRODUODENOSCOPY;  Surgeon: Robert Clifton MD;  Location: [facility] ENDOSCOPY;  Service:    • Colonoscopy N/A 1/26/2017     Procedure: COLONOSCOPY;  Surgeon: Robert Clifton MD;  Location: [facility] ENDOSCOPY;  Service:    • Upper gastrointestinal endoscopy  01/26/2017         Family History   Problem Relation Age of Onset   • Coronary artery disease Other    • Diabetes Other    • Hypertension Other          Social History     Social History   • Marital status:      Spouse name: N/A   • Number of children: N/A   • Years of education: N/A     Occupational History   • Not on file.     Social History Main Topics   • Smoking status: Former Smoker   • Smokeless tobacco: Never Used   • Alcohol use No   • Drug use: No   • Sexual activity: Defer     Other Topics Concern   • Not on file     Social History Narrative         Current Outpatient Prescriptions   Medication Sig Dispense Refill   • albuterol (PROAIR RESPICLICK) 108 (90 BASE) MCG/ACT inhaler Inhale 2 puffs Every 6 (Six) Hours As Needed for wheezing or shortness of air. 1 inhaler 11   • benzonatate (TESSALON) 200 MG capsule Take 1 capsule by mouth 3 (Three) Times a Day As Needed for cough. 90 capsule 2   • calcium carbonate (OS-ONELIA) 1250 (500 CA) MG tablet Take 1 tablet by mouth 3 (Three) Times a Day. 90 tablet 5   • calcium carbonate (OS-ONELIA) 1250 (500 CA) MG tablet TAKE 1 TABLET BY MOUTH THREE TIMES DAILY 90 tablet 0   • CARTIA  MG 24 hr capsule Take 1 capsule by mouth Daily. 30 capsule 5   • cetirizine (zyrTEC) 10 MG tablet Take 1 tablet by mouth Daily. 30 tablet 5   • clonazePAM (KlonoPIN) 2 MG tablet Take 1 tablet by mouth 2 (Two) Times a Day As Needed for anxiety. 60 tablet 2   • ferrous sulfate 325 (65 FE) MG tablet Take 1 tablet by mouth 3 (Three) Times a Day. 90 tablet 5   • fluticasone (FLONASE) 50 MCG/ACT nasal spray 2 sprays into each nostril Daily. 16 g 12   • LEVEMIR 100 UNIT/ML injection INJECT 8 UNITS UNDER THE SKIN  "EVERY NIGHT AT BEDTIME. 10 mL 0   • lubiprostone (AMITIZA) 24 MCG capsule Take 1 capsule by mouth 2 (Two) Times a Day With Meals. 60 capsule 5   • magnesium oxide (MAGNESIUM-OXIDE) 400 (241.3 MG) MG tablet tablet Take 1 tablet by mouth 2 (Two) Times a Day. 60 tablet 11   • MethylPREDNISolone (MEDROL, NOY,) 4 MG tablet Take as directed 21 tablet 0   • montelukast (SINGULAIR) 10 MG tablet Take 1 tablet by mouth Every Night. 30 tablet 5   • oxyCODONE-acetaminophen (PERCOCET)  MG per tablet Take 1 tablet by mouth Every 8 (Eight) Hours As Needed for moderate pain (4-6). 90 tablet 0   • pantoprazole (PROTONIX) 40 MG EC tablet Take 1 tablet by mouth Every Night. 90 tablet 3   • polyethylene glycol (MIRALAX) powder Take 3,350 g by mouth Daily.     • potassium chloride (K-DUR,KLOR-CON) 20 MEQ CR tablet Take 1 tablet by mouth 2 (Two) Times a Day. 60 tablet 5   • promethazine (PHENERGAN) 25 MG tablet Take 1 tablet by mouth Every 6 (Six) Hours As Needed for nausea or vomiting. 120 tablet 5   • raNITIdine (ZANTAC) 150 MG tablet TK 1 T PO BID  1   • sucralfate (CARAFATE) 1 GM/10ML suspension Take 10 mL by mouth 2 (Two) Times a Day. 420 mL 1   • venlafaxine XR (EFFEXOR-XR) 37.5 MG 24 hr capsule Take 1 capsule by mouth Daily. 30 capsule 5     No current facility-administered medications for this visit.      Facility-Administered Medications Ordered in Other Visits   Medication Dose Route Frequency Provider Last Rate Last Dose   • sodium chloride 0.9 % flush 10 mL  10 mL Intravenous PRN Joselito Waddell MD   10 mL at 01/26/17 1129         OBJECTIVE    Visit Vitals   • Ht 65\" (165.1 cm)   • Wt 208 lb (94.3 kg)   • BMI 34.61 kg/m2         Review of Systems   Constitutional:  Denies recent weight loss, weight gain, fever or chills, no change in exercise tolerance  Musculoskeletal: Foot pain.   Skin: No wounds or lesions  Neurological: Denies paresthesias.  Psychiatric/Behavioral: Denies depression    Physical Exam   Constitutional: " he appears well-developed and well-nourished.   HEENT: Normocephalic. Atraumatic.  CV: No CP. RRR  Resp: Non-labored respirations.  Psychiatric: he has a normal mood and affect. his behavior is normal.         Lower Extremity Exam:  Vascular: DP/PT pulses palpable 2+.   Mild right lateral ankle edema  Foot warm  Neuro: Protective sensation intact, b/l.  Light touch sensation intact, b/l  DTRs intact  Integument: No open wounds or lesions.  No erythema, scaling  Musculoskeletal:  RLE muscle strength 5/5.   LLE dorsiflexion, plantarflexion, inversion, eversion intact; guarded.  Achilles, TA, TP, Peroneal tendons intact  No gross instability, exam limited by pain  Gait assisted with crutches  Tenderness to palpation over ATFL, CFL  Significant pain over dorsal TN joint    Right TN injection:  Risks and benefits discussed. Written consent obtained.  Skin prepped with alcohol  Dorsal TN joint injection 1cc 0.5% Marcaine, 1cc Celestone with 27g needle  Band-aid applied.  Pt tolerated well.      ASSESSMENT AND PLAN    Andra was seen today for follow-up.    Diagnoses and all orders for this visit:    Sprain of calcaneofibular ligament of right ankle, subsequent encounter    Pain  -     XR Ankle 3+ View Right    Right foot pain    Chronic pain of right ankle  -     betamethasone acetate-betamethasone sodium phosphate (CELESTONE SOLUSPAN) injection 6 mg; Inject 1 mL into the joint 1 (One) Time.  -     bupivacaine (PF) (MARCAINE) 0.5 % injection 1 mL; Inject 1 mL as directed 1 (One) Time.    Closed nondisplaced fracture of navicular bone of right foot with delayed healing, subsequent encounter    -Comprehensive foot and ankle exam performed  -Radiographs ordered and reviewed  -Educated pt on diagnosis, etiology and treatment of moderate ankle sprain, suspected navicular avulsion fracture  -Low tide CAM boot dispensed  -RICE therapy  -Corticosteroid injection as above  -Recheck 3-4 weeks          This document has been  electronically signed by Armani Oliveira DPM on February 23, 2017 8:33 PM     Armani Oliveira DPM  2/23/2017  8:33 PM

## 2017-02-24 ENCOUNTER — HOSPITAL ENCOUNTER (OUTPATIENT)
Facility: HOSPITAL | Age: 42
Setting detail: HOSPITAL OUTPATIENT SURGERY
Discharge: HOME | End: 2017-02-24
Attending: INTERNAL MEDICINE | Admitting: INTERNAL MEDICINE

## 2017-02-24 ENCOUNTER — ANESTHESIA (OUTPATIENT)
Dept: GASTROENTEROLOGY | Facility: HOSPITAL | Age: 42
End: 2017-02-24

## 2017-02-24 ENCOUNTER — ANESTHESIA EVENT (OUTPATIENT)
Dept: GASTROENTEROLOGY | Facility: HOSPITAL | Age: 42
End: 2017-02-24

## 2017-02-24 VITALS
HEART RATE: 77 BPM | SYSTOLIC BLOOD PRESSURE: 165 MMHG | DIASTOLIC BLOOD PRESSURE: 86 MMHG | WEIGHT: 219.58 LBS | BODY MASS INDEX: 36.58 KG/M2 | HEIGHT: 65 IN | TEMPERATURE: 97 F | RESPIRATION RATE: 20 BRPM | OXYGEN SATURATION: 96 %

## 2017-02-24 LAB — GLUCOSE BLDC GLUCOMTR-MCNC: 114 MG/DL (ref 70–130)

## 2017-02-24 PROCEDURE — 43255 EGD CONTROL BLEEDING ANY: CPT | Performed by: INTERNAL MEDICINE

## 2017-02-24 PROCEDURE — 25010000002 PROPOFOL 10 MG/ML EMULSION: Performed by: NURSE ANESTHETIST, CERTIFIED REGISTERED

## 2017-02-24 PROCEDURE — 82962 GLUCOSE BLOOD TEST: CPT

## 2017-02-24 RX ORDER — PROPOFOL 10 MG/ML
VIAL (ML) INTRAVENOUS AS NEEDED
Status: DISCONTINUED | OUTPATIENT
Start: 2017-02-24 | End: 2017-02-24 | Stop reason: SURG

## 2017-02-24 RX ORDER — DEXTROSE AND SODIUM CHLORIDE 5; .45 G/100ML; G/100ML
20 INJECTION, SOLUTION INTRAVENOUS CONTINUOUS
Status: DISCONTINUED | OUTPATIENT
Start: 2017-02-24 | End: 2017-02-24 | Stop reason: HOSPADM

## 2017-02-24 RX ORDER — DILTIAZEM HYDROCHLORIDE 120 MG/1
120 CAPSULE, COATED, EXTENDED RELEASE ORAL NIGHTLY
COMMUNITY
End: 2017-07-13 | Stop reason: SDUPTHER

## 2017-02-24 RX ORDER — LIDOCAINE HYDROCHLORIDE 10 MG/ML
INJECTION, SOLUTION INFILTRATION; PERINEURAL AS NEEDED
Status: DISCONTINUED | OUTPATIENT
Start: 2017-02-24 | End: 2017-02-24 | Stop reason: SURG

## 2017-02-24 RX ORDER — 0.9 % SODIUM CHLORIDE 0.9 %
10 VIAL (ML) INJECTION ONCE
Status: COMPLETED | OUTPATIENT
Start: 2017-02-24 | End: 2017-02-24

## 2017-02-24 RX ADMIN — PROPOFOL 60 MG: 10 INJECTION, EMULSION INTRAVENOUS at 11:15

## 2017-02-24 RX ADMIN — DEXTROSE AND SODIUM CHLORIDE: 5; 450 INJECTION, SOLUTION INTRAVENOUS at 10:54

## 2017-02-24 RX ADMIN — LIDOCAINE HYDROCHLORIDE 50 MG: 10 INJECTION, SOLUTION INFILTRATION; PERINEURAL at 11:00

## 2017-02-24 RX ADMIN — PROPOFOL 50 MG: 10 INJECTION, EMULSION INTRAVENOUS at 11:10

## 2017-02-24 RX ADMIN — DEXTROSE AND SODIUM CHLORIDE 20 ML/HR: 5; 450 INJECTION, SOLUTION INTRAVENOUS at 10:27

## 2017-02-24 RX ADMIN — PROPOFOL 70 MG: 10 INJECTION, EMULSION INTRAVENOUS at 11:05

## 2017-02-24 RX ADMIN — PROPOFOL 50 MG: 10 INJECTION, EMULSION INTRAVENOUS at 11:08

## 2017-02-24 RX ADMIN — SODIUM CHLORIDE 10 ML: 9 INJECTION, SOLUTION INTRAMUSCULAR; INTRAVENOUS; SUBCUTANEOUS at 10:26

## 2017-02-24 NOTE — ANESTHESIA PREPROCEDURE EVALUATION
Anesthesia Evaluation        Airway   Mallampati: II  TM distance: >3 FB  possible difficult intubation  Dental - normal exam     Pulmonary - normal exam   (+) pneumonia , asthma, recent URI,   Cardiovascular - normal exam    (+) hypertension, PVD,       Neuro/Psych  (+) psychiatric history,    GI/Hepatic/Renal/Endo    (+)  GERD, chronic renal disease ARF, diabetes mellitus,     Musculoskeletal     (+) back pain,   Abdominal  - normal exam   Substance History      OB/GYN          Other                                    Anesthesia Plan    ASA 3     MAC     intravenous induction

## 2017-02-24 NOTE — PLAN OF CARE
Problem: Patient Care Overview (Adult)  Goal: Plan of Care Review    02/24/17 1132   Coping/Psychosocial Response Interventions   Plan Of Care Reviewed With patient   Patient Care Overview   Progress no change         Problem: GI Endoscopy (Adult)  Goal: Signs and Symptoms of Listed Potential Problems Will be Absent or Manageable (GI Endoscopy)    02/24/17 1132   GI Endoscopy   Problems Assessed (GI Endoscopy) all   Problems Present (GI Endoscopy) none

## 2017-02-24 NOTE — PLAN OF CARE
Problem: Patient Care Overview (Adult)  Goal: Plan of Care Review  Outcome: Ongoing (interventions implemented as appropriate)    02/24/17 1117   Coping/Psychosocial Response Interventions   Plan Of Care Reviewed With patient   Patient Care Overview   Progress no change   Outcome Evaluation   Outcome Summary/Follow up Plan vss         Problem: GI Endoscopy (Adult)  Goal: Signs and Symptoms of Listed Potential Problems Will be Absent or Manageable (GI Endoscopy)  Outcome: Ongoing (interventions implemented as appropriate)    02/24/17 1117   GI Endoscopy   Problems Assessed (GI Endoscopy) all   Problems Present (GI Endoscopy) none

## 2017-02-24 NOTE — H&P (VIEW-ONLY)
Chief Complaint   Patient presents with   • Iron Deficiency   • Constipation   • Hemorrhage of Anus   • Heartburn       ENDO PROCEDURE ORDERED: EGD, MITZI ANGIOECtasis gastric body. D/w Dr. Martines. Urgent    Subjective    Andra Villareal is a 41 y.o. female. she is here today for follow-up.    History of Present Illness    Patient seen on a recheck of her iron deficiency anemia, GERD, abdominal pain, probable cirrhosis.  Last seen 1/23/17.  Patient has a history of metastatic breast disease to the liver.  She currently denies abdominal pain.  She is on Protonix, Zantac for chronic GERD.  She denied nausea, vomiting, dysphagia.  Bowel movements are fairly regular, she is on iron.  Weight is down 3.4 pounds since last visit.    Patient underwent EGD/colonoscopy on 1/26/17 showed a diffuse gastritis.  There was a single large nonbleeding angiectasia in the gastric body.  Duodenum appears normal.  Biopsy of this area showed mild chronic gastritis, negative H. pylori.  Antral biopsy showed mild chronic gastritis, negative H. pylori.  Duodenal biopsy showed mild chronic inflammation.  Colonoscopy showed internal/external hemorrhoids, fair prep.  Random colon biopsy was negative.  Recommend repeat colonoscopy in 5 years.    Laboratory on 1/25/17 showed normal ferritin.  Iron 19.  Ceruloplasmin elevated at 42 but of unlikely significance.  Alpha-1 antitrypsin, SMA, VITOR, hepatitis diagnostic panel were normal or negative.  Dominique fibro-sure 0.13/F0, steatosis 0.70/S3, N1.  Prior CT imaging on 1/16/17 showed a lobular cirrhotic liver.    A/P: Iron deficiency anemia presumed secondary to the large angiectasia in her stomach.  I discussed with Dr. Martines.  Recommend repeat EGD with Mitzi laser treatment.  We'll start her on Carafate to hopefully decrease the chance of bleeding from this area and I have told the patient likely this may also rate somewhat with treatment.  Given her persistent iron deficiency anemia despite oral  supplementation I have recommended iron infusions ×2.  We'll repeat laboratories after her infusions with follow-up in 4-6 weeks.  Consider further workup on her liver.  She had a previous liver biopsy of her breast cancer.  I am reluctant to put her through a repeat liver biopsy to evaluate the potential source of her underlying cirrhosis with normal or negative laboratories less far.  Further pending clinical course and the results of the above.     The following portions of the patient's history were reviewed and updated as appropriate:   Past Medical History   Diagnosis Date   • Abnormal breathing sounds    • Abscess of skin      and / or subcutaneous tissue   • Acute bronchitis      unspecified   • Adult body mass index 30+      obesity-BMI 33   • Allergic rhinitis    • Anasarca    • Anxiety      currently on xanax   • Anxiety    • Asthenia    • Asthma      moderate persistent   • Asthmatic bronchitis    • Astigmatism    • Bleeding external hemorrhoids    • Body mass index (BMI) of 34.0-34.9 in adult    • Body mass index (BMI) of 35.0-35.9 in adult    • Body mass index (BMI) of 36.0-36.9 in adult    • Body mass index 40.0-44.9, adult      SEVERELY OBESE   • Cellulitis    • Chemotherapy induced nausea and vomiting    • Chronic back pain    • Chronic cough    • Chronic hypoxemic respiratory failure      on NC at 3 LPM   • Cough    • Depressive disorder    • Diabetes mellitus      no retinopathy   • Dyslipidemia    • Edema of lower extremity    • Encounter for follow-up examination after completed treatment for malignant neoplasm    • Epidermoid cyst of skin      excision with secondary intention healing   • Excessive and frequent menstruation with irregular cycle      Vaginal bleeding after 4 years of no bleeding secondary to chemotherapy for breast cancer; currently being treated for breast cancer for the second time, mets to bone and liver while on chemotherapy      • Flushing    • Fracture of thoracic spine  with cord lesion    • Gastroesophageal reflux disease    • H/O tubal ligation    • Hx of echocardiogram      w/ color flow, and w/o color flow   • Hyperglycemia    • Hypermetropia    • Hypertension    • Hypokalemia    • Impaired glucose tolerance      hgbalc 6.2   • Infection of skin      and /or subcutaneous tissue-around the catheter exit site   • Influenza      needs influenza immunization   • Irregular periods    • Lesion of lumbar spine      pain controlled with percocet and lidocaine patches   • Lumbago with sciatica    • Malignant neoplasm of female breast      s/p right mastectomy, mets to bone and liver on chemotherapy      • Menopausal flushing    • Muscle weakness    • Muscle weakness (generalized)    • Nonspecific interstitial pneumonia      symptomatically improved   • Pleural effusion      refractory massive right, most likely malignant effusion   • Primary atypical pneumonia    • Renal failure      acute drug-induced renal failure   • Sinus tachycardia    • Tobacco dependence syndrome    • Tobacco non-user    • Upper respiratory infection    • Wheezing      Past Surgical History   Procedure Laterality Date   • Other surgical history  05/05/2016     central line insertion , PICC line replacement   • Central venous catheter tunneled insertion single lumen       Placement of indwelling Pleurx catheter right   • Venous access device (port) insertion       Ultrasound guided right internal jugular Mediport placement under fluoroscopy   • Lung volume reduction     • Mastectomy       partial   • Pap smear     • Other surgical history       place breast clip percut   • Other surgical history       pulse oximetry   • Other surgical history       remove cc cath w/ sc port or pump, Removal of right internal jugular MediPort   • Other surgical history       spirometry   • Thoracentesis       aspiration of pleural space   • Other surgical history       tubal sterilization   • Hysterectomy       vaginal   •  "Endoscopy N/A 1/26/2017     Procedure: ESOPHAGOGASTRODUODENOSCOPY;  Surgeon: Robert Clifton MD;  Location: [facility] ENDOSCOPY;  Service:    • Colonoscopy N/A 1/26/2017     Procedure: COLONOSCOPY;  Surgeon: Robert Clifton MD;  Location: [facility] ENDOSCOPY;  Service:    • Upper gastrointestinal endoscopy  01/26/2017     Family History   Problem Relation Age of Onset   • Coronary artery disease Other    • Diabetes Other    • Hypertension Other      OB History     No data available        Allergies   Allergen Reactions   • Lisinopril Shortness Of Breath   • Flagyl [Metronidazole] Other (See Comments)     pancreatitis   • Fentanyl Anxiety     \"goes out of her mind\"   • Latex Rash     Social History     Social History   • Marital status:      Spouse name: N/A   • Number of children: N/A   • Years of education: N/A     Social History Main Topics   • Smoking status: Former Smoker   • Smokeless tobacco: Never Used   • Alcohol use No   • Drug use: No   • Sexual activity: Defer     Other Topics Concern   • None     Social History Narrative       Current Outpatient Prescriptions:   •  benzonatate (TESSALON) 100 MG capsule, Take 1 capsule by mouth 3 (Three) Times a Day As Needed for cough., Disp: 90 capsule, Rfl: 5  •  calcium carbonate (OS-ONELIA) 1250 (500 CA) MG tablet, TAKE 1 TABLET BY MOUTH THREE TIMES DAILY, Disp: 90 tablet, Rfl: 0  •  CARTIA  MG 24 hr capsule, Take 1 capsule by mouth Daily., Disp: 30 capsule, Rfl: 5  •  celecoxib (CeleBREX) 200 MG capsule, Take 1 capsule by mouth Daily., Disp: 90 capsule, Rfl: 5  •  cetirizine (zyrTEC) 10 MG tablet, Take 1 tablet by mouth Daily., Disp: 30 tablet, Rfl: 5  •  clonazePAM (KlonoPIN) 2 MG tablet, Take 1 tablet by mouth 2 (Two) Times a Day As Needed for anxiety., Disp: 60 tablet, Rfl: 2  •  doxycycline (MONODOX) 100 MG capsule, Take 1 capsule by mouth 2 (Two) Times a Day for 10 days., Disp: 20 capsule, Rfl: 0  •  ferrous sulfate 325 (65 FE) MG tablet, Take 1 tablet by " mouth 3 (Three) Times a Day., Disp: 90 tablet, Rfl: 5  •  fluticasone (FLONASE) 50 MCG/ACT nasal spray, 2 sprays into each nostril Daily., Disp: 16 g, Rfl: 12  •  LEVEMIR 100 UNIT/ML injection, INJECT 8 UNITS UNDER THE SKIN EVERY NIGHT AT BEDTIME. (Patient taking differently: INJECT 8 UNITS UNDER THE SKIN EVERY NIGHT AT BEDTIME.,prn sliding scale), Disp: 10 mL, Rfl: 0  •  magnesium oxide (MAGNESIUM-OXIDE) 400 (241.3 MG) MG tablet tablet, Take 1 tablet by mouth 2 (Two) Times a Day., Disp: 60 tablet, Rfl: 11  •  montelukast (SINGULAIR) 10 MG tablet, Take 1 tablet by mouth Every Night., Disp: 30 tablet, Rfl: 5  •  Naloxegol Oxalate (MOVANTIK) 25 MG tablet, Take 25 mg by mouth Daily., Disp: 30 tablet, Rfl: 5  •  oxyCODONE-acetaminophen (PERCOCET)  MG per tablet, Take 1 tablet by mouth Every 8 (Eight) Hours As Needed for moderate pain (4-6)., Disp: 90 tablet, Rfl: 0  •  pantoprazole (PROTONIX) 40 MG EC tablet, Take 1 tablet by mouth Every Night., Disp: 90 tablet, Rfl: 3  •  polyethylene glycol (MIRALAX) powder, Take 3,350 g by mouth Daily., Disp: , Rfl:   •  potassium chloride (K-DUR,KLOR-CON) 20 MEQ CR tablet, TAKE 1 TABLET BY MOUTH TWICE DAILY, Disp: 60 tablet, Rfl: 0  •  promethazine (PHENERGAN) 25 MG tablet, Take 1 tablet by mouth Every 6 (Six) Hours As Needed for nausea or vomiting., Disp: 120 tablet, Rfl: 5  •  raNITIdine (ZANTAC) 150 MG tablet, TK 1 T PO BID, Disp: , Rfl: 1  •  venlafaxine XR (EFFEXOR-XR) 37.5 MG 24 hr capsule, Take 1 capsule by mouth Daily., Disp: 30 capsule, Rfl: 5  •  sucralfate (CARAFATE) 1 GM/10ML suspension, Take 10 mL by mouth 2 (Two) Times a Day., Disp: 420 mL, Rfl: 1  No current facility-administered medications for this visit.     Facility-Administered Medications Ordered in Other Visits:   •  sodium chloride 0.9 % flush 10 mL, 10 mL, Intravenous, PRN, Joselito Waddell MD, 10 mL at 01/26/17 1129  Review of Systems  Review of Systems       Objective      Visit Vitals   • /82 (BP  "Location: Left arm)   • Pulse 86   • Ht 64\" (162.6 cm)   • Wt 214 lb 9.6 oz (97.3 kg)   • BMI 36.84 kg/m2     Physical Exam   Constitutional: She is oriented to person, place, and time. She appears well-developed and well-nourished. No distress.   PICC line in left. Chronically ill appearing   HENT:   Head: Normocephalic and atraumatic.   Eyes: EOM are normal. Pupils are equal, round, and reactive to light.   Neck: Normal range of motion.   Cardiovascular: Normal rate, regular rhythm and normal heart sounds.    Pulmonary/Chest: Effort normal and breath sounds normal.   Abdominal: Soft. Bowel sounds are normal. She exhibits no shifting dullness, no distension, no abdominal bruit, no ascites and no mass. There is no hepatosplenomegaly. There is tenderness. There is no rigidity, no rebound, no guarding and no CVA tenderness. No hernia. Hernia confirmed negative in the ventral area.   Obese, mild diffuse, moderate epigastric, inferior to umbilicus   Musculoskeletal: Normal range of motion.   Neurological: She is alert and oriented to person, place, and time.   Skin: Skin is warm and dry.   Psychiatric: She has a normal mood and affect. Her behavior is normal. Judgment and thought content normal.   Nursing note and vitals reviewed.    Admission on 01/26/2017, Discharged on 01/26/2017   Component Date Value Ref Range Status   • Glucose 01/26/2017 80  70 - 130 mg/dL Final    Meter: [id number]: [id number] OMI HARRY   • Case Report 01/26/2017    Final                    Value:Surgical Pathology Report                         Case: [id number]                                  Authorizing Provider:  Robert Clifotn MD         Collected:           01/26/2017 02:40 PM          Ordering Location:     [facility]             Received:            01/27/2017 08:52 AM                                 [facility]                                                     Pathologist:           Rich Gracia MD   "                                                       Specimens:   1) - Gastric, Antrum                                                                                2) - Small Intestine, Duodenum                                                                      3) - Gastric, Body                                                                                  4) - Large Intestine, colonic mucosa                                                      • Final Diagnosis 01/26/2017    Final                    Value:This result contains rich text formatting which cannot be displayed here.   • Comment 01/26/2017    Final                    Value:This result contains rich text formatting which cannot be displayed here.   • Gross Description 01/26/2017    Final                    Value:This result contains rich text formatting which cannot be displayed here.     Assessment/Plan      1. Iron deficiency anemia due to chronic blood loss    2. Other cirrhosis of liver    3. Iron deficiency    4. Gastroesophageal reflux disease, esophagitis presence not specified    5. Abnormal findings on diagnostic imaging of abdomen    .   Andra was seen today for iron deficiency, constipation, hemorrhage of anus and heartburn.    Diagnoses and all orders for this visit:    Iron deficiency anemia due to chronic blood loss  -     Cancel: Case request  -     Case request  -     Iron; Future  -     Hemoglobin & Hematocrit, Blood; Future    Other cirrhosis of liver  -     Case request    Iron deficiency  -     Cancel: Case request  -     Case request  -     Iron; Future  -     Hemoglobin & Hematocrit, Blood; Future    Gastroesophageal reflux disease, esophagitis presence not specified  -     Cancel: Case request  -     Case request    Abnormal findings on diagnostic imaging of abdomen  -     Cancel: Case request  -     Case request    Other orders  -     sucralfate (CARAFATE) 1 GM/10ML suspension; Take 10 mL by mouth 2 (Two) Times a  Day.        Orders placed during this encounter include:  Orders Placed This Encounter   Procedures   • Iron     Due prior to follow up in March     Standing Status:   Future     Standing Expiration Date:   3/24/2017   • Hemoglobin & Hematocrit, Blood     Due prior to follow up in March     Standing Status:   Future     Standing Expiration Date:   3/24/2017       Medications prescribed:  New Medications Ordered This Visit   Medications   • sucralfate (CARAFATE) 1 GM/10ML suspension     Sig: Take 10 mL by mouth 2 (Two) Times a Day.     Dispense:  420 mL     Refill:  1       Requested Prescriptions     Signed Prescriptions Disp Refills   • sucralfate (CARAFATE) 1 GM/10ML suspension 420 mL 1     Sig: Take 10 mL by mouth 2 (Two) Times a Day.       Review and/or summary of lab tests, radiology, procedures, medications. Review and summary of old records and obtaining of history. The risks and benefits of my recommendations, as well as other treatment options were discussed with the patient today. Questions were answered.    Follow-up: Return in about 6 weeks (around 3/15/2017), or if symptoms worsen or fail to improve.           This document has been electronically signed by Jordin Oliveira PA-C on February 1, 2017 6:19 PM      Results for orders placed or performed during the hospital encounter of 01/26/17   Tissue Exam   Result Value Ref Range    Case Report       Surgical Pathology Report                         Case: [id number]                                  Authorizing Provider:  Robert Clifton MD         Collected:           01/26/2017 02:40 PM          Ordering Location:     [facility]             Received:            01/27/2017 08:52 AM                                 [facility]                                                     Pathologist:           Rich Gracia MD                                                         Specimens:   1) - Gastric, Antrum                                                                                 2) - Small Intestine, Duodenum                                                                      3) - Gastric, Body                                                                                  4) - Large Intestine, colonic mucosa                                                       Final Diagnosis       1.  GASTRIC ANTRUM, MUCOSAL BIOPSY:              MILD CHRONIC GASTRITIS.              NO EVIDENCE OF HELICOBACTER PYLORI (IP STAIN PERFORMED).     2.  DUODENUM, MUCOSAL BIOPSY:             MILD CHRONIC INFLAMMATION.     3.  GASTRIC BODY, MUCOSAL BIOPSY:              MILD CHRONIC GASTRITIS.              NO EVIDENCE OF HELICOBACTER PYLORI (IP STAIN PERFORMED).     4.  COLONIC MUCOSAL BIOPSY, RANDOM:              NO SIGNIFICANT PATHOLOGIC DIAGNOSIS.                    Comment       HP immunostain was developed and its performance characteristics determined by [facility].  It has not been cleared or approved by the U.S.Food and Drug Administration.  The FDA has determined that such clearance of approval is not necessary.  This test is used for clinical purposes.  It should not be regarded as investigational or for research.  This laboratory is certified under the Clinical Laboratory Amendments of 1988 (CLIA-88) as qualified to perform high complexity clinical laboratory testing.         Gross Description       In 3 containers, each of these show mucosal biopsies measuring up to 0.3 cm in greatest dimension.  Embedded accordingly:  1A antrum, 2A duodenum, 3A gastric body, 4A colonic mucosa.           Embedded Images     POC Glucose Fingerstick   Result Value Ref Range    Glucose 80 70 - 130 mg/dL   Results for orders placed or performed in visit on 01/23/17   BOWIE Fibrosure   Result Value Ref Range    Fibrosis Score (References) 0.13 0.00 - 0.21    Fibrosis Stage (Reference) Comment     Steatosis Score (Reference) 0.70 (H) 0.00 -  0.30    Steatosis Grade (Reference) Comment     BOWIE Score (Reference) 0.50 0.25    Bowie Grade (Reference) Comment     Height: (Reference) 64 Inches    Weight: (Reference) 163 LBS    Alpha 2-Macroglobulins, Qn 227 110 - 276 mg/dL    Haptoglobin 157 34 - 200 mg/dL    Apolipoprotein A-1 169 116 - 209 mg/dL    Total Bilirubin 0.2 0.0 - 1.2 mg/dL     (H) 0 - 60 IU/L    ALT (SGPT) 46 (H) 0 - 40 IU/L    AST (SGOT) P5P (Reference) 52 (H) 0 - 40 IU/L    Cholesterol, Total (Reference) 281 (H) 100 - 199 mg/dL    Glucose, Serum (Reference) 97 65 - 99 mg/dL    Triglycerides 169 (H) 0 - 149 mg/dL    Interpretations: (Reference) Comment     Fibrosis Scoring: Comment     Steatosis Grading (Reference) Comment     Bowie Scoring (Reference) Comment     Limitations: (Reference) Comment     Comment (Reference) Comment    Nuclear Antigen Antibody, IFA   Result Value Ref Range    VITOR Negative    Iron and TIBC   Result Value Ref Range    Iron 19 (L) 37 - 170 mcg/dL    TIBC 304 265 - 497 mcg/dL    Iron Saturation 6 (L) 15 - 50 %   Alpha - 1 - Antitrypsin   Result Value Ref Range    A-1 Antitrypsin 156 90 - 200 mg/dL   Ceruloplasmin   Result Value Ref Range    Ceruloplasmin 42.0 (H) 19.0 - 39.0 mg/dL   Hepatitis Panel, Acute   Result Value Ref Range    HCV S/C Ratio 0.01 0.00 - 0.89    Hepatitis C Ab Negative Negative    Hep A IgM Negative Negative    Hep B C IgM Negative Negative    Hepatitis B Surface Ag Negative Negative   Anti-Smooth Muscle Antibody Titer   Result Value Ref Range    Smooth Muscle Ab 4 0 - 19 Units   Ferritin   Result Value Ref Range    Ferritin 51.50 6.20 - 137.00 ng/mL   Results for orders placed or performed during the hospital encounter of 01/16/17   Urinalysis   Result Value Ref Range    Color, UA YELLOW STRAW,YELLOW,DK YELLOW,ROGER    Appearance CLEAR CLEAR    Specific Gravity, UA 1.016 1.003 - 1.030    pH, UA 7.5 5.0 - 9.0 pH Units    Leukocytes, UA NEGATIVE NEGATIVE    Nitrite, UA NEGATIVE NEGATIVE     Protein, UA NEGATIVE NEGATIVE    Glucose, Urine NEGATIVE NEGATIVE mg/dl    Ketones, UA NEGATIVE NEGATIVE    Urobilinogen, UA 0.2 0.2 EU/dl    Blood, UA NEGATIVE NEGATIVE   aPTT   Result Value Ref Range    PTT 30.9 20.0 - 40.3 seconds   Protime-INR   Result Value Ref Range    Protime 15.3 11.1 - 15.3 seconds    INR 1.2 0.8 - 1.2   CBC & Differential   Result Value Ref Range    WBC 7.0 3.2 - 9.8 x1000/uL    RBC 3.86 3.77 - 5.16 mellissa/mm3    Hemoglobin 11.3 (L) 12.0 - 15.5 gm/dl    Hematocrit 34.2 (L) 35.0 - 45.0 %    MCV 88.6 80.0 - 98.0 fl    MCH 29.3 26.0 - 34.0 pg    MCHC 33.0 31.4 - 36.0 gm/dl    RDW 14.5 11.5 - 14.5 %    Platelets 193 150 - 450 x1000/mm3    MPV 12.1 (H) 8.0 - 12.0 fl    Neutrophil Rel % 71.9 37.0 - 80.0 %    Lymphocyte Rel % 21.8 10.0 - 50.0 %    Monocyte Rel % 5.0 0.0 - 12.0 %    Eosinophil Rel % 0.6 0.0 - 7.0 %    Basophil Rel % 0.3 0.0 - 2.0 %    Immature Granulocyte Rel % 0.40 0.00 - 0.50 %    Neutrophils Absolute 5.06 2.00 - 8.60 x1000/uL    Lymphocytes Absolute 1.53 0.60 - 4.20 x1000/uL    Monocytes Absolute 0.35 0.00 - 0.90 x1000/uL    Eosinophils Absolute 0.04 0.00 - 0.70 x1000/uL    Basophils Absolute 0.02 0.00 - 0.20 x1000/uL    Immature Granulocytes Absolute 0.030 (H) 0.005 - 0.022 x1000/uL    nRBC 0.0 0.0 - 0.2 %    nRBC 0.000 x1000/uL   Type & Screen   Result Value Ref Range    DOES A PREVIOUS ABORH EXIST? PREVIOUS TYPE ON FILE     ABO Type A     RH type POS     Antibody Screen Interpretation NEG    Lipase   Result Value Ref Range    Lipase 22 (L) 23 - 300 U/L   Comprehensive Metabolic Panel   Result Value Ref Range    Sodium 140 137 - 145 mmol/L    Potassium 3.3 (L) 3.5 - 5.1 mmol/L    Chloride 101 95 - 110 mmol/L    CO2 31 22 - 31 mmol/L    Anion Gap 8.0 5.0 - 15.0 mmol/L    Glucose 111 (H) 60 - 100 mg/dl    BUN 16 7 - 21 mg/dl    Creatinine 1.0 0.5 - 1.0 mg/dl    GFR MDRD Non  61 58 - 135 mL/min/1.73 sq.M    GFR MDRD  74 58 - 135 mL/min/1.73 sq.M     Calcium 7.7 (L) 8.4 - 10.2 mg/dl    Total Protein 5.9 (L) 6.3 - 8.6 gm/dl    Albumin 3.2 (L) 3.4 - 4.8 gm/dl    Total Bilirubin 0.4 0.2 - 1.3 mg/dl    Alkaline Phosphatase 85 38 - 126 U/L    AST (SGOT) 33 14 - 36 U/L    ALT (SGPT) 44 9 - 52 U/L     *Note: Due to a large number of results and/or encounters for the requested time period, some results have not been displayed. A complete set of results can be found in Results Review.       Some portions of this note have been dictated using voice recognition software and may contain errors or omissions.

## 2017-02-24 NOTE — ANESTHESIA POSTPROCEDURE EVALUATION
Patient: Andra Villareal    Procedure Summary     Date Anesthesia Start Anesthesia Stop Room / Location    02/24/17 1100 1119 [facility] ENDOSCOPY 1 / [facility] ENDOSCOPY       Procedure Diagnosis Surgeon Provider    ESOPHAGOGASTRODUODENOSCOPY (N/A Esophagus) Iron deficiency; Iron deficiency anemia due to chronic blood loss; Abnormal findings on diagnostic imaging of abdomen; Other cirrhosis of liver; Gastroesophageal reflux disease, esophagitis presence not specified  (Iron deficiency [E61.1]; Iron deficiency anemia due to chronic blood loss [D50.0]; Abnormal findings on diagnostic imaging of abdomen [R93.5]; Other cirrhosis of liver [K74.69]; Gastroesophageal reflux disease, esophagitis presence not specified [K21.9]) MD Malrene Alcatnar CRNA          Anesthesia Type: MAC  Last vitals  BP      Temp      Pulse     Resp      SpO2        Post Anesthesia Care and Evaluation    Patient location during evaluation: bedside  Patient participation: complete - patient participated  Level of consciousness: awake and alert  Pain management: adequate  Airway patency: patent  Anesthetic complications: No anesthetic complications    Cardiovascular status: acceptable  Respiratory status: acceptable  Hydration status: acceptable

## 2017-03-02 ENCOUNTER — OFFICE VISIT (OUTPATIENT)
Dept: ONCOLOGY | Facility: CLINIC | Age: 42
End: 2017-03-02

## 2017-03-02 ENCOUNTER — INFUSION (OUTPATIENT)
Dept: ONCOLOGY | Facility: HOSPITAL | Age: 42
End: 2017-03-02

## 2017-03-02 VITALS
WEIGHT: 218.3 LBS | SYSTOLIC BLOOD PRESSURE: 134 MMHG | HEART RATE: 83 BPM | TEMPERATURE: 96.3 F | RESPIRATION RATE: 20 BRPM | HEIGHT: 65 IN | BODY MASS INDEX: 36.37 KG/M2 | DIASTOLIC BLOOD PRESSURE: 90 MMHG

## 2017-03-02 DIAGNOSIS — C50.912 BREAST CANCER METASTASIZED TO BONE, LEFT (HCC): Primary | ICD-10-CM

## 2017-03-02 DIAGNOSIS — C50.912 BREAST CANCER METASTASIZED TO BONE, LEFT (HCC): ICD-10-CM

## 2017-03-02 DIAGNOSIS — C79.51 BREAST CANCER METASTASIZED TO BONE, LEFT (HCC): Primary | ICD-10-CM

## 2017-03-02 DIAGNOSIS — C50.911 BREAST CANCER METASTASIZED TO BONE, RIGHT (HCC): ICD-10-CM

## 2017-03-02 DIAGNOSIS — C79.51 BREAST CANCER METASTASIZED TO BONE, RIGHT (HCC): ICD-10-CM

## 2017-03-02 DIAGNOSIS — C79.51 BREAST CANCER METASTASIZED TO BONE, LEFT (HCC): ICD-10-CM

## 2017-03-02 LAB
ALBUMIN SERPL-MCNC: 4.3 G/DL (ref 3.4–4.8)
ALBUMIN/GLOB SERPL: 1.3 G/DL (ref 1.1–1.8)
ALP SERPL-CCNC: 113 U/L (ref 38–126)
ALT SERPL W P-5'-P-CCNC: 47 U/L (ref 9–52)
ANION GAP SERPL CALCULATED.3IONS-SCNC: 9 MMOL/L (ref 5–15)
AST SERPL-CCNC: 37 U/L (ref 14–36)
BASOPHILS # BLD AUTO: 0.01 10*3/MM3 (ref 0–0.2)
BASOPHILS NFR BLD AUTO: 0.1 % (ref 0–2)
BILIRUB SERPL-MCNC: 0.5 MG/DL (ref 0.2–1.3)
BUN BLD-MCNC: 20 MG/DL (ref 7–21)
BUN/CREAT SERPL: 25 (ref 7–25)
CALCIUM SPEC-SCNC: 9.1 MG/DL (ref 8.4–10.2)
CHLORIDE SERPL-SCNC: 101 MMOL/L (ref 95–110)
CO2 SERPL-SCNC: 27 MMOL/L (ref 22–31)
CREAT BLD-MCNC: 0.8 MG/DL (ref 0.5–1)
DEPRECATED RDW RBC AUTO: 45 FL (ref 36.4–46.3)
EOSINOPHIL # BLD AUTO: 0.28 10*3/MM3 (ref 0–0.7)
EOSINOPHIL NFR BLD AUTO: 3.6 % (ref 0–7)
ERYTHROCYTE [DISTWIDTH] IN BLOOD BY AUTOMATED COUNT: 14 % (ref 11.5–14.5)
GFR SERPL CREATININE-BSD FRML MDRD: 96 ML/MIN/1.73 (ref 58–135)
GLOBULIN UR ELPH-MCNC: 3.2 GM/DL (ref 2.3–3.5)
GLUCOSE BLD-MCNC: 112 MG/DL (ref 60–100)
HCT VFR BLD AUTO: 36.7 % (ref 35–45)
HGB BLD-MCNC: 12.2 G/DL (ref 12–15.5)
IMM GRANULOCYTES # BLD: 0.02 10*3/MM3 (ref 0–0.02)
IMM GRANULOCYTES NFR BLD: 0.3 % (ref 0–0.5)
LYMPHOCYTES # BLD AUTO: 1.65 10*3/MM3 (ref 0.6–4.2)
LYMPHOCYTES NFR BLD AUTO: 21.5 % (ref 10–50)
MCH RBC QN AUTO: 29 PG (ref 26.5–34)
MCHC RBC AUTO-ENTMCNC: 33.2 G/DL (ref 31.4–36)
MCV RBC AUTO: 87.4 FL (ref 80–98)
MONOCYTES # BLD AUTO: 0.57 10*3/MM3 (ref 0–0.9)
MONOCYTES NFR BLD AUTO: 7.4 % (ref 0–12)
NEUTROPHILS # BLD AUTO: 5.16 10*3/MM3 (ref 2–8.6)
NEUTROPHILS NFR BLD AUTO: 67.1 % (ref 37–80)
NRBC BLD MANUAL-RTO: 0 /100 WBC (ref 0–0)
PLATELET # BLD AUTO: 178 10*3/MM3 (ref 150–450)
PMV BLD AUTO: 11.2 FL (ref 8–12)
POTASSIUM BLD-SCNC: 4 MMOL/L (ref 3.5–5.1)
PROT SERPL-MCNC: 7.5 G/DL (ref 6.3–8.6)
RBC # BLD AUTO: 4.2 10*6/MM3 (ref 3.77–5.16)
SODIUM BLD-SCNC: 137 MMOL/L (ref 137–145)
WBC NRBC COR # BLD: 7.69 10*3/MM3 (ref 3.2–9.8)

## 2017-03-02 PROCEDURE — 80053 COMPREHEN METABOLIC PANEL: CPT | Performed by: INTERNAL MEDICINE

## 2017-03-02 PROCEDURE — 99214 OFFICE O/P EST MOD 30 MIN: CPT | Performed by: INTERNAL MEDICINE

## 2017-03-02 PROCEDURE — 96365 THER/PROPH/DIAG IV INF INIT: CPT | Performed by: INTERNAL MEDICINE

## 2017-03-02 PROCEDURE — 85025 COMPLETE CBC W/AUTO DIFF WBC: CPT | Performed by: INTERNAL MEDICINE

## 2017-03-02 PROCEDURE — 25010000002 ZOLEDRONIC ACID PER 1 MG

## 2017-03-02 RX ORDER — SODIUM CHLORIDE 9 MG/ML
250 INJECTION, SOLUTION INTRAVENOUS ONCE
Status: DISCONTINUED | OUTPATIENT
Start: 2017-03-02 | End: 2017-03-02 | Stop reason: HOSPADM

## 2017-03-02 RX ORDER — SODIUM CHLORIDE 9 MG/ML
250 INJECTION, SOLUTION INTRAVENOUS ONCE
Status: CANCELLED | OUTPATIENT
Start: 2017-03-02

## 2017-03-02 RX ORDER — SODIUM CHLORIDE 9 MG/ML
250 INJECTION, SOLUTION INTRAVENOUS ONCE
Status: COMPLETED | OUTPATIENT
Start: 2017-03-02 | End: 2017-03-02

## 2017-03-02 RX ORDER — SODIUM CHLORIDE 0.9 % (FLUSH) 0.9 %
10 SYRINGE (ML) INJECTION AS NEEDED
Status: CANCELLED | OUTPATIENT
Start: 2017-03-02

## 2017-03-02 RX ORDER — SODIUM CHLORIDE 0.9 % (FLUSH) 0.9 %
10 SYRINGE (ML) INJECTION AS NEEDED
Status: DISCONTINUED | OUTPATIENT
Start: 2017-03-02 | End: 2017-03-02 | Stop reason: HOSPADM

## 2017-03-02 RX ADMIN — Medication 10 ML: at 10:02

## 2017-03-02 RX ADMIN — Medication 10 ML: at 12:46

## 2017-03-02 RX ADMIN — SODIUM CHLORIDE 250 ML: 9 INJECTION, SOLUTION INTRAVENOUS at 10:41

## 2017-03-02 NOTE — PROGRESS NOTES
Oncology Diagnosis and Treatment:   1- Metastatic breast cancer on Herceptin and perjeta and Faslodex.     Subjective:     Andra Villareal is a 41 y.o. female presents today for follow up. Stage IV metastatic right breast cancer with liver and bone metastasis. Patient was initially diagnosed in 2011 with a stage III disease. Initially patient had a Taxotere carboplatin and Herceptin for 6 cycles followed by 1 year of Herceptin maintenance. After that patient was getting Lupron and tamoxifen. In March 2015 she was diagnosed with a metastatic disease involving liver and bone. Patient was again started on Taxotere Herceptin and perjeta. She could not tolerate Taxotere at that point she had a recurrent pneumonias and cytopenias. Subsequently she was maintained on Herceptin and perjeta until October 2016, at that point restaging CAT scan showed there was anterior mediastinal mass for which patient was started on Taxol Herceptin and perjeta. Patient received her last dose of Taxol on January 12, 2017. After that in view of rectal bleeding chemotherapy was on hold. Recently done restaging CAT scan shows the mediastinal mass has significantly decreased in size.  Herceptin and perjeta continued and taxol was held.  She was also started Faslodex with the next Herceptin and perjeta.    Came today for follow up. Had laser surgery few days ago and still complaining of abdominal pain and nausea. The pain is more of discomfort and epigastric. She was told by the treating team that this will take couple of weeks to heal.              Current Outpatient Prescriptions on File Prior to Visit   Medication Sig Dispense Refill   • albuterol (PROAIR RESPICLICK) 108 (90 BASE) MCG/ACT inhaler Inhale 2 puffs Every 6 (Six) Hours As Needed for wheezing or shortness of air. 1 inhaler 11   • benzonatate (TESSALON) 200 MG capsule Take 1 capsule by mouth 3 (Three) Times a Day As Needed for cough. 90 capsule 2   • calcium carbonate (OS-ONELIA) 1250  (500 CA) MG tablet Take 1 tablet by mouth 3 (Three) Times a Day. 90 tablet 5   • cetirizine (zyrTEC) 10 MG tablet Take 1 tablet by mouth Daily. 30 tablet 5   • clonazePAM (KlonoPIN) 2 MG tablet Take 1 tablet by mouth 2 (Two) Times a Day As Needed for anxiety. 60 tablet 2   • diltiaZEM CD (CARTIA XT) 120 MG 24 hr capsule Take 120 mg by mouth Daily.     • ferrous sulfate 325 (65 FE) MG tablet Take 1 tablet by mouth 3 (Three) Times a Day. 90 tablet 5   • fluticasone (FLONASE) 50 MCG/ACT nasal spray 2 sprays into each nostril Daily. 16 g 12   • LEVEMIR 100 UNIT/ML injection INJECT 8 UNITS UNDER THE SKIN EVERY NIGHT AT BEDTIME. 10 mL 0   • lubiprostone (AMITIZA) 24 MCG capsule Take 1 capsule by mouth 2 (Two) Times a Day With Meals. 60 capsule 5   • magnesium oxide (MAGNESIUM-OXIDE) 400 (241.3 MG) MG tablet tablet Take 1 tablet by mouth 2 (Two) Times a Day. 60 tablet 11   • montelukast (SINGULAIR) 10 MG tablet Take 1 tablet by mouth Every Night. 30 tablet 5   • oxyCODONE-acetaminophen (PERCOCET)  MG per tablet Take 1 tablet by mouth Every 8 (Eight) Hours As Needed for moderate pain (4-6). 90 tablet 0   • pantoprazole (PROTONIX) 40 MG EC tablet Take 1 tablet by mouth Every Night. 90 tablet 3   • polyethylene glycol (MIRALAX) powder Take 3,350 g by mouth Daily.     • potassium chloride (K-DUR,KLOR-CON) 20 MEQ CR tablet Take 1 tablet by mouth 2 (Two) Times a Day. 60 tablet 5   • promethazine (PHENERGAN) 25 MG tablet Take 1 tablet by mouth Every 6 (Six) Hours As Needed for nausea or vomiting. 120 tablet 5   • raNITIdine (ZANTAC) 150 MG tablet TK 1 T PO BID  1   • sucralfate (CARAFATE) 1 GM/10ML suspension Take 10 mL by mouth 2 (Two) Times a Day. 420 mL 1   • venlafaxine XR (EFFEXOR-XR) 37.5 MG 24 hr capsule Take 1 capsule by mouth Daily. 30 capsule 5     Current Facility-Administered Medications on File Prior to Visit   Medication Dose Route Frequency Provider Last Rate Last Dose   • sodium chloride 0.9 % flush 10 mL   10 mL Intravenous PRN Joselito Waddell MD   10 mL at 01/26/17 1129            Review Of Systems   Comprehensive review of systems was done it was negative other than what mentioned in HPI       PHYSICAL EXAMINATION:   There were no vitals taken for this visit.   General Appearance: Appears healthy, alert, polite and cooperative   Head and neck: Mild pallor and no jaundice. wet mucous membranes without ulceration.   Lungs: Good bilateral air entry, clear to auscultation   Heart: Regular rate and rhythm,   Abdomen: Soft, tender in the epigastric area. Mild , not distended   Extremities: No edema.     Labs:     Reviewed.     ASSESSMENT/PLAN:   1- Breast cancer  Will hold her chemo today. Still have abdominal pain and discomfort after her laser procedure and not able to eat well.   Will proceed with Zometa for now   reevaluate in one week

## 2017-03-06 RX ORDER — ALBUTEROL SULFATE 90 UG/1
POWDER, METERED RESPIRATORY (INHALATION)
Qty: 1 G | Refills: 0 | Status: SHIPPED | OUTPATIENT
Start: 2017-03-06 | End: 2017-04-11 | Stop reason: SDUPTHER

## 2017-03-09 ENCOUNTER — INFUSION (OUTPATIENT)
Dept: ONCOLOGY | Facility: HOSPITAL | Age: 42
End: 2017-03-09

## 2017-03-09 ENCOUNTER — OFFICE VISIT (OUTPATIENT)
Dept: ONCOLOGY | Facility: CLINIC | Age: 42
End: 2017-03-09

## 2017-03-09 VITALS
WEIGHT: 220.4 LBS | RESPIRATION RATE: 18 BRPM | DIASTOLIC BLOOD PRESSURE: 85 MMHG | HEART RATE: 67 BPM | TEMPERATURE: 97.1 F | SYSTOLIC BLOOD PRESSURE: 156 MMHG | BODY MASS INDEX: 36.68 KG/M2

## 2017-03-09 DIAGNOSIS — C79.51 BREAST CANCER METASTASIZED TO BONE, RIGHT (HCC): ICD-10-CM

## 2017-03-09 DIAGNOSIS — C50.912 BREAST CANCER METASTASIZED TO BONE, LEFT (HCC): ICD-10-CM

## 2017-03-09 DIAGNOSIS — C50.912 BREAST CANCER METASTASIZED TO BONE, LEFT (HCC): Primary | ICD-10-CM

## 2017-03-09 DIAGNOSIS — C79.51 BREAST CANCER METASTASIZED TO BONE, LEFT (HCC): ICD-10-CM

## 2017-03-09 DIAGNOSIS — Z51.11 ENCOUNTER FOR ANTINEOPLASTIC CHEMOTHERAPY: ICD-10-CM

## 2017-03-09 DIAGNOSIS — C79.51 BREAST CANCER METASTASIZED TO BONE, LEFT (HCC): Primary | ICD-10-CM

## 2017-03-09 DIAGNOSIS — C50.911 BREAST CANCER METASTASIZED TO BONE, RIGHT (HCC): ICD-10-CM

## 2017-03-09 LAB
ALBUMIN SERPL-MCNC: 4.2 G/DL (ref 3.4–4.8)
ALBUMIN/GLOB SERPL: 1.4 G/DL (ref 1.1–1.8)
ALP SERPL-CCNC: 120 U/L (ref 38–126)
ALT SERPL W P-5'-P-CCNC: 54 U/L (ref 9–52)
ANION GAP SERPL CALCULATED.3IONS-SCNC: 9 MMOL/L (ref 5–15)
AST SERPL-CCNC: 38 U/L (ref 14–36)
BASOPHILS # BLD AUTO: 0.03 10*3/MM3 (ref 0–0.2)
BASOPHILS NFR BLD AUTO: 0.4 % (ref 0–2)
BILIRUB SERPL-MCNC: 0.4 MG/DL (ref 0.2–1.3)
BUN BLD-MCNC: 17 MG/DL (ref 7–21)
BUN/CREAT SERPL: 20.7 (ref 7–25)
CALCIUM SPEC-SCNC: 9.1 MG/DL (ref 8.4–10.2)
CHLORIDE SERPL-SCNC: 104 MMOL/L (ref 95–110)
CO2 SERPL-SCNC: 28 MMOL/L (ref 22–31)
CREAT BLD-MCNC: 0.82 MG/DL (ref 0.5–1)
DEPRECATED RDW RBC AUTO: 44.5 FL (ref 36.4–46.3)
EOSINOPHIL # BLD AUTO: 0.2 10*3/MM3 (ref 0–0.7)
EOSINOPHIL NFR BLD AUTO: 2.6 % (ref 0–7)
ERYTHROCYTE [DISTWIDTH] IN BLOOD BY AUTOMATED COUNT: 13.7 % (ref 11.5–14.5)
GFR SERPL CREATININE-BSD FRML MDRD: 94 ML/MIN/1.73 (ref 58–135)
GLOBULIN UR ELPH-MCNC: 3 GM/DL (ref 2.3–3.5)
GLUCOSE BLD-MCNC: 127 MG/DL (ref 60–100)
HCT VFR BLD AUTO: 37.2 % (ref 35–45)
HGB BLD-MCNC: 12.3 G/DL (ref 12–15.5)
IMM GRANULOCYTES # BLD: 0.03 10*3/MM3 (ref 0–0.02)
IMM GRANULOCYTES NFR BLD: 0.4 % (ref 0–0.5)
LYMPHOCYTES # BLD AUTO: 1.72 10*3/MM3 (ref 0.6–4.2)
LYMPHOCYTES NFR BLD AUTO: 22.7 % (ref 10–50)
MCH RBC QN AUTO: 29.3 PG (ref 26.5–34)
MCHC RBC AUTO-ENTMCNC: 33.1 G/DL (ref 31.4–36)
MCV RBC AUTO: 88.6 FL (ref 80–98)
MONOCYTES # BLD AUTO: 0.42 10*3/MM3 (ref 0–0.9)
MONOCYTES NFR BLD AUTO: 5.5 % (ref 0–12)
NEUTROPHILS # BLD AUTO: 5.18 10*3/MM3 (ref 2–8.6)
NEUTROPHILS NFR BLD AUTO: 68.4 % (ref 37–80)
PLATELET # BLD AUTO: 194 10*3/MM3 (ref 150–450)
PMV BLD AUTO: 11.6 FL (ref 8–12)
POTASSIUM BLD-SCNC: 3.9 MMOL/L (ref 3.5–5.1)
PROT SERPL-MCNC: 7.2 G/DL (ref 6.3–8.6)
RBC # BLD AUTO: 4.2 10*6/MM3 (ref 3.77–5.16)
SODIUM BLD-SCNC: 141 MMOL/L (ref 137–145)
WBC NRBC COR # BLD: 7.58 10*3/MM3 (ref 3.2–9.8)

## 2017-03-09 PROCEDURE — 96417 CHEMO IV INFUS EACH ADDL SEQ: CPT | Performed by: INTERNAL MEDICINE

## 2017-03-09 PROCEDURE — 96413 CHEMO IV INFUSION 1 HR: CPT | Performed by: INTERNAL MEDICINE

## 2017-03-09 PROCEDURE — 96367 TX/PROPH/DG ADDL SEQ IV INF: CPT | Performed by: INTERNAL MEDICINE

## 2017-03-09 PROCEDURE — 25010000002 TRASTUZUMAB PER 10 MG: Performed by: INTERNAL MEDICINE

## 2017-03-09 PROCEDURE — 25010000002 PERTUZUMAB 420 MG/14ML SOLUTION 420 MG VIAL: Performed by: INTERNAL MEDICINE

## 2017-03-09 PROCEDURE — 96375 TX/PRO/DX INJ NEW DRUG ADDON: CPT | Performed by: INTERNAL MEDICINE

## 2017-03-09 PROCEDURE — 99214 OFFICE O/P EST MOD 30 MIN: CPT | Performed by: INTERNAL MEDICINE

## 2017-03-09 PROCEDURE — 36415 COLL VENOUS BLD VENIPUNCTURE: CPT | Performed by: INTERNAL MEDICINE

## 2017-03-09 PROCEDURE — 25010000002 DIPHENHYDRAMINE PER 50 MG: Performed by: INTERNAL MEDICINE

## 2017-03-09 RX ORDER — FAMOTIDINE 10 MG/ML
20 INJECTION, SOLUTION INTRAVENOUS ONCE
Status: CANCELLED
Start: 2017-03-30 | End: 2017-03-30

## 2017-03-09 RX ORDER — DIPHENHYDRAMINE HYDROCHLORIDE 50 MG/ML
50 INJECTION INTRAMUSCULAR; INTRAVENOUS ONCE
Status: CANCELLED
Start: 2017-03-30 | End: 2017-03-30

## 2017-03-09 RX ORDER — SODIUM CHLORIDE 0.9 % (FLUSH) 0.9 %
10 SYRINGE (ML) INJECTION AS NEEDED
Status: DISCONTINUED | OUTPATIENT
Start: 2017-03-09 | End: 2017-03-09 | Stop reason: HOSPADM

## 2017-03-09 RX ORDER — ACETAMINOPHEN 325 MG/1
650 TABLET ORAL ONCE
Status: CANCELLED
Start: 2017-03-09 | End: 2017-03-09

## 2017-03-09 RX ORDER — DIPHENHYDRAMINE HYDROCHLORIDE 50 MG/ML
50 INJECTION INTRAMUSCULAR; INTRAVENOUS ONCE
Status: CANCELLED
Start: 2017-03-09 | End: 2017-03-09

## 2017-03-09 RX ORDER — MAGNESIUM OXIDE 400 MG/1
TABLET ORAL
Refills: 9 | COMMUNITY
Start: 2017-03-02 | End: 2017-03-09 | Stop reason: SDUPTHER

## 2017-03-09 RX ORDER — SODIUM CHLORIDE 9 MG/ML
250 INJECTION, SOLUTION INTRAVENOUS ONCE
Status: CANCELLED | OUTPATIENT
Start: 2017-03-30

## 2017-03-09 RX ORDER — SODIUM CHLORIDE 9 MG/ML
250 INJECTION, SOLUTION INTRAVENOUS ONCE
Status: CANCELLED | OUTPATIENT
Start: 2017-03-09

## 2017-03-09 RX ORDER — CALCIUM CARBONATE 500(1250)
TABLET ORAL
Refills: 5 | COMMUNITY
Start: 2017-02-16 | End: 2017-03-09 | Stop reason: SDUPTHER

## 2017-03-09 RX ORDER — ACETAMINOPHEN 325 MG/1
650 TABLET ORAL ONCE
Status: CANCELLED
Start: 2017-03-30 | End: 2017-03-30

## 2017-03-09 RX ORDER — SODIUM CHLORIDE 9 MG/ML
250 INJECTION, SOLUTION INTRAVENOUS ONCE
Status: COMPLETED | OUTPATIENT
Start: 2017-03-09 | End: 2017-03-09

## 2017-03-09 RX ORDER — FAMOTIDINE 10 MG/ML
20 INJECTION, SOLUTION INTRAVENOUS ONCE
Status: CANCELLED
Start: 2017-03-09 | End: 2017-03-09

## 2017-03-09 RX ORDER — SODIUM CHLORIDE 0.9 % (FLUSH) 0.9 %
10 SYRINGE (ML) INJECTION AS NEEDED
Status: CANCELLED | OUTPATIENT
Start: 2017-03-09

## 2017-03-09 RX ORDER — ACETAMINOPHEN 325 MG/1
650 TABLET ORAL ONCE
Status: COMPLETED | OUTPATIENT
Start: 2017-03-09 | End: 2017-03-09

## 2017-03-09 RX ORDER — FAMOTIDINE 10 MG/ML
20 INJECTION, SOLUTION INTRAVENOUS ONCE
Status: COMPLETED | OUTPATIENT
Start: 2017-03-09 | End: 2017-03-09

## 2017-03-09 RX ADMIN — ACETAMINOPHEN 650 MG: 325 TABLET ORAL at 10:12

## 2017-03-09 RX ADMIN — FAMOTIDINE 20 MG: 10 INJECTION, SOLUTION INTRAVENOUS at 10:13

## 2017-03-09 RX ADMIN — PERTUZUMAB 420 MG: 30 INJECTION, SOLUTION, CONCENTRATE INTRAVENOUS at 10:54

## 2017-03-09 RX ADMIN — SODIUM CHLORIDE 250 ML: 9 INJECTION, SOLUTION INTRAVENOUS at 10:07

## 2017-03-09 RX ADMIN — Medication 10 ML: at 08:37

## 2017-03-09 RX ADMIN — TRASTUZUMAB 590 MG: KIT at 11:47

## 2017-03-09 RX ADMIN — DIPHENHYDRAMINE HYDROCHLORIDE 50 MG: 50 INJECTION INTRAMUSCULAR; INTRAVENOUS at 10:23

## 2017-03-09 NOTE — PROGRESS NOTES
DATE OF VISIT: 3/9/2017    REASON FOR VISIT:  Metastatic right breast cancer    HISTORY OF PRESENT ILLNESS:    41-year-old female with a past medical history significant for metastatic right breast cancer with liver and bone metastases currently on treatment with Herceptin and perjeta.  Was held on March 2, 2017 by Dr. Shaikh due to abdominal discomfort after upper endoscopy.  She states her abdominal pain is much improved.  Denies any fever or chills.  Denies any more bleeding in the stool.      PAST MEDICAL HISTORY:    1.  Metastatic right breast cancer with liver and bone metastasis  2.  History of pneumonia  3.  Right internal jugular vein DVT status post around to  4.  Rectal bleeding  5.  Dyslipidemia  6.  Diabetes mellitus  7.  Anxiety  8.  Bone metastasis    SOCIAL HISTORY:    Social History   Substance Use Topics   • Smoking status: Former Smoker   • Smokeless tobacco: Never Used   • Alcohol use No       Surgical History :  Past Surgical History   Procedure Laterality Date   • Other surgical history  05/05/2016     central line insertion , PICC line replacement   • Central venous catheter tunneled insertion single lumen       Placement of indwelling Pleurx catheter right   • Venous access device (port) insertion       Ultrasound guided right internal jugular Mediport placement under fluoroscopy   • Lung volume reduction     • Mastectomy       partial   • Pap smear     • Other surgical history       place breast clip percut   • Other surgical history       pulse oximetry   • Other surgical history       remove cc cath w/ sc port or pump, Removal of right internal jugular MediPort   • Other surgical history       spirometry   • Thoracentesis       aspiration of pleural space   • Other surgical history       tubal sterilization   • Hysterectomy       vaginal   • Endoscopy N/A 1/26/2017     Procedure: ESOPHAGOGASTRODUODENOSCOPY;  Surgeon: Robert Clifton MD;  Location: [facility] ENDOSCOPY;  Service:    •  "Colonoscopy N/A 1/26/2017     Procedure: COLONOSCOPY;  Surgeon: Robert Clifton MD;  Location: [facility] ENDOSCOPY;  Service:    • Upper gastrointestinal endoscopy  01/26/2017   • Endoscopy N/A 2/24/2017     Procedure: ESOPHAGOGASTRODUODENOSCOPY;  Surgeon: Robert Clifton MD;  Location: [facility] ENDOSCOPY;  Service:        ALLERGIES:    Allergies   Allergen Reactions   • Lisinopril Shortness Of Breath   • Flagyl [Metronidazole] Other (See Comments)     pancreatitis   • Fentanyl Anxiety     \"goes out of her mind\"   • Latex Rash       REVIEW OF SYSTEMS:      CONSTITUTIONAL: Positive for fatigue.  No fever, chills, or night sweats.     HEENT:  No epistaxis, mouth sores, or difficulty swallowing.    RESPIRATORY:  No new shortness of breath or cough at present.    CARDIOVASCULAR:  No chest pain or palpitations.    GASTROINTESTINAL:  No abdominal pain, nausea, vomiting, or blood in the stool.    GENITOURINARY:  No dysuria or hematuria.    MUSCULOSKELETAL: No new back pain or joint pain    NEUROLOGICAL: Positive for intermittent tingling and numbness.. No new headache or dizziness.     LYMPHATICS:  Denies any abnormal swollen and anywhere in the body.    SKIN:  Denies any new skin rash.    PHYSICAL EXAMINATION:      VITAL SIGNS:    Visit Vitals   • /85   • Pulse 67   • Temp 97.1 °F (36.2 °C)   • Resp 18   • Wt 220 lb 6.4 oz (100 kg)   • BMI 36.68 kg/m2       GENERAL:  Not in any distress.     EYES:  Mild pallor. No icterus.    NECK:  No adenopathy in cervical or supraclavicular area.    RESPIRATORY:  Fair air entry bilaterally. No rhonchi or wheezing.    CARDIOVASCULAR:  S1, S2. Regular rate and rhythm.    ABDOMEN:  Soft, nontender. Bowel sounds present.    EXTREMITIES:  No edema.    NEUROLOGICAL:  Alert, awake, oriented x3.  No motor or sensory deficit.  Cranial nerves II-12 grossly intact.    DIAGNOSTIC DATA:    GLUCOSE   Date Value Ref Range Status   03/09/2017 127 (H) 60 - 100 mg/dL Final   01/19/2017 124 (H) 60 - 100 " mg/dl Final     SODIUM   Date Value Ref Range Status   03/09/2017 141 137 - 145 mmol/L Final   01/19/2017 141 137 - 145 mmol/L Final     POTASSIUM   Date Value Ref Range Status   03/09/2017 3.9 3.5 - 5.1 mmol/L Final   01/19/2017 3.7 3.5 - 5.1 mmol/L Final     CO2   Date Value Ref Range Status   03/09/2017 28.0 22.0 - 31.0 mmol/L Final   01/19/2017 25 22 - 31 mmol/L Final     CHLORIDE   Date Value Ref Range Status   03/09/2017 104 95 - 110 mmol/L Final   01/19/2017 106 95 - 110 mmol/L Final     ANION GAP   Date Value Ref Range Status   03/09/2017 9.0 5.0 - 15.0 mmol/L Final   01/19/2017 10.0 5.0 - 15.0 mmol/L Final     CREATININE   Date Value Ref Range Status   03/09/2017 0.82 0.50 - 1.00 mg/dL Final   01/19/2017 0.9 0.5 - 1.0 mg/dl Final     BUN   Date Value Ref Range Status   03/09/2017 17 7 - 21 mg/dL Final   01/19/2017 13 7 - 21 mg/dl Final     BUN/CREATININE RATIO   Date Value Ref Range Status   03/09/2017 20.7 7.0 - 25.0 Final     CALCIUM   Date Value Ref Range Status   03/09/2017 9.1 8.4 - 10.2 mg/dL Final   01/19/2017 7.9 (L) 8.4 - 10.2 mg/dl Final     ALKALINE PHOSPHATASE   Date Value Ref Range Status   03/09/2017 120 38 - 126 U/L Final   01/19/2017 89 38 - 126 U/L Final     TOTAL PROTEIN   Date Value Ref Range Status   03/09/2017 7.2 6.3 - 8.6 g/dL Final   01/19/2017 6.4 6.3 - 8.6 gm/dl Final     ALT (SGPT)   Date Value Ref Range Status   03/09/2017 54 (H) 9 - 52 U/L Final   01/19/2017 59 (H) 9 - 52 U/L Final     AST (SGOT)   Date Value Ref Range Status   03/09/2017 38 (H) 14 - 36 U/L Final   01/19/2017 38 (H) 14 - 36 U/L Final     TOTAL BILIRUBIN   Date Value Ref Range Status   03/09/2017 0.4 0.2 - 1.3 mg/dL Final   01/19/2017 0.4 0.2 - 1.3 mg/dl Final     ALBUMIN   Date Value Ref Range Status   03/09/2017 4.20 3.40 - 4.80 g/dL Final   01/19/2017 3.5 3.4 - 4.8 gm/dl Final     GLOBULIN   Date Value Ref Range Status   03/09/2017 3.0 2.3 - 3.5 gm/dL Final     A/G RATIO   Date Value Ref Range Status    03/09/2017 1.4 1.1 - 1.8 g/dL Final     Lab Results   Component Value Date    WBC 7.58 03/09/2017    HGB 12.3 03/09/2017    HCT 37.2 03/09/2017    MCV 88.6 03/09/2017     03/09/2017     Lab Results   Component Value Date    NEUTROABS 5.18 03/09/2017    IRON 106 02/09/2017    TIBC 304 01/25/2017    LABIRON 6 (L) 01/25/2017    FERRITIN 51.50 01/25/2017    [id number] 559 06/17/2016    FOLATE 14.10 06/17/2016     Lab Results   Component Value Date     22.1 02/09/2017    LABCA2 23.6 02/09/2017    AFPTM 156 01/25/2017    HCGQUANT 1 12/12/2016    CEA 3.2 09/22/2015   ]    PATHOLOGY:  Pathology result from EGD and colonoscopy biopsy on January 26, 2017 shows:  Final Diagnosis   1. GASTRIC ANTRUM, MUCOSAL BIOPSY:   MILD CHRONIC GASTRITIS.   NO EVIDENCE OF HELICOBACTER PYLORI (IP STAIN PERFORMED).      2. DUODENUM, MUCOSAL BIOPSY:   MILD CHRONIC INFLAMMATION.      3. GASTRIC BODY, MUCOSAL BIOPSY:   MILD CHRONIC GASTRITIS.   NO EVIDENCE OF HELICOBACTER PYLORI (IP STAIN PERFORMED).      4. COLONIC MUCOSAL BIOPSY, RANDOM:   NO SIGNIFICANT PATHOLOGIC DIAGNOSIS.          RADIOLOGY DATA :  Restaging CT chest abdomen and pelvis with contrast was done on February 2, 2017 it was reviewed and discussed with patient it showed:  CONCLUSION:   1. Subtle low-density area in the right hepatic dome that appears slightly increased in size. While this could be related to an area of fibrosis from the patient's cirrhosis this raises a question of metastatic disease and consider follow-up liver   protocol MRI with contrast in this patient with history of breast cancer.  2. Changes of cirrhosis of the liver again noted.  3. Decrease in size of anterior mediastinal soft tissue density that on the previous examination had an appearance more suggestive of thymic tissue than definite metastatic disease. This has nearly resolved.      ASSESSMENT AND PLAN:      1.  Stage IV metastatic right breast cancer with liver and bone metastasis.   Patient was initially diagnosed in 2011 with a stage III disease.  Initially patient had a Taxotere carboplatin and Herceptin for 6 cycles followed by 1 year of Herceptin maintenance.  After that patient was getting Lupron and tamoxifen.  In March 2015 she was diagnosed with a metastatic disease involving liver and bone.  Patient was again started on Taxotere Herceptin and perjeta.  She could not tolerate Taxotere at that point she had a recurrent pneumonias and cytopenias.  Subsequently she was maintained on Herceptin and perjeta until October 2016, at that point restaging CAT scan showed there was anterior mediastinal mass for which patient was started on Taxol Herceptin and perjeta.  Patient received her last dose of Taxol on January 12, 2017.  Patient was started back on Herceptin and perjeta on February 9, 2017.  We'll go head with a cycle with Herceptin and perjeta today.  We will also add Faslodex to be restarted from next week since patient is no longer on Taxol.    2.  Rectal bleeding: Patient is status post argon coagulation treatment on February 23, 2017.  Denies any rectal bleeding since then.    3.  Recurrent DVT: Patient was on Xarelto which was held secondary to rectal bleeding.  Patient is status post argon laser treatment on February 23, 2017.  Recommendation would be to support her on Lovenox and Coumadin Clinic visit if she does not have any bleeding at that point.  She is high risk of recurrent thrombosis due to active cancer.  Patient is in agreement with that.    4.  Bone metastasis: Continue with Zometa for now.    5.  Health maintenance: Patient does not smoke.  She just had a colonoscopy done on January 2017 which was negative for any malignancy.  She remains full code.    6.  Anemia: Anemia workup has been done today we will follow up on the result if she has a significant iron deficiency she might need some intravenous Feraheme as she was not able to tolerate oral iron in the  past.       Joselito Waddell MD  3/9/2017  5:02 PM

## 2017-03-10 LAB
CANCER AG15-3 SERPL-ACNC: 22.4 U/ML (ref 0–25)
CANCER AG27-29 SERPL-ACNC: 24 U/ML (ref 0–38.6)

## 2017-03-14 ENCOUNTER — OFFICE VISIT (OUTPATIENT)
Dept: FAMILY MEDICINE CLINIC | Facility: CLINIC | Age: 42
End: 2017-03-14

## 2017-03-14 VITALS
HEIGHT: 65 IN | TEMPERATURE: 97.8 F | HEART RATE: 84 BPM | SYSTOLIC BLOOD PRESSURE: 130 MMHG | WEIGHT: 221 LBS | BODY MASS INDEX: 36.82 KG/M2 | DIASTOLIC BLOOD PRESSURE: 74 MMHG | OXYGEN SATURATION: 96 %

## 2017-03-14 DIAGNOSIS — C79.51 BREAST CANCER METASTASIZED TO BONE, LEFT (HCC): ICD-10-CM

## 2017-03-14 DIAGNOSIS — K59.03 CONSTIPATION DUE TO OPIOID THERAPY: ICD-10-CM

## 2017-03-14 DIAGNOSIS — C50.912 BREAST CANCER METASTASIZED TO BONE, LEFT (HCC): ICD-10-CM

## 2017-03-14 DIAGNOSIS — R73.9 HYPERGLYCEMIA, DRUG-INDUCED: ICD-10-CM

## 2017-03-14 DIAGNOSIS — T40.2X5A CONSTIPATION DUE TO OPIOID THERAPY: ICD-10-CM

## 2017-03-14 DIAGNOSIS — J42 CHRONIC BRONCHITIS, UNSPECIFIED CHRONIC BRONCHITIS TYPE (HCC): Primary | ICD-10-CM

## 2017-03-14 DIAGNOSIS — T50.905A HYPERGLYCEMIA, DRUG-INDUCED: ICD-10-CM

## 2017-03-14 PROCEDURE — 99214 OFFICE O/P EST MOD 30 MIN: CPT | Performed by: FAMILY MEDICINE

## 2017-03-14 RX ORDER — LEVOFLOXACIN 500 MG/1
500 TABLET, FILM COATED ORAL DAILY
Qty: 7 TABLET | Refills: 0 | Status: SHIPPED | OUTPATIENT
Start: 2017-03-14 | End: 2017-03-30

## 2017-03-14 RX ORDER — OXYCODONE AND ACETAMINOPHEN 10; 325 MG/1; MG/1
1 TABLET ORAL EVERY 6 HOURS PRN
Qty: 120 TABLET | Refills: 0 | Status: SHIPPED | OUTPATIENT
Start: 2017-03-14 | End: 2017-04-11 | Stop reason: SDUPTHER

## 2017-03-14 RX ORDER — GUAIFENESIN AND DEXTROMETHORPHAN HYDROBROMIDE 600; 30 MG/1; MG/1
1 TABLET, EXTENDED RELEASE ORAL 2 TIMES DAILY PRN
Qty: 30 TABLET | Refills: 0 | Status: SHIPPED | OUTPATIENT
Start: 2017-03-14 | End: 2017-04-11

## 2017-03-14 NOTE — PROGRESS NOTES
Subjective   Chief Complaint   Patient presents with   • Med Refill     last dose last night     Andra Villareal is a 41 y.o. female.   Med Refill (last dose last night)    History of Present Illness     Recent hospitalization due to GI bleeding  Jordin sage - diagnosed with gerd and chronic gastritis  Ceruloplasmin was elevated   Recheck scheduled 3/15/17    Podiatry - pain in the right ankle  Currently wearing ankle brace  Steroid injection done - improved the pain  Recheck scheduled in March    Chronic pain relating to metastatic breast cancer to bone  Not controlled with percocet   Complaining of limited activity due to pain  Present in the lumbar spine and down the left lower extremity    Metastatic breast cancer s/p right mastectomy with breast reconstructive surgery with metastases to liver and bone with new lesion noted on CT 10/26/16 of anterior mediastinal mass measuring 4.9x5.1x3.3cm  Followed at [facility] with Dr Waddell  Treatment for new lesion with chemotherapy    STEVEN - currently controlled with klonopin PRN    Acute on chronic bronchitis - previously treated with doxycycline  Symptoms have not resolved  C/o soa, wheeze, productive cough  No improvements or control with tessalon perles    Idiopathic chronic constipation with opioid use - controlled with amitiza  Using miralax daily which causes her rebound diarrhea  Failed movantik - caused her diarrhea    Hyperglycemia - controlled with diet    The following portions of the patient's history were reviewed and updated as appropriate: allergies, current medications, past family history, past medical history, past social history, past surgical history and problem list.    Review of Systems   Constitutional: Negative for appetite change, chills, fatigue and fever.   HENT: Negative for congestion, ear pain, rhinorrhea and sore throat.    Eyes: Negative for pain.   Respiratory: Positive for cough, shortness of breath and wheezing.   "  Cardiovascular: Negative for chest pain and palpitations.   Gastrointestinal: Negative for abdominal pain, constipation, diarrhea and nausea.   Genitourinary: Negative for dysuria.   Musculoskeletal: Positive for back pain. Negative for joint swelling and neck pain.   Skin: Negative for rash.   Neurological: Negative for dizziness and headaches.       Objective   Visit Vitals   • /74   • Pulse 84   • Temp 97.8 °F (36.6 °C)   • Ht 65\" (165.1 cm)   • Wt 221 lb (100 kg)   • SpO2 96%   • BMI 36.78 kg/m2     Physical Exam   Constitutional: She is oriented to person, place, and time. She appears well-developed and well-nourished.   HENT:   Head: Normocephalic and atraumatic.   Eyes: Pupils are equal, round, and reactive to light.   Neck: Normal range of motion. Neck supple.   Cardiovascular: Normal rate, regular rhythm and normal heart sounds.    Pulmonary/Chest: Effort normal. No respiratory distress. She has wheezes. She has rales.   Abdominal: Soft. Bowel sounds are normal.   Musculoskeletal: Normal range of motion.   Neurological: She is alert and oriented to person, place, and time.   Skin: Skin is warm and dry.   Psychiatric: She has a normal mood and affect.   Nursing note and vitals reviewed.      Assessment/Plan   Problems Addressed this Visit        Respiratory    Chronic bronchitis - Primary    Relevant Medications    guaifenesin-dextromethorphan (MUCINEX DM)  MG tablet sustained-release 12 hour tablet       Digestive    Constipation due to opioid therapy       Musculoskeletal and Integument    Breast cancer metastasized to bone    Relevant Medications    oxyCODONE-acetaminophen (PERCOCET)  MG per tablet       Other    Hyperglycemia, drug-induced        Increased percocet    Start mucinex dm  Start levaquin  Continue with inhalers as needed    Recheck in 4 weeks           "

## 2017-03-15 ENCOUNTER — OFFICE VISIT (OUTPATIENT)
Dept: GASTROENTEROLOGY | Facility: CLINIC | Age: 42
End: 2017-03-15

## 2017-03-15 VITALS
SYSTOLIC BLOOD PRESSURE: 128 MMHG | WEIGHT: 221.9 LBS | DIASTOLIC BLOOD PRESSURE: 79 MMHG | HEIGHT: 65 IN | HEART RATE: 66 BPM | BODY MASS INDEX: 36.97 KG/M2

## 2017-03-15 DIAGNOSIS — K31.9 LESION OF STOMACH: ICD-10-CM

## 2017-03-15 DIAGNOSIS — K74.69 OTHER CIRRHOSIS OF LIVER (HCC): Primary | ICD-10-CM

## 2017-03-15 DIAGNOSIS — K21.9 GASTROESOPHAGEAL REFLUX DISEASE, ESOPHAGITIS PRESENCE NOT SPECIFIED: ICD-10-CM

## 2017-03-15 DIAGNOSIS — E61.1 IRON DEFICIENCY: ICD-10-CM

## 2017-03-15 DIAGNOSIS — K76.0 FATTY (CHANGE OF) LIVER, NOT ELSEWHERE CLASSIFIED: ICD-10-CM

## 2017-03-15 PROCEDURE — 99213 OFFICE O/P EST LOW 20 MIN: CPT | Performed by: PHYSICIAN ASSISTANT

## 2017-03-15 NOTE — PROGRESS NOTES
Chief Complaint   Patient presents with   • Iron Deficiency Anemia   • Cirrhosis   • Heartburn       ENDO PROCEDURE ORDERED:    Subjective    Andra Villareal is a 41 y.o. female. she is here today for follow-up.    History of Present Illness    Patient seen on a recheck of her iron deficiency anemia, GERD, cirrhosis on imaging with large angiectasia of the stomach.  Last seen 2/1/17.  Patient was given Carafate.  I had recommended iron infusions but Dr. Waddell held disease as her repeat laboratories had improved.  She is on Protonix, Zantac, Carafate, denied heartburn, nausea vomiting, dysphagia.  She is on Amitiza as well as MiraLAX and iron.  No further bleeding.  Weight is up 7 pounds since last visit.  Patient did undergo EGD on 2/24/17 again showed a nonbleeding angiodysplastic lesion in the cardia of the stomach.  This was treated with argon laser been.  Did not appear malignant.  I did review with Dr. Clifton.  Would recommend no follow-up at this time as long as she improves.    Laboratory on 2/9/17 CMP showed glucose 139, otherwise normal.  Serum iron 106.  Normal CA 27.29, 15.3.  CBC showed a white count of 13.41, otherwise normal.  Repeat laboratories on 3/2/17 showed normal CBC, CMP showed glucose 112, AST 37, otherwise normal.  Laboratories on 3/9/17 CA 15-3, CA 27.29 both normal.  CMP showed glucose 127, AST 38, ALT 54, otherwise normal.  CBC normal.    A/P: Cirrhosis presumed secondary to fatty liver.  Patient encouraged dietary modification and weight loss.  Successful treatment of her angiodysplasia of the stomach, likely source of her bleeding.  Since treatment she has significantly improved.  I recommended she complete the current prescription of Carafate and discontinue that.  We'll continue on the Protonix for now.  Continue on the Amitiza.  We'll follow-up in 6 months, further pending clinical course and the results of the above.     The following portions of the patient's history were  reviewed and updated as appropriate:   Past Medical History:   Diagnosis Date   • Abnormal breathing sounds    • Abscess of skin     and / or subcutaneous tissue   • Acute bronchitis     unspecified   • Adult body mass index 30+     obesity-BMI 33   • Allergic rhinitis    • Anasarca    • Anxiety     currently on xanax   • Anxiety    • Asthenia    • Asthma     moderate persistent   • Asthmatic bronchitis    • Astigmatism    • Bleeding external hemorrhoids    • Body mass index (BMI) of 34.0-34.9 in adult    • Body mass index (BMI) of 35.0-35.9 in adult    • Body mass index (BMI) of 36.0-36.9 in adult    • Body mass index 40.0-44.9, adult     SEVERELY OBESE   • Cellulitis    • Chemotherapy induced nausea and vomiting    • Chronic back pain    • Chronic cough    • Chronic hypoxemic respiratory failure     on NC at 3 LPM   • Cough    • Depressive disorder    • Diabetes mellitus     no retinopathy   • Dyslipidemia    • Edema of lower extremity    • Encounter for follow-up examination after completed treatment for malignant neoplasm    • Epidermoid cyst of skin     excision with secondary intention healing   • Excessive and frequent menstruation with irregular cycle     Vaginal bleeding after 4 years of no bleeding secondary to chemotherapy for breast cancer; currently being treated for breast cancer for the second time, mets to bone and liver while on chemotherapy      • Flushing    • 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    • Gastroesophageal reflux disease    • H/O tubal ligation    • Hx of echocardiogram     w/ color flow, and w/o color flow   • Hyperglycemia    • Hypermetropia    • Hypertension    • Hypokalemia    • Impaired glucose tolerance     hgbalc 6.2   • Infection of skin     and /or subcutaneous tissue-around the catheter exit site   • Influenza     needs influenza immunization   • Irregular periods    • Lesion of lumbar spine     pain controlled with percocet and lidocaine patches   • Lumbago with  sciatica    • Malignant neoplasm of female breast     s/p right mastectomy, mets to bone and liver on chemotherapy      • Menopausal flushing    • Muscle weakness    • Muscle weakness (generalized)    • Nonspecific interstitial pneumonia     symptomatically improved   • Pleural effusion     refractory massive right, most likely malignant effusion   • Primary atypical pneumonia    • Renal failure     acute drug-induced renal failure   • Sinus tachycardia    • Tobacco dependence syndrome    • Tobacco non-user    • Upper respiratory infection    • Wheezing      Past Surgical History:   Procedure Laterality Date   • CENTRAL VENOUS CATHETER TUNNELED INSERTION SINGLE LUMEN      Placement of indwelling Pleurx catheter right   • COLONOSCOPY N/A 1/26/2017    Procedure: COLONOSCOPY;  Surgeon: Robert Clifton MD;  Location: [facility] ENDOSCOPY;  Service:    • ENDOSCOPY N/A 1/26/2017    Procedure: ESOPHAGOGASTRODUODENOSCOPY;  Surgeon: Robert Clifton MD;  Location: [facility] ENDOSCOPY;  Service:    • ENDOSCOPY N/A 2/24/2017    Procedure: ESOPHAGOGASTRODUODENOSCOPY;  Surgeon: Robert Clifton MD;  Location: [facility] ENDOSCOPY;  Service:    • HYSTERECTOMY      vaginal   • LUNG VOLUME REDUCTION     • MASTECTOMY      partial   • OTHER SURGICAL HISTORY  05/05/2016    central line insertion , PICC line replacement   • OTHER SURGICAL HISTORY      place breast clip percut   • OTHER SURGICAL HISTORY      pulse oximetry   • OTHER SURGICAL HISTORY      remove cc cath w/ sc port or pump, Removal of right internal jugular MediPort   • OTHER SURGICAL HISTORY      spirometry   • OTHER SURGICAL HISTORY      tubal sterilization   • PAP SMEAR     • THORACENTESIS      aspiration of pleural space   • UPPER GASTROINTESTINAL ENDOSCOPY  01/26/2017   • UPPER GASTROINTESTINAL ENDOSCOPY  02/24/2017   • VENOUS ACCESS DEVICE (PORT) INSERTION      Ultrasound guided right internal jugular Mediport placement under fluoroscopy     Family History   Problem Relation Age  "of Onset   • Coronary artery disease Other    • Diabetes Other    • Hypertension Other      OB History     No data available        Allergies   Allergen Reactions   • Lisinopril Shortness Of Breath   • Flagyl [Metronidazole] Other (See Comments)     pancreatitis   • Fentanyl Anxiety     \"goes out of her mind\"   • Latex Rash     Social History     Social History   • Marital status:      Spouse name: N/A   • Number of children: N/A   • Years of education: N/A     Social History Main Topics   • Smoking status: Former Smoker   • Smokeless tobacco: Never Used   • Alcohol use No   • Drug use: No   • Sexual activity: Defer     Other Topics Concern   • None     Social History Narrative       Current Outpatient Prescriptions:   •  Ascorbic Acid (VITAMIN C PO), Take  by mouth 3 (Three) Times a Day., Disp: , Rfl:   •  calcium carbonate (OS-ONELIA) 1250 (500 CA) MG tablet, Take 1 tablet by mouth 3 (Three) Times a Day., Disp: 90 tablet, Rfl: 5  •  cetirizine (zyrTEC) 10 MG tablet, Take 1 tablet by mouth Daily., Disp: 30 tablet, Rfl: 5  •  clonazePAM (KlonoPIN) 2 MG tablet, Take 1 tablet by mouth 2 (Two) Times a Day As Needed for anxiety., Disp: 60 tablet, Rfl: 2  •  diltiaZEM CD (CARTIA XT) 120 MG 24 hr capsule, Take 120 mg by mouth Daily., Disp: , Rfl:   •  ferrous sulfate 325 (65 FE) MG tablet, Take 1 tablet by mouth 3 (Three) Times a Day., Disp: 90 tablet, Rfl: 5  •  fluticasone (FLONASE) 50 MCG/ACT nasal spray, 2 sprays into each nostril Daily., Disp: 16 g, Rfl: 12  •  guaifenesin-dextromethorphan (MUCINEX DM)  MG tablet sustained-release 12 hour tablet, Take 1 tablet by mouth 2 (Two) Times a Day As Needed (cough and congestion)., Disp: 30 tablet, Rfl: 0  •  LEVEMIR 100 UNIT/ML injection, INJECT 8 UNITS UNDER THE SKIN EVERY NIGHT AT BEDTIME. (Patient taking differently: INJECT 8 UNITS UNDER THE SKIN EVERY NIGHT AT BEDTIME prn), Disp: 10 mL, Rfl: 0  •  levoFLOXacin (LEVAQUIN) 500 MG tablet, Take 1 tablet by mouth " "Daily., Disp: 7 tablet, Rfl: 0  •  lubiprostone (AMITIZA) 24 MCG capsule, Take 1 capsule by mouth 2 (Two) Times a Day With Meals., Disp: 60 capsule, Rfl: 5  •  magnesium oxide (MAGNESIUM-OXIDE) 400 (241.3 MG) MG tablet tablet, Take 1 tablet by mouth 2 (Two) Times a Day., Disp: 60 tablet, Rfl: 11  •  montelukast (SINGULAIR) 10 MG tablet, Take 1 tablet by mouth Every Night., Disp: 30 tablet, Rfl: 5  •  oxyCODONE-acetaminophen (PERCOCET)  MG per tablet, Take 1 tablet by mouth Every 6 (Six) Hours As Needed for Moderate Pain (4-6)., Disp: 120 tablet, Rfl: 0  •  pantoprazole (PROTONIX) 40 MG EC tablet, Take 1 tablet by mouth Every Night., Disp: 90 tablet, Rfl: 3  •  polyethylene glycol (MIRALAX) powder, Take 3,350 g by mouth Daily As Needed., Disp: , Rfl:   •  potassium chloride (K-DUR,KLOR-CON) 20 MEQ CR tablet, Take 1 tablet by mouth 2 (Two) Times a Day., Disp: 60 tablet, Rfl: 5  •  PROAIR RESPICLICK 108 (90 BASE) MCG/ACT inhaler, USE 2 PUFFS BY MOUTH UP TO 4 TIMES DAILY AS NEEDED, Disp: 1 g, Rfl: 0  •  promethazine (PHENERGAN) 25 MG tablet, Take 1 tablet by mouth Every 6 (Six) Hours As Needed for nausea or vomiting., Disp: 120 tablet, Rfl: 5  •  raNITIdine (ZANTAC) 150 MG tablet, TK 1 T PO BID, Disp: , Rfl: 1  •  rivaroxaban (XARELTO) 20 MG tablet, Take 20 mg by mouth Daily., Disp: , Rfl:   •  sucralfate (CARAFATE) 1 GM/10ML suspension, Take 10 mL by mouth 2 (Two) Times a Day., Disp: 420 mL, Rfl: 1  •  venlafaxine XR (EFFEXOR-XR) 37.5 MG 24 hr capsule, Take 1 capsule by mouth Daily., Disp: 30 capsule, Rfl: 5  •  VENTOLIN  (90 BASE) MCG/ACT inhaler, , Disp: , Rfl: 11  No current facility-administered medications for this visit.     Facility-Administered Medications Ordered in Other Visits:   •  sodium chloride 0.9 % flush 10 mL, 10 mL, Intravenous, PRN, Joselito Waddell MD, 10 mL at 01/26/17 1129  Review of Systems  Review of Systems       Objective      /79 (BP Location: Left arm)  Pulse 66  Ht 65\" " (165.1 cm)  Wt 221 lb 14.4 oz (101 kg)  BMI 36.93 kg/m2  Physical Exam   Constitutional: She is oriented to person, place, and time. She appears well-developed and well-nourished. No distress.   PICC line in left. Chronically ill appearing   HENT:   Head: Normocephalic and atraumatic.   Eyes: EOM are normal. Pupils are equal, round, and reactive to light.   Neck: Normal range of motion.   Cardiovascular: Normal rate, regular rhythm and normal heart sounds.    Pulmonary/Chest: Effort normal and breath sounds normal.   Abdominal: Soft. Bowel sounds are normal. She exhibits no shifting dullness, no distension, no abdominal bruit, no ascites and no mass. There is no hepatosplenomegaly. There is tenderness. There is no rigidity, no rebound, no guarding and no CVA tenderness. No hernia. Hernia confirmed negative in the ventral area.   Obese, mild diffuse, moderate epigastric, inferior to umbilicus   Musculoskeletal: Normal range of motion.   Neurological: She is alert and oriented to person, place, and time.   Skin: Skin is warm and dry.   Psychiatric: She has a normal mood and affect. Her behavior is normal. Judgment and thought content normal.   Nursing note and vitals reviewed.    Appointment on 03/10/2017   Component Date Value Ref Range Status   • BSA 03/10/2017 2.1  m^2 Preliminary   • BH CV ECHO LOUISA - RVDD 03/10/2017 2.1  cm Preliminary   • IVSd 03/10/2017 1.5  cm Preliminary   • LVIDd 03/10/2017 4.5  cm Preliminary   • LVIDs 03/10/2017 3.0  cm Preliminary   • LVPWd 03/10/2017 1.6  cm Preliminary   • IVS/LVPW 03/10/2017 0.97   Preliminary   • FS 03/10/2017 33.7  % Preliminary   • EDV(Teich) 03/10/2017 90.1  ml Preliminary   • ESV(Teich) 03/10/2017 33.6  ml Preliminary   • EF(Teich) 03/10/2017 62.7  % Preliminary   • EDV(cubed) 03/10/2017 88.1  ml Preliminary   • ESV(cubed) 03/10/2017 25.7  ml Preliminary   • EF(cubed) 03/10/2017 70.9  % Preliminary   • LV mass(C)d 03/10/2017 278.3  grams Preliminary   • LV  mass(C)dI 03/10/2017 135.1  grams/m^2 Preliminary   • SV(Teich) 03/10/2017 56.5  ml Preliminary   • SI(Teich) 03/10/2017 27.4  ml/m^2 Preliminary   • SV(cubed) 03/10/2017 62.4  ml Preliminary   • SI(cubed) 03/10/2017 30.3  ml/m^2 Preliminary   • EPSS 03/10/2017 0.5  cm Preliminary   • Ao root diam 03/10/2017 3.0  cm Preliminary   • Ao root area 03/10/2017 7.1  cm^2 Preliminary   • ACS 03/10/2017 1.8  cm Preliminary   • LA dimension 03/10/2017 3.9  cm Preliminary   • LA/Ao 03/10/2017 1.3   Preliminary   • Ao root area (BSA corrected) 03/10/2017 1.5   Preliminary   • MV E max elena 03/10/2017 91.3  cm/sec Preliminary   • MV A max elena 03/10/2017 58.7  cm/sec Preliminary   • MV E/A 03/10/2017 1.6   Preliminary   • Ao pk elena 03/10/2017 148.0  cm/sec Preliminary   • Ao max PG 03/10/2017 8.8  mmHg Preliminary   • Ao max PG (full) 03/10/2017 5.8  mmHg Preliminary   • Ao V2 mean 03/10/2017 96.4  cm/sec Preliminary   • Ao mean PG 03/10/2017 5.0  mmHg Preliminary   • Ao mean PG (full) 03/10/2017 4.0  mmHg Preliminary   • Ao V2 VTI 03/10/2017 31.5  cm Preliminary   • LV V1 max PG 03/10/2017 2.9  mmHg Preliminary   • LV V1 mean PG 03/10/2017 1.0  mmHg Preliminary   • LV V1 max 03/10/2017 85.7  cm/sec Preliminary   • LV V1 mean 03/10/2017 46.8  cm/sec Preliminary   • LV V1 VTI 03/10/2017 15.1  cm Preliminary   • SV(Ao) 03/10/2017 222.7  ml Preliminary   • SI(Ao) 03/10/2017 108.1  ml/m^2 Preliminary   • PA V2 max 03/10/2017 117.0  cm/sec Preliminary   • PA max PG 03/10/2017 5.5  mmHg Preliminary   • PA V2 mean 03/10/2017 71.3  cm/sec Preliminary   • PA mean PG 03/10/2017 3.0  mmHg Preliminary   • PA V2 VTI 03/10/2017 24.1  cm Preliminary   • TR max elena 03/10/2017 153.0  cm/sec Preliminary   • RVSP(TR) 03/10/2017 19.4  mmHg Preliminary   • RAP systole 03/10/2017 10.0  mmHg Preliminary   • BH CV ECHO LOUISA - BZI_BMI 03/10/2017 36.6  kilograms/m^2 Preliminary   • BH CV ECHO LOUISA - BSA(HAYCOCK) 03/10/2017 2.2  m^2 Preliminary   • BH CV  ECHO LOUISA - BZI_METRIC_WEIGHT 03/10/2017 99.8  kg Preliminary   •  CV ECHO LOUISA - BZI_METRIC_HEIGHT 03/10/2017 165.1  cm Preliminary   • Echo EF Estimated 03/10/2017 60  % Final     Assessment/Plan      1. Other cirrhosis of liver    2. Iron deficiency    3. Fatty (change of) liver, not elsewhere classified     4. Gastroesophageal reflux disease, esophagitis presence not specified    5. Lesion of stomach    .   Andra was seen today for iron deficiency anemia, cirrhosis and heartburn.    Diagnoses and all orders for this visit:    Other cirrhosis of liver    Iron deficiency    Fatty (change of) liver, not elsewhere classified     Gastroesophageal reflux disease, esophagitis presence not specified    Lesion of stomach  Comments:  angioectasis of stomach tx with lazer        Orders placed during this encounter include:  No orders of the defined types were placed in this encounter.      Medications prescribed:  No orders of the defined types were placed in this encounter.      Requested Prescriptions      No prescriptions requested or ordered in this encounter       Review and/or summary of lab tests, radiology, procedures, medications. Review and summary of old records and obtaining of history. The risks and benefits of my recommendations, as well as other treatment options were discussed with the patient today. Questions were answered.    Follow-up: Return in about 6 months (around 9/15/2017), or if symptoms worsen or fail to improve.     * Surgery not found *      This document has been electronically signed by Jordin Oliveira PA-C on March 27, 2017 12:22 PM      Results for orders placed or performed in visit on 03/10/17   Adult Transthoracic Echo Complete   Result Value Ref Range    BSA 2.1 m^2     CV ECHO LOUISA - RVDD 2.1 cm    IVSd 1.5 cm    LVIDd 4.5 cm    LVIDs 3.0 cm    LVPWd 1.6 cm    IVS/LVPW 0.97     FS 33.7 %    EDV(Teich) 90.1 ml    ESV(Teich) 33.6 ml    EF(Teich) 62.7 %    EDV(cubed) 88.1 ml     ESV(cubed) 25.7 ml    EF(cubed) 70.9 %    LV mass(C)d 278.3 grams    LV mass(C)dI 135.1 grams/m^2    SV(Teich) 56.5 ml    SI(Teich) 27.4 ml/m^2    SV(cubed) 62.4 ml    SI(cubed) 30.3 ml/m^2    EPSS 0.5 cm    Ao root diam 3.0 cm    Ao root area 7.1 cm^2    ACS 1.8 cm    LA dimension 3.9 cm    LA/Ao 1.3     Ao root area (BSA corrected) 1.5     MV E max elena 91.3 cm/sec    MV A max elena 58.7 cm/sec    MV E/A 1.6     Ao pk elena 148.0 cm/sec    Ao max PG 8.8 mmHg    Ao max PG (full) 5.8 mmHg    Ao V2 mean 96.4 cm/sec    Ao mean PG 5.0 mmHg    Ao mean PG (full) 4.0 mmHg    Ao V2 VTI 31.5 cm    LV V1 max PG 2.9 mmHg    LV V1 mean PG 1.0 mmHg    LV V1 max 85.7 cm/sec    LV V1 mean 46.8 cm/sec    LV V1 VTI 15.1 cm    SV(Ao) 222.7 ml    SI(Ao) 108.1 ml/m^2    PA V2 max 117.0 cm/sec    PA max PG 5.5 mmHg    PA V2 mean 71.3 cm/sec    PA mean PG 3.0 mmHg    PA V2 VTI 24.1 cm    TR max elena 153.0 cm/sec    RVSP(TR) 19.4 mmHg    RAP systole 10.0 mmHg     CV ECHO LOUISA - BZI_BMI 36.6 kilograms/m^2     CV ECHO LOUISA - BSA(HAYCOCK) 2.2 m^2     CV ECHO LOUISA - BZI_METRIC_WEIGHT 99.8 kg     CV ECHO LOUISA - BZI_METRIC_HEIGHT 165.1 cm    Echo EF Estimated 60 %   Results for orders placed or performed in visit on 03/09/17   CBC Auto Differential   Result Value Ref Range    WBC 7.58 3.20 - 9.80 10*3/mm3    RBC 4.20 3.77 - 5.16 10*6/mm3    Hemoglobin 12.3 12.0 - 15.5 g/dL    Hematocrit 37.2 35.0 - 45.0 %    MCV 88.6 80.0 - 98.0 fL    MCH 29.3 26.5 - 34.0 pg    MCHC 33.1 31.4 - 36.0 g/dL    RDW 13.7 11.5 - 14.5 %    RDW-SD 44.5 36.4 - 46.3 fl    MPV 11.6 8.0 - 12.0 fL    Platelets 194 150 - 450 10*3/mm3    Neutrophil % 68.4 37.0 - 80.0 %    Lymphocyte % 22.7 10.0 - 50.0 %    Monocyte % 5.5 0.0 - 12.0 %    Eosinophil % 2.6 0.0 - 7.0 %    Basophil % 0.4 0.0 - 2.0 %    Immature Grans % 0.4 0.0 - 0.5 %    Neutrophils, Absolute 5.18 2.00 - 8.60 10*3/mm3    Lymphocytes, Absolute 1.72 0.60 - 4.20 10*3/mm3    Monocytes, Absolute 0.42 0.00 - 0.90  10*3/mm3    Eosinophils, Absolute 0.20 0.00 - 0.70 10*3/mm3    Basophils, Absolute 0.03 0.00 - 0.20 10*3/mm3    Immature Grans, Absolute 0.03 (H) 0.00 - 0.02 10*3/mm3   Comprehensive metabolic panel   Result Value Ref Range    Glucose 127 (H) 60 - 100 mg/dL    BUN 17 7 - 21 mg/dL    Creatinine 0.82 0.50 - 1.00 mg/dL    Sodium 141 137 - 145 mmol/L    Potassium 3.9 3.5 - 5.1 mmol/L    Chloride 104 95 - 110 mmol/L    CO2 28.0 22.0 - 31.0 mmol/L    Calcium 9.1 8.4 - 10.2 mg/dL    Total Protein 7.2 6.3 - 8.6 g/dL    Albumin 4.20 3.40 - 4.80 g/dL    ALT (SGPT) 54 (H) 9 - 52 U/L    AST (SGOT) 38 (H) 14 - 36 U/L    Alkaline Phosphatase 120 38 - 126 U/L    Total Bilirubin 0.4 0.2 - 1.3 mg/dL    eGFR  African Amer 94 58 - 135 mL/min/1.73    Globulin 3.0 2.3 - 3.5 gm/dL    A/G Ratio 1.4 1.1 - 1.8 g/dL    BUN/Creatinine Ratio 20.7 7.0 - 25.0    Anion Gap 9.0 5.0 - 15.0 mmol/L   Results for orders placed or performed in visit on 03/02/17   CBC Auto Differential   Result Value Ref Range    WBC 7.69 3.20 - 9.80 10*3/mm3    RBC 4.20 3.77 - 5.16 10*6/mm3    Hemoglobin 12.2 12.0 - 15.5 g/dL    Hematocrit 36.7 35.0 - 45.0 %    MCV 87.4 80.0 - 98.0 fL    MCH 29.0 26.5 - 34.0 pg    MCHC 33.2 31.4 - 36.0 g/dL    RDW 14.0 11.5 - 14.5 %    RDW-SD 45.0 36.4 - 46.3 fl    MPV 11.2 8.0 - 12.0 fL    Platelets 178 150 - 450 10*3/mm3    Neutrophil % 67.1 37.0 - 80.0 %    Lymphocyte % 21.5 10.0 - 50.0 %    Monocyte % 7.4 0.0 - 12.0 %    Eosinophil % 3.6 0.0 - 7.0 %    Basophil % 0.1 0.0 - 2.0 %    Immature Grans % 0.3 0.0 - 0.5 %    Neutrophils, Absolute 5.16 2.00 - 8.60 10*3/mm3    Lymphocytes, Absolute 1.65 0.60 - 4.20 10*3/mm3    Monocytes, Absolute 0.57 0.00 - 0.90 10*3/mm3    Eosinophils, Absolute 0.28 0.00 - 0.70 10*3/mm3    Basophils, Absolute 0.01 0.00 - 0.20 10*3/mm3    Immature Grans, Absolute 0.02 0.00 - 0.02 10*3/mm3    nRBC 0.0 0.0 - 0.0 /100 WBC     *Note: Due to a large number of results and/or encounters for the requested time  period, some results have not been displayed. A complete set of results can be found in Results Review.       Some portions of this note have been dictated using voice recognition software and may contain errors and/or omissions.

## 2017-03-16 ENCOUNTER — INFUSION (OUTPATIENT)
Dept: ONCOLOGY | Facility: HOSPITAL | Age: 42
End: 2017-03-16

## 2017-03-16 DIAGNOSIS — C79.51 BREAST CANCER METASTASIZED TO BONE, LEFT (HCC): Primary | ICD-10-CM

## 2017-03-16 DIAGNOSIS — C50.912 BREAST CANCER METASTASIZED TO BONE, LEFT (HCC): Primary | ICD-10-CM

## 2017-03-16 PROCEDURE — 96523 IRRIG DRUG DELIVERY DEVICE: CPT | Performed by: INTERNAL MEDICINE

## 2017-03-16 RX ORDER — SODIUM CHLORIDE 0.9 % (FLUSH) 0.9 %
10 SYRINGE (ML) INJECTION AS NEEDED
Status: DISCONTINUED | OUTPATIENT
Start: 2017-03-16 | End: 2017-03-16 | Stop reason: HOSPADM

## 2017-03-16 RX ORDER — SODIUM CHLORIDE 0.9 % (FLUSH) 0.9 %
10 SYRINGE (ML) INJECTION AS NEEDED
Status: CANCELLED | OUTPATIENT
Start: 2017-03-16

## 2017-03-16 RX ADMIN — Medication 10 ML: at 10:33

## 2017-03-23 ENCOUNTER — INFUSION (OUTPATIENT)
Dept: ONCOLOGY | Facility: HOSPITAL | Age: 42
End: 2017-03-23

## 2017-03-23 DIAGNOSIS — C79.51 BREAST CANCER METASTASIZED TO BONE, LEFT (HCC): ICD-10-CM

## 2017-03-23 DIAGNOSIS — C50.912 BREAST CANCER METASTASIZED TO BONE, LEFT (HCC): ICD-10-CM

## 2017-03-23 DIAGNOSIS — C79.51 BREAST CANCER METASTASIZED TO BONE, RIGHT (HCC): Primary | ICD-10-CM

## 2017-03-23 DIAGNOSIS — C50.911 BREAST CANCER METASTASIZED TO BONE, RIGHT (HCC): Primary | ICD-10-CM

## 2017-03-23 PROCEDURE — 96402 CHEMO HORMON ANTINEOPL SQ/IM: CPT | Performed by: INTERNAL MEDICINE

## 2017-03-23 PROCEDURE — 25010000002 FULVESTRANT PER 25 MG: Performed by: INTERNAL MEDICINE

## 2017-03-23 PROCEDURE — 96523 IRRIG DRUG DELIVERY DEVICE: CPT | Performed by: INTERNAL MEDICINE

## 2017-03-23 RX ORDER — SODIUM CHLORIDE 0.9 % (FLUSH) 0.9 %
10 SYRINGE (ML) INJECTION AS NEEDED
Status: CANCELLED | OUTPATIENT
Start: 2017-03-23

## 2017-03-23 RX ORDER — SODIUM CHLORIDE 0.9 % (FLUSH) 0.9 %
10 SYRINGE (ML) INJECTION AS NEEDED
Status: DISCONTINUED | OUTPATIENT
Start: 2017-03-23 | End: 2017-03-23 | Stop reason: HOSPADM

## 2017-03-23 RX ADMIN — FULVESTRANT 500 MG: 50 INJECTION INTRAMUSCULAR at 09:47

## 2017-03-23 RX ADMIN — Medication 10 ML: at 09:15

## 2017-03-30 ENCOUNTER — OFFICE VISIT (OUTPATIENT)
Dept: ONCOLOGY | Facility: CLINIC | Age: 42
End: 2017-03-30

## 2017-03-30 ENCOUNTER — INFUSION (OUTPATIENT)
Dept: ONCOLOGY | Facility: HOSPITAL | Age: 42
End: 2017-03-30

## 2017-03-30 ENCOUNTER — HOSPITAL ENCOUNTER (OUTPATIENT)
Dept: INTERVENTIONAL RADIOLOGY/VASCULAR | Facility: HOSPITAL | Age: 42
Setting detail: HOSPITAL OUTPATIENT SURGERY
Discharge: HOME | End: 2017-03-30
Admitting: INTERNAL MEDICINE

## 2017-03-30 ENCOUNTER — HOSPITAL ENCOUNTER (OUTPATIENT)
Dept: GENERAL RADIOLOGY | Facility: HOSPITAL | Age: 42
Discharge: HOME | End: 2017-03-30

## 2017-03-30 VITALS
RESPIRATION RATE: 18 BRPM | SYSTOLIC BLOOD PRESSURE: 156 MMHG | TEMPERATURE: 97.1 F | BODY MASS INDEX: 38.09 KG/M2 | HEART RATE: 80 BPM | WEIGHT: 228.9 LBS | DIASTOLIC BLOOD PRESSURE: 91 MMHG

## 2017-03-30 DIAGNOSIS — C79.51 BREAST CANCER METASTASIZED TO BONE, LEFT (HCC): Primary | ICD-10-CM

## 2017-03-30 DIAGNOSIS — Z45.2 ENCOUNTER FOR ASSESSMENT OF PERIPHERALLY INSERTED CENTRAL VENOUS CATHETER (PICC): ICD-10-CM

## 2017-03-30 DIAGNOSIS — C50.911 BREAST CANCER METASTASIZED TO BONE, RIGHT (HCC): ICD-10-CM

## 2017-03-30 DIAGNOSIS — C79.51 BREAST CANCER METASTASIZED TO BONE, RIGHT (HCC): ICD-10-CM

## 2017-03-30 DIAGNOSIS — C79.51 BREAST CANCER METASTASIZED TO BONE, LEFT (HCC): ICD-10-CM

## 2017-03-30 DIAGNOSIS — C50.912 BREAST CANCER METASTASIZED TO BONE, LEFT (HCC): ICD-10-CM

## 2017-03-30 DIAGNOSIS — C50.912 BREAST CANCER METASTASIZED TO BONE, LEFT (HCC): Primary | ICD-10-CM

## 2017-03-30 LAB
ALBUMIN SERPL-MCNC: 4.4 G/DL (ref 3.4–4.8)
ALBUMIN/GLOB SERPL: 1.6 G/DL (ref 1.1–1.8)
ALP SERPL-CCNC: 105 U/L (ref 38–126)
ALT SERPL W P-5'-P-CCNC: 43 U/L (ref 9–52)
ANION GAP SERPL CALCULATED.3IONS-SCNC: 12 MMOL/L (ref 5–15)
AST SERPL-CCNC: 58 U/L (ref 14–36)
BASOPHILS # BLD AUTO: 0.02 10*3/MM3 (ref 0–0.2)
BASOPHILS NFR BLD AUTO: 0.3 % (ref 0–2)
BILIRUB SERPL-MCNC: 0.4 MG/DL (ref 0.2–1.3)
BUN BLD-MCNC: 22 MG/DL (ref 7–21)
BUN/CREAT SERPL: 25 (ref 7–25)
CALCIUM SPEC-SCNC: 8.9 MG/DL (ref 8.4–10.2)
CHLORIDE SERPL-SCNC: 101 MMOL/L (ref 95–110)
CO2 SERPL-SCNC: 25 MMOL/L (ref 22–31)
CREAT BLD-MCNC: 0.88 MG/DL (ref 0.5–1)
DEPRECATED RDW RBC AUTO: 42.4 FL (ref 36.4–46.3)
EOSINOPHIL # BLD AUTO: 0.16 10*3/MM3 (ref 0–0.7)
EOSINOPHIL NFR BLD AUTO: 2.4 % (ref 0–7)
ERYTHROCYTE [DISTWIDTH] IN BLOOD BY AUTOMATED COUNT: 13.2 % (ref 11.5–14.5)
GFR SERPL CREATININE-BSD FRML MDRD: 86 ML/MIN/1.73 (ref 58–135)
GLOBULIN UR ELPH-MCNC: 2.8 GM/DL (ref 2.3–3.5)
GLUCOSE BLD-MCNC: 144 MG/DL (ref 60–100)
HCT VFR BLD AUTO: 36.9 % (ref 35–45)
HGB BLD-MCNC: 12.4 G/DL (ref 12–15.5)
IMM GRANULOCYTES # BLD: 0.01 10*3/MM3 (ref 0–0.02)
IMM GRANULOCYTES NFR BLD: 0.1 % (ref 0–0.5)
LYMPHOCYTES # BLD AUTO: 1.89 10*3/MM3 (ref 0.6–4.2)
LYMPHOCYTES NFR BLD AUTO: 28.3 % (ref 10–50)
MCH RBC QN AUTO: 29.3 PG (ref 26.5–34)
MCHC RBC AUTO-ENTMCNC: 33.6 G/DL (ref 31.4–36)
MCV RBC AUTO: 87.2 FL (ref 80–98)
MONOCYTES # BLD AUTO: 0.48 10*3/MM3 (ref 0–0.9)
MONOCYTES NFR BLD AUTO: 7.2 % (ref 0–12)
NEUTROPHILS # BLD AUTO: 4.11 10*3/MM3 (ref 2–8.6)
NEUTROPHILS NFR BLD AUTO: 61.7 % (ref 37–80)
PLATELET # BLD AUTO: 168 10*3/MM3 (ref 150–450)
PMV BLD AUTO: 11.6 FL (ref 8–12)
POTASSIUM BLD-SCNC: 4 MMOL/L (ref 3.5–5.1)
PROT SERPL-MCNC: 7.2 G/DL (ref 6.3–8.6)
RBC # BLD AUTO: 4.23 10*6/MM3 (ref 3.77–5.16)
SODIUM BLD-SCNC: 138 MMOL/L (ref 137–145)
WBC NRBC COR # BLD: 6.67 10*3/MM3 (ref 3.2–9.8)

## 2017-03-30 PROCEDURE — 85025 COMPLETE CBC W/AUTO DIFF WBC: CPT | Performed by: INTERNAL MEDICINE

## 2017-03-30 PROCEDURE — 96367 TX/PROPH/DG ADDL SEQ IV INF: CPT | Performed by: INTERNAL MEDICINE

## 2017-03-30 PROCEDURE — 25010000002 DIPHENHYDRAMINE PER 50 MG: Performed by: INTERNAL MEDICINE

## 2017-03-30 PROCEDURE — 36415 COLL VENOUS BLD VENIPUNCTURE: CPT | Performed by: INTERNAL MEDICINE

## 2017-03-30 PROCEDURE — 80053 COMPREHEN METABOLIC PANEL: CPT | Performed by: INTERNAL MEDICINE

## 2017-03-30 PROCEDURE — 96413 CHEMO IV INFUSION 1 HR: CPT | Performed by: INTERNAL MEDICINE

## 2017-03-30 PROCEDURE — 36593 DECLOT VASCULAR DEVICE: CPT | Performed by: INTERNAL MEDICINE

## 2017-03-30 PROCEDURE — 96375 TX/PRO/DX INJ NEW DRUG ADDON: CPT | Performed by: INTERNAL MEDICINE

## 2017-03-30 PROCEDURE — 96417 CHEMO IV INFUS EACH ADDL SEQ: CPT | Performed by: INTERNAL MEDICINE

## 2017-03-30 PROCEDURE — 99214 OFFICE O/P EST MOD 30 MIN: CPT | Performed by: INTERNAL MEDICINE

## 2017-03-30 PROCEDURE — C1751 CATH, INF, PER/CENT/MIDLINE: HCPCS

## 2017-03-30 PROCEDURE — 25010000002 PERTUZUMAB 420 MG/14ML SOLUTION 420 MG VIAL: Performed by: INTERNAL MEDICINE

## 2017-03-30 PROCEDURE — 25010000002 ZOLEDRONIC ACID PER 1 MG: Performed by: INTERNAL MEDICINE

## 2017-03-30 PROCEDURE — 25010000002 TRASTUZUMAB PER 10 MG: Performed by: INTERNAL MEDICINE

## 2017-03-30 PROCEDURE — 25010000002 ALTEPLASE 2 MG RECONSTITUTED SOLUTION: Performed by: INTERNAL MEDICINE

## 2017-03-30 RX ORDER — SODIUM CHLORIDE 9 MG/ML
250 INJECTION, SOLUTION INTRAVENOUS ONCE
Status: CANCELLED | OUTPATIENT
Start: 2017-04-20

## 2017-03-30 RX ORDER — ACETAMINOPHEN 325 MG/1
650 TABLET ORAL ONCE
Status: CANCELLED
Start: 2017-04-20 | End: 2017-04-20

## 2017-03-30 RX ORDER — SODIUM CHLORIDE 9 MG/ML
250 INJECTION, SOLUTION INTRAVENOUS ONCE
Status: COMPLETED | OUTPATIENT
Start: 2017-03-30 | End: 2017-03-30

## 2017-03-30 RX ORDER — SODIUM CHLORIDE 0.9 % (FLUSH) 0.9 %
10 SYRINGE (ML) INJECTION AS NEEDED
Status: CANCELLED | OUTPATIENT
Start: 2017-03-30

## 2017-03-30 RX ORDER — ACETAMINOPHEN 325 MG/1
650 TABLET ORAL ONCE
Status: COMPLETED | OUTPATIENT
Start: 2017-03-30 | End: 2017-03-30

## 2017-03-30 RX ORDER — FAMOTIDINE 10 MG/ML
20 INJECTION, SOLUTION INTRAVENOUS ONCE
Status: CANCELLED
Start: 2017-04-20 | End: 2017-04-20

## 2017-03-30 RX ORDER — DIPHENHYDRAMINE HYDROCHLORIDE 50 MG/ML
50 INJECTION INTRAMUSCULAR; INTRAVENOUS ONCE
Status: CANCELLED
Start: 2017-04-20 | End: 2017-04-20

## 2017-03-30 RX ORDER — SODIUM CHLORIDE 0.9 % (FLUSH) 0.9 %
10 SYRINGE (ML) INJECTION AS NEEDED
Status: DISCONTINUED | OUTPATIENT
Start: 2017-03-30 | End: 2017-03-30 | Stop reason: HOSPADM

## 2017-03-30 RX ORDER — SODIUM CHLORIDE 9 MG/ML
250 INJECTION, SOLUTION INTRAVENOUS ONCE
Status: DISCONTINUED | OUTPATIENT
Start: 2017-03-30 | End: 2017-03-30 | Stop reason: HOSPADM

## 2017-03-30 RX ORDER — FAMOTIDINE 10 MG/ML
20 INJECTION, SOLUTION INTRAVENOUS ONCE
Status: COMPLETED | OUTPATIENT
Start: 2017-03-30 | End: 2017-03-30

## 2017-03-30 RX ADMIN — TRASTUZUMAB 590 MG: KIT at 16:00

## 2017-03-30 RX ADMIN — PERTUZUMAB 420 MG: 30 INJECTION, SOLUTION, CONCENTRATE INTRAVENOUS at 15:01

## 2017-03-30 RX ADMIN — DIPHENHYDRAMINE HYDROCHLORIDE: 50 INJECTION INTRAMUSCULAR; INTRAVENOUS at 14:33

## 2017-03-30 RX ADMIN — ACETAMINOPHEN 650 MG: 325 TABLET ORAL at 14:17

## 2017-03-30 RX ADMIN — FAMOTIDINE 20 MG: 10 INJECTION, SOLUTION INTRAVENOUS at 14:24

## 2017-03-30 RX ADMIN — ZOLEDRONIC ACID 4 MG: 4 INJECTION, SOLUTION, CONCENTRATE INTRAVENOUS at 16:42

## 2017-03-30 RX ADMIN — ALTEPLASE 2 MG: 2.2 INJECTION, POWDER, LYOPHILIZED, FOR SOLUTION INTRAVENOUS at 10:08

## 2017-03-30 RX ADMIN — Medication 10 ML: at 09:36

## 2017-03-30 RX ADMIN — SODIUM CHLORIDE 250 ML: 9 INJECTION, SOLUTION INTRAVENOUS at 14:18

## 2017-03-30 RX ADMIN — ALTEPLASE 2 MG: 2.2 INJECTION, POWDER, LYOPHILIZED, FOR SOLUTION INTRAVENOUS at 11:20

## 2017-03-30 NOTE — PROGRESS NOTES
THE PATIENT WAS DRAPED AND PREPPED IN STERILE TECHNIQUE. AN 0.018 WIRE WAS THREADED THROUGH THE PATIENT'S EXISTING  PICC LINE. THE PICC LINE WAS WITHDRAWN OVER THE WIRE. A 5FR   SHEATH WAS THREADED OVER THE 0.018 WIRE INTO THE LEFT  ARM. THE PICC LINE WAS TRIMMED AT 38 CM. THE PICC LINE WAS THEN THREADED OVER THE WIRE THROUGH THE SHEATH INTO THE CENTRAL VENOUS SYSTEM. THE SHEATH WAS PEELED AWAY AND WIRE WAS REMOVED. PICC LINE WAS FLUSHED WITH NORMAL SALINE. TIP AND BIOPATCH APPLIED. PICC WAS SECURED WITH STAT LOCK AND TEGADERM. PATIENT TOLERATED PROCEDURE WELL. PROCEDURE WAS DONE IN ANGIO SUITE      IMPRESSION: SUCCESSFUL RE-PLACEMENT  OF SINGLE SOLO LUMEN PICC LINE          Daniella Soriano  3/30/2017  1:50 PM

## 2017-04-06 ENCOUNTER — INFUSION (OUTPATIENT)
Dept: ONCOLOGY | Facility: HOSPITAL | Age: 42
End: 2017-04-06

## 2017-04-06 DIAGNOSIS — Z45.2 ENCOUNTER FOR ASSESSMENT OF PERIPHERALLY INSERTED CENTRAL VENOUS CATHETER (PICC): Primary | ICD-10-CM

## 2017-04-06 DIAGNOSIS — C79.51 BREAST CANCER METASTASIZED TO BONE, LEFT (HCC): ICD-10-CM

## 2017-04-06 DIAGNOSIS — C50.912 BREAST CANCER METASTASIZED TO BONE, LEFT (HCC): ICD-10-CM

## 2017-04-06 DIAGNOSIS — C50.911 BREAST CANCER METASTASIZED TO BONE, RIGHT (HCC): ICD-10-CM

## 2017-04-06 DIAGNOSIS — C79.51 BREAST CANCER METASTASIZED TO BONE, RIGHT (HCC): ICD-10-CM

## 2017-04-06 LAB
BASOPHILS # BLD AUTO: 0.02 10*3/MM3 (ref 0–0.2)
BASOPHILS NFR BLD AUTO: 0.3 % (ref 0–2)
DEPRECATED RDW RBC AUTO: 42.5 FL (ref 36.4–46.3)
EOSINOPHIL # BLD AUTO: 0.21 10*3/MM3 (ref 0–0.7)
EOSINOPHIL NFR BLD AUTO: 3.4 % (ref 0–7)
ERYTHROCYTE [DISTWIDTH] IN BLOOD BY AUTOMATED COUNT: 13.1 % (ref 11.5–14.5)
HCT VFR BLD AUTO: 36.9 % (ref 35–45)
HGB BLD-MCNC: 12.1 G/DL (ref 12–15.5)
IMM GRANULOCYTES # BLD: 0.02 10*3/MM3 (ref 0–0.02)
IMM GRANULOCYTES NFR BLD: 0.3 % (ref 0–0.5)
LYMPHOCYTES # BLD AUTO: 1.73 10*3/MM3 (ref 0.6–4.2)
LYMPHOCYTES NFR BLD AUTO: 27.9 % (ref 10–50)
MCH RBC QN AUTO: 29.1 PG (ref 26.5–34)
MCHC RBC AUTO-ENTMCNC: 32.8 G/DL (ref 31.4–36)
MCV RBC AUTO: 88.7 FL (ref 80–98)
MONOCYTES # BLD AUTO: 0.45 10*3/MM3 (ref 0–0.9)
MONOCYTES NFR BLD AUTO: 7.2 % (ref 0–12)
NEUTROPHILS # BLD AUTO: 3.78 10*3/MM3 (ref 2–8.6)
NEUTROPHILS NFR BLD AUTO: 60.9 % (ref 37–80)
PLATELET # BLD AUTO: 177 10*3/MM3 (ref 150–450)
PMV BLD AUTO: 11.7 FL (ref 8–12)
RBC # BLD AUTO: 4.16 10*6/MM3 (ref 3.77–5.16)
WBC NRBC COR # BLD: 6.21 10*3/MM3 (ref 3.2–9.8)

## 2017-04-06 PROCEDURE — 36592 COLLECT BLOOD FROM PICC: CPT | Performed by: INTERNAL MEDICINE

## 2017-04-06 PROCEDURE — 96402 CHEMO HORMON ANTINEOPL SQ/IM: CPT | Performed by: INTERNAL MEDICINE

## 2017-04-06 PROCEDURE — 25010000002 FULVESTRANT PER 25 MG: Performed by: INTERNAL MEDICINE

## 2017-04-06 RX ORDER — SODIUM CHLORIDE 0.9 % (FLUSH) 0.9 %
10 SYRINGE (ML) INJECTION AS NEEDED
Status: DISCONTINUED | OUTPATIENT
Start: 2017-04-06 | End: 2017-04-06 | Stop reason: HOSPADM

## 2017-04-06 RX ORDER — SODIUM CHLORIDE 0.9 % (FLUSH) 0.9 %
10 SYRINGE (ML) INJECTION AS NEEDED
Status: CANCELLED | OUTPATIENT
Start: 2017-04-06

## 2017-04-06 RX ADMIN — FULVESTRANT 500 MG: 50 INJECTION INTRAMUSCULAR at 09:28

## 2017-04-06 RX ADMIN — Medication 10 ML: at 09:15

## 2017-04-07 LAB
CANCER AG15-3 SERPL-ACNC: 23 U/ML (ref 0–25)
CANCER AG27-29 SERPL-ACNC: 19.9 U/ML (ref 0–38.6)

## 2017-04-11 ENCOUNTER — OFFICE VISIT (OUTPATIENT)
Dept: FAMILY MEDICINE CLINIC | Facility: CLINIC | Age: 42
End: 2017-04-11

## 2017-04-11 VITALS
HEART RATE: 63 BPM | SYSTOLIC BLOOD PRESSURE: 118 MMHG | OXYGEN SATURATION: 96 % | DIASTOLIC BLOOD PRESSURE: 76 MMHG | BODY MASS INDEX: 37.49 KG/M2 | TEMPERATURE: 95.9 F | WEIGHT: 225 LBS | HEIGHT: 65 IN

## 2017-04-11 DIAGNOSIS — J30.9 CHRONIC ALLERGIC RHINITIS: ICD-10-CM

## 2017-04-11 DIAGNOSIS — R05.8 PRODUCTIVE COUGH: ICD-10-CM

## 2017-04-11 DIAGNOSIS — J42 CHRONIC BRONCHITIS, UNSPECIFIED CHRONIC BRONCHITIS TYPE (HCC): ICD-10-CM

## 2017-04-11 DIAGNOSIS — C79.51 BREAST CANCER METASTASIZED TO BONE, LEFT (HCC): ICD-10-CM

## 2017-04-11 DIAGNOSIS — F32.5 MAJOR DEPRESSIVE DISORDER WITH SINGLE EPISODE, IN FULL REMISSION (HCC): Primary | ICD-10-CM

## 2017-04-11 DIAGNOSIS — C50.912 BREAST CANCER METASTASIZED TO BONE, LEFT (HCC): ICD-10-CM

## 2017-04-11 DIAGNOSIS — R23.2 HOT FLASHES: ICD-10-CM

## 2017-04-11 DIAGNOSIS — F41.1 GAD (GENERALIZED ANXIETY DISORDER): ICD-10-CM

## 2017-04-11 PROCEDURE — 99214 OFFICE O/P EST MOD 30 MIN: CPT | Performed by: FAMILY MEDICINE

## 2017-04-11 RX ORDER — VENLAFAXINE HYDROCHLORIDE 75 MG/1
75 CAPSULE, EXTENDED RELEASE ORAL DAILY
Qty: 30 CAPSULE | Refills: 5 | Status: SHIPPED | OUTPATIENT
Start: 2017-04-11 | End: 2017-10-16 | Stop reason: SDUPTHER

## 2017-04-11 RX ORDER — OXYCODONE AND ACETAMINOPHEN 10; 325 MG/1; MG/1
1 TABLET ORAL EVERY 6 HOURS PRN
Qty: 120 TABLET | Refills: 0 | Status: SHIPPED | OUTPATIENT
Start: 2017-04-11 | End: 2017-05-16 | Stop reason: SDUPTHER

## 2017-04-11 RX ORDER — FLUTICASONE PROPIONATE 50 MCG
2 SPRAY, SUSPENSION (ML) NASAL DAILY
Qty: 16 G | Refills: 12 | Status: SHIPPED | OUTPATIENT
Start: 2017-04-11 | End: 2018-09-27

## 2017-04-11 RX ORDER — CLONAZEPAM 2 MG/1
2 TABLET ORAL 2 TIMES DAILY PRN
Qty: 60 TABLET | Refills: 2 | Status: SHIPPED | OUTPATIENT
Start: 2017-04-11 | End: 2017-09-07 | Stop reason: SDUPTHER

## 2017-04-11 RX ORDER — ALBUTEROL SULFATE 90 UG/1
AEROSOL, METERED RESPIRATORY (INHALATION)
Refills: 11 | Status: CANCELLED | OUTPATIENT
Start: 2017-04-11

## 2017-04-11 NOTE — PROGRESS NOTES
Subjective   Chief Complaint   Patient presents with   • Follow-up     4 week follow up   • Med Refill     oxycodone last dose this morning     Andra Villareal is a 41 y.o. female.   Follow-up (4 week follow up) and Med Refill (oxycodone last dose this morning)    History of Present Illness     Jordin Oliveira scoped due to GI bleed, rectal bleed- diagnosed with gerd and chronic gastritis  Ceruloplasmin was elevated   Diagnosed with iron deficiency, cirrhosis of the liver, GERD, fatty liver, lesion on stomach  Rectal bleeding - controlled s/p argon coagulation treatment 2/23/17  Follow up scheduled in September    Chronic pain relating to metastatic breast cancer to bone  Controlled with percocet   Present in the lumbar spine and down the left lower extremity    Metastatic breast cancer s/p right mastectomy with breast reconstructive surgery with metastases to liver and bone with new lesion noted on CT 10/26/16 of anterior mediastinal mass measuring 4.9x5.1x3.3cm  Followed at [facility] with Dr Waddell  Treatment for new lesion with chemotherapy - currently on treatment with herceptin, perjeta and faslodex  Bone metastasis - scheduled with zometa  Scheduled to repeat CT chest, abdomen/pelvis in May 2017    STEVEN - currently controlled with klonopin PRN    Acute on chronic bronchitis - c/o productive cough, soa, wheezing  previously treated with doxycycline which improved symptoms until course completed and then had return of symptoms  Worried about fluid vs pnemonia today  Requesting cxr and additional inhaler with refills    Idiopathic chronic constipation with opioid use - controlled with amitiza  Failed miralax caused her rebound diarrhea  Failed movantik - caused her diarrhea    Hyperglycemia - controlled with diet  Has lantus as needed    Depression - not currently controlled with effexor dose  Was started on medication due to additional complaints of hot flashes which have improved and resolved with this  "medication    Podiatry following - sprain of calcaneofibular ligament of right ankle, chronic pain of the right ankle  Closed nondisplaced fracture of navicular bone of the right foot with delayed healing  Provided low tide CAM boot, rice therapy, corticosteroid injection with a recheck in 4 weeks  Last visit 2/23/17    The following portions of the patient's history were reviewed and updated as appropriate: allergies, current medications, past family history, past medical history, past social history, past surgical history and problem list.    Review of Systems   Constitutional: Negative for appetite change, chills, fatigue and fever.   HENT: Negative for congestion, ear pain, rhinorrhea and sore throat.    Eyes: Negative for pain.   Respiratory: Positive for cough, shortness of breath and wheezing.    Cardiovascular: Negative for chest pain and palpitations.   Gastrointestinal: Negative for abdominal pain, constipation, diarrhea and nausea.   Genitourinary: Negative for dysuria.   Musculoskeletal: Negative for back pain, joint swelling and neck pain.   Skin: Negative for rash.   Neurological: Negative for dizziness and headaches.   Psychiatric/Behavioral: Positive for dysphoric mood.       Objective   /76  Pulse 63  Temp 95.9 °F (35.5 °C)  Ht 65\" (165.1 cm)  Wt 225 lb (102 kg)  SpO2 96%  BMI 37.44 kg/m2  Physical Exam   Constitutional: She is oriented to person, place, and time. She appears well-developed and well-nourished.   HENT:   Head: Normocephalic and atraumatic.   Eyes: Pupils are equal, round, and reactive to light.   Neck: Normal range of motion. Neck supple.   Cardiovascular: Normal rate, regular rhythm and normal heart sounds.    Pulmonary/Chest: Effort normal. No respiratory distress. She has wheezes. She has rales.   Abdominal: Soft. Bowel sounds are normal.   Musculoskeletal: Normal range of motion.   Neurological: She is alert and oriented to person, place, and time.   Skin: Skin is " warm and dry.   Psychiatric: She has a normal mood and affect.   Nursing note and vitals reviewed.      Assessment/Plan   Problems Addressed this Visit        Respiratory    Chronic allergic rhinitis    Relevant Medications    fluticasone (FLONASE) 50 MCG/ACT nasal spray    Chronic bronchitis    Relevant Medications    albuterol (PROAIR RESPICLICK) 108 (90 BASE) MCG/ACT inhaler    fluticasone (FLONASE) 50 MCG/ACT nasal spray       Musculoskeletal and Integument    Breast cancer metastasized to bone    Relevant Medications    oxyCODONE-acetaminophen (PERCOCET)  MG per tablet       Genitourinary    Hot flashes    Relevant Medications    venlafaxine XR (EFFEXOR-XR) 75 MG 24 hr capsule       Other    STEVEN (generalized anxiety disorder)    Relevant Medications    clonazePAM (KlonoPIN) 2 MG tablet    venlafaxine XR (EFFEXOR-XR) 75 MG 24 hr capsule    Major depressive disorder with single episode, in full remission - Primary    Relevant Medications    venlafaxine XR (EFFEXOR-XR) 75 MG 24 hr capsule      Other Visit Diagnoses     Productive cough        Relevant Orders    XR Chest 2 View        Reviewed other providers notes    cxr today    Increased effexor    Refilled inhaler  Samples of breo provided, coupon provided    Refilled klonopin and percocet    Recheck in 3 months or sooner as needed

## 2017-04-12 ENCOUNTER — TELEPHONE (OUTPATIENT)
Dept: FAMILY MEDICINE CLINIC | Facility: CLINIC | Age: 42
End: 2017-04-12

## 2017-04-13 ENCOUNTER — INFUSION (OUTPATIENT)
Dept: ONCOLOGY | Facility: HOSPITAL | Age: 42
End: 2017-04-13

## 2017-04-13 DIAGNOSIS — C50.912 BREAST CANCER METASTASIZED TO BONE, LEFT (HCC): ICD-10-CM

## 2017-04-13 DIAGNOSIS — Z45.2 ENCOUNTER FOR ASSESSMENT OF PERIPHERALLY INSERTED CENTRAL VENOUS CATHETER (PICC): Primary | ICD-10-CM

## 2017-04-13 DIAGNOSIS — C79.51 BREAST CANCER METASTASIZED TO BONE, LEFT (HCC): ICD-10-CM

## 2017-04-13 RX ORDER — SODIUM CHLORIDE 0.9 % (FLUSH) 0.9 %
10 SYRINGE (ML) INJECTION AS NEEDED
Status: DISCONTINUED | OUTPATIENT
Start: 2017-04-13 | End: 2017-04-13 | Stop reason: HOSPADM

## 2017-04-13 RX ORDER — SODIUM CHLORIDE 0.9 % (FLUSH) 0.9 %
10 SYRINGE (ML) INJECTION AS NEEDED
Status: CANCELLED | OUTPATIENT
Start: 2017-04-13

## 2017-04-13 RX ADMIN — Medication 10 ML: at 09:54

## 2017-04-20 ENCOUNTER — INFUSION (OUTPATIENT)
Dept: ONCOLOGY | Facility: HOSPITAL | Age: 42
End: 2017-04-20

## 2017-04-20 ENCOUNTER — OFFICE VISIT (OUTPATIENT)
Dept: ONCOLOGY | Facility: CLINIC | Age: 42
End: 2017-04-20

## 2017-04-20 VITALS
SYSTOLIC BLOOD PRESSURE: 157 MMHG | HEART RATE: 66 BPM | DIASTOLIC BLOOD PRESSURE: 82 MMHG | RESPIRATION RATE: 16 BRPM | BODY MASS INDEX: 36.93 KG/M2 | WEIGHT: 221.9 LBS | TEMPERATURE: 97.8 F

## 2017-04-20 DIAGNOSIS — C50.911 BREAST CANCER METASTASIZED TO BONE, RIGHT (HCC): ICD-10-CM

## 2017-04-20 DIAGNOSIS — C50.912 BREAST CANCER METASTASIZED TO BONE, LEFT (HCC): ICD-10-CM

## 2017-04-20 DIAGNOSIS — C50.912 BREAST CANCER METASTASIZED TO BONE, LEFT (HCC): Primary | ICD-10-CM

## 2017-04-20 DIAGNOSIS — C79.51 BREAST CANCER METASTASIZED TO BONE, RIGHT (HCC): ICD-10-CM

## 2017-04-20 DIAGNOSIS — C79.51 BREAST CANCER METASTASIZED TO BONE, LEFT (HCC): ICD-10-CM

## 2017-04-20 DIAGNOSIS — Z45.2 ENCOUNTER FOR ASSESSMENT OF PERIPHERALLY INSERTED CENTRAL VENOUS CATHETER (PICC): ICD-10-CM

## 2017-04-20 DIAGNOSIS — C79.51 BREAST CANCER METASTASIZED TO BONE, LEFT (HCC): Primary | ICD-10-CM

## 2017-04-20 LAB
ALBUMIN SERPL-MCNC: 4.4 G/DL (ref 3.4–4.8)
ALBUMIN/GLOB SERPL: 1.5 G/DL (ref 1.1–1.8)
ALP SERPL-CCNC: 110 U/L (ref 38–126)
ALT SERPL W P-5'-P-CCNC: 41 U/L (ref 9–52)
ANION GAP SERPL CALCULATED.3IONS-SCNC: 12 MMOL/L (ref 5–15)
AST SERPL-CCNC: 40 U/L (ref 14–36)
BASOPHILS # BLD AUTO: 0.01 10*3/MM3 (ref 0–0.2)
BASOPHILS NFR BLD AUTO: 0.1 % (ref 0–2)
BILIRUB SERPL-MCNC: 0.4 MG/DL (ref 0.2–1.3)
BUN BLD-MCNC: 17 MG/DL (ref 7–21)
BUN/CREAT SERPL: 20.5 (ref 7–25)
CALCIUM SPEC-SCNC: 9 MG/DL (ref 8.4–10.2)
CHLORIDE SERPL-SCNC: 101 MMOL/L (ref 95–110)
CO2 SERPL-SCNC: 27 MMOL/L (ref 22–31)
CREAT BLD-MCNC: 0.83 MG/DL (ref 0.5–1)
DEPRECATED RDW RBC AUTO: 41.1 FL (ref 36.4–46.3)
EOSINOPHIL # BLD AUTO: 0.24 10*3/MM3 (ref 0–0.7)
EOSINOPHIL NFR BLD AUTO: 3.5 % (ref 0–7)
ERYTHROCYTE [DISTWIDTH] IN BLOOD BY AUTOMATED COUNT: 12.8 % (ref 11.5–14.5)
GFR SERPL CREATININE-BSD FRML MDRD: 92 ML/MIN/1.73 (ref 58–135)
GLOBULIN UR ELPH-MCNC: 3 GM/DL (ref 2.3–3.5)
GLUCOSE BLD-MCNC: 172 MG/DL (ref 60–100)
HCT VFR BLD AUTO: 37.5 % (ref 35–45)
HGB BLD-MCNC: 12.5 G/DL (ref 12–15.5)
IMM GRANULOCYTES # BLD: 0.02 10*3/MM3 (ref 0–0.02)
IMM GRANULOCYTES NFR BLD: 0.3 % (ref 0–0.5)
LYMPHOCYTES # BLD AUTO: 1.42 10*3/MM3 (ref 0.6–4.2)
LYMPHOCYTES NFR BLD AUTO: 20.7 % (ref 10–50)
MCH RBC QN AUTO: 29.1 PG (ref 26.5–34)
MCHC RBC AUTO-ENTMCNC: 33.3 G/DL (ref 31.4–36)
MCV RBC AUTO: 87.2 FL (ref 80–98)
MONOCYTES # BLD AUTO: 0.32 10*3/MM3 (ref 0–0.9)
MONOCYTES NFR BLD AUTO: 4.7 % (ref 0–12)
NEUTROPHILS # BLD AUTO: 4.86 10*3/MM3 (ref 2–8.6)
NEUTROPHILS NFR BLD AUTO: 70.7 % (ref 37–80)
PLATELET # BLD AUTO: 188 10*3/MM3 (ref 150–450)
PMV BLD AUTO: 11.6 FL (ref 8–12)
POTASSIUM BLD-SCNC: 3.7 MMOL/L (ref 3.5–5.1)
PROT SERPL-MCNC: 7.4 G/DL (ref 6.3–8.6)
RBC # BLD AUTO: 4.3 10*6/MM3 (ref 3.77–5.16)
SODIUM BLD-SCNC: 140 MMOL/L (ref 137–145)
WBC NRBC COR # BLD: 6.87 10*3/MM3 (ref 3.2–9.8)

## 2017-04-20 PROCEDURE — 25010000002 TRASTUZUMAB PER 10 MG: Performed by: INTERNAL MEDICINE

## 2017-04-20 PROCEDURE — 25010000002 PERTUZUMAB 420 MG/14ML SOLUTION 420 MG VIAL: Performed by: INTERNAL MEDICINE

## 2017-04-20 PROCEDURE — 80053 COMPREHEN METABOLIC PANEL: CPT

## 2017-04-20 PROCEDURE — 99214 OFFICE O/P EST MOD 30 MIN: CPT | Performed by: INTERNAL MEDICINE

## 2017-04-20 PROCEDURE — 36592 COLLECT BLOOD FROM PICC: CPT | Performed by: INTERNAL MEDICINE

## 2017-04-20 PROCEDURE — 96402 CHEMO HORMON ANTINEOPL SQ/IM: CPT | Performed by: INTERNAL MEDICINE

## 2017-04-20 PROCEDURE — 36415 COLL VENOUS BLD VENIPUNCTURE: CPT

## 2017-04-20 PROCEDURE — 96413 CHEMO IV INFUSION 1 HR: CPT | Performed by: INTERNAL MEDICINE

## 2017-04-20 PROCEDURE — 25010000002 DIPHENHYDRAMINE PER 50 MG: Performed by: INTERNAL MEDICINE

## 2017-04-20 PROCEDURE — 85025 COMPLETE CBC W/AUTO DIFF WBC: CPT

## 2017-04-20 PROCEDURE — 96417 CHEMO IV INFUS EACH ADDL SEQ: CPT | Performed by: INTERNAL MEDICINE

## 2017-04-20 PROCEDURE — 25010000002 FULVESTRANT PER 25 MG: Performed by: INTERNAL MEDICINE

## 2017-04-20 RX ORDER — SODIUM CHLORIDE 0.9 % (FLUSH) 0.9 %
10 SYRINGE (ML) INJECTION AS NEEDED
Status: CANCELLED | OUTPATIENT
Start: 2017-04-20

## 2017-04-20 RX ORDER — FAMOTIDINE 10 MG/ML
20 INJECTION, SOLUTION INTRAVENOUS ONCE
Status: COMPLETED | OUTPATIENT
Start: 2017-04-20 | End: 2017-04-20

## 2017-04-20 RX ORDER — ACETAMINOPHEN 325 MG/1
650 TABLET ORAL ONCE
Status: COMPLETED | OUTPATIENT
Start: 2017-04-20 | End: 2017-04-20

## 2017-04-20 RX ORDER — SODIUM CHLORIDE 9 MG/ML
250 INJECTION, SOLUTION INTRAVENOUS ONCE
Status: COMPLETED | OUTPATIENT
Start: 2017-04-20 | End: 2017-04-20

## 2017-04-20 RX ORDER — BENZONATATE 200 MG/1
CAPSULE ORAL AS NEEDED
Refills: 2 | COMMUNITY
Start: 2017-04-03 | End: 2017-05-18 | Stop reason: ALTCHOICE

## 2017-04-20 RX ORDER — SODIUM CHLORIDE 0.9 % (FLUSH) 0.9 %
10 SYRINGE (ML) INJECTION AS NEEDED
Status: DISCONTINUED | OUTPATIENT
Start: 2017-04-20 | End: 2017-04-20 | Stop reason: HOSPADM

## 2017-04-20 RX ORDER — DIPHENHYDRAMINE HYDROCHLORIDE 50 MG/ML
50 INJECTION INTRAMUSCULAR; INTRAVENOUS ONCE
Status: COMPLETED | OUTPATIENT
Start: 2017-04-20 | End: 2017-04-20

## 2017-04-20 RX ORDER — CALCIUM CARBONATE 500(1250)
TABLET ORAL
Refills: 5 | COMMUNITY
Start: 2017-04-03 | End: 2017-05-18

## 2017-04-20 RX ADMIN — FAMOTIDINE 20 MG: 10 INJECTION, SOLUTION INTRAVENOUS at 11:08

## 2017-04-20 RX ADMIN — Medication 10 ML: at 14:17

## 2017-04-20 RX ADMIN — Medication 10 ML: at 10:40

## 2017-04-20 RX ADMIN — FULVESTRANT 500 MG: 50 INJECTION INTRAMUSCULAR at 14:44

## 2017-04-20 RX ADMIN — ACETAMINOPHEN 650 MG: 325 TABLET ORAL at 10:51

## 2017-04-20 RX ADMIN — Medication 10 ML: at 09:11

## 2017-04-20 RX ADMIN — DIPHENHYDRAMINE HYDROCHLORIDE 50 MG: 50 INJECTION INTRAMUSCULAR; INTRAVENOUS at 10:51

## 2017-04-20 RX ADMIN — PERTUZUMAB 420 MG: 30 INJECTION, SOLUTION, CONCENTRATE INTRAVENOUS at 12:13

## 2017-04-20 RX ADMIN — TRASTUZUMAB 590 MG: KIT at 13:25

## 2017-04-20 RX ADMIN — SODIUM CHLORIDE 250 ML: 9 INJECTION, SOLUTION INTRAVENOUS at 10:40

## 2017-04-20 NOTE — PROGRESS NOTES
DATE OF VISIT: 4/20/2017    REASON FOR VISIT:  Metastatic right breast cancer    HISTORY OF PRESENT ILLNESS:    41-year-old female with a past medical history significant for metastatic right breast cancer with liver and bone metastases currently on treatment with Herceptin and perjeta with Faslodex.  She is here for follow-up visit today.    She is still complaining of discomfort in right ankle for which she is being followed by podiatrist.  Denies any fever or chills.  Denies any more bleeding in the stool.      PAST MEDICAL HISTORY:    1.  Metastatic right breast cancer with liver and bone metastasis  2.  History of pneumonia  3.  Right internal jugular vein DVT status post around to  4.  Rectal bleeding  5.  Dyslipidemia  6.  Diabetes mellitus  7.  Anxiety  8.  Bone metastasis    SOCIAL HISTORY:    Social History   Substance Use Topics   • Smoking status: Former Smoker   • Smokeless tobacco: Never Used   • Alcohol use No       Surgical History :  Past Surgical History:   Procedure Laterality Date   • CENTRAL VENOUS CATHETER TUNNELED INSERTION SINGLE LUMEN      Placement of indwelling Pleurx catheter right   • COLONOSCOPY N/A 1/26/2017    Procedure: COLONOSCOPY;  Surgeon: Robert Clifton MD;  Location: [facility] ENDOSCOPY;  Service:    • ENDOSCOPY N/A 1/26/2017    Procedure: ESOPHAGOGASTRODUODENOSCOPY;  Surgeon: Robert Clifton MD;  Location: [facility] ENDOSCOPY;  Service:    • ENDOSCOPY N/A 2/24/2017    Procedure: ESOPHAGOGASTRODUODENOSCOPY;  Surgeon: Robert Clifton MD;  Location: [facility] ENDOSCOPY;  Service:    • HYSTERECTOMY      vaginal   • LUNG VOLUME REDUCTION     • MASTECTOMY      partial   • OTHER SURGICAL HISTORY  05/05/2016    central line insertion , PICC line replacement   • OTHER SURGICAL HISTORY      place breast clip percut   • OTHER SURGICAL HISTORY      pulse oximetry   • OTHER SURGICAL HISTORY      remove cc cath w/ sc port or pump, Removal of right internal jugular MediPort   • OTHER SURGICAL HISTORY  "     spirometry   • OTHER SURGICAL HISTORY      tubal sterilization   • PAP SMEAR     • THORACENTESIS      aspiration of pleural space   • UPPER GASTROINTESTINAL ENDOSCOPY  01/26/2017   • UPPER GASTROINTESTINAL ENDOSCOPY  02/24/2017   • VENOUS ACCESS DEVICE (PORT) INSERTION      Ultrasound guided right internal jugular Mediport placement under fluoroscopy       ALLERGIES:    Allergies   Allergen Reactions   • Lisinopril Shortness Of Breath   • Flagyl [Metronidazole] Other (See Comments)     pancreatitis   • Fentanyl Anxiety     \"goes out of her mind\"   • Latex Rash       REVIEW OF SYSTEMS:      CONSTITUTIONAL: Positive for fatigue.  No fever, chills, or night sweats.     HEENT:  No epistaxis, mouth sores, or difficulty swallowing.    RESPIRATORY:  No new shortness of breath or cough at present.    CARDIOVASCULAR:  No chest pain or palpitations.    GASTROINTESTINAL:  No abdominal pain, nausea, vomiting, or blood in the stool.    GENITOURINARY:  No dysuria or hematuria.    MUSCULOSKELETAL: Complains of discomfort in right ankle.    NEUROLOGICAL: Positive for intermittent tingling and numbness.. No new headache or dizziness.     LYMPHATICS:  Denies any abnormal swollen and anywhere in the body.    SKIN:  Denies any new skin rash.        PHYSICAL EXAMINATION:      VITAL SIGNS:    /82  Pulse 66  Temp 97.8 °F (36.6 °C)  Resp 16  Wt 221 lb 14.4 oz (101 kg)  BMI 36.93 kg/m2    GENERAL:  Not in any distress.     EYES:  Mild pallor. No icterus.    NECK:  No adenopathy in cervical or supraclavicular area.    RESPIRATORY:  Fair air entry bilaterally. No rhonchi or wheezing.    CARDIOVASCULAR:  S1, S2. Regular rate and rhythm.    ABDOMEN:  Soft, nontender. Bowel sounds present.    EXTREMITIES:  No edema.    NEUROLOGICAL:  Alert, awake, oriented x3.  No motor or sensory deficit.  Cranial nerves II-12 grossly intact.        DIAGNOSTIC DATA:    Glucose   Date Value Ref Range Status   04/20/2017 172 (H) 60 - 100 mg/dL " Final     Sodium   Date Value Ref Range Status   04/20/2017 140 137 - 145 mmol/L Final     Potassium   Date Value Ref Range Status   04/20/2017 3.7 3.5 - 5.1 mmol/L Final     CO2   Date Value Ref Range Status   04/20/2017 27.0 22.0 - 31.0 mmol/L Final     Chloride   Date Value Ref Range Status   04/20/2017 101 95 - 110 mmol/L Final     Anion Gap   Date Value Ref Range Status   04/20/2017 12.0 5.0 - 15.0 mmol/L Final     Creatinine   Date Value Ref Range Status   04/20/2017 0.83 0.50 - 1.00 mg/dL Final     BUN   Date Value Ref Range Status   04/20/2017 17 7 - 21 mg/dL Final     BUN/Creatinine Ratio   Date Value Ref Range Status   04/20/2017 20.5 7.0 - 25.0 Final     Calcium   Date Value Ref Range Status   04/20/2017 9.0 8.4 - 10.2 mg/dL Final     Alkaline Phosphatase   Date Value Ref Range Status   04/20/2017 110 38 - 126 U/L Final     Total Protein   Date Value Ref Range Status   04/20/2017 7.4 6.3 - 8.6 g/dL Final     ALT (SGPT)   Date Value Ref Range Status   04/20/2017 41 9 - 52 U/L Final     AST (SGOT)   Date Value Ref Range Status   04/20/2017 40 (H) 14 - 36 U/L Final     Total Bilirubin   Date Value Ref Range Status   04/20/2017 0.4 0.2 - 1.3 mg/dL Final     Albumin   Date Value Ref Range Status   04/20/2017 4.40 3.40 - 4.80 g/dL Final     Globulin   Date Value Ref Range Status   04/20/2017 3.0 2.3 - 3.5 gm/dL Final     A/G Ratio   Date Value Ref Range Status   04/20/2017 1.5 1.1 - 1.8 g/dL Final     Lab Results   Component Value Date    WBC 6.87 04/20/2017    HGB 12.5 04/20/2017    HCT 37.5 04/20/2017    MCV 87.2 04/20/2017     04/20/2017     Lab Results   Component Value Date    NEUTROABS 4.86 04/20/2017    IRON 106 02/09/2017    TIBC 304 01/25/2017    LABIRON 6 (L) 01/25/2017    FERRITIN 51.50 01/25/2017    AWULIAFX67 559 06/17/2016    FOLATE 14.10 06/17/2016     Lab Results   Component Value Date     23.0 04/06/2017    LABCA2 19.9 04/06/2017    AFPTM 156 01/25/2017    HCGQUANT 1 12/12/2016     CEA 3.2 09/22/2015   ]    PATHOLOGY:  Pathology result from EGD and colonoscopy biopsy on January 26, 2017 shows:  Final Diagnosis   1. GASTRIC ANTRUM, MUCOSAL BIOPSY:   MILD CHRONIC GASTRITIS.   NO EVIDENCE OF HELICOBACTER PYLORI (IP STAIN PERFORMED).      2. DUODENUM, MUCOSAL BIOPSY:   MILD CHRONIC INFLAMMATION.      3. GASTRIC BODY, MUCOSAL BIOPSY:   MILD CHRONIC GASTRITIS.   NO EVIDENCE OF HELICOBACTER PYLORI (IP STAIN PERFORMED).      4. COLONIC MUCOSAL BIOPSY, RANDOM:   NO SIGNIFICANT PATHOLOGIC DIAGNOSIS.          RADIOLOGY DATA :  Restaging CT chest abdomen and pelvis with contrast was done on February 2, 2017 it was reviewed and discussed with patient it showed:  CONCLUSION:   1. Subtle low-density area in the right hepatic dome that appears slightly increased in size. While this could be related to an area of fibrosis from the patient's cirrhosis this raises a question of metastatic disease and consider follow-up liver   protocol MRI with contrast in this patient with history of breast cancer.  2. Changes of cirrhosis of the liver again noted.  3. Decrease in size of anterior mediastinal soft tissue density that on the previous examination had an appearance more suggestive of thymic tissue than definite metastatic disease. This has nearly resolved.    2 D Echo done on March 10, 2017 showed:  Interpretation Summary   · Left ventricular function is normal. Estimated EF = 60%.  · Normal left ventricular cavity size and wall thickness noted. All left ventricular wall segments contract normally       ASSESSMENT AND PLAN:      1.  Stage IV metastatic right breast cancer with liver and bone metastasis.  Patient was initially diagnosed in 2011 with a stage III disease.  Initially patient had a Taxotere carboplatin and Herceptin for 6 cycles followed by 1 year of Herceptin maintenance.  After that patient was getting Lupron and tamoxifen.  In March 2015 she was diagnosed with a metastatic disease involving liver  and bone.  Patient was again started on Taxotere Herceptin and perjeta.  She could not tolerate Taxotere at that point she had a recurrent pneumonias and cytopenias.  Subsequently she was maintained on Herceptin and perjeta until October 2016, at that point restaging CAT scan showed there was anterior mediastinal mass for which patient was started on Taxol Herceptin and perjeta.  Patient received her last dose of Taxol on January 12, 2017.  Patient was started back on Herceptin and perjeta on February 9, 2017.  We'll go head with a sixth cycle with Herceptin and perjeta since restaring on February 9, 2017.  She was started back on Faslodex on March 16th 2017.  At this point plan is to continue with Herceptin, perjeta and Faslodex as scheduled.  We'll repeat neck CT of chest abdomen and pelvis in around  End of May 2017.    2.  Rectal bleeding: Patient is status post argon coagulation treatment on February 23, 2017.  Denies any rectal bleeding since then.    3.  Recurrent DVT: Patient was on Xarelto which was held secondary to rectal bleeding.  Patient is status post argon laser treatment on February 23, 2017.  Recommendation would be to support her on Lovenox and Coumadin Clinic visit if she does not have any bleeding at that point.  She is high risk of recurrent thrombosis due to active cancer.  Patient states it would be very difficult for her to come to Coumadin clinic for checks of INR.  Patient was given prescription for 0-20 mg daily upon last clinic visit 3 weeks ago.  She has not started taking Xarelto yet.  She was encouraged to start taking Xarelto to prevent recurrent thrombosis.    4.  Bone metastasis: Continue with Zometa as scheduled for now.    5.  Health maintenance: Patient does not smoke.  She just had a colonoscopy done on January 2017 which was negative for any malignancy.  She remains full code.          Joselito Waddell MD  4/20/2017  5:30 PM

## 2017-04-27 ENCOUNTER — INFUSION (OUTPATIENT)
Dept: ONCOLOGY | Facility: HOSPITAL | Age: 42
End: 2017-04-27

## 2017-04-27 VITALS
TEMPERATURE: 97.2 F | DIASTOLIC BLOOD PRESSURE: 68 MMHG | SYSTOLIC BLOOD PRESSURE: 147 MMHG | HEART RATE: 72 BPM | RESPIRATION RATE: 18 BRPM

## 2017-04-27 DIAGNOSIS — C79.51 BREAST CANCER METASTASIZED TO BONE, RIGHT (HCC): Primary | ICD-10-CM

## 2017-04-27 DIAGNOSIS — C50.912 BREAST CANCER METASTASIZED TO BONE, LEFT (HCC): ICD-10-CM

## 2017-04-27 DIAGNOSIS — Z45.2 ENCOUNTER FOR ASSESSMENT OF PERIPHERALLY INSERTED CENTRAL VENOUS CATHETER (PICC): ICD-10-CM

## 2017-04-27 DIAGNOSIS — C50.911 BREAST CANCER METASTASIZED TO BONE, RIGHT (HCC): Primary | ICD-10-CM

## 2017-04-27 DIAGNOSIS — C79.51 BREAST CANCER METASTASIZED TO BONE, LEFT (HCC): ICD-10-CM

## 2017-04-27 PROCEDURE — 96374 THER/PROPH/DIAG INJ IV PUSH: CPT | Performed by: INTERNAL MEDICINE

## 2017-04-27 PROCEDURE — 25010000002 ZOLEDRONIC ACID PER 1 MG: Performed by: INTERNAL MEDICINE

## 2017-04-27 RX ORDER — SODIUM CHLORIDE 9 MG/ML
250 INJECTION, SOLUTION INTRAVENOUS ONCE
Status: COMPLETED | OUTPATIENT
Start: 2017-04-27 | End: 2017-04-27

## 2017-04-27 RX ORDER — SODIUM CHLORIDE 0.9 % (FLUSH) 0.9 %
10 SYRINGE (ML) INJECTION AS NEEDED
Status: DISCONTINUED | OUTPATIENT
Start: 2017-04-27 | End: 2017-04-27 | Stop reason: HOSPADM

## 2017-04-27 RX ORDER — SODIUM CHLORIDE 0.9 % (FLUSH) 0.9 %
10 SYRINGE (ML) INJECTION AS NEEDED
Status: CANCELLED | OUTPATIENT
Start: 2017-04-27

## 2017-04-27 RX ADMIN — SODIUM CHLORIDE 250 ML: 9 INJECTION, SOLUTION INTRAVENOUS at 09:04

## 2017-04-27 RX ADMIN — Medication 10 ML: at 10:17

## 2017-04-27 RX ADMIN — Medication 10 ML: at 09:03

## 2017-04-27 RX ADMIN — ZOLEDRONIC ACID 4 MG: 4 INJECTION, SOLUTION, CONCENTRATE INTRAVENOUS at 09:46

## 2017-04-28 ENCOUNTER — OFFICE VISIT (OUTPATIENT)
Dept: PODIATRY | Facility: CLINIC | Age: 42
End: 2017-04-28

## 2017-04-28 VITALS — BODY MASS INDEX: 36.97 KG/M2 | HEIGHT: 65 IN | WEIGHT: 221.9 LBS

## 2017-04-28 DIAGNOSIS — M79.671 RIGHT FOOT PAIN: ICD-10-CM

## 2017-04-28 DIAGNOSIS — S92.254G CLOSED NONDISPLACED FRACTURE OF NAVICULAR BONE OF RIGHT FOOT WITH DELAYED HEALING, SUBSEQUENT ENCOUNTER: Primary | ICD-10-CM

## 2017-04-28 PROCEDURE — 99024 POSTOP FOLLOW-UP VISIT: CPT | Performed by: PODIATRIST

## 2017-04-28 NOTE — PROGRESS NOTES
Andra Villareal  1975  41 y.o. female     Patient presents today for recheck of her right ankle.  04/28/2017    Chief Complaint   Patient presents with   • Right Ankle - Follow-up         History of Present Illness    Ms Villareal is a 40 y/o female who presents for f/u evaluation of right ankle sprain and navicular avulsion fracture.  She continues to complain of significant pain. She states she has difficulty ambulating out of CAM boot. Corticosteroid injection provided only mild improvement of symptoms.    Past Medical History:   Diagnosis Date   • Abnormal breathing sounds    • Abscess of skin     and / or subcutaneous tissue   • Acute bronchitis     unspecified   • Adult body mass index 30+     obesity-BMI 33   • Allergic rhinitis    • Anasarca    • Anxiety     currently on xanax   • Anxiety    • Asthenia    • Asthma     moderate persistent   • Asthmatic bronchitis    • Astigmatism    • Bleeding external hemorrhoids    • Body mass index (BMI) of 34.0-34.9 in adult    • Body mass index (BMI) of 35.0-35.9 in adult    • Body mass index (BMI) of 36.0-36.9 in adult    • Body mass index 40.0-44.9, adult     SEVERELY OBESE   • Cellulitis    • Chemotherapy induced nausea and vomiting    • Chronic back pain    • Chronic cough    • Chronic hypoxemic respiratory failure     on NC at 3 LPM   • Cough    • Depressive disorder    • Diabetes mellitus     no retinopathy   • Dyslipidemia    • Edema of lower extremity    • Encounter for follow-up examination after completed treatment for malignant neoplasm    • Epidermoid cyst of skin     excision with secondary intention healing   • Excessive and frequent menstruation with irregular cycle     Vaginal bleeding after 4 years of no bleeding secondary to chemotherapy for breast cancer; currently being treated for breast cancer for the second time, mets to bone and liver while on chemotherapy      • Flushing    • 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    • Gastroesophageal  reflux disease    • H/O tubal ligation    • Hx of echocardiogram     w/ color flow, and w/o color flow   • Hyperglycemia    • Hypermetropia    • Hypertension    • Hypokalemia    • Impaired glucose tolerance     hgbalc 6.2   • Infection of skin     and /or subcutaneous tissue-around the catheter exit site   • Influenza     needs influenza immunization   • Irregular periods    • Lesion of lumbar spine     pain controlled with percocet and lidocaine patches   • Lumbago with sciatica    • Malignant neoplasm of female breast     s/p right mastectomy, mets to bone and liver on chemotherapy      • Menopausal flushing    • Muscle weakness    • Muscle weakness (generalized)    • Nonspecific interstitial pneumonia     symptomatically improved   • Pleural effusion     refractory massive right, most likely malignant effusion   • Primary atypical pneumonia    • Renal failure     acute drug-induced renal failure   • Sinus tachycardia    • Tobacco dependence syndrome    • Tobacco non-user    • Upper respiratory infection    • Wheezing          Past Surgical History:   Procedure Laterality Date   • CENTRAL VENOUS CATHETER TUNNELED INSERTION SINGLE LUMEN      Placement of indwelling Pleurx catheter right   • COLONOSCOPY N/A 1/26/2017    Procedure: COLONOSCOPY;  Surgeon: Robert Clifton MD;  Location: [facility] ENDOSCOPY;  Service:    • ENDOSCOPY N/A 1/26/2017    Procedure: ESOPHAGOGASTRODUODENOSCOPY;  Surgeon: Robert Clifton MD;  Location: [facility] ENDOSCOPY;  Service:    • ENDOSCOPY N/A 2/24/2017    Procedure: ESOPHAGOGASTRODUODENOSCOPY;  Surgeon: Robert Clifton MD;  Location: [facility] ENDOSCOPY;  Service:    • HYSTERECTOMY      vaginal   • LUNG VOLUME REDUCTION     • MASTECTOMY      partial   • OTHER SURGICAL HISTORY  05/05/2016    central line insertion , PICC line replacement   • OTHER SURGICAL HISTORY      place breast clip percut   • OTHER SURGICAL HISTORY      pulse oximetry   • OTHER SURGICAL HISTORY      remove cc cath w/ sc  port or pump, Removal of right internal jugular MediPort   • OTHER SURGICAL HISTORY      spirometry   • OTHER SURGICAL HISTORY      tubal sterilization   • PAP SMEAR     • THORACENTESIS      aspiration of pleural space   • UPPER GASTROINTESTINAL ENDOSCOPY  01/26/2017   • UPPER GASTROINTESTINAL ENDOSCOPY  02/24/2017   • VENOUS ACCESS DEVICE (PORT) INSERTION      Ultrasound guided right internal jugular Mediport placement under fluoroscopy         Family History   Problem Relation Age of Onset   • Coronary artery disease Other    • Diabetes Other    • Hypertension Other          Social History     Social History   • Marital status:      Spouse name: N/A   • Number of children: N/A   • Years of education: N/A     Occupational History   • Not on file.     Social History Main Topics   • Smoking status: Former Smoker   • Smokeless tobacco: Never Used   • Alcohol use No   • Drug use: No   • Sexual activity: Defer     Other Topics Concern   • Not on file     Social History Narrative         Current Outpatient Prescriptions   Medication Sig Dispense Refill   • albuterol (PROAIR RESPICLICK) 108 (90 BASE) MCG/ACT inhaler Inhale 2 puffs Every 6 (Six) Hours As Needed for Wheezing or Shortness of Air. 1 inhaler 11   • Ascorbic Acid (VITAMIN C PO) Take  by mouth 3 (Three) Times a Day.     • benzonatate (TESSALON) 200 MG capsule As Needed.  2   • cetirizine (zyrTEC) 10 MG tablet Take 1 tablet by mouth Daily. 30 tablet 5   • clonazePAM (KlonoPIN) 2 MG tablet Take 1 tablet by mouth 2 (Two) Times a Day As Needed for Anxiety. 60 tablet 2   • diltiaZEM CD (CARTIA XT) 120 MG 24 hr capsule Take 120 mg by mouth Daily.     • ferrous sulfate 325 (65 FE) MG tablet Take 1 tablet by mouth 3 (Three) Times a Day. 90 tablet 5   • fluticasone (FLONASE) 50 MCG/ACT nasal spray 2 sprays into each nostril Daily. 16 g 12   • LEVEMIR 100 UNIT/ML injection INJECT 8 UNITS UNDER THE SKIN EVERY NIGHT AT BEDTIME. (Patient taking differently: INJECT 8  "UNITS UNDER THE SKIN EVERY NIGHT AT BEDTIME prn) 10 mL 0   • lubiprostone (AMITIZA) 24 MCG capsule Take 1 capsule by mouth 2 (Two) Times a Day With Meals. 60 capsule 5   • magnesium oxide (MAGNESIUM-OXIDE) 400 (241.3 MG) MG tablet tablet Take 1 tablet by mouth 2 (Two) Times a Day. 60 tablet 11   • montelukast (SINGULAIR) 10 MG tablet Take 1 tablet by mouth Every Night. 30 tablet 5   • oxyCODONE-acetaminophen (PERCOCET)  MG per tablet Take 1 tablet by mouth Every 6 (Six) Hours As Needed for Moderate Pain (4-6). 120 tablet 0   • oyster shell calcium 500 MG tablet tablet   5   • pantoprazole (PROTONIX) 40 MG EC tablet Take 1 tablet by mouth Every Night. 90 tablet 3   • potassium chloride (K-DUR,KLOR-CON) 20 MEQ CR tablet Take 1 tablet by mouth 2 (Two) Times a Day. 60 tablet 5   • promethazine (PHENERGAN) 25 MG tablet Take 1 tablet by mouth Every 6 (Six) Hours As Needed for nausea or vomiting. 120 tablet 5   • raNITIdine (ZANTAC) 150 MG tablet TK 1 T PO BID  1   • rivaroxaban (XARELTO) 20 MG tablet Take 1 tablet by mouth Daily. 30 tablet 2   • sucralfate (CARAFATE) 1 GM/10ML suspension Take 10 mL by mouth 2 (Two) Times a Day. 420 mL 1   • venlafaxine XR (EFFEXOR-XR) 75 MG 24 hr capsule Take 1 capsule by mouth Daily. 30 capsule 5     No current facility-administered medications for this visit.      Facility-Administered Medications Ordered in Other Visits   Medication Dose Route Frequency Provider Last Rate Last Dose   • sodium chloride 0.9 % flush 10 mL  10 mL Intravenous PRN Joselito Waddell MD   10 mL at 01/26/17 1129         OBJECTIVE    Ht 65\" (165.1 cm)  Wt 221 lb 14.4 oz (101 kg)  BMI 36.93 kg/m2      Review of Systems   Constitutional:  Denies recent weight loss, weight gain, fever or chills, no change in exercise tolerance  Musculoskeletal: Foot pain.   Skin: No wounds or lesions  Neurological: Denies paresthesias.  Psychiatric/Behavioral: Denies depression    Physical Exam   Constitutional: he appears " well-developed and well-nourished.   HEENT: Normocephalic. Atraumatic.  CV: No CP. RRR  Resp: Non-labored respirations.  Psychiatric: he has a normal mood and affect. his behavior is normal.         Lower Extremity Exam:  Vascular: DP/PT pulses palpable 2+.   Mild right lateral ankle edema  Foot warm  Neuro: Protective sensation intact, b/l.  Light touch sensation intact, b/l  DTRs intact  Integument: No open wounds or lesions.  No erythema, scaling  Musculoskeletal:  RLE muscle strength 5/5.   LLE dorsiflexion, plantarflexion, inversion, eversion intact; guarded.  Achilles, TA, TP, Peroneal tendons intact  No gross instability, exam limited by pain  Gait antalgic  Significant pain over dorsal TN joint, TN ROM        ASSESSMENT AND PLAN    Andra was seen today for follow-up.    Diagnoses and all orders for this visit:    Closed nondisplaced fracture of navicular bone of right foot with delayed healing, subsequent encounter  -     Case Request    Right foot pain    -Comprehensive foot and ankle exam performed  -Educated pt on diagnosis, etiology and treatment of moderate ankle sprain, suspected navicular avulsion fracture  -Cont CAM boot  -Patient has failed conservative care and continues to complain of significant pain. Discussed excision of non-union fracture fragment, which patient is interested in. Discussed all risks and potential benefits including but not limited to infection, delayed healing, continued pain.  -Discussed pt case with Dr. Waddell on oncology  -Will tentatively plan for 5/24/17  -Recheck 3-4 weeks          This document has been electronically signed by Armani Oliveira DPM on April 30, 2017 8:15 PM     Armani Oliveira DPM  4/30/2017  8:15 PM

## 2017-05-03 ENCOUNTER — INFUSION (OUTPATIENT)
Dept: ONCOLOGY | Facility: HOSPITAL | Age: 42
End: 2017-05-03

## 2017-05-03 DIAGNOSIS — C50.912 BREAST CANCER METASTASIZED TO BONE, LEFT (HCC): ICD-10-CM

## 2017-05-03 DIAGNOSIS — C79.51 BREAST CANCER METASTASIZED TO BONE, LEFT (HCC): ICD-10-CM

## 2017-05-03 DIAGNOSIS — Z45.2 ENCOUNTER FOR ASSESSMENT OF PERIPHERALLY INSERTED CENTRAL VENOUS CATHETER (PICC): Primary | ICD-10-CM

## 2017-05-03 RX ORDER — SODIUM CHLORIDE 0.9 % (FLUSH) 0.9 %
10 SYRINGE (ML) INJECTION AS NEEDED
Status: DISCONTINUED | OUTPATIENT
Start: 2017-05-03 | End: 2017-05-03 | Stop reason: HOSPADM

## 2017-05-03 RX ORDER — SODIUM CHLORIDE 0.9 % (FLUSH) 0.9 %
10 SYRINGE (ML) INJECTION AS NEEDED
Status: CANCELLED | OUTPATIENT
Start: 2017-05-04

## 2017-05-03 RX ADMIN — Medication 10 ML: at 13:14

## 2017-05-04 ENCOUNTER — APPOINTMENT (OUTPATIENT)
Dept: ONCOLOGY | Facility: HOSPITAL | Age: 42
End: 2017-05-04

## 2017-05-04 PROBLEM — C78.7 LIVER METASTASES: Status: ACTIVE | Noted: 2017-05-04

## 2017-05-11 ENCOUNTER — DOCUMENTATION (OUTPATIENT)
Dept: ONCOLOGY | Facility: CLINIC | Age: 42
End: 2017-05-11

## 2017-05-11 ENCOUNTER — INFUSION (OUTPATIENT)
Dept: ONCOLOGY | Facility: HOSPITAL | Age: 42
End: 2017-05-11

## 2017-05-11 ENCOUNTER — OFFICE VISIT (OUTPATIENT)
Dept: ONCOLOGY | Facility: CLINIC | Age: 42
End: 2017-05-11

## 2017-05-11 VITALS
SYSTOLIC BLOOD PRESSURE: 123 MMHG | TEMPERATURE: 97 F | HEART RATE: 76 BPM | RESPIRATION RATE: 16 BRPM | DIASTOLIC BLOOD PRESSURE: 77 MMHG | BODY MASS INDEX: 36.96 KG/M2 | WEIGHT: 222.1 LBS

## 2017-05-11 DIAGNOSIS — C50.911 CARCINOMA OF RIGHT BREAST METASTATIC TO BONE (HCC): ICD-10-CM

## 2017-05-11 DIAGNOSIS — C50.912 BREAST CANCER METASTASIZED TO BONE, LEFT (HCC): Primary | ICD-10-CM

## 2017-05-11 DIAGNOSIS — I82.409 RECURRENT DEEP VEIN THROMBOSIS (DVT) (HCC): ICD-10-CM

## 2017-05-11 DIAGNOSIS — Z45.2 ENCOUNTER FOR ASSESSMENT OF PERIPHERALLY INSERTED CENTRAL VENOUS CATHETER (PICC): ICD-10-CM

## 2017-05-11 DIAGNOSIS — C50.911 CARCINOMA OF RIGHT BREAST METASTATIC TO BONE (HCC): Primary | ICD-10-CM

## 2017-05-11 DIAGNOSIS — C79.51 CARCINOMA OF RIGHT BREAST METASTATIC TO BONE (HCC): Primary | ICD-10-CM

## 2017-05-11 DIAGNOSIS — C79.51 BREAST CANCER METASTASIZED TO BONE, LEFT (HCC): Primary | ICD-10-CM

## 2017-05-11 DIAGNOSIS — C79.51 CARCINOMA OF RIGHT BREAST METASTATIC TO BONE (HCC): ICD-10-CM

## 2017-05-11 LAB
ALBUMIN SERPL-MCNC: 4.4 G/DL (ref 3.4–4.8)
ALBUMIN/GLOB SERPL: 1.4 G/DL (ref 1.1–1.8)
ALP SERPL-CCNC: 100 U/L (ref 38–126)
ALT SERPL W P-5'-P-CCNC: 39 U/L (ref 9–52)
ANION GAP SERPL CALCULATED.3IONS-SCNC: 13 MMOL/L (ref 5–15)
AST SERPL-CCNC: 37 U/L (ref 14–36)
BASOPHILS # BLD AUTO: 0.02 10*3/MM3 (ref 0–0.2)
BASOPHILS NFR BLD AUTO: 0.3 % (ref 0–2)
BILIRUB SERPL-MCNC: 0.5 MG/DL (ref 0.2–1.3)
BUN BLD-MCNC: 17 MG/DL (ref 7–21)
BUN/CREAT SERPL: 20.5 (ref 7–25)
CALCIUM SPEC-SCNC: 8.6 MG/DL (ref 8.4–10.2)
CHLORIDE SERPL-SCNC: 103 MMOL/L (ref 95–110)
CO2 SERPL-SCNC: 24 MMOL/L (ref 22–31)
CREAT BLD-MCNC: 0.83 MG/DL (ref 0.5–1)
DEPRECATED RDW RBC AUTO: 41.1 FL (ref 36.4–46.3)
EOSINOPHIL # BLD AUTO: 0.2 10*3/MM3 (ref 0–0.7)
EOSINOPHIL NFR BLD AUTO: 3.3 % (ref 0–7)
ERYTHROCYTE [DISTWIDTH] IN BLOOD BY AUTOMATED COUNT: 12.9 % (ref 11.5–14.5)
GFR SERPL CREATININE-BSD FRML MDRD: 92 ML/MIN/1.73 (ref 58–135)
GLOBULIN UR ELPH-MCNC: 3.1 GM/DL (ref 2.3–3.5)
GLUCOSE BLD-MCNC: 111 MG/DL (ref 60–100)
HCT VFR BLD AUTO: 37.3 % (ref 35–45)
HGB BLD-MCNC: 12.5 G/DL (ref 12–15.5)
IMM GRANULOCYTES # BLD: 0.01 10*3/MM3 (ref 0–0.02)
IMM GRANULOCYTES NFR BLD: 0.2 % (ref 0–0.5)
LYMPHOCYTES # BLD AUTO: 1.68 10*3/MM3 (ref 0.6–4.2)
LYMPHOCYTES NFR BLD AUTO: 28 % (ref 10–50)
MCH RBC QN AUTO: 29.1 PG (ref 26.5–34)
MCHC RBC AUTO-ENTMCNC: 33.5 G/DL (ref 31.4–36)
MCV RBC AUTO: 86.9 FL (ref 80–98)
MONOCYTES # BLD AUTO: 0.37 10*3/MM3 (ref 0–0.9)
MONOCYTES NFR BLD AUTO: 6.2 % (ref 0–12)
NEUTROPHILS # BLD AUTO: 3.73 10*3/MM3 (ref 2–8.6)
NEUTROPHILS NFR BLD AUTO: 62 % (ref 37–80)
PLATELET # BLD AUTO: 157 10*3/MM3 (ref 150–450)
PMV BLD AUTO: 11.5 FL (ref 8–12)
POTASSIUM BLD-SCNC: 4 MMOL/L (ref 3.5–5.1)
PROT SERPL-MCNC: 7.5 G/DL (ref 6.3–8.6)
RBC # BLD AUTO: 4.29 10*6/MM3 (ref 3.77–5.16)
SODIUM BLD-SCNC: 140 MMOL/L (ref 137–145)
WBC NRBC COR # BLD: 6.01 10*3/MM3 (ref 3.2–9.8)

## 2017-05-11 PROCEDURE — 85025 COMPLETE CBC W/AUTO DIFF WBC: CPT | Performed by: INTERNAL MEDICINE

## 2017-05-11 PROCEDURE — 99214 OFFICE O/P EST MOD 30 MIN: CPT | Performed by: INTERNAL MEDICINE

## 2017-05-11 PROCEDURE — 80053 COMPREHEN METABOLIC PANEL: CPT | Performed by: INTERNAL MEDICINE

## 2017-05-11 PROCEDURE — 36592 COLLECT BLOOD FROM PICC: CPT | Performed by: INTERNAL MEDICINE

## 2017-05-11 RX ORDER — SODIUM CHLORIDE 0.9 % (FLUSH) 0.9 %
10 SYRINGE (ML) INJECTION AS NEEDED
Status: CANCELLED | OUTPATIENT
Start: 2017-05-18

## 2017-05-11 RX ORDER — ACETAMINOPHEN 325 MG/1
650 TABLET ORAL ONCE
Status: CANCELLED
Start: 2017-05-31 | End: 2017-05-11

## 2017-05-11 RX ORDER — FAMOTIDINE 10 MG/ML
20 INJECTION, SOLUTION INTRAVENOUS ONCE
Status: CANCELLED
Start: 2017-05-31 | End: 2017-05-11

## 2017-05-11 RX ORDER — SODIUM CHLORIDE 0.9 % (FLUSH) 0.9 %
10 SYRINGE (ML) INJECTION AS NEEDED
Status: DISCONTINUED | OUTPATIENT
Start: 2017-05-11 | End: 2017-05-11 | Stop reason: HOSPADM

## 2017-05-11 RX ORDER — SODIUM CHLORIDE 9 MG/ML
250 INJECTION, SOLUTION INTRAVENOUS ONCE
Status: CANCELLED | OUTPATIENT
Start: 2017-05-31

## 2017-05-11 RX ORDER — DIPHENHYDRAMINE HYDROCHLORIDE 50 MG/ML
50 INJECTION INTRAMUSCULAR; INTRAVENOUS ONCE
Status: CANCELLED
Start: 2017-05-31 | End: 2017-05-11

## 2017-05-11 RX ADMIN — Medication 10 ML: at 08:41

## 2017-05-16 ENCOUNTER — LAB (OUTPATIENT)
Dept: LAB | Facility: OTHER | Age: 42
End: 2017-05-16

## 2017-05-16 ENCOUNTER — OFFICE VISIT (OUTPATIENT)
Dept: FAMILY MEDICINE CLINIC | Facility: CLINIC | Age: 42
End: 2017-05-16

## 2017-05-16 ENCOUNTER — CLINICAL SUPPORT (OUTPATIENT)
Dept: FAMILY MEDICINE CLINIC | Facility: CLINIC | Age: 42
End: 2017-05-16

## 2017-05-16 VITALS
HEIGHT: 65 IN | HEART RATE: 80 BPM | WEIGHT: 223 LBS | OXYGEN SATURATION: 96 % | BODY MASS INDEX: 37.15 KG/M2 | DIASTOLIC BLOOD PRESSURE: 68 MMHG | TEMPERATURE: 98.1 F | SYSTOLIC BLOOD PRESSURE: 122 MMHG

## 2017-05-16 DIAGNOSIS — F33.1 MODERATE EPISODE OF RECURRENT MAJOR DEPRESSIVE DISORDER (HCC): ICD-10-CM

## 2017-05-16 DIAGNOSIS — Z23 ENCOUNTER FOR IMMUNIZATION: ICD-10-CM

## 2017-05-16 DIAGNOSIS — Z00.00 INITIAL MEDICARE ANNUAL WELLNESS VISIT: Primary | ICD-10-CM

## 2017-05-16 DIAGNOSIS — J42 CHRONIC BRONCHITIS, UNSPECIFIED CHRONIC BRONCHITIS TYPE (HCC): ICD-10-CM

## 2017-05-16 DIAGNOSIS — C79.51 BREAST CANCER METASTASIZED TO BONE, LEFT (HCC): ICD-10-CM

## 2017-05-16 DIAGNOSIS — Z45.2 ENCOUNTER FOR ASSESSMENT OF PERIPHERALLY INSERTED CENTRAL VENOUS CATHETER (PICC): Primary | ICD-10-CM

## 2017-05-16 DIAGNOSIS — C79.51 BREAST CANCER METASTASIZED TO BONE, RIGHT (HCC): ICD-10-CM

## 2017-05-16 DIAGNOSIS — E11.65 TYPE 2 DIABETES MELLITUS WITH HYPERGLYCEMIA, WITHOUT LONG-TERM CURRENT USE OF INSULIN (HCC): ICD-10-CM

## 2017-05-16 DIAGNOSIS — C50.912 BREAST CANCER METASTASIZED TO BONE, LEFT (HCC): ICD-10-CM

## 2017-05-16 DIAGNOSIS — F51.01 PRIMARY INSOMNIA: ICD-10-CM

## 2017-05-16 DIAGNOSIS — C50.911 BREAST CANCER METASTASIZED TO BONE, RIGHT (HCC): ICD-10-CM

## 2017-05-16 DIAGNOSIS — K21.9 GASTROESOPHAGEAL REFLUX DISEASE WITHOUT ESOPHAGITIS: ICD-10-CM

## 2017-05-16 PROBLEM — C50.919 BREAST CANCER METASTASIZED TO BONE (HCC): Status: ACTIVE | Noted: 2017-05-16

## 2017-05-16 LAB
ALBUMIN SERPL-MCNC: 4.1 G/DL (ref 3.2–5.5)
ALBUMIN/GLOB SERPL: 1.2 G/DL (ref 1–3)
ALP SERPL-CCNC: 87 U/L (ref 15–121)
ALT SERPL W P-5'-P-CCNC: 34 U/L (ref 10–60)
ANION GAP SERPL CALCULATED.3IONS-SCNC: 9 MMOL/L (ref 5–15)
AST SERPL-CCNC: 39 U/L (ref 10–60)
BASOPHILS # BLD AUTO: 0.04 10*3/MM3 (ref 0–0.2)
BASOPHILS NFR BLD AUTO: 0.6 % (ref 0–2)
BILIRUB SERPL-MCNC: 0.4 MG/DL (ref 0.2–1)
BUN BLD-MCNC: 14 MG/DL (ref 8–25)
BUN/CREAT SERPL: 17.5 (ref 7–25)
CALCIUM SPEC-SCNC: 9 MG/DL (ref 8.4–10.8)
CHLORIDE SERPL-SCNC: 103 MMOL/L (ref 100–112)
CHOLEST SERPL-MCNC: 227 MG/DL (ref 150–200)
CO2 SERPL-SCNC: 26 MMOL/L (ref 20–32)
CREAT BLD-MCNC: 0.8 MG/DL (ref 0.4–1.3)
DEPRECATED RDW RBC AUTO: 41.9 FL (ref 36.4–46.3)
EOSINOPHIL # BLD AUTO: 0.2 10*3/MM3 (ref 0–0.7)
EOSINOPHIL NFR BLD AUTO: 2.8 % (ref 0–7)
ERYTHROCYTE [DISTWIDTH] IN BLOOD BY AUTOMATED COUNT: 13.2 % (ref 11.5–14.5)
GFR SERPL CREATININE-BSD FRML MDRD: 96 ML/MIN/1.73 (ref 58–135)
GLOBULIN UR ELPH-MCNC: 3.4 GM/DL (ref 2.5–4.6)
GLUCOSE BLD-MCNC: 127 MG/DL (ref 70–100)
HCT VFR BLD AUTO: 39.6 % (ref 35–45)
HDLC SERPL-MCNC: 55 MG/DL (ref 35–100)
HGB BLD-MCNC: 12.8 G/DL (ref 12–15.5)
LDLC SERPL CALC-MCNC: 124 MG/DL
LDLC/HDLC SERPL: 2.25 {RATIO}
LYMPHOCYTES # BLD AUTO: 1.65 10*3/MM3 (ref 0.6–4.2)
LYMPHOCYTES NFR BLD AUTO: 23.2 % (ref 10–50)
MCH RBC QN AUTO: 28.8 PG (ref 26.5–34)
MCHC RBC AUTO-ENTMCNC: 32.3 G/DL (ref 31.4–36)
MCV RBC AUTO: 89.2 FL (ref 80–98)
MONOCYTES # BLD AUTO: 0.44 10*3/MM3 (ref 0–0.9)
MONOCYTES NFR BLD AUTO: 6.2 % (ref 0–12)
NEUTROPHILS # BLD AUTO: 4.77 10*3/MM3 (ref 2–8.6)
NEUTROPHILS NFR BLD AUTO: 67.2 % (ref 37–80)
PLATELET # BLD AUTO: 185 10*3/MM3 (ref 150–450)
PMV BLD AUTO: 12.1 FL (ref 8–12)
POTASSIUM BLD-SCNC: 4.1 MMOL/L (ref 3.4–5.4)
PROT SERPL-MCNC: 7.5 G/DL (ref 6.7–8.2)
RBC # BLD AUTO: 4.44 10*6/MM3 (ref 3.77–5.16)
SODIUM BLD-SCNC: 138 MMOL/L (ref 134–146)
TRIGL SERPL-MCNC: 241 MG/DL (ref 35–160)
VLDLC SERPL-MCNC: 48.2 MG/DL
WBC NRBC COR # BLD: 7.1 10*3/MM3 (ref 3.2–9.8)

## 2017-05-16 PROCEDURE — 85025 COMPLETE CBC W/AUTO DIFF WBC: CPT | Performed by: INTERNAL MEDICINE

## 2017-05-16 PROCEDURE — G0438 PPPS, INITIAL VISIT: HCPCS | Performed by: FAMILY MEDICINE

## 2017-05-16 PROCEDURE — 83036 HEMOGLOBIN GLYCOSYLATED A1C: CPT | Performed by: FAMILY MEDICINE

## 2017-05-16 PROCEDURE — 82043 UR ALBUMIN QUANTITATIVE: CPT | Performed by: FAMILY MEDICINE

## 2017-05-16 PROCEDURE — 99214 OFFICE O/P EST MOD 30 MIN: CPT | Performed by: FAMILY MEDICINE

## 2017-05-16 PROCEDURE — 80061 LIPID PANEL: CPT | Performed by: FAMILY MEDICINE

## 2017-05-16 PROCEDURE — 96523 IRRIG DRUG DELIVERY DEVICE: CPT | Performed by: FAMILY MEDICINE

## 2017-05-16 PROCEDURE — 80053 COMPREHEN METABOLIC PANEL: CPT | Performed by: FAMILY MEDICINE

## 2017-05-16 RX ORDER — TRAZODONE HYDROCHLORIDE 50 MG/1
50 TABLET ORAL NIGHTLY
Qty: 30 TABLET | Refills: 5 | Status: SHIPPED | OUTPATIENT
Start: 2017-05-16 | End: 2017-07-17 | Stop reason: SDUPTHER

## 2017-05-16 RX ORDER — RANITIDINE 150 MG/1
150 TABLET ORAL 2 TIMES DAILY
Qty: 60 TABLET | Refills: 5 | Status: SHIPPED | OUTPATIENT
Start: 2017-05-16 | End: 2017-10-16 | Stop reason: SDUPTHER

## 2017-05-16 RX ORDER — SUCRALFATE ORAL 1 G/10ML
1 SUSPENSION ORAL 2 TIMES DAILY
Qty: 420 ML | Refills: 1 | Status: CANCELLED | OUTPATIENT
Start: 2017-05-16

## 2017-05-16 RX ORDER — OXYCODONE AND ACETAMINOPHEN 10; 325 MG/1; MG/1
1 TABLET ORAL EVERY 6 HOURS PRN
Qty: 120 TABLET | Refills: 0 | Status: SHIPPED | OUTPATIENT
Start: 2017-05-16 | End: 2017-06-16 | Stop reason: SDUPTHER

## 2017-05-16 RX ORDER — CHLORAL HYDRATE 500 MG
2000 CAPSULE ORAL
Qty: 60 CAPSULE | Refills: 11 | Status: SHIPPED | OUTPATIENT
Start: 2017-05-16 | End: 2018-08-24

## 2017-05-17 ENCOUNTER — TELEPHONE (OUTPATIENT)
Dept: FAMILY MEDICINE CLINIC | Facility: CLINIC | Age: 42
End: 2017-05-17

## 2017-05-17 LAB
ALBUMIN UR-MCNC: 1.5 MG/L
HBA1C MFR BLD: 6.24 % (ref 4–5.6)

## 2017-05-18 ENCOUNTER — APPOINTMENT (OUTPATIENT)
Dept: PREADMISSION TESTING | Facility: HOSPITAL | Age: 42
End: 2017-05-18

## 2017-05-18 ENCOUNTER — INFUSION (OUTPATIENT)
Dept: ONCOLOGY | Facility: HOSPITAL | Age: 42
End: 2017-05-18

## 2017-05-18 VITALS
HEIGHT: 64 IN | HEART RATE: 66 BPM | WEIGHT: 222 LBS | BODY MASS INDEX: 37.9 KG/M2 | DIASTOLIC BLOOD PRESSURE: 70 MMHG | OXYGEN SATURATION: 97 % | SYSTOLIC BLOOD PRESSURE: 107 MMHG | RESPIRATION RATE: 18 BRPM

## 2017-05-18 DIAGNOSIS — C50.911 CARCINOMA OF RIGHT BREAST METASTATIC TO BONE (HCC): ICD-10-CM

## 2017-05-18 DIAGNOSIS — Z45.2 ENCOUNTER FOR ASSESSMENT OF PERIPHERALLY INSERTED CENTRAL VENOUS CATHETER (PICC): Primary | ICD-10-CM

## 2017-05-18 DIAGNOSIS — C79.51 CARCINOMA OF RIGHT BREAST METASTATIC TO BONE (HCC): ICD-10-CM

## 2017-05-18 LAB — MRSA DNA SPEC QL NAA+PROBE: NEGATIVE

## 2017-05-18 PROCEDURE — 96523 IRRIG DRUG DELIVERY DEVICE: CPT | Performed by: INTERNAL MEDICINE

## 2017-05-18 PROCEDURE — 87641 MR-STAPH DNA AMP PROBE: CPT | Performed by: PODIATRIST

## 2017-05-18 PROCEDURE — 93010 ELECTROCARDIOGRAM REPORT: CPT | Performed by: INTERNAL MEDICINE

## 2017-05-18 PROCEDURE — 93005 ELECTROCARDIOGRAM TRACING: CPT

## 2017-05-18 RX ORDER — SODIUM CHLORIDE 0.9 % (FLUSH) 0.9 %
10 SYRINGE (ML) INJECTION AS NEEDED
Status: CANCELLED | OUTPATIENT
Start: 2017-05-18

## 2017-05-18 RX ORDER — SODIUM CHLORIDE 9 MG/ML
1000 INJECTION, SOLUTION INTRAVENOUS CONTINUOUS PRN
Status: CANCELLED | OUTPATIENT
Start: 2017-05-24

## 2017-05-18 RX ORDER — SODIUM CHLORIDE 0.9 % (FLUSH) 0.9 %
10 SYRINGE (ML) INJECTION AS NEEDED
Status: DISCONTINUED | OUTPATIENT
Start: 2017-05-18 | End: 2017-05-18 | Stop reason: HOSPADM

## 2017-05-18 RX ADMIN — Medication 10 ML: at 08:56

## 2017-05-23 ENCOUNTER — INFUSION (OUTPATIENT)
Dept: ONCOLOGY | Facility: HOSPITAL | Age: 42
End: 2017-05-23

## 2017-05-23 DIAGNOSIS — Z45.2 ENCOUNTER FOR ASSESSMENT OF PERIPHERALLY INSERTED CENTRAL VENOUS CATHETER (PICC): Primary | ICD-10-CM

## 2017-05-23 DIAGNOSIS — C50.911 CARCINOMA OF RIGHT BREAST METASTATIC TO BONE (HCC): ICD-10-CM

## 2017-05-23 DIAGNOSIS — C79.51 CARCINOMA OF RIGHT BREAST METASTATIC TO BONE (HCC): ICD-10-CM

## 2017-05-23 PROCEDURE — 96523 IRRIG DRUG DELIVERY DEVICE: CPT | Performed by: INTERNAL MEDICINE

## 2017-05-23 RX ORDER — SODIUM CHLORIDE 0.9 % (FLUSH) 0.9 %
10 SYRINGE (ML) INJECTION AS NEEDED
Status: CANCELLED | OUTPATIENT
Start: 2017-05-25

## 2017-05-23 RX ORDER — SODIUM CHLORIDE 0.9 % (FLUSH) 0.9 %
10 SYRINGE (ML) INJECTION AS NEEDED
Status: DISCONTINUED | OUTPATIENT
Start: 2017-05-23 | End: 2017-05-23 | Stop reason: HOSPADM

## 2017-05-23 RX ADMIN — Medication 10 ML: at 14:30

## 2017-05-24 ENCOUNTER — ANESTHESIA EVENT (OUTPATIENT)
Dept: PERIOP | Facility: HOSPITAL | Age: 42
End: 2017-05-24

## 2017-05-24 ENCOUNTER — HOSPITAL ENCOUNTER (EMERGENCY)
Facility: HOSPITAL | Age: 42
Discharge: HOME | End: 2017-05-24
Attending: EMERGENCY MEDICINE | Admitting: NURSE PRACTITIONER

## 2017-05-24 ENCOUNTER — TELEPHONE (OUTPATIENT)
Dept: PODIATRY | Facility: CLINIC | Age: 42
End: 2017-05-24

## 2017-05-24 ENCOUNTER — APPOINTMENT (OUTPATIENT)
Dept: GENERAL RADIOLOGY | Facility: HOSPITAL | Age: 42
End: 2017-05-24

## 2017-05-24 ENCOUNTER — ANESTHESIA (OUTPATIENT)
Dept: PERIOP | Facility: HOSPITAL | Age: 42
End: 2017-05-24

## 2017-05-24 ENCOUNTER — HOSPITAL ENCOUNTER (OUTPATIENT)
Facility: HOSPITAL | Age: 42
Setting detail: HOSPITAL OUTPATIENT SURGERY
Discharge: HOME | End: 2017-05-24
Attending: PODIATRIST | Admitting: PODIATRIST

## 2017-05-24 VITALS
TEMPERATURE: 98 F | WEIGHT: 224 LBS | DIASTOLIC BLOOD PRESSURE: 66 MMHG | HEIGHT: 65 IN | BODY MASS INDEX: 37.32 KG/M2 | OXYGEN SATURATION: 93 % | SYSTOLIC BLOOD PRESSURE: 118 MMHG | RESPIRATION RATE: 18 BRPM | HEART RATE: 89 BPM

## 2017-05-24 VITALS
DIASTOLIC BLOOD PRESSURE: 69 MMHG | SYSTOLIC BLOOD PRESSURE: 141 MMHG | RESPIRATION RATE: 20 BRPM | BODY MASS INDEX: 37.47 KG/M2 | TEMPERATURE: 97.9 F | HEIGHT: 65 IN | WEIGHT: 224.87 LBS | HEART RATE: 65 BPM | OXYGEN SATURATION: 95 %

## 2017-05-24 DIAGNOSIS — S92.254G CLOSED NONDISPLACED FRACTURE OF NAVICULAR BONE OF RIGHT FOOT WITH DELAYED HEALING, SUBSEQUENT ENCOUNTER: ICD-10-CM

## 2017-05-24 DIAGNOSIS — R11.2 NON-INTRACTABLE VOMITING WITH NAUSEA, UNSPECIFIED VOMITING TYPE: ICD-10-CM

## 2017-05-24 DIAGNOSIS — R19.7 DIARRHEA, UNSPECIFIED TYPE: Primary | ICD-10-CM

## 2017-05-24 DIAGNOSIS — B34.9 VIRAL SYNDROME: ICD-10-CM

## 2017-05-24 LAB
ALBUMIN SERPL-MCNC: 4.1 G/DL (ref 3.4–4.8)
ALBUMIN/GLOB SERPL: 1.3 G/DL (ref 1.1–1.8)
ALP SERPL-CCNC: 108 U/L (ref 38–126)
ALT SERPL W P-5'-P-CCNC: 61 U/L (ref 9–52)
ANION GAP SERPL CALCULATED.3IONS-SCNC: 12 MMOL/L (ref 5–15)
AST SERPL-CCNC: 59 U/L (ref 14–36)
BASOPHILS # BLD AUTO: 0.01 10*3/MM3 (ref 0–0.2)
BASOPHILS NFR BLD AUTO: 0.1 % (ref 0–2)
BILIRUB SERPL-MCNC: 0.4 MG/DL (ref 0.2–1.3)
BUN BLD-MCNC: 13 MG/DL (ref 7–21)
BUN/CREAT SERPL: 17.3 (ref 7–25)
CALCIUM SPEC-SCNC: 7.9 MG/DL (ref 8.4–10.2)
CHLORIDE SERPL-SCNC: 103 MMOL/L (ref 95–110)
CO2 SERPL-SCNC: 25 MMOL/L (ref 22–31)
CREAT BLD-MCNC: 0.75 MG/DL (ref 0.5–1)
DEPRECATED RDW RBC AUTO: 42.4 FL (ref 36.4–46.3)
EOSINOPHIL # BLD AUTO: 0.06 10*3/MM3 (ref 0–0.7)
EOSINOPHIL NFR BLD AUTO: 0.6 % (ref 0–7)
ERYTHROCYTE [DISTWIDTH] IN BLOOD BY AUTOMATED COUNT: 13.4 % (ref 11.5–14.5)
GFR SERPL CREATININE-BSD FRML MDRD: 104 ML/MIN/1.73 (ref 58–135)
GLOBULIN UR ELPH-MCNC: 3.2 GM/DL (ref 2.3–3.5)
GLUCOSE BLD-MCNC: 106 MG/DL (ref 60–100)
GLUCOSE BLDC GLUCOMTR-MCNC: 124 MG/DL (ref 70–130)
HCT VFR BLD AUTO: 36.8 % (ref 35–45)
HGB BLD-MCNC: 12 G/DL (ref 12–15.5)
IMM GRANULOCYTES # BLD: 0.03 10*3/MM3 (ref 0–0.02)
IMM GRANULOCYTES NFR BLD: 0.3 % (ref 0–0.5)
LYMPHOCYTES # BLD AUTO: 0.98 10*3/MM3 (ref 0.6–4.2)
LYMPHOCYTES NFR BLD AUTO: 9.6 % (ref 10–50)
MCH RBC QN AUTO: 28.8 PG (ref 26.5–34)
MCHC RBC AUTO-ENTMCNC: 32.6 G/DL (ref 31.4–36)
MCV RBC AUTO: 88.2 FL (ref 80–98)
MONOCYTES # BLD AUTO: 0.65 10*3/MM3 (ref 0–0.9)
MONOCYTES NFR BLD AUTO: 6.4 % (ref 0–12)
NEUTROPHILS # BLD AUTO: 8.43 10*3/MM3 (ref 2–8.6)
NEUTROPHILS NFR BLD AUTO: 83 % (ref 37–80)
PLATELET # BLD AUTO: 172 10*3/MM3 (ref 150–450)
PMV BLD AUTO: 11.6 FL (ref 8–12)
POTASSIUM BLD-SCNC: 3.7 MMOL/L (ref 3.5–5.1)
PROT SERPL-MCNC: 7.3 G/DL (ref 6.3–8.6)
RBC # BLD AUTO: 4.17 10*6/MM3 (ref 3.77–5.16)
SODIUM BLD-SCNC: 140 MMOL/L (ref 137–145)
WBC NRBC COR # BLD: 10.16 10*3/MM3 (ref 3.2–9.8)

## 2017-05-24 PROCEDURE — 88304 TISSUE EXAM BY PATHOLOGIST: CPT | Performed by: PODIATRIST

## 2017-05-24 PROCEDURE — 99284 EMERGENCY DEPT VISIT MOD MDM: CPT

## 2017-05-24 PROCEDURE — 96376 TX/PRO/DX INJ SAME DRUG ADON: CPT

## 2017-05-24 PROCEDURE — 28465 OPTX TARSAL BONE FX EACH: CPT | Performed by: PODIATRIST

## 2017-05-24 PROCEDURE — 25010000002 HYDROMORPHONE PER 4 MG: Performed by: NURSE ANESTHETIST, CERTIFIED REGISTERED

## 2017-05-24 PROCEDURE — 96374 THER/PROPH/DIAG INJ IV PUSH: CPT

## 2017-05-24 PROCEDURE — 88304 TISSUE EXAM BY PATHOLOGIST: CPT | Performed by: PATHOLOGY

## 2017-05-24 PROCEDURE — 96361 HYDRATE IV INFUSION ADD-ON: CPT

## 2017-05-24 PROCEDURE — 88311 DECALCIFY TISSUE: CPT | Performed by: PODIATRIST

## 2017-05-24 PROCEDURE — 25010000002 ONDANSETRON PER 1 MG

## 2017-05-24 PROCEDURE — 25010000002 MIDAZOLAM PER 1 MG: Performed by: NURSE ANESTHETIST, CERTIFIED REGISTERED

## 2017-05-24 PROCEDURE — 76000 FLUOROSCOPY <1 HR PHYS/QHP: CPT

## 2017-05-24 PROCEDURE — 96375 TX/PRO/DX INJ NEW DRUG ADDON: CPT

## 2017-05-24 PROCEDURE — 25010000003 CEFAZOLIN PER 500 MG: Performed by: PODIATRIST

## 2017-05-24 PROCEDURE — 88311 DECALCIFY TISSUE: CPT | Performed by: PATHOLOGY

## 2017-05-24 PROCEDURE — 25010000002 FENTANYL CITRATE (PF) 100 MCG/2ML SOLUTION: Performed by: NURSE ANESTHETIST, CERTIFIED REGISTERED

## 2017-05-24 PROCEDURE — 85025 COMPLETE CBC W/AUTO DIFF WBC: CPT | Performed by: NURSE PRACTITIONER

## 2017-05-24 PROCEDURE — 80053 COMPREHEN METABOLIC PANEL: CPT | Performed by: NURSE PRACTITIONER

## 2017-05-24 PROCEDURE — 25010000002 HYDROMORPHONE PER 4 MG: Performed by: EMERGENCY MEDICINE

## 2017-05-24 PROCEDURE — 25010000002 PROPOFOL 1000 MG/ML EMULSION: Performed by: NURSE ANESTHETIST, CERTIFIED REGISTERED

## 2017-05-24 PROCEDURE — 28120 PART REMOVAL OF ANKLE/HEEL: CPT | Performed by: PODIATRIST

## 2017-05-24 PROCEDURE — 25010000002 KETOROLAC TROMETHAMINE PER 15 MG: Performed by: NURSE ANESTHETIST, CERTIFIED REGISTERED

## 2017-05-24 PROCEDURE — 82962 GLUCOSE BLOOD TEST: CPT

## 2017-05-24 PROCEDURE — 25010000002 PROPOFOL 10 MG/ML EMULSION: Performed by: NURSE ANESTHETIST, CERTIFIED REGISTERED

## 2017-05-24 RX ORDER — HYDROMORPHONE HCL 110MG/55ML
PATIENT CONTROLLED ANALGESIA SYRINGE INTRAVENOUS AS NEEDED
Status: DISCONTINUED | OUTPATIENT
Start: 2017-05-24 | End: 2017-05-24 | Stop reason: SURG

## 2017-05-24 RX ORDER — ONDANSETRON HYDROCHLORIDE 8 MG/1
8 TABLET, FILM COATED ORAL EVERY 8 HOURS PRN
Qty: 30 TABLET | Refills: 0 | Status: SHIPPED | OUTPATIENT
Start: 2017-05-24 | End: 2018-02-26 | Stop reason: SDUPTHER

## 2017-05-24 RX ORDER — FENTANYL CITRATE 50 UG/ML
INJECTION, SOLUTION INTRAMUSCULAR; INTRAVENOUS AS NEEDED
Status: DISCONTINUED | OUTPATIENT
Start: 2017-05-24 | End: 2017-05-24 | Stop reason: SURG

## 2017-05-24 RX ORDER — PROPOFOL 10 MG/ML
VIAL (ML) INTRAVENOUS AS NEEDED
Status: DISCONTINUED | OUTPATIENT
Start: 2017-05-24 | End: 2017-05-24 | Stop reason: SURG

## 2017-05-24 RX ORDER — ONDANSETRON 2 MG/ML
4 INJECTION INTRAMUSCULAR; INTRAVENOUS ONCE
Status: COMPLETED | OUTPATIENT
Start: 2017-05-24 | End: 2017-05-24

## 2017-05-24 RX ORDER — ONDANSETRON 2 MG/ML
4 INJECTION INTRAMUSCULAR; INTRAVENOUS ONCE AS NEEDED
Status: DISCONTINUED | OUTPATIENT
Start: 2017-05-24 | End: 2017-05-24 | Stop reason: HOSPADM

## 2017-05-24 RX ORDER — BUPIVACAINE HYDROCHLORIDE 5 MG/ML
INJECTION, SOLUTION EPIDURAL; INTRACAUDAL AS NEEDED
Status: DISCONTINUED | OUTPATIENT
Start: 2017-05-24 | End: 2017-05-24 | Stop reason: HOSPADM

## 2017-05-24 RX ORDER — PROMETHAZINE HYDROCHLORIDE 25 MG/ML
12.5 INJECTION, SOLUTION INTRAMUSCULAR; INTRAVENOUS ONCE
Status: DISCONTINUED | OUTPATIENT
Start: 2017-05-24 | End: 2017-05-24

## 2017-05-24 RX ORDER — KETAMINE HYDROCHLORIDE 100 MG/ML
INJECTION INTRAMUSCULAR; INTRAVENOUS AS NEEDED
Status: DISCONTINUED | OUTPATIENT
Start: 2017-05-24 | End: 2017-05-24 | Stop reason: SURG

## 2017-05-24 RX ORDER — PROMETHAZINE HYDROCHLORIDE 25 MG/1
25 TABLET ORAL EVERY 6 HOURS PRN
Qty: 15 TABLET | Refills: 0 | Status: SHIPPED | OUTPATIENT
Start: 2017-05-24 | End: 2017-05-27

## 2017-05-24 RX ORDER — MIDAZOLAM HYDROCHLORIDE 1 MG/ML
INJECTION INTRAMUSCULAR; INTRAVENOUS AS NEEDED
Status: DISCONTINUED | OUTPATIENT
Start: 2017-05-24 | End: 2017-05-24 | Stop reason: SURG

## 2017-05-24 RX ORDER — SODIUM CHLORIDE 9 MG/ML
1000 INJECTION, SOLUTION INTRAVENOUS CONTINUOUS PRN
Status: DISCONTINUED | OUTPATIENT
Start: 2017-05-24 | End: 2017-05-24 | Stop reason: HOSPADM

## 2017-05-24 RX ORDER — KETOROLAC TROMETHAMINE 30 MG/ML
INJECTION, SOLUTION INTRAMUSCULAR; INTRAVENOUS AS NEEDED
Status: DISCONTINUED | OUTPATIENT
Start: 2017-05-24 | End: 2017-05-24 | Stop reason: SURG

## 2017-05-24 RX ADMIN — CEFAZOLIN SODIUM 2 G: 1 INJECTION, POWDER, FOR SOLUTION INTRAMUSCULAR; INTRAVENOUS at 08:35

## 2017-05-24 RX ADMIN — FENTANYL CITRATE 25 MCG: 50 INJECTION, SOLUTION INTRAMUSCULAR; INTRAVENOUS at 10:05

## 2017-05-24 RX ADMIN — FENTANYL CITRATE 25 MCG: 50 INJECTION, SOLUTION INTRAMUSCULAR; INTRAVENOUS at 10:06

## 2017-05-24 RX ADMIN — SODIUM CHLORIDE 1000 ML: 9 INJECTION, SOLUTION INTRAVENOUS at 20:00

## 2017-05-24 RX ADMIN — PROPOFOL 30 MG: 10 INJECTION, EMULSION INTRAVENOUS at 08:49

## 2017-05-24 RX ADMIN — ONDANSETRON 4 MG: 2 INJECTION INTRAMUSCULAR; INTRAVENOUS at 20:00

## 2017-05-24 RX ADMIN — SODIUM CHLORIDE 1000 ML: 9 INJECTION, SOLUTION INTRAVENOUS at 07:31

## 2017-05-24 RX ADMIN — HYDROMORPHONE HYDROCHLORIDE 1 MG: 1 INJECTION, SOLUTION INTRAMUSCULAR; INTRAVENOUS; SUBCUTANEOUS at 20:13

## 2017-05-24 RX ADMIN — FENTANYL CITRATE 25 MCG: 50 INJECTION, SOLUTION INTRAMUSCULAR; INTRAVENOUS at 10:03

## 2017-05-24 RX ADMIN — HYDROMORPHONE HYDROCHLORIDE 0.5 MG: 2 INJECTION, SOLUTION INTRAMUSCULAR; INTRAVENOUS; SUBCUTANEOUS at 10:12

## 2017-05-24 RX ADMIN — HYDROMORPHONE HYDROCHLORIDE 1 MG: 1 INJECTION, SOLUTION INTRAMUSCULAR; INTRAVENOUS; SUBCUTANEOUS at 21:24

## 2017-05-24 RX ADMIN — MIDAZOLAM 2 MG: 1 INJECTION INTRAMUSCULAR; INTRAVENOUS at 08:25

## 2017-05-24 RX ADMIN — KETOROLAC TROMETHAMINE 30 MG: 30 INJECTION, SOLUTION INTRAMUSCULAR at 10:11

## 2017-05-24 RX ADMIN — PROPOFOL 150 MG: 10 INJECTION, EMULSION INTRAVENOUS at 08:37

## 2017-05-24 RX ADMIN — KETAMINE HYDROCHLORIDE 40 MG: 100 INJECTION INTRAMUSCULAR; INTRAVENOUS at 08:50

## 2017-05-24 RX ADMIN — PROPOFOL 50 MG: 10 INJECTION, EMULSION INTRAVENOUS at 08:39

## 2017-05-24 RX ADMIN — HYDROMORPHONE HYDROCHLORIDE 0.5 MG: 2 INJECTION, SOLUTION INTRAMUSCULAR; INTRAVENOUS; SUBCUTANEOUS at 10:17

## 2017-05-24 RX ADMIN — FENTANYL CITRATE 25 MCG: 50 INJECTION, SOLUTION INTRAMUSCULAR; INTRAVENOUS at 10:08

## 2017-05-24 RX ADMIN — KETAMINE HYDROCHLORIDE 10 MG: 100 INJECTION INTRAMUSCULAR; INTRAVENOUS at 09:27

## 2017-05-24 RX ADMIN — PROPOFOL 50 MCG/KG/MIN: 10 INJECTION, EMULSION INTRAVENOUS at 08:38

## 2017-05-25 ENCOUNTER — TELEPHONE (OUTPATIENT)
Dept: ONCOLOGY | Facility: CLINIC | Age: 42
End: 2017-05-25

## 2017-05-30 ENCOUNTER — OFFICE VISIT (OUTPATIENT)
Dept: PODIATRY | Facility: CLINIC | Age: 42
End: 2017-05-30

## 2017-05-30 VITALS — HEIGHT: 65 IN | BODY MASS INDEX: 37.32 KG/M2 | WEIGHT: 224 LBS

## 2017-05-30 DIAGNOSIS — S92.254G CLOSED NONDISPLACED FRACTURE OF NAVICULAR BONE OF RIGHT FOOT WITH DELAYED HEALING, SUBSEQUENT ENCOUNTER: Primary | ICD-10-CM

## 2017-05-30 DIAGNOSIS — M79.671 RIGHT FOOT PAIN: ICD-10-CM

## 2017-05-30 LAB
LAB AP CASE REPORT: NORMAL
LAB AP CLINICAL INFORMATION: NORMAL
Lab: NORMAL
PATH REPORT.FINAL DX SPEC: NORMAL
PATH REPORT.GROSS SPEC: NORMAL

## 2017-05-30 PROCEDURE — 99024 POSTOP FOLLOW-UP VISIT: CPT | Performed by: PODIATRIST

## 2017-05-30 RX ORDER — HYDROMORPHONE HYDROCHLORIDE 4 MG/1
4 TABLET ORAL EVERY 4 HOURS PRN
Qty: 40 TABLET | Refills: 0 | Status: SHIPPED | OUTPATIENT
Start: 2017-05-30 | End: 2017-06-19 | Stop reason: SDUPTHER

## 2017-05-31 ENCOUNTER — OFFICE VISIT (OUTPATIENT)
Dept: ONCOLOGY | Facility: CLINIC | Age: 42
End: 2017-05-31

## 2017-05-31 ENCOUNTER — INFUSION (OUTPATIENT)
Dept: ONCOLOGY | Facility: HOSPITAL | Age: 42
End: 2017-05-31

## 2017-05-31 VITALS
SYSTOLIC BLOOD PRESSURE: 139 MMHG | TEMPERATURE: 97.6 F | RESPIRATION RATE: 18 BRPM | DIASTOLIC BLOOD PRESSURE: 83 MMHG | HEART RATE: 85 BPM

## 2017-05-31 DIAGNOSIS — C79.51 BREAST CANCER METASTASIZED TO BONE, RIGHT (HCC): ICD-10-CM

## 2017-05-31 DIAGNOSIS — C50.912 BREAST CANCER METASTASIZED TO BONE, LEFT (HCC): ICD-10-CM

## 2017-05-31 DIAGNOSIS — C79.51 BREAST CANCER METASTASIZED TO BONE, LEFT (HCC): ICD-10-CM

## 2017-05-31 DIAGNOSIS — Z45.2 ENCOUNTER FOR ASSESSMENT OF PERIPHERALLY INSERTED CENTRAL VENOUS CATHETER (PICC): Primary | ICD-10-CM

## 2017-05-31 DIAGNOSIS — C79.51 CARCINOMA OF RIGHT BREAST METASTATIC TO BONE (HCC): ICD-10-CM

## 2017-05-31 DIAGNOSIS — C50.911 BREAST CANCER METASTASIZED TO BONE, RIGHT (HCC): ICD-10-CM

## 2017-05-31 DIAGNOSIS — C50.911 CARCINOMA OF RIGHT BREAST METASTATIC TO BONE (HCC): ICD-10-CM

## 2017-05-31 LAB
ALBUMIN SERPL-MCNC: 4.3 G/DL (ref 3.4–4.8)
ALBUMIN/GLOB SERPL: 1.2 G/DL (ref 1.1–1.8)
ALP SERPL-CCNC: 136 U/L (ref 38–126)
ALT SERPL W P-5'-P-CCNC: 63 U/L (ref 9–52)
ANION GAP SERPL CALCULATED.3IONS-SCNC: 15 MMOL/L (ref 5–15)
AST SERPL-CCNC: 67 U/L (ref 14–36)
BASOPHILS # BLD AUTO: 0.02 10*3/MM3 (ref 0–0.2)
BASOPHILS NFR BLD AUTO: 0.3 % (ref 0–2)
BILIRUB SERPL-MCNC: 0.4 MG/DL (ref 0.2–1.3)
BUN BLD-MCNC: 19 MG/DL (ref 7–21)
BUN/CREAT SERPL: 21.6 (ref 7–25)
CALCIUM SPEC-SCNC: 9.2 MG/DL (ref 8.4–10.2)
CHLORIDE SERPL-SCNC: 98 MMOL/L (ref 95–110)
CO2 SERPL-SCNC: 26 MMOL/L (ref 22–31)
CREAT BLD-MCNC: 0.88 MG/DL (ref 0.5–1)
DEPRECATED RDW RBC AUTO: 42.5 FL (ref 36.4–46.3)
EOSINOPHIL # BLD AUTO: 0.15 10*3/MM3 (ref 0–0.7)
EOSINOPHIL NFR BLD AUTO: 2.2 % (ref 0–7)
ERYTHROCYTE [DISTWIDTH] IN BLOOD BY AUTOMATED COUNT: 13.5 % (ref 11.5–14.5)
GFR SERPL CREATININE-BSD FRML MDRD: 86 ML/MIN/1.73 (ref 58–135)
GLOBULIN UR ELPH-MCNC: 3.5 GM/DL (ref 2.3–3.5)
GLUCOSE BLD-MCNC: 111 MG/DL (ref 60–100)
HCT VFR BLD AUTO: 41 % (ref 35–45)
HGB BLD-MCNC: 13.4 G/DL (ref 12–15.5)
IMM GRANULOCYTES # BLD: 0.03 10*3/MM3 (ref 0–0.02)
IMM GRANULOCYTES NFR BLD: 0.4 % (ref 0–0.5)
LYMPHOCYTES # BLD AUTO: 2.04 10*3/MM3 (ref 0.6–4.2)
LYMPHOCYTES NFR BLD AUTO: 29.7 % (ref 10–50)
MCH RBC QN AUTO: 28.8 PG (ref 26.5–34)
MCHC RBC AUTO-ENTMCNC: 32.7 G/DL (ref 31.4–36)
MCV RBC AUTO: 88 FL (ref 80–98)
MONOCYTES # BLD AUTO: 0.55 10*3/MM3 (ref 0–0.9)
MONOCYTES NFR BLD AUTO: 8 % (ref 0–12)
NEUTROPHILS # BLD AUTO: 4.09 10*3/MM3 (ref 2–8.6)
NEUTROPHILS NFR BLD AUTO: 59.4 % (ref 37–80)
PLATELET # BLD AUTO: 188 10*3/MM3 (ref 150–450)
PMV BLD AUTO: 11.2 FL (ref 8–12)
POTASSIUM BLD-SCNC: 4 MMOL/L (ref 3.5–5.1)
PROT SERPL-MCNC: 7.8 G/DL (ref 6.3–8.6)
RBC # BLD AUTO: 4.66 10*6/MM3 (ref 3.77–5.16)
SODIUM BLD-SCNC: 139 MMOL/L (ref 137–145)
WBC NRBC COR # BLD: 6.88 10*3/MM3 (ref 3.2–9.8)

## 2017-05-31 PROCEDURE — 25010000002 FULVESTRANT PER 25 MG: Performed by: INTERNAL MEDICINE

## 2017-05-31 PROCEDURE — 90471 IMMUNIZATION ADMIN: CPT | Performed by: FAMILY MEDICINE

## 2017-05-31 PROCEDURE — 25010000002 PERTUZUMAB 420 MG/14ML SOLUTION 420 MG VIAL: Performed by: INTERNAL MEDICINE

## 2017-05-31 PROCEDURE — 96367 TX/PROPH/DG ADDL SEQ IV INF: CPT | Performed by: INTERNAL MEDICINE

## 2017-05-31 PROCEDURE — 36591 DRAW BLOOD OFF VENOUS DEVICE: CPT | Performed by: INTERNAL MEDICINE

## 2017-05-31 PROCEDURE — 99214 OFFICE O/P EST MOD 30 MIN: CPT | Performed by: INTERNAL MEDICINE

## 2017-05-31 PROCEDURE — 96413 CHEMO IV INFUSION 1 HR: CPT | Performed by: INTERNAL MEDICINE

## 2017-05-31 PROCEDURE — 96372 THER/PROPH/DIAG INJ SC/IM: CPT | Performed by: INTERNAL MEDICINE

## 2017-05-31 PROCEDURE — 86300 IMMUNOASSAY TUMOR CA 15-3: CPT | Performed by: INTERNAL MEDICINE

## 2017-05-31 PROCEDURE — 25010000002 ZOLEDRONIC ACID PER 1 MG: Performed by: INTERNAL MEDICINE

## 2017-05-31 PROCEDURE — 90732 PPSV23 VACC 2 YRS+ SUBQ/IM: CPT | Performed by: FAMILY MEDICINE

## 2017-05-31 PROCEDURE — 80053 COMPREHEN METABOLIC PANEL: CPT | Performed by: INTERNAL MEDICINE

## 2017-05-31 PROCEDURE — 96375 TX/PRO/DX INJ NEW DRUG ADDON: CPT | Performed by: INTERNAL MEDICINE

## 2017-05-31 PROCEDURE — 85025 COMPLETE CBC W/AUTO DIFF WBC: CPT | Performed by: INTERNAL MEDICINE

## 2017-05-31 PROCEDURE — 25010000002 DIPHENHYDRAMINE PER 50 MG: Performed by: INTERNAL MEDICINE

## 2017-05-31 RX ORDER — SODIUM CHLORIDE 9 MG/ML
250 INJECTION, SOLUTION INTRAVENOUS ONCE
Status: CANCELLED | OUTPATIENT
Start: 2017-06-21

## 2017-05-31 RX ORDER — ACETAMINOPHEN 325 MG/1
650 TABLET ORAL ONCE
Status: COMPLETED | OUTPATIENT
Start: 2017-05-31 | End: 2017-05-31

## 2017-05-31 RX ORDER — ACETAMINOPHEN 325 MG/1
650 TABLET ORAL ONCE
Status: CANCELLED | OUTPATIENT
Start: 2017-06-21

## 2017-05-31 RX ORDER — DIPHENHYDRAMINE HYDROCHLORIDE 50 MG/ML
50 INJECTION INTRAMUSCULAR; INTRAVENOUS ONCE
Status: CANCELLED | OUTPATIENT
Start: 2017-06-21

## 2017-05-31 RX ORDER — SODIUM CHLORIDE 0.9 % (FLUSH) 0.9 %
10 SYRINGE (ML) INJECTION AS NEEDED
Status: CANCELLED | OUTPATIENT
Start: 2017-05-31

## 2017-05-31 RX ORDER — FAMOTIDINE 10 MG/ML
20 INJECTION, SOLUTION INTRAVENOUS ONCE
Status: CANCELLED | OUTPATIENT
Start: 2017-06-21

## 2017-05-31 RX ORDER — FAMOTIDINE 10 MG/ML
20 INJECTION, SOLUTION INTRAVENOUS ONCE
Status: COMPLETED | OUTPATIENT
Start: 2017-05-31 | End: 2017-05-31

## 2017-05-31 RX ORDER — SODIUM CHLORIDE 9 MG/ML
250 INJECTION, SOLUTION INTRAVENOUS ONCE
Status: CANCELLED | OUTPATIENT
Start: 2017-05-31

## 2017-05-31 RX ORDER — SODIUM CHLORIDE 9 MG/ML
250 INJECTION, SOLUTION INTRAVENOUS ONCE
Status: COMPLETED | OUTPATIENT
Start: 2017-05-31 | End: 2017-05-31

## 2017-05-31 RX ORDER — SODIUM CHLORIDE 0.9 % (FLUSH) 0.9 %
10 SYRINGE (ML) INJECTION AS NEEDED
Status: DISCONTINUED | OUTPATIENT
Start: 2017-05-31 | End: 2017-05-31 | Stop reason: HOSPADM

## 2017-05-31 RX ADMIN — Medication 10 ML: at 14:26

## 2017-05-31 RX ADMIN — SODIUM CHLORIDE 250 ML: 9 INJECTION, SOLUTION INTRAVENOUS at 10:37

## 2017-05-31 RX ADMIN — ZOLEDRONIC ACID 4 MG: 4 INJECTION, SOLUTION, CONCENTRATE INTRAVENOUS at 13:19

## 2017-05-31 RX ADMIN — Medication 10 ML: at 09:09

## 2017-05-31 RX ADMIN — FAMOTIDINE 20 MG: 10 INJECTION, SOLUTION INTRAVENOUS at 11:31

## 2017-05-31 RX ADMIN — FULVESTRANT 500 MG: 50 INJECTION INTRAMUSCULAR at 14:26

## 2017-05-31 RX ADMIN — DIPHENHYDRAMINE HYDROCHLORIDE: 50 INJECTION INTRAMUSCULAR; INTRAVENOUS at 10:48

## 2017-05-31 RX ADMIN — PERTUZUMAB 420 MG: 30 INJECTION, SOLUTION, CONCENTRATE INTRAVENOUS at 11:53

## 2017-05-31 RX ADMIN — ACETAMINOPHEN 650 MG: 325 TABLET ORAL at 10:48

## 2017-06-01 ENCOUNTER — DOCUMENTATION (OUTPATIENT)
Dept: ONCOLOGY | Facility: CLINIC | Age: 42
End: 2017-06-01

## 2017-06-01 LAB
CANCER AG15-3 SERPL-ACNC: 29.7 U/ML (ref 0–25)
CANCER AG27-29 SERPL-ACNC: 32.7 U/ML (ref 0–38.6)

## 2017-06-01 NOTE — PROGRESS NOTES
Pt. Was seen by undersigned during her visit on 5-31-17.   Pt. Known to undersigned from previous interventions.   SW offered emotional support and engaged in client centered therapy as pt. Processed psychosocial stressors to include complex and conflicted family dynamics and her ongoing health care needs, barriers to care and recent medical experiences related to surgery on her foot.   Pt. Was openly supported and feelings normalized. SW served as advocate and discussed patient rights.  SW inquired as to family dynamics and pt safety at home.  Pt denies incident of abuse or neglect and states her home environment is safe.   Clarifying her response to risk assessment as feeling disrespected and unsupported by her family at home. She resides with adult female child that is home from college, 17 year old son and 9 year old son.  Pt. Is unable to provide her own transportation and has needed assistance with mobility and describes poor support system.  SW and pt processed other areas of support to include public transportation,  Cancer support group and ongoing support of undersigned to process and problem solve.   Psychosocial stressors include poor support system, multiple medical issues to include stage IV breast cancer, family stress and conflict, financial problems, transportation issues.Pt responded receptive to feedback and presents to benefit from ability to process stressors. Ongoing supportive counseling with undersigned encouraged.

## 2017-06-07 ENCOUNTER — APPOINTMENT (OUTPATIENT)
Dept: ONCOLOGY | Facility: HOSPITAL | Age: 42
End: 2017-06-07

## 2017-06-08 ENCOUNTER — APPOINTMENT (OUTPATIENT)
Dept: ONCOLOGY | Facility: HOSPITAL | Age: 42
End: 2017-06-08

## 2017-06-08 ENCOUNTER — INFUSION (OUTPATIENT)
Dept: ONCOLOGY | Facility: HOSPITAL | Age: 42
End: 2017-06-08

## 2017-06-08 DIAGNOSIS — C50.911 CARCINOMA OF RIGHT BREAST METASTATIC TO BONE (HCC): ICD-10-CM

## 2017-06-08 DIAGNOSIS — Z45.2 ENCOUNTER FOR ASSESSMENT OF PERIPHERALLY INSERTED CENTRAL VENOUS CATHETER (PICC): Primary | ICD-10-CM

## 2017-06-08 DIAGNOSIS — C79.51 CARCINOMA OF RIGHT BREAST METASTATIC TO BONE (HCC): ICD-10-CM

## 2017-06-08 PROCEDURE — G0463 HOSPITAL OUTPT CLINIC VISIT: HCPCS | Performed by: INTERNAL MEDICINE

## 2017-06-08 RX ORDER — SODIUM CHLORIDE 0.9 % (FLUSH) 0.9 %
10 SYRINGE (ML) INJECTION AS NEEDED
Status: CANCELLED | OUTPATIENT
Start: 2017-06-08

## 2017-06-08 RX ORDER — SODIUM CHLORIDE 0.9 % (FLUSH) 0.9 %
10 SYRINGE (ML) INJECTION AS NEEDED
Status: DISCONTINUED | OUTPATIENT
Start: 2017-06-08 | End: 2017-06-08 | Stop reason: HOSPADM

## 2017-06-08 RX ADMIN — Medication 10 ML: at 14:29

## 2017-06-09 ENCOUNTER — OFFICE VISIT (OUTPATIENT)
Dept: PODIATRY | Facility: CLINIC | Age: 42
End: 2017-06-09

## 2017-06-09 VITALS — BODY MASS INDEX: 37.32 KG/M2 | WEIGHT: 224 LBS | HEIGHT: 65 IN

## 2017-06-09 DIAGNOSIS — M79.671 RIGHT FOOT PAIN: ICD-10-CM

## 2017-06-09 DIAGNOSIS — S92.254G CLOSED NONDISPLACED FRACTURE OF NAVICULAR BONE OF RIGHT FOOT WITH DELAYED HEALING, SUBSEQUENT ENCOUNTER: Primary | ICD-10-CM

## 2017-06-09 PROCEDURE — 99024 POSTOP FOLLOW-UP VISIT: CPT | Performed by: PODIATRIST

## 2017-06-09 NOTE — PROGRESS NOTES
Andra Villareal  1975  41 y.o. female   06/09/2017    Chief Complaint   Patient presents with   • Left Ankle - Post-op Follow-up       History of Present Illness    Ms Villareal is a 42 y/o female who presents for f/u evaluation of right navicular avulsion fracture. She underwent excision of non-union fracture fragment, talonavicular joint debridement on 5/24/17. Pain is improving. Still minimally weightbearing in CAM boot.    Past Medical History:   Diagnosis Date   • Abnormal breathing sounds    • Abscess of skin     and / or subcutaneous tissue   • Acute bronchitis     unspecified   • Adult body mass index 30+     obesity-BMI 33   • Allergic rhinitis    • Anasarca    • Anxiety     currently on xanax   • Anxiety    • Asthenia    • Asthma     moderate persistent   • Asthmatic bronchitis    • Astigmatism    • Bleeding external hemorrhoids    • Body mass index (BMI) of 34.0-34.9 in adult    • Body mass index (BMI) of 35.0-35.9 in adult    • Body mass index (BMI) of 36.0-36.9 in adult    • Body mass index 40.0-44.9, adult     SEVERELY OBESE   • Cellulitis    • Chemotherapy induced nausea and vomiting    • Chronic back pain    • Chronic cough    • Chronic hypoxemic respiratory failure     on NC at 3 LPM   • Cough    • Depressive disorder    • Diabetes mellitus     no retinopathy   • Dyslipidemia    • Edema of lower extremity    • Encounter for follow-up examination after completed treatment for malignant neoplasm    • Epidermoid cyst of skin     excision with secondary intention healing   • Excessive and frequent menstruation with irregular cycle     Vaginal bleeding after 4 years of no bleeding secondary to chemotherapy for breast cancer; currently being treated for breast cancer for the second time, mets to bone and liver while on chemotherapy      • Flushing    • 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    • Gastroesophageal reflux disease    • H/O tubal ligation    • History of blood clots     clots around  mediports x 2   • Hx of echocardiogram     w/ color flow, and w/o color flow   • Hyperglycemia    • Hypermetropia    • Hypertension    • Hypokalemia    • Impaired glucose tolerance     hgbalc 6.2   • Infection of skin     and /or subcutaneous tissue-around the catheter exit site   • Influenza     needs influenza immunization   • Irregular periods    • Lesion of lumbar spine     pain controlled with percocet and lidocaine patches   • Lumbago with sciatica    • Malignant neoplasm of female breast     s/p right mastectomy, mets to bone and liver on chemotherapy      • Menopausal flushing    • Muscle weakness    • Muscle weakness (generalized)    • Nonspecific interstitial pneumonia     symptomatically improved   • Pleural effusion     refractory massive right, most likely malignant effusion   • Primary atypical pneumonia    • Renal failure     acute drug-induced renal failure   • Sinus tachycardia    • Tobacco dependence syndrome    • Tobacco non-user    • Upper respiratory infection    • Wheezing          Past Surgical History:   Procedure Laterality Date   • ANKLE LOOSE BODY EXCISION/REMOVAL Right 5/24/2017    Procedure: RIGTH FOOT EXCISION NAVICULAR FRACTURES FRAGMENT  DEBRIDEMENT TALONAVILCULA RJOIN T;  Surgeon: Armani Oliveira DPM;  Location: [facility] OR;  Service:    • BREAST SURGERY Right 2013     reconstruction of right breast, trans flap surgery   • CENTRAL VENOUS CATHETER TUNNELED INSERTION SINGLE LUMEN      Placement of indwelling Pleurx catheter right   • COLONOSCOPY N/A 1/26/2017    Procedure: COLONOSCOPY;  Surgeon: Robert Clifton MD;  Location: [facility] ENDOSCOPY;  Service:    • ENDOSCOPY N/A 1/26/2017    Procedure: ESOPHAGOGASTRODUODENOSCOPY;  Surgeon: Robert Clifton MD;  Location: [facility] ENDOSCOPY;  Service:    • ENDOSCOPY N/A 2/24/2017    Procedure: ESOPHAGOGASTRODUODENOSCOPY;  Surgeon: Robert Clifton MD;  Location: [facility] ENDOSCOPY;  Service:    • HYSTERECTOMY      vaginal   • LIVER BIOPSY  2015   •  LUNG VOLUME REDUCTION     • MASTECTOMY      partial   • OTHER SURGICAL HISTORY  05/05/2016    central line insertion , PICC line replacement   • OTHER SURGICAL HISTORY      place breast clip percut   • OTHER SURGICAL HISTORY      pulse oximetry   • OTHER SURGICAL HISTORY      remove cc cath w/ sc port or pump, Removal of right internal jugular MediPort   • OTHER SURGICAL HISTORY      spirometry   • OTHER SURGICAL HISTORY      tubal sterilization   • PAP SMEAR     • THORACENTESIS      aspiration of pleural space   • TUBAL ABDOMINAL LIGATION  2009   • UPPER GASTROINTESTINAL ENDOSCOPY  01/26/2017   • UPPER GASTROINTESTINAL ENDOSCOPY  02/24/2017   • VENOUS ACCESS DEVICE (PORT) INSERTION      Ultrasound guided right internal jugular Mediport placement under fluoroscopy         Family History   Problem Relation Age of Onset   • Coronary artery disease Other    • Diabetes Other    • Hypertension Other          Social History     Social History   • Marital status:      Spouse name: N/A   • Number of children: N/A   • Years of education: N/A     Occupational History   • Not on file.     Social History Main Topics   • Smoking status: Former Smoker     Quit date: 2012   • Smokeless tobacco: Never Used   • Alcohol use No   • Drug use: No   • Sexual activity: Defer     Other Topics Concern   • Not on file     Social History Narrative         Current Outpatient Prescriptions   Medication Sig Dispense Refill   • albuterol (PROAIR RESPICLICK) 108 (90 BASE) MCG/ACT inhaler Inhale 2 puffs Every 6 (Six) Hours As Needed for Wheezing or Shortness of Air. 1 inhaler 11   • Ascorbic Acid (VITAMIN C PO) Take 1 tablet by mouth 3 (Three) Times a Day.     • cetirizine (zyrTEC) 10 MG tablet Take 1 tablet by mouth Daily. 30 tablet 5   • clonazePAM (KlonoPIN) 2 MG tablet Take 1 tablet by mouth 2 (Two) Times a Day As Needed for Anxiety. 60 tablet 2   • diltiaZEM CD (CARTIA XT) 120 MG 24 hr capsule Take 120 mg by mouth Every Night.     •  ferrous sulfate 325 (65 FE) MG tablet Take 1 tablet by mouth 3 (Three) Times a Day. 90 tablet 5   • fluticasone (FLONASE) 50 MCG/ACT nasal spray 2 sprays into each nostril Daily. 16 g 12   • Fluticasone Furoate-Vilanterol (BREO ELLIPTA) 100-25 MCG/INH aerosol powder  Inhale 1 puff Daily. 28 each 12   • HYDROmorphone (DILAUDID) 4 MG tablet Take 1 tablet by mouth Every 4 (Four) Hours As Needed for Moderate Pain (4-6) or Severe Pain (7-10). 40 tablet 0   • insulin detemir (LEVEMIR) 100 UNIT/ML injection Inject 8 Units under the skin At Night As Needed.     • lubiprostone (AMITIZA) 24 MCG capsule Take 1 capsule by mouth 2 (Two) Times a Day With Meals. 60 capsule 5   • magnesium oxide (MAGNESIUM-OXIDE) 400 (241.3 MG) MG tablet tablet Take 1 tablet by mouth 2 (Two) Times a Day. 60 tablet 11   • montelukast (SINGULAIR) 10 MG tablet Take 1 tablet by mouth Every Night. 30 tablet 5   • Omega-3 Fatty Acids (FISH OIL) 1000 MG capsule capsule Take 2 capsules by mouth Daily With Breakfast. (Patient taking differently: Take 2,000 mg by mouth 2 (Two) Times a Day.) 60 capsule 11   • ondansetron (ZOFRAN) 8 MG tablet Take 1 tablet by mouth Every 8 (Eight) Hours As Needed for Nausea or Vomiting. 30 tablet 0   • oxyCODONE-acetaminophen (PERCOCET)  MG per tablet Take 1 tablet by mouth Every 6 (Six) Hours As Needed for Moderate Pain (4-6). 120 tablet 0   • pantoprazole (PROTONIX) 40 MG EC tablet Take 1 tablet by mouth Every Night. 90 tablet 3   • potassium chloride (K-DUR,KLOR-CON) 20 MEQ CR tablet Take 1 tablet by mouth 2 (Two) Times a Day. 60 tablet 5   • promethazine (PHENERGAN) 25 MG tablet Take 1 tablet by mouth Every 6 (Six) Hours As Needed for nausea or vomiting. 120 tablet 5   • raNITIdine (ZANTAC) 150 MG tablet Take 1 tablet by mouth 2 (Two) Times a Day. 60 tablet 5   • rivaroxaban (XARELTO) 20 MG tablet Take 1 tablet by mouth Daily. 30 tablet 5   • traZODone (DESYREL) 50 MG tablet Take 1 tablet by mouth Every Night. 30  "tablet 5   • venlafaxine XR (EFFEXOR-XR) 75 MG 24 hr capsule Take 1 capsule by mouth Daily. (Patient taking differently: Take 75 mg by mouth Every Night.) 30 capsule 5     No current facility-administered medications for this visit.      Facility-Administered Medications Ordered in Other Visits   Medication Dose Route Frequency Provider Last Rate Last Dose   • sodium chloride 0.9 % flush 10 mL  10 mL Intravenous PRN Joselito Waddell MD   10 mL at 01/26/17 1129         OBJECTIVE    Ht 65\" (165.1 cm)  Wt 224 lb (102 kg)  BMI 37.28 kg/m2      Review of Systems   Constitutional:  Denies recent weight loss, weight gain, fever or chills, no change in exercise tolerance  Musculoskeletal: Foot pain.   Skin: No wounds or lesions  Neurological: Denies paresthesias.  Psychiatric/Behavioral: Denies depression    Physical Exam   Constitutional: he appears well-developed and well-nourished.   HEENT: Normocephalic. Atraumatic.  CV: No CP. RRR  Resp: Non-labored respirations.  Psychiatric: he has a normal mood and affect. his behavior is normal.         Lower Extremity Exam:  Vascular: DP/PT pulses palpable 2+.   Mild right ankle edema, ecchymosis--improving  Foot warm  Neuro: Protective sensation intact, b/l.  Light touch sensation intact, b/l  DTRs intact  Integument: No open wounds or lesions.  No erythema, scaling  Right dorsal foot incision caoted with sutures in place. No SOI.  Musculoskeletal:  RLE muscle strength 5/5.   Moderate pain over dorsal TN joint, TN ROM        ASSESSMENT AND PLAN    Andra was seen today for post-op follow-up.    Diagnoses and all orders for this visit:    Closed nondisplaced fracture of navicular bone of right foot with delayed healing, subsequent encounter    Right foot pain    -Comprehensive foot and ankle exam performed  -Sutures removed, may begin getting incision wet.  -Dressing changed, keep incision covered outside of bathing  -Ice/elevate extremity  -CAM boot at all times, progress " weightbearing  -Recheck 2 weeks          This document has been electronically signed by Armani Oliveira DPM on Jemma 10, 2017 2:10 PM     Armani Oliveira DPM  6/10/2017  2:10 PM

## 2017-06-12 DIAGNOSIS — J30.9 CHRONIC ALLERGIC RHINITIS: ICD-10-CM

## 2017-06-12 RX ORDER — PANTOPRAZOLE SODIUM 20 MG/1
TABLET, DELAYED RELEASE ORAL
Qty: 30 TABLET | Refills: 5 | Status: SHIPPED | OUTPATIENT
Start: 2017-06-12 | End: 2017-06-18

## 2017-06-12 RX ORDER — CETIRIZINE HYDROCHLORIDE 10 MG/1
TABLET ORAL
Qty: 30 TABLET | Refills: 2 | Status: SHIPPED | OUTPATIENT
Start: 2017-06-12 | End: 2017-09-07 | Stop reason: SDUPTHER

## 2017-06-12 RX ORDER — MONTELUKAST SODIUM 10 MG/1
TABLET ORAL
Qty: 30 TABLET | Refills: 2 | Status: SHIPPED | OUTPATIENT
Start: 2017-06-12 | End: 2017-07-17 | Stop reason: SDUPTHER

## 2017-06-14 ENCOUNTER — INFUSION (OUTPATIENT)
Dept: ONCOLOGY | Facility: HOSPITAL | Age: 42
End: 2017-06-14

## 2017-06-14 ENCOUNTER — APPOINTMENT (OUTPATIENT)
Dept: ONCOLOGY | Facility: HOSPITAL | Age: 42
End: 2017-06-14

## 2017-06-14 DIAGNOSIS — C50.911 CARCINOMA OF RIGHT BREAST METASTATIC TO BONE (HCC): ICD-10-CM

## 2017-06-14 DIAGNOSIS — Z45.2 ENCOUNTER FOR ASSESSMENT OF PERIPHERALLY INSERTED CENTRAL VENOUS CATHETER (PICC): Primary | ICD-10-CM

## 2017-06-14 DIAGNOSIS — C79.51 CARCINOMA OF RIGHT BREAST METASTATIC TO BONE (HCC): ICD-10-CM

## 2017-06-14 PROCEDURE — 96523 IRRIG DRUG DELIVERY DEVICE: CPT | Performed by: INTERNAL MEDICINE

## 2017-06-14 RX ORDER — SODIUM CHLORIDE 0.9 % (FLUSH) 0.9 %
10 SYRINGE (ML) INJECTION AS NEEDED
Status: CANCELLED | OUTPATIENT
Start: 2017-06-15

## 2017-06-14 RX ORDER — SODIUM CHLORIDE 0.9 % (FLUSH) 0.9 %
10 SYRINGE (ML) INJECTION AS NEEDED
Status: DISCONTINUED | OUTPATIENT
Start: 2017-06-14 | End: 2017-06-14 | Stop reason: HOSPADM

## 2017-06-14 RX ADMIN — Medication 10 ML: at 08:49

## 2017-06-16 ENCOUNTER — OFFICE VISIT (OUTPATIENT)
Dept: FAMILY MEDICINE CLINIC | Facility: CLINIC | Age: 42
End: 2017-06-16

## 2017-06-16 VITALS
BODY MASS INDEX: 37.15 KG/M2 | SYSTOLIC BLOOD PRESSURE: 126 MMHG | TEMPERATURE: 96.3 F | WEIGHT: 223 LBS | HEART RATE: 86 BPM | OXYGEN SATURATION: 95 % | HEIGHT: 65 IN | DIASTOLIC BLOOD PRESSURE: 74 MMHG

## 2017-06-16 DIAGNOSIS — T40.2X5A CONSTIPATION DUE TO OPIOID THERAPY: ICD-10-CM

## 2017-06-16 DIAGNOSIS — C79.51 BREAST CANCER METASTASIZED TO BONE, LEFT (HCC): ICD-10-CM

## 2017-06-16 DIAGNOSIS — M51.34 DEGENERATIVE DISC DISEASE, THORACIC: ICD-10-CM

## 2017-06-16 DIAGNOSIS — M54.42 CHRONIC BILATERAL LOW BACK PAIN WITH LEFT-SIDED SCIATICA: Primary | ICD-10-CM

## 2017-06-16 DIAGNOSIS — C50.912 BREAST CANCER METASTASIZED TO BONE, LEFT (HCC): ICD-10-CM

## 2017-06-16 DIAGNOSIS — C79.51 CARCINOMA OF RIGHT BREAST METASTATIC TO BONE (HCC): ICD-10-CM

## 2017-06-16 DIAGNOSIS — R60.0 PEDAL EDEMA: ICD-10-CM

## 2017-06-16 DIAGNOSIS — K59.03 CONSTIPATION DUE TO OPIOID THERAPY: ICD-10-CM

## 2017-06-16 DIAGNOSIS — C50.911 CARCINOMA OF RIGHT BREAST METASTATIC TO BONE (HCC): ICD-10-CM

## 2017-06-16 DIAGNOSIS — M79.671 RIGHT FOOT PAIN: ICD-10-CM

## 2017-06-16 DIAGNOSIS — G89.3 CANCER ASSOCIATED PAIN: ICD-10-CM

## 2017-06-16 DIAGNOSIS — G89.29 CHRONIC BILATERAL LOW BACK PAIN WITH LEFT-SIDED SCIATICA: Primary | ICD-10-CM

## 2017-06-16 DIAGNOSIS — F41.1 GAD (GENERALIZED ANXIETY DISORDER): ICD-10-CM

## 2017-06-16 PROCEDURE — 99214 OFFICE O/P EST MOD 30 MIN: CPT | Performed by: FAMILY MEDICINE

## 2017-06-16 RX ORDER — OXYCODONE AND ACETAMINOPHEN 10; 325 MG/1; MG/1
1 TABLET ORAL EVERY 6 HOURS PRN
Qty: 120 TABLET | Refills: 0 | Status: SHIPPED | OUTPATIENT
Start: 2017-06-16 | End: 2017-07-17

## 2017-06-16 NOTE — PROGRESS NOTES
Subjective   Chief Complaint   Patient presents with   • Follow-up     4 week follow up     Andra Villareal is a 41 y.o. female.   Follow-up (4 week follow up)    History of Present Illness     Jordin Oliveira scoped due to GI bleed, rectal bleed- diagnosed with gerd and chronic gastritis  Ceruloplasmin was elevated   Diagnosed with iron deficiency, cirrhosis of the liver, GERD, fatty liver, lesion on stomach  Rectal bleeding - controlled s/p argon coagulation treatment 2/23/17  Follow up scheduled in September    Chronic pain relating to metastatic breast cancer to bone  Not controlled with percocet   Chronic lumbar spine pain, history of metastatic lesions to the thoracic spine  Symptoms have worsened  Located across the lower back and radiates down the left lower extremity  Interested in a repeat MRI  Last MRI done 2/2016    Metastatic breast cancer s/p right mastectomy with breast reconstructive surgery with metastases to liver and bone with new lesion noted on CT 10/26/16 of anterior mediastinal mass measuring 4.9x5.1x3.3cm  Followed at [facility] with Dr Waddell  Treatment for new lesion with chemotherapy - currently on treatment with herceptin, perjeta and faslodex  Bone metastasis - scheduled with zometa    STEVEN - currently controlled with klonopin PRN    Idiopathic chronic constipation with opioid use - controlled with amitiza  Failed miralax caused her rebound diarrhea  Failed movantik - caused her diarrhea    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  Patient is complaining of moderate pain in the foot  Provided dilaudid by Dr Oliveira  Instructed to wear CAM boot  Recheck scheduled 6/19    The following portions of the patient's history were reviewed and updated as appropriate: allergies, current medications, past family history, past medical history, past social history, past surgical history and problem list.    Review of Systems   Constitutional: Negative for appetite change, chills, fatigue and fever.   HENT: Negative for congestion, ear pain, rhinorrhea and sore throat.    Eyes: Negative for pain.   Respiratory: Negative for cough and shortness of breath.    Cardiovascular: Negative for chest pain and palpitations.   Gastrointestinal: Negative for abdominal pain, constipation, diarrhea and nausea.   Genitourinary: Negative for dysuria.   Musculoskeletal: Positive for arthralgias and back pain. Negative for joint swelling and neck pain.   Skin: Positive for wound. Negative for rash.   Neurological: Negative for dizziness and headaches.       Objective   /74  Pulse 86  Temp 96.3 °F (35.7 °C)  Ht 65\" (165.1 cm)  Wt 223 lb (101 kg)  SpO2 95%  BMI 37.11 kg/m2  Physical Exam   Constitutional: She is oriented to person, place, and time. She appears well-developed and well-nourished.   HENT:   Head: Normocephalic and atraumatic.   Eyes: Pupils are equal, round, and reactive to light.   Neck: Normal range of motion. Neck supple.   Cardiovascular: Normal rate, regular rhythm and normal heart sounds.    Lower extremity edema +1   Pulmonary/Chest: Effort normal and breath sounds normal. No respiratory distress. She has no wheezes. She has no rales.   Abdominal: Soft. Bowel sounds are normal.   Musculoskeletal:        Lumbar back: She exhibits decreased range of motion, bony tenderness and pain.   CAM boot on the RLE   Neurological: She is alert and oriented to person, place, and time.   Skin: Skin is warm and dry.   Surgical wound of the RLE at " the dorsal midfoot - no signs of infection   Psychiatric: She has a normal mood and affect.   Nursing note and vitals reviewed.      Assessment/Plan   Problems Addressed this Visit        Digestive    Constipation due to opioid therapy       Musculoskeletal and Integument    Degenerative disc disease, thoracic    Carcinoma of right breast metastatic to bone    Relevant Medications    oxyCODONE-acetaminophen (PERCOCET)  MG per tablet    Breast cancer metastasized to bone    Relevant Medications    oxyCODONE-acetaminophen (PERCOCET)  MG per tablet    Other Relevant Orders    MRI Lumbar Spine Without Contrast       Other    STEVEN (generalized anxiety disorder)    Pedal edema    Cancer associated pain      Other Visit Diagnoses     Chronic bilateral low back pain with left-sided sciatica    -  Primary    Relevant Orders    MRI Lumbar Spine Without Contrast    Right foot pain            Refilled percocet    MRI ordered    Dressed wound  Provided wound supplies to patient    Follow up appointments scheduled     Recheck in 4 weeks

## 2017-06-18 PROBLEM — Z45.2 ENCOUNTER FOR ASSESSMENT OF PERIPHERALLY INSERTED CENTRAL VENOUS CATHETER (PICC): Status: RESOLVED | Noted: 2017-03-30 | Resolved: 2017-06-18

## 2017-06-18 PROBLEM — Z00.00 INITIAL MEDICARE ANNUAL WELLNESS VISIT: Status: RESOLVED | Noted: 2017-05-16 | Resolved: 2017-06-18

## 2017-06-19 ENCOUNTER — OFFICE VISIT (OUTPATIENT)
Dept: PODIATRY | Facility: CLINIC | Age: 42
End: 2017-06-19

## 2017-06-19 VITALS — BODY MASS INDEX: 37.15 KG/M2 | WEIGHT: 223 LBS | HEIGHT: 65 IN

## 2017-06-19 DIAGNOSIS — M79.671 RIGHT FOOT PAIN: ICD-10-CM

## 2017-06-19 DIAGNOSIS — S92.254G CLOSED NONDISPLACED FRACTURE OF NAVICULAR BONE OF RIGHT FOOT WITH DELAYED HEALING, SUBSEQUENT ENCOUNTER: Primary | ICD-10-CM

## 2017-06-19 PROCEDURE — 99024 POSTOP FOLLOW-UP VISIT: CPT | Performed by: PODIATRIST

## 2017-06-19 RX ORDER — HYDROMORPHONE HYDROCHLORIDE 4 MG/1
4 TABLET ORAL EVERY 4 HOURS PRN
Qty: 30 TABLET | Refills: 0 | Status: SHIPPED | OUTPATIENT
Start: 2017-06-19 | End: 2017-07-12

## 2017-06-19 NOTE — PROGRESS NOTES
Andra Villareal  1975  41 y.o. female   06/19/2017    Patient presents today for left ankle post-op.      History of Present Illness    Ms Villareal is a 40 y/o female who presents for f/u evaluation of right navicular avulsion fracture. She underwent excision of non-union fracture fragment, talonavicular joint debridement on 5/24/17. Pain is improving. Still heel weightbearing in CAM boot. Does note some numbness to incision site.    Past Medical History:   Diagnosis Date   • Abnormal breathing sounds    • Abscess of skin     and / or subcutaneous tissue   • Acute bronchitis     unspecified   • Adult body mass index 30+     obesity-BMI 33   • Allergic rhinitis    • Anasarca    • Anxiety     currently on xanax   • Anxiety    • Asthenia    • Asthma     moderate persistent   • Asthmatic bronchitis    • Astigmatism    • Bleeding external hemorrhoids    • Body mass index (BMI) of 34.0-34.9 in adult    • Body mass index (BMI) of 35.0-35.9 in adult    • Body mass index (BMI) of 36.0-36.9 in adult    • Body mass index 40.0-44.9, adult     SEVERELY OBESE   • Cellulitis    • Chemotherapy induced nausea and vomiting    • Chronic back pain    • Chronic cough    • Chronic hypoxemic respiratory failure     on NC at 3 LPM   • Cough    • Depressive disorder    • Diabetes mellitus     no retinopathy   • Dyslipidemia    • Edema of lower extremity    • Encounter for follow-up examination after completed treatment for malignant neoplasm    • Epidermoid cyst of skin     excision with secondary intention healing   • Excessive and frequent menstruation with irregular cycle     Vaginal bleeding after 4 years of no bleeding secondary to chemotherapy for breast cancer; currently being treated for breast cancer for the second time, mets to bone and liver while on chemotherapy      • Flushing    • 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    • Gastroesophageal reflux disease    • H/O tubal ligation    • History of blood clots     clots  around mediports x 2   • Hx of echocardiogram     w/ color flow, and w/o color flow   • Hyperglycemia    • Hypermetropia    • Hypertension    • Hypokalemia    • Impaired glucose tolerance     hgbalc 6.2   • Infection of skin     and /or subcutaneous tissue-around the catheter exit site   • Influenza     needs influenza immunization   • Irregular periods    • Lesion of lumbar spine     pain controlled with percocet and lidocaine patches   • Lumbago with sciatica    • Malignant neoplasm of female breast     s/p right mastectomy, mets to bone and liver on chemotherapy      • Menopausal flushing    • Muscle weakness    • Muscle weakness (generalized)    • Nonspecific interstitial pneumonia     symptomatically improved   • Pleural effusion     refractory massive right, most likely malignant effusion   • Primary atypical pneumonia    • Renal failure     acute drug-induced renal failure   • Sinus tachycardia    • Tobacco dependence syndrome    • Tobacco non-user    • Upper respiratory infection    • Wheezing          Past Surgical History:   Procedure Laterality Date   • ANKLE LOOSE BODY EXCISION/REMOVAL Right 5/24/2017    Procedure: RIGTH FOOT EXCISION NAVICULAR FRACTURES FRAGMENT  DEBRIDEMENT TALONAVILCULA RJOIN T;  Surgeon: Armani Oliveira DPM;  Location: [facility] OR;  Service:    • BREAST SURGERY Right 2013     reconstruction of right breast, trans flap surgery   • CENTRAL VENOUS CATHETER TUNNELED INSERTION SINGLE LUMEN      Placement of indwelling Pleurx catheter right   • COLONOSCOPY N/A 1/26/2017    Procedure: COLONOSCOPY;  Surgeon: Robert Clifton MD;  Location: [facility] ENDOSCOPY;  Service:    • ENDOSCOPY N/A 1/26/2017    Procedure: ESOPHAGOGASTRODUODENOSCOPY;  Surgeon: Robert Clifton MD;  Location: [facility] ENDOSCOPY;  Service:    • ENDOSCOPY N/A 2/24/2017    Procedure: ESOPHAGOGASTRODUODENOSCOPY;  Surgeon: Robert Clifton MD;  Location: [facility] ENDOSCOPY;  Service:    • HYSTERECTOMY      vaginal   • LIVER BIOPSY   2015   • LUNG VOLUME REDUCTION     • MASTECTOMY      partial   • OTHER SURGICAL HISTORY  05/05/2016    central line insertion , PICC line replacement   • OTHER SURGICAL HISTORY      place breast clip percut   • OTHER SURGICAL HISTORY      pulse oximetry   • OTHER SURGICAL HISTORY      remove cc cath w/ sc port or pump, Removal of right internal jugular MediPort   • OTHER SURGICAL HISTORY      spirometry   • OTHER SURGICAL HISTORY      tubal sterilization   • PAP SMEAR     • THORACENTESIS      aspiration of pleural space   • TUBAL ABDOMINAL LIGATION  2009   • UPPER GASTROINTESTINAL ENDOSCOPY  01/26/2017   • UPPER GASTROINTESTINAL ENDOSCOPY  02/24/2017   • VENOUS ACCESS DEVICE (PORT) INSERTION      Ultrasound guided right internal jugular Mediport placement under fluoroscopy         Family History   Problem Relation Age of Onset   • Coronary artery disease Other    • Diabetes Other    • Hypertension Other          Social History     Social History   • Marital status:      Spouse name: N/A   • Number of children: N/A   • Years of education: N/A     Occupational History   • Not on file.     Social History Main Topics   • Smoking status: Former Smoker     Quit date: 2012   • Smokeless tobacco: Never Used   • Alcohol use No   • Drug use: No   • Sexual activity: Defer     Other Topics Concern   • Not on file     Social History Narrative         Current Outpatient Prescriptions   Medication Sig Dispense Refill   • albuterol (PROAIR RESPICLICK) 108 (90 BASE) MCG/ACT inhaler Inhale 2 puffs Every 6 (Six) Hours As Needed for Wheezing or Shortness of Air. 1 inhaler 11   • Ascorbic Acid (VITAMIN C PO) Take 1 tablet by mouth 3 (Three) Times a Day.     • cetirizine (zyrTEC) 10 MG tablet TAKE ONE TABLET BY MOUTH EVERY DAY 30 tablet 2   • clonazePAM (KlonoPIN) 2 MG tablet Take 1 tablet by mouth 2 (Two) Times a Day As Needed for Anxiety. 60 tablet 2   • diltiaZEM CD (CARTIA XT) 120 MG 24 hr capsule Take 120 mg by mouth Every  Night.     • ferrous sulfate 325 (65 FE) MG tablet Take 1 tablet by mouth 3 (Three) Times a Day. 90 tablet 5   • fluticasone (FLONASE) 50 MCG/ACT nasal spray 2 sprays into each nostril Daily. 16 g 12   • Fluticasone Furoate-Vilanterol (BREO ELLIPTA) 100-25 MCG/INH aerosol powder  Inhale 1 puff Daily. 28 each 12   • HYDROmorphone (DILAUDID) 4 MG tablet Take 1 tablet by mouth Every 4 (Four) Hours As Needed for Severe Pain (7-10). 30 tablet 0   • insulin detemir (LEVEMIR) 100 UNIT/ML injection Inject 8 Units under the skin At Night As Needed.     • lubiprostone (AMITIZA) 24 MCG capsule Take 1 capsule by mouth 2 (Two) Times a Day With Meals. 60 capsule 5   • magnesium oxide (MAGNESIUM-OXIDE) 400 (241.3 MG) MG tablet tablet Take 1 tablet by mouth 2 (Two) Times a Day. 60 tablet 11   • montelukast (SINGULAIR) 10 MG tablet TAKE ONE TABLET BY MOUTH EVERY NIGHT 30 tablet 2   • Omega-3 Fatty Acids (FISH OIL) 1000 MG capsule capsule Take 2 capsules by mouth Daily With Breakfast. (Patient taking differently: Take 2,000 mg by mouth 2 (Two) Times a Day.) 60 capsule 11   • ondansetron (ZOFRAN) 8 MG tablet Take 1 tablet by mouth Every 8 (Eight) Hours As Needed for Nausea or Vomiting. 30 tablet 0   • oxyCODONE-acetaminophen (PERCOCET)  MG per tablet Take 1 tablet by mouth Every 6 (Six) Hours As Needed for Moderate Pain (4-6). 120 tablet 0   • potassium chloride (K-DUR,KLOR-CON) 20 MEQ CR tablet Take 1 tablet by mouth 2 (Two) Times a Day. 60 tablet 5   • promethazine (PHENERGAN) 25 MG tablet Take 1 tablet by mouth Every 6 (Six) Hours As Needed for nausea or vomiting. 120 tablet 5   • raNITIdine (ZANTAC) 150 MG tablet Take 1 tablet by mouth 2 (Two) Times a Day. 60 tablet 5   • rivaroxaban (XARELTO) 20 MG tablet Take 1 tablet by mouth Daily. 30 tablet 5   • traZODone (DESYREL) 50 MG tablet Take 1 tablet by mouth Every Night. 30 tablet 5   • venlafaxine XR (EFFEXOR-XR) 75 MG 24 hr capsule Take 1 capsule by mouth Daily. (Patient  "taking differently: Take 75 mg by mouth Every Night.) 30 capsule 5     No current facility-administered medications for this visit.      Facility-Administered Medications Ordered in Other Visits   Medication Dose Route Frequency Provider Last Rate Last Dose   • sodium chloride 0.9 % flush 10 mL  10 mL Intravenous PRN Joselito Waddell MD   10 mL at 01/26/17 1129         OBJECTIVE    Ht 65\" (165.1 cm)  Wt 223 lb (101 kg)  BMI 37.11 kg/m2      Review of Systems   Constitutional:  Denies recent weight loss, weight gain, fever or chills, no change in exercise tolerance  Musculoskeletal: Foot pain.   Skin: No wounds or lesions  Neurological: Denies paresthesias.  Psychiatric/Behavioral: Denies depression    Physical Exam   Constitutional: he appears well-developed and well-nourished.   HEENT: Normocephalic. Atraumatic.  CV: No CP. RRR  Resp: Non-labored respirations.  Psychiatric: he has a normal mood and affect. his behavior is normal.         Lower Extremity Exam:  Vascular: DP/PT pulses palpable 2+.   Mild right ankle edema, ecchymosis--improving  Foot warm  Neuro: Protective sensation intact, b/l.  Light touch sensation intact, b/l  DTRs intact  Integument: Right dorsal foot incision with mild superficial thickness breakdown, No SOI.  No erythema, scaling  Musculoskeletal:  RLE muscle strength 5/5.   Moderate pain over dorsal TN joint, TN ROM        ASSESSMENT AND PLAN    Andra was seen today for follow-up and post-op.    Diagnoses and all orders for this visit:    Closed nondisplaced fracture of navicular bone of right foot with delayed healing, subsequent encounter    Right foot pain  -     XR Ankle 3+ View Right    Other orders  -     HYDROmorphone (DILAUDID) 4 MG tablet; Take 1 tablet by mouth Every 4 (Four) Hours As Needed for Severe Pain (7-10).    -Comprehensive foot and ankle exam performed  -Cont to keep incision covered outside of bathing  -Ice/elevate extremity  -CAM boot at all times, progress " weightbearing  -Recheck 2 weeks          This document has been electronically signed by Armani Oliveira DPM on June 19, 2017 8:56 PM     Armani Oliveira DPM  6/19/2017  8:56 PM

## 2017-06-21 ENCOUNTER — DOCUMENTATION (OUTPATIENT)
Dept: ONCOLOGY | Facility: CLINIC | Age: 42
End: 2017-06-21

## 2017-06-21 ENCOUNTER — OFFICE VISIT (OUTPATIENT)
Dept: ONCOLOGY | Facility: CLINIC | Age: 42
End: 2017-06-21

## 2017-06-21 ENCOUNTER — INFUSION (OUTPATIENT)
Dept: ONCOLOGY | Facility: HOSPITAL | Age: 42
End: 2017-06-21

## 2017-06-21 VITALS
RESPIRATION RATE: 18 BRPM | WEIGHT: 228.9 LBS | BODY MASS INDEX: 38.09 KG/M2 | TEMPERATURE: 97 F | SYSTOLIC BLOOD PRESSURE: 147 MMHG | HEART RATE: 75 BPM | DIASTOLIC BLOOD PRESSURE: 79 MMHG

## 2017-06-21 DIAGNOSIS — Z51.11 ENCOUNTER FOR ANTINEOPLASTIC CHEMOTHERAPY: Primary | ICD-10-CM

## 2017-06-21 DIAGNOSIS — C50.911 CARCINOMA OF RIGHT BREAST METASTATIC TO BONE (HCC): ICD-10-CM

## 2017-06-21 DIAGNOSIS — C79.51 BREAST CANCER METASTASIZED TO BONE, LEFT (HCC): ICD-10-CM

## 2017-06-21 DIAGNOSIS — C50.911 CARCINOMA OF RIGHT BREAST METASTATIC TO BONE (HCC): Primary | ICD-10-CM

## 2017-06-21 DIAGNOSIS — Z45.2 ENCOUNTER FOR VENOUS ACCESS DEVICE CARE: ICD-10-CM

## 2017-06-21 DIAGNOSIS — C50.912 BREAST CANCER METASTASIZED TO BONE, LEFT (HCC): ICD-10-CM

## 2017-06-21 DIAGNOSIS — C79.51 CARCINOMA OF RIGHT BREAST METASTATIC TO BONE (HCC): Primary | ICD-10-CM

## 2017-06-21 DIAGNOSIS — C79.51 CARCINOMA OF RIGHT BREAST METASTATIC TO BONE (HCC): ICD-10-CM

## 2017-06-21 LAB
ALBUMIN SERPL-MCNC: 4 G/DL (ref 3.4–4.8)
ALBUMIN/GLOB SERPL: 1.3 G/DL (ref 1.1–1.8)
ALP SERPL-CCNC: 116 U/L (ref 38–126)
ALT SERPL W P-5'-P-CCNC: 64 U/L (ref 9–52)
ANION GAP SERPL CALCULATED.3IONS-SCNC: 11 MMOL/L (ref 5–15)
AST SERPL-CCNC: 56 U/L (ref 14–36)
BASOPHILS # BLD AUTO: 0.03 10*3/MM3 (ref 0–0.2)
BASOPHILS NFR BLD AUTO: 0.4 % (ref 0–2)
BILIRUB SERPL-MCNC: 0.3 MG/DL (ref 0.2–1.3)
BUN BLD-MCNC: 13 MG/DL (ref 7–21)
BUN/CREAT SERPL: 14.9 (ref 7–25)
CALCIUM SPEC-SCNC: 8.1 MG/DL (ref 8.4–10.2)
CHLORIDE SERPL-SCNC: 102 MMOL/L (ref 95–110)
CO2 SERPL-SCNC: 27 MMOL/L (ref 22–31)
CREAT BLD-MCNC: 0.87 MG/DL (ref 0.5–1)
DEPRECATED RDW RBC AUTO: 41.8 FL (ref 36.4–46.3)
EOSINOPHIL # BLD AUTO: 0.2 10*3/MM3 (ref 0–0.7)
EOSINOPHIL NFR BLD AUTO: 2.9 % (ref 0–7)
ERYTHROCYTE [DISTWIDTH] IN BLOOD BY AUTOMATED COUNT: 13 % (ref 11.5–14.5)
GFR SERPL CREATININE-BSD FRML MDRD: 87 ML/MIN/1.73 (ref 58–135)
GLOBULIN UR ELPH-MCNC: 3.1 GM/DL (ref 2.3–3.5)
GLUCOSE BLD-MCNC: 122 MG/DL (ref 60–100)
HCT VFR BLD AUTO: 37.9 % (ref 35–45)
HGB BLD-MCNC: 12.4 G/DL (ref 12–15.5)
IMM GRANULOCYTES # BLD: 0.02 10*3/MM3 (ref 0–0.02)
IMM GRANULOCYTES NFR BLD: 0.3 % (ref 0–0.5)
LYMPHOCYTES # BLD AUTO: 2.12 10*3/MM3 (ref 0.6–4.2)
LYMPHOCYTES NFR BLD AUTO: 31.1 % (ref 10–50)
MCH RBC QN AUTO: 28.8 PG (ref 26.5–34)
MCHC RBC AUTO-ENTMCNC: 32.7 G/DL (ref 31.4–36)
MCV RBC AUTO: 87.9 FL (ref 80–98)
MONOCYTES # BLD AUTO: 0.51 10*3/MM3 (ref 0–0.9)
MONOCYTES NFR BLD AUTO: 7.5 % (ref 0–12)
NEUTROPHILS # BLD AUTO: 3.94 10*3/MM3 (ref 2–8.6)
NEUTROPHILS NFR BLD AUTO: 57.8 % (ref 37–80)
NRBC BLD MANUAL-RTO: 0 /100 WBC (ref 0–0)
PLATELET # BLD AUTO: 156 10*3/MM3 (ref 150–450)
PMV BLD AUTO: 11.4 FL (ref 8–12)
POTASSIUM BLD-SCNC: 3.7 MMOL/L (ref 3.5–5.1)
PROT SERPL-MCNC: 7.1 G/DL (ref 6.3–8.6)
RBC # BLD AUTO: 4.31 10*6/MM3 (ref 3.77–5.16)
SODIUM BLD-SCNC: 140 MMOL/L (ref 137–145)
WBC NRBC COR # BLD: 6.82 10*3/MM3 (ref 3.2–9.8)

## 2017-06-21 PROCEDURE — 25010000002 PERTUZUMAB 420 MG/14ML SOLUTION 420 MG VIAL: Performed by: INTERNAL MEDICINE

## 2017-06-21 PROCEDURE — 36591 DRAW BLOOD OFF VENOUS DEVICE: CPT | Performed by: INTERNAL MEDICINE

## 2017-06-21 PROCEDURE — 25010000002 DIPHENHYDRAMINE PER 50 MG: Performed by: INTERNAL MEDICINE

## 2017-06-21 PROCEDURE — 25010000002 TRASTUZUMAB PER 10 MG: Performed by: INTERNAL MEDICINE

## 2017-06-21 PROCEDURE — 96375 TX/PRO/DX INJ NEW DRUG ADDON: CPT | Performed by: INTERNAL MEDICINE

## 2017-06-21 PROCEDURE — 85025 COMPLETE CBC W/AUTO DIFF WBC: CPT

## 2017-06-21 PROCEDURE — 80053 COMPREHEN METABOLIC PANEL: CPT

## 2017-06-21 PROCEDURE — 96417 CHEMO IV INFUS EACH ADDL SEQ: CPT | Performed by: INTERNAL MEDICINE

## 2017-06-21 PROCEDURE — 99214 OFFICE O/P EST MOD 30 MIN: CPT | Performed by: INTERNAL MEDICINE

## 2017-06-21 PROCEDURE — 96413 CHEMO IV INFUSION 1 HR: CPT | Performed by: INTERNAL MEDICINE

## 2017-06-21 RX ORDER — FAMOTIDINE 10 MG/ML
20 INJECTION, SOLUTION INTRAVENOUS ONCE
Status: COMPLETED | OUTPATIENT
Start: 2017-06-21 | End: 2017-06-21

## 2017-06-21 RX ORDER — ACETAMINOPHEN 325 MG/1
650 TABLET ORAL ONCE
Status: COMPLETED | OUTPATIENT
Start: 2017-06-21 | End: 2017-06-21

## 2017-06-21 RX ORDER — SODIUM CHLORIDE 9 MG/ML
250 INJECTION, SOLUTION INTRAVENOUS ONCE
Status: COMPLETED | OUTPATIENT
Start: 2017-06-21 | End: 2017-06-21

## 2017-06-21 RX ORDER — SODIUM CHLORIDE 0.9 % (FLUSH) 0.9 %
10 SYRINGE (ML) INJECTION AS NEEDED
Status: DISCONTINUED | OUTPATIENT
Start: 2017-06-21 | End: 2017-06-21 | Stop reason: HOSPADM

## 2017-06-21 RX ORDER — SODIUM CHLORIDE 0.9 % (FLUSH) 0.9 %
10 SYRINGE (ML) INJECTION AS NEEDED
Status: CANCELLED | OUTPATIENT
Start: 2017-06-22

## 2017-06-21 RX ADMIN — Medication 10 ML: at 07:43

## 2017-06-21 RX ADMIN — FAMOTIDINE 20 MG: 10 INJECTION INTRAVENOUS at 09:51

## 2017-06-21 RX ADMIN — TRASTUZUMAB 590 MG: KIT at 10:58

## 2017-06-21 RX ADMIN — SODIUM CHLORIDE 250 ML: 9 INJECTION, SOLUTION INTRAVENOUS at 09:20

## 2017-06-21 RX ADMIN — DIPHENHYDRAMINE HYDROCHLORIDE: 50 INJECTION INTRAMUSCULAR; INTRAVENOUS at 09:21

## 2017-06-21 RX ADMIN — PERTUZUMAB 420 MG: 30 INJECTION, SOLUTION, CONCENTRATE INTRAVENOUS at 10:11

## 2017-06-21 RX ADMIN — ACETAMINOPHEN 650 MG: 325 TABLET ORAL at 09:26

## 2017-06-21 NOTE — PROGRESS NOTES
LCSW met with patient subsequent to her follow up with Dr. Waddell.  SW spoke privately with patient in the chemotherapy infusion room.  SW reiterated support and encouraged pt to share her thoughts and feelings.  SW advised by RN of pt responses to risk questions related to family dynamics and how she is treated at home.   SW encouraged pt to share her stressors, thoughts and feelings.  Pt. Presents this day with blunted affect and melancholy mood, noted as her recent baseline.  Pt. Describes multiple psychosocial stressors that mediate her distress and exacerbate symptoms of depression and anxiety. Pt. Shared recent interpersonal conflicts within the family and stressors of single parenting of special needs child (age 9) and demands of adult children.   SW and pt spent time discussing setting boundaries and assertive communication skills were reinforced, modeled by undersigned and practiced with patient. Pt. Inquired a to transportation funds but regretfully there are none at present.  SW offered ongoing plan to search for funding to aid pt in financial stress related to transportation, housing, and basic financial stress.  Pt. Responds receptive to SW feedback and presents to benefit from person centered therapy offered.  Pt. Presents motivated for follow through with recommendations.  Behavioral modifications were reinforced that would aid in increased self esteem, assurance and self care.  SW provided feedback as to resources of support to include [facility] , regarding collection bills (student fees) and [employer] to inquire as to class scheduled as pt could benefit from support system outside of home.

## 2017-06-21 NOTE — PROGRESS NOTES
DATE OF VISIT: 6/21/2017    REASON FOR VISIT:  Metastatic right breast cancer    HISTORY OF PRESENT ILLNESS:    41-year-old female with a past medical history significant for metastatic right breast cancer with liver and bone metastases currently on treatment with Herceptin and perjeta with Faslodex.  She is here for follow-up visit today.   Last Chemotherapy was on April 20, 2017 since then she has not received any chemotherapy in view of right ankle surgery.  Complains of worsening back pain.  Complains of swelling and pain in right ankle.  Denies any blood in the stool or urine.  Denies any abnormal swollen and anywhere in the body.    PAST MEDICAL HISTORY:    1.  Metastatic right breast cancer with liver and bone metastasis  2.  History of pneumonia  3.  Right internal jugular vein DVT status post around to  4.  Rectal bleeding  5.  Dyslipidemia  6.  Diabetes mellitus  7.  Anxiety  8.  Bone metastasis    SOCIAL HISTORY:    Social History   Substance Use Topics   • Smoking status: Former Smoker     Quit date: 2012   • Smokeless tobacco: Never Used   • Alcohol use No       Surgical History :  Past Surgical History:   Procedure Laterality Date   • ANKLE LOOSE BODY EXCISION/REMOVAL Right 5/24/2017    Procedure: RIGTH FOOT EXCISION NAVICULAR FRACTURES FRAGMENT  DEBRIDEMENT TALONAVILCULA RJOIN T;  Surgeon: Armani Oliveira DPM;  Location: [facility] OR;  Service:    • BREAST SURGERY Right 2013     reconstruction of right breast, trans flap surgery   • CENTRAL VENOUS CATHETER TUNNELED INSERTION SINGLE LUMEN      Placement of indwelling Pleurx catheter right   • COLONOSCOPY N/A 1/26/2017    Procedure: COLONOSCOPY;  Surgeon: Robert Clifton MD;  Location: [facility] ENDOSCOPY;  Service:    • ENDOSCOPY N/A 1/26/2017    Procedure: ESOPHAGOGASTRODUODENOSCOPY;  Surgeon: Robert Clifton MD;  Location: [facility] ENDOSCOPY;  Service:    • ENDOSCOPY N/A 2/24/2017    Procedure: ESOPHAGOGASTRODUODENOSCOPY;  Surgeon: Robert Clifton MD;   "Location: [facility] ENDOSCOPY;  Service:    • HYSTERECTOMY      vaginal   • LIVER BIOPSY  2015   • LUNG VOLUME REDUCTION     • MASTECTOMY      partial   • OTHER SURGICAL HISTORY  05/05/2016    central line insertion , PICC line replacement   • OTHER SURGICAL HISTORY      place breast clip percut   • OTHER SURGICAL HISTORY      pulse oximetry   • OTHER SURGICAL HISTORY      remove cc cath w/ sc port or pump, Removal of right internal jugular MediPort   • OTHER SURGICAL HISTORY      spirometry   • OTHER SURGICAL HISTORY      tubal sterilization   • PAP SMEAR     • THORACENTESIS      aspiration of pleural space   • TUBAL ABDOMINAL LIGATION  2009   • UPPER GASTROINTESTINAL ENDOSCOPY  01/26/2017   • UPPER GASTROINTESTINAL ENDOSCOPY  02/24/2017   • VENOUS ACCESS DEVICE (PORT) INSERTION      Ultrasound guided right internal jugular Mediport placement under fluoroscopy       ALLERGIES:    Allergies   Allergen Reactions   • Lisinopril Shortness Of Breath   • Flagyl [Metronidazole] Other (See Comments)     pancreatitis   • Fentanyl Anxiety     \"goes out of her mind\"   • Latex Rash       REVIEW OF SYSTEMS:      CONSTITUTIONAL: Positive for fatigue.  No fever, chills, or night sweats.     HEENT:  No epistaxis, mouth sores, or difficulty swallowing.    RESPIRATORY:  No new shortness of breath or cough at present.    CARDIOVASCULAR:  No chest pain or palpitations.    GASTROINTESTINAL:  No abdominal pain, nausea, vomiting, or blood in the stool.    GENITOURINARY:  No dysuria or hematuria.    MUSCULOSKELETAL: Complains of discomfort And pain in right ankle.Complains of worsening back pain.    NEUROLOGICAL: Positive for intermittent tingling and numbness.. No new headache or dizziness.     LYMPHATICS:  Denies any abnormal swollen and anywhere in the body.    SKIN:  Denies any new skin rash.        PHYSICAL EXAMINATION:      VITAL SIGNS:    /79  Pulse 75  Temp 97 °F (36.1 °C)  Resp 18  Wt 228 lb 14.4 oz (104 kg)  BMI 38.09 " kg/m2    GENERAL:  Not in any distress.     EYES:  Mild pallor. No icterus.  Extraocular movements intact.  No facial asymmetry.    NECK:  No adenopathy in cervical or supraclavicular area.    RESPIRATORY:  Fair air entry bilaterally. No rhonchi or wheezing.    CARDIOVASCULAR:  S1, S2. Regular rate and rhythm.    ABDOMEN:  Soft, nontender. Bowel sounds present.  No organomegaly appreciated.    EXTREMITIES:  No edema.  No calf  tenderness.  There is a surgical shoe present on right foot.    NEUROLOGICAL:  Alert, awake, oriented x3.  No motor or sensory deficit.  Cranial nerves II-12 grossly intact.        DIAGNOSTIC DATA:    Glucose   Date Value Ref Range Status   06/21/2017 122 (H) 60 - 100 mg/dL Final     Sodium   Date Value Ref Range Status   06/21/2017 140 137 - 145 mmol/L Final     Potassium   Date Value Ref Range Status   06/21/2017 3.7 3.5 - 5.1 mmol/L Final     CO2   Date Value Ref Range Status   06/21/2017 27.0 22.0 - 31.0 mmol/L Final     Chloride   Date Value Ref Range Status   06/21/2017 102 95 - 110 mmol/L Final     Anion Gap   Date Value Ref Range Status   06/21/2017 11.0 5.0 - 15.0 mmol/L Final     Creatinine   Date Value Ref Range Status   06/21/2017 0.87 0.50 - 1.00 mg/dL Final     BUN   Date Value Ref Range Status   06/21/2017 13 7 - 21 mg/dL Final     BUN/Creatinine Ratio   Date Value Ref Range Status   06/21/2017 14.9 7.0 - 25.0 Final     Calcium   Date Value Ref Range Status   06/21/2017 8.1 (L) 8.4 - 10.2 mg/dL Final     Alkaline Phosphatase   Date Value Ref Range Status   06/21/2017 116 38 - 126 U/L Final     Total Protein   Date Value Ref Range Status   06/21/2017 7.1 6.3 - 8.6 g/dL Final     ALT (SGPT)   Date Value Ref Range Status   06/21/2017 64 (H) 9 - 52 U/L Final     AST (SGOT)   Date Value Ref Range Status   06/21/2017 56 (H) 14 - 36 U/L Final     Total Bilirubin   Date Value Ref Range Status   06/21/2017 0.3 0.2 - 1.3 mg/dL Final     Albumin   Date Value Ref Range Status   06/21/2017  4.00 3.40 - 4.80 g/dL Final     Globulin   Date Value Ref Range Status   06/21/2017 3.1 2.3 - 3.5 gm/dL Final     A/G Ratio   Date Value Ref Range Status   06/21/2017 1.3 1.1 - 1.8 g/dL Final     Lab Results   Component Value Date    WBC 6.82 06/21/2017    HGB 12.4 06/21/2017    HCT 37.9 06/21/2017    MCV 87.9 06/21/2017     06/21/2017     Lab Results   Component Value Date    NEUTROABS 3.94 06/21/2017    IRON 106 02/09/2017    TIBC 304 01/25/2017    LABIRON 6 (L) 01/25/2017    FERRITIN 51.50 01/25/2017    VDKPQDIT97 559 06/17/2016    FOLATE 14.10 06/17/2016     Lab Results   Component Value Date     29.7 (H) 05/31/2017    LABCA2 32.7 05/31/2017    AFPTM 156 01/25/2017    HCGQUANT 1 12/12/2016    CEA 3.2 09/22/2015   ]    PATHOLOGY:  Pathology result from EGD and colonoscopy biopsy on January 26, 2017 shows:  Final Diagnosis   1. GASTRIC ANTRUM, MUCOSAL BIOPSY:   MILD CHRONIC GASTRITIS.   NO EVIDENCE OF HELICOBACTER PYLORI (IP STAIN PERFORMED).      2. DUODENUM, MUCOSAL BIOPSY:   MILD CHRONIC INFLAMMATION.      3. GASTRIC BODY, MUCOSAL BIOPSY:   MILD CHRONIC GASTRITIS.   NO EVIDENCE OF HELICOBACTER PYLORI (IP STAIN PERFORMED).      4. COLONIC MUCOSAL BIOPSY, RANDOM:   NO SIGNIFICANT PATHOLOGIC DIAGNOSIS.          RADIOLOGY DATA :  Restaging CT chest abdomen and pelvis with contrast was done on February 2, 2017 it was reviewed and discussed with patient it showed:  CONCLUSION:   1. Subtle low-density area in the right hepatic dome that appears slightly increased in size. While this could be related to an area of fibrosis from the patient's cirrhosis this raises a question of metastatic disease and consider follow-up liver   protocol MRI with contrast in this patient with history of breast cancer.  2. Changes of cirrhosis of the liver again noted.  3. Decrease in size of anterior mediastinal soft tissue density that on the previous examination had an appearance more suggestive of thymic tissue than  definite metastatic disease. This has nearly resolved.    2 D Echo done on March 10, 2017 showed:  Interpretation Summary   · Left ventricular function is normal. Estimated EF = 60%.  · Normal left ventricular cavity size and wall thickness noted. All left ventricular wall segments contract normally       ASSESSMENT AND PLAN:      1.  Stage IV metastatic right breast cancer with liver and bone metastasis.  Patient was initially diagnosed in 2011 with a stage III disease.  Initially patient had a Taxotere carboplatin and Herceptin for 6 cycles followed by 1 year of Herceptin maintenance.  After that patient was getting Lupron and tamoxifen.  In March 2015 she was diagnosed with a metastatic disease involving liver and bone.  Patient was again started on Taxotere Herceptin and perjeta.  She could not tolerate Taxotere at that point she had a recurrent pneumonias and cytopenias.  Subsequently she was maintained on Herceptin and perjeta until October 2016, at that point restaging CT scan showed there was anterior mediastinal mass for which patient was started on Taxol Herceptin and perjeta.  Patient received her last dose of Taxol on January 12, 2017.  Patient was started back on Herceptin and perjeta on February 9, 2017.  Patient's last dose of Herceptin and perjeta was on April 20, 2017 since then patient has not received any perjeta secondary to ankle surgery.  At this point we will resume Herceptin and perjeta since there is no surgery planned at this point.  We will continue with monthly Faslodex for now.  Her last tumor marker CA 15-3 was elevated at 29 which was discussed with patient plan is to give her 2 cycles of Herceptin and perjeta and then we'll do restaging CT scan.  Patient will be due for 2-D echo at this point.  We will order a 2-D echo prior to next clinic visit in 3 weeks.    2.  Rectal bleeding: Patient is status post argon coagulation treatment on February 23, 2017.  Denies any rectal bleeding  since then.    3.  Recurrent DVT: Patient was on Xarelto which was held secondary to rectal bleeding.  Patient is status post argon laser treatment on February 23, 2017.  Recommendation would be to support her on Lovenox and Coumadin Clinic visit if she does not have any bleeding at that point.  She is high risk of recurrent thrombosis due to active cancer.  Patient states it would be very difficult for her to come to Coumadin clinic for checks of INR.  And is currently taking Xarelto 20 mg by mouth daily.  As resume her Xarelto after surgery.  Denies any active bleeding.  We'll monitor clinically for now.    4.  Bone metastasis: Continue with Zometa as scheduled for now.  Since patient is complaining of back pain she has an MRI scheduled later this week.  If MRI shows worsening bone metastasis she will benefit from evaluation by spine surgeon to see if she is a candidate for kyphoplasty.  Recommendation were discussed with patient.    5.  Right ankle pain: Most likely postoperative pain.  Continue with management as per Dr. Oliveira.    6.  Health maintenance: Patient does not smoke.  She just had a colonoscopy done on January 2017 which was negative for any malignancy.  She remains full code.          Joselito Waddell MD  6/21/2017  9:37 AM

## 2017-06-23 ENCOUNTER — HOSPITAL ENCOUNTER (OUTPATIENT)
Dept: MRI IMAGING | Facility: HOSPITAL | Age: 42
Discharge: HOME | End: 2017-06-23
Admitting: FAMILY MEDICINE

## 2017-06-23 DIAGNOSIS — M54.42 CHRONIC BILATERAL LOW BACK PAIN WITH LEFT-SIDED SCIATICA: ICD-10-CM

## 2017-06-23 DIAGNOSIS — G89.29 CHRONIC BILATERAL LOW BACK PAIN WITH LEFT-SIDED SCIATICA: ICD-10-CM

## 2017-06-23 DIAGNOSIS — C50.912 BREAST CANCER METASTASIZED TO BONE, LEFT (HCC): ICD-10-CM

## 2017-06-23 DIAGNOSIS — C79.51 BREAST CANCER METASTASIZED TO BONE, LEFT (HCC): ICD-10-CM

## 2017-06-23 PROCEDURE — 72148 MRI LUMBAR SPINE W/O DYE: CPT

## 2017-06-28 ENCOUNTER — TELEPHONE (OUTPATIENT)
Dept: FAMILY MEDICINE CLINIC | Facility: CLINIC | Age: 42
End: 2017-06-28

## 2017-06-28 ENCOUNTER — APPOINTMENT (OUTPATIENT)
Dept: ONCOLOGY | Facility: HOSPITAL | Age: 42
End: 2017-06-28

## 2017-06-28 NOTE — TELEPHONE ENCOUNTER
----- Message from Rosi Brown MD sent at 6/26/2017  9:07 AM CDT -----  Multiple new areas of the thoracic and lumbar vertebreal bodies suggesting metastatic disease.  Mild multilevel degenerative disc disease of the lumbar spine.

## 2017-06-29 ENCOUNTER — DOCUMENTATION (OUTPATIENT)
Dept: ONCOLOGY | Facility: CLINIC | Age: 42
End: 2017-06-29

## 2017-06-29 ENCOUNTER — INFUSION (OUTPATIENT)
Dept: ONCOLOGY | Facility: HOSPITAL | Age: 42
End: 2017-06-29

## 2017-06-29 ENCOUNTER — OFFICE VISIT (OUTPATIENT)
Dept: OPHTHALMOLOGY | Facility: CLINIC | Age: 42
End: 2017-06-29

## 2017-06-29 DIAGNOSIS — H26.9 CATARACT: ICD-10-CM

## 2017-06-29 DIAGNOSIS — H52.4 PRESBYOPIA: ICD-10-CM

## 2017-06-29 DIAGNOSIS — C50.911 CARCINOMA OF RIGHT BREAST METASTATIC TO BONE (HCC): ICD-10-CM

## 2017-06-29 DIAGNOSIS — E11.9 DIABETES MELLITUS TYPE 2 WITHOUT RETINOPATHY (HCC): Primary | ICD-10-CM

## 2017-06-29 DIAGNOSIS — C79.51 CARCINOMA OF RIGHT BREAST METASTATIC TO BONE (HCC): Primary | ICD-10-CM

## 2017-06-29 DIAGNOSIS — C50.911 CARCINOMA OF RIGHT BREAST METASTATIC TO BONE (HCC): Primary | ICD-10-CM

## 2017-06-29 DIAGNOSIS — C79.51 CARCINOMA OF RIGHT BREAST METASTATIC TO BONE (HCC): ICD-10-CM

## 2017-06-29 PROCEDURE — 92134 CPTRZ OPH DX IMG PST SGM RTA: CPT | Performed by: OPHTHALMOLOGY

## 2017-06-29 PROCEDURE — 99203 OFFICE O/P NEW LOW 30 MIN: CPT | Performed by: OPHTHALMOLOGY

## 2017-06-29 PROCEDURE — 25010000002 FULVESTRANT PER 25 MG: Performed by: INTERNAL MEDICINE

## 2017-06-29 PROCEDURE — 96402 CHEMO HORMON ANTINEOPL SQ/IM: CPT | Performed by: INTERNAL MEDICINE

## 2017-06-29 RX ORDER — SODIUM CHLORIDE 0.9 % (FLUSH) 0.9 %
10 SYRINGE (ML) INJECTION AS NEEDED
Status: DISCONTINUED | OUTPATIENT
Start: 2017-06-29 | End: 2017-06-29 | Stop reason: HOSPADM

## 2017-06-29 RX ORDER — SODIUM CHLORIDE 0.9 % (FLUSH) 0.9 %
10 SYRINGE (ML) INJECTION AS NEEDED
Status: CANCELLED | OUTPATIENT
Start: 2017-06-29

## 2017-06-29 RX ADMIN — FULVESTRANT 500 MG: 50 INJECTION INTRAMUSCULAR at 11:27

## 2017-06-29 NOTE — PROGRESS NOTES
Subjective   Andra Villareal is a 41 y.o. female.   Chief Complaint   Patient presents with   • Diabetic Eye Exam     Last A1c was 6.24% in May 2017  Denies flashes of light or floaters.      HPI     Diabetic Eye Exam   Diabetes characteristics include Type 2 and on insulin.  Duration of years.  Blood sugar level is controlled. Additional comments: Last A1c was 6.24% in May 2017  Denies flashes of light or floaters.        Last edited by Kanu Adamson MD on 6/29/2017 10:16 AM. (History)          Review of Systems   Constitutional: Negative for chills and fever.   Respiratory: Positive for shortness of breath.    Cardiovascular: Negative for chest pain.   Gastrointestinal: Negative for abdominal pain.   Genitourinary: Negative for dysuria.   Musculoskeletal: Positive for myalgias.   Psychiatric/Behavioral: Positive for confusion.       The following portions of the patient’s history were reviewed and updated as appropriate: current medications, allergies, past medical and surgical history and social history.       Objective   Base Eye Exam     Visual Acuity (Snellen - Linear)      Right Left   Dist sc 20/30 -1 20/30 -2   Near sc J7 J7         Tonometry (Applanation, 9:29 AM)      Right Left   Pressure 14 14         Pupils      Pupils   Right PERRL   Left PERRL         Extraocular Movement      Right Left   Result Full, Ortho Full, Ortho         Neuro/Psych     Oriented x3:  Yes    Mood/Affect:  Normal      Dilation     Both eyes:  1.0% Mydriacyl, 2.5% phenyl @ 9:33 AM            Slit Lamp and Fundus Exam     External Exam      Right Left    External Normal Normal      Slit Lamp Exam      Right Left    Lids/Lashes Blepharitis Blepharitis    Conjunctiva/Sclera White and quiet White and quiet    Cornea Clear Clear    Anterior Chamber Deep and quiet Deep and quiet    Iris Round and reactive Round and reactive    Lens trace NS trace NS    Vitreous Normal Normal      Fundus Exam      Right Left    Disc Normal  Normal    Macula Normal Normal    Vessels Normal Normal    Periphery Normal Normal            Refraction     Wearing Rx      Sphere Cylinder   Right +0.75 Sphere   Left +1.50 Sphere         Manifest Refraction      Sphere Cylinder Axis Dist Add Near   Right +0.50 +0.50 153 20/30 +1.50 J1   Left +1.50 Sphere  20/25-1 +1.50 J1                   OCT Macula:  OD- normal  OS- normal      Assessment/Plan   Diagnoses and all orders for this visit:    Diabetes mellitus type 2 without retinopathy  Comments:  Continue BG control. OCT of the macula does not show edema OU.     Cataract  Comments:  Not visually significant. Patient's vision is not correctable to 20/20 OU. There is no macula edema OU and denies a history of ambylopia.     Presbyopia  Comments:  Continue OTC glasses.     Plan:       Return in about 1 year (around 6/29/2018).                            This document has been signed by Kanu Adamson MD on June 29, 2017 10:18 AM

## 2017-06-29 NOTE — PROGRESS NOTES
"LCSW follow up with established patient, currently on treatment for metastatic breast cancer.  SW provided pt with gas voucher and noted pt's visible distress. SW encouraged pt to share her thoughts and feelings. Pt. Responded that the recent report from MRI ordered by PCP revealed possible metastatic changes and this information has been a \"major blow\" to her emotions and coping.  SW offered supportive feedback from strength and empowerment perspective.  Pt. States she is scheduled to see Dr. Waddell on 7-12-17 to which DAVID offered to discuss her concerns with MD and advise her if he thought she needed to be seen sooner.  DAVID followed up with MD after her departure and MD suggested pt be referred to orthopaedic surgeon for consultation and that he would follow up with her as planned.   DAVID attempted to reach pt by phone [phone] and left message for her to call back.    "

## 2017-07-03 ENCOUNTER — APPOINTMENT (OUTPATIENT)
Dept: GENERAL RADIOLOGY | Facility: HOSPITAL | Age: 42
End: 2017-07-03

## 2017-07-03 ENCOUNTER — APPOINTMENT (OUTPATIENT)
Dept: INTERVENTIONAL RADIOLOGY/VASCULAR | Facility: HOSPITAL | Age: 42
End: 2017-07-03
Attending: EMERGENCY MEDICINE

## 2017-07-03 ENCOUNTER — HOSPITAL ENCOUNTER (OUTPATIENT)
Facility: HOSPITAL | Age: 42
Setting detail: OBSERVATION
Discharge: HOME | End: 2017-07-05
Attending: EMERGENCY MEDICINE | Admitting: INTERNAL MEDICINE

## 2017-07-03 ENCOUNTER — APPOINTMENT (OUTPATIENT)
Dept: CT IMAGING | Facility: HOSPITAL | Age: 42
End: 2017-07-03

## 2017-07-03 DIAGNOSIS — R10.84 INTRACTABLE GENERALIZED ABDOMINAL PAIN: Primary | ICD-10-CM

## 2017-07-03 DIAGNOSIS — K52.9 COLITIS: ICD-10-CM

## 2017-07-03 DIAGNOSIS — R19.7 INTRACTABLE DIARRHEA: ICD-10-CM

## 2017-07-03 DIAGNOSIS — R11.2 INTRACTABLE VOMITING WITH NAUSEA, UNSPECIFIED VOMITING TYPE: ICD-10-CM

## 2017-07-03 DIAGNOSIS — E83.42 HYPOMAGNESEMIA: ICD-10-CM

## 2017-07-03 LAB
ADV 40+41 DNA STL QL NAA+NON-PROBE: NOT DETECTED
ALBUMIN SERPL-MCNC: 5.1 G/DL (ref 3.4–4.8)
ALBUMIN/GLOB SERPL: 1.3 G/DL (ref 1.1–1.8)
ALP SERPL-CCNC: 134 U/L (ref 38–126)
ALT SERPL W P-5'-P-CCNC: 46 U/L (ref 9–52)
AMPHET+METHAMPHET UR QL: NEGATIVE
ANION GAP SERPL CALCULATED.3IONS-SCNC: 19 MMOL/L (ref 5–15)
AST SERPL-CCNC: 41 U/L (ref 14–36)
ASTRO TYP 1-8 RNA STL QL NAA+NON-PROBE: NOT DETECTED
BACTERIA UR QL AUTO: ABNORMAL /HPF
BARBITURATES UR QL SCN: NEGATIVE
BASOPHILS # BLD AUTO: 0.02 10*3/MM3 (ref 0–0.2)
BASOPHILS NFR BLD AUTO: 0.2 % (ref 0–2)
BENZODIAZ UR QL SCN: NEGATIVE
BILIRUB SERPL-MCNC: 0.4 MG/DL (ref 0.2–1.3)
BILIRUB UR QL STRIP: NEGATIVE
BUN BLD-MCNC: 22 MG/DL (ref 7–21)
BUN/CREAT SERPL: 19.6 (ref 7–25)
C CAYETANENSIS DNA STL QL NAA+NON-PROBE: NOT DETECTED
C DIFF TOX GENS STL QL NAA+PROBE: NOT DETECTED
CALCIUM SPEC-SCNC: 9.8 MG/DL (ref 8.4–10.2)
CAMPY SP DNA.DIARRHEA STL QL NAA+PROBE: NOT DETECTED
CANNABINOIDS SERPL QL: NEGATIVE
CHLORIDE SERPL-SCNC: 102 MMOL/L (ref 95–110)
CLARITY UR: CLEAR
CO2 SERPL-SCNC: 24 MMOL/L (ref 22–31)
COCAINE UR QL: NEGATIVE
COLOR UR: YELLOW
CREAT BLD-MCNC: 1.12 MG/DL (ref 0.5–1)
CRYPTOSP STL CULT: NOT DETECTED
DEPRECATED RDW RBC AUTO: 42.8 FL (ref 36.4–46.3)
E COLI DNA SPEC QL NAA+PROBE: NOT DETECTED
E HISTOLYT AG STL-ACNC: NOT DETECTED
EAEC PAA PLAS AGGR+AATA ST NAA+NON-PRB: NOT DETECTED
EC STX1+STX2 GENES STL QL NAA+NON-PROBE: NOT DETECTED
EOSINOPHIL # BLD AUTO: 0.07 10*3/MM3 (ref 0–0.7)
EOSINOPHIL NFR BLD AUTO: 0.5 % (ref 0–7)
EPEC EAE GENE STL QL NAA+NON-PROBE: NOT DETECTED
ERYTHROCYTE [DISTWIDTH] IN BLOOD BY AUTOMATED COUNT: 13.7 % (ref 11.5–14.5)
ETEC LTA+ST1A+ST1B TOX ST NAA+NON-PROBE: NOT DETECTED
G LAMBLIA DNA SPEC QL NAA+PROBE: NOT DETECTED
GFR SERPL CREATININE-BSD FRML MDRD: 65 ML/MIN/1.73 (ref 58–135)
GLOBULIN UR ELPH-MCNC: 3.9 GM/DL (ref 2.3–3.5)
GLUCOSE BLD-MCNC: 139 MG/DL (ref 60–100)
GLUCOSE BLDC GLUCOMTR-MCNC: 102 MG/DL (ref 70–130)
GLUCOSE UR STRIP-MCNC: NEGATIVE MG/DL
HCT VFR BLD AUTO: 47.6 % (ref 35–45)
HGB BLD-MCNC: 16.1 G/DL (ref 12–15.5)
HGB UR QL STRIP.AUTO: NEGATIVE
HOLD SPECIMEN: NORMAL
HYALINE CASTS UR QL AUTO: ABNORMAL /LPF
IMM GRANULOCYTES # BLD: 0.04 10*3/MM3 (ref 0–0.02)
IMM GRANULOCYTES NFR BLD: 0.3 % (ref 0–0.5)
INR PPP: 1.24 (ref 0.8–1.2)
KETONES UR QL STRIP: NEGATIVE
LEUKOCYTE ESTERASE UR QL STRIP.AUTO: NEGATIVE
LIPASE SERPL-CCNC: 26 U/L (ref 23–300)
LYMPHOCYTES # BLD AUTO: 0.9 10*3/MM3 (ref 0.6–4.2)
LYMPHOCYTES NFR BLD AUTO: 6.9 % (ref 10–50)
MAGNESIUM SERPL-MCNC: 1.4 MG/DL (ref 1.6–2.3)
MCH RBC QN AUTO: 29 PG (ref 26.5–34)
MCHC RBC AUTO-ENTMCNC: 33.8 G/DL (ref 31.4–36)
MCV RBC AUTO: 85.6 FL (ref 80–98)
METHADONE UR QL SCN: NEGATIVE
MONOCYTES # BLD AUTO: 1.18 10*3/MM3 (ref 0–0.9)
MONOCYTES NFR BLD AUTO: 9.1 % (ref 0–12)
NEUTROPHILS # BLD AUTO: 10.76 10*3/MM3 (ref 2–8.6)
NEUTROPHILS NFR BLD AUTO: 83 % (ref 37–80)
NITRITE UR QL STRIP: NEGATIVE
NOROVIRUS GI+II RNA STL QL NAA+NON-PROBE: NOT DETECTED
OPIATES UR QL: POSITIVE
OXYCODONE UR QL SCN: POSITIVE
P SHIGELLOIDES DNA STL QL NAA+NON-PROBE: NOT DETECTED
PH UR STRIP.AUTO: 5.5 [PH] (ref 5–9)
PLATELET # BLD AUTO: 223 10*3/MM3 (ref 150–450)
PMV BLD AUTO: ABNORMAL FL (ref 8–12)
POTASSIUM BLD-SCNC: 3.6 MMOL/L (ref 3.5–5.1)
PROT SERPL-MCNC: 9 G/DL (ref 6.3–8.6)
PROT UR QL STRIP: ABNORMAL
PROTHROMBIN TIME: 15.6 SECONDS (ref 11.1–15.3)
RBC # BLD AUTO: 5.56 10*6/MM3 (ref 3.77–5.16)
RBC # UR: ABNORMAL /HPF
RBC MORPH BLD: NORMAL
REF LAB TEST METHOD: ABNORMAL
RV RNA STL NAA+PROBE: NOT DETECTED
SALMONELLA DNA SPEC QL NAA+PROBE: NOT DETECTED
SAPO I+II+IV+V RNA STL QL NAA+NON-PROBE: NOT DETECTED
SHIGELLA SP+EIEC IPAH ST NAA+NON-PROBE: NOT DETECTED
SMALL PLATELETS BLD QL SMEAR: ADEQUATE
SODIUM BLD-SCNC: 145 MMOL/L (ref 137–145)
SP GR UR STRIP: 1.06 (ref 1–1.03)
SQUAMOUS #/AREA URNS HPF: ABNORMAL /HPF
UROBILINOGEN UR QL STRIP: ABNORMAL
V CHOLERAE DNA SPEC QL NAA+PROBE: NOT DETECTED
VIBRIO DNA SPEC NAA+PROBE: NOT DETECTED
WBC MORPH BLD: NORMAL
WBC NRBC COR # BLD: 12.97 10*3/MM3 (ref 3.2–9.8)
WBC UR QL AUTO: ABNORMAL /HPF
WHOLE BLOOD HOLD SPECIMEN: NORMAL
YERSINIA STL CULT: NOT DETECTED

## 2017-07-03 PROCEDURE — 93005 ELECTROCARDIOGRAM TRACING: CPT | Performed by: NURSE PRACTITIONER

## 2017-07-03 PROCEDURE — 99285 EMERGENCY DEPT VISIT HI MDM: CPT

## 2017-07-03 PROCEDURE — 96365 THER/PROPH/DIAG IV INF INIT: CPT

## 2017-07-03 PROCEDURE — 80307 DRUG TEST PRSMV CHEM ANLYZR: CPT | Performed by: NURSE PRACTITIONER

## 2017-07-03 PROCEDURE — 74177 CT ABD & PELVIS W/CONTRAST: CPT

## 2017-07-03 PROCEDURE — G0378 HOSPITAL OBSERVATION PER HR: HCPCS

## 2017-07-03 PROCEDURE — 25010000002 MORPHINE PER 10 MG: Performed by: NURSE PRACTITIONER

## 2017-07-03 PROCEDURE — 96375 TX/PRO/DX INJ NEW DRUG ADDON: CPT

## 2017-07-03 PROCEDURE — 25010000002 ONDANSETRON PER 1 MG: Performed by: EMERGENCY MEDICINE

## 2017-07-03 PROCEDURE — 93010 ELECTROCARDIOGRAM REPORT: CPT | Performed by: INTERNAL MEDICINE

## 2017-07-03 PROCEDURE — 25010000002 METOCLOPRAMIDE PER 10 MG: Performed by: INTERNAL MEDICINE

## 2017-07-03 PROCEDURE — 87086 URINE CULTURE/COLONY COUNT: CPT | Performed by: NURSE PRACTITIONER

## 2017-07-03 PROCEDURE — 25010000002 ONDANSETRON PER 1 MG: Performed by: INTERNAL MEDICINE

## 2017-07-03 PROCEDURE — 83690 ASSAY OF LIPASE: CPT | Performed by: NURSE PRACTITIONER

## 2017-07-03 PROCEDURE — 0 IOPAMIDOL 61 % SOLUTION: Performed by: EMERGENCY MEDICINE

## 2017-07-03 PROCEDURE — 25010000002 MORPHINE SULFATE (PF) 2 MG/ML SOLUTION: Performed by: NURSE PRACTITIONER

## 2017-07-03 PROCEDURE — 96376 TX/PRO/DX INJ SAME DRUG ADON: CPT

## 2017-07-03 PROCEDURE — 63710000001 INSULIN DETEMIR PER 5 UNITS: Performed by: INTERNAL MEDICINE

## 2017-07-03 PROCEDURE — 87999 UNLISTED MICROBIOLOGY PX: CPT | Performed by: NURSE PRACTITIONER

## 2017-07-03 PROCEDURE — 81001 URINALYSIS AUTO W/SCOPE: CPT | Performed by: NURSE PRACTITIONER

## 2017-07-03 PROCEDURE — 25010000002 METOCLOPRAMIDE PER 10 MG: Performed by: NURSE PRACTITIONER

## 2017-07-03 PROCEDURE — 83735 ASSAY OF MAGNESIUM: CPT | Performed by: NURSE PRACTITIONER

## 2017-07-03 PROCEDURE — 85025 COMPLETE CBC W/AUTO DIFF WBC: CPT | Performed by: NURSE PRACTITIONER

## 2017-07-03 PROCEDURE — 82962 GLUCOSE BLOOD TEST: CPT

## 2017-07-03 PROCEDURE — 80053 COMPREHEN METABOLIC PANEL: CPT | Performed by: NURSE PRACTITIONER

## 2017-07-03 PROCEDURE — 25010000002 MAGNESIUM SULFATE 2 GM/50ML SOLUTION: Performed by: NURSE PRACTITIONER

## 2017-07-03 PROCEDURE — 71020 HC CHEST PA AND LATERAL: CPT

## 2017-07-03 PROCEDURE — 85610 PROTHROMBIN TIME: CPT | Performed by: NURSE PRACTITIONER

## 2017-07-03 PROCEDURE — 96361 HYDRATE IV INFUSION ADD-ON: CPT

## 2017-07-03 PROCEDURE — C1751 CATH, INF, PER/CENT/MIDLINE: HCPCS

## 2017-07-03 PROCEDURE — 25010000002 PROMETHAZINE PER 50 MG: Performed by: NURSE PRACTITIONER

## 2017-07-03 PROCEDURE — 85007 BL SMEAR W/DIFF WBC COUNT: CPT | Performed by: NURSE PRACTITIONER

## 2017-07-03 RX ORDER — FAMOTIDINE 20 MG/1
20 TABLET, FILM COATED ORAL 2 TIMES DAILY
Status: DISCONTINUED | OUTPATIENT
Start: 2017-07-03 | End: 2017-07-05 | Stop reason: HOSPADM

## 2017-07-03 RX ORDER — ONDANSETRON 2 MG/ML
4 INJECTION INTRAMUSCULAR; INTRAVENOUS ONCE
Status: COMPLETED | OUTPATIENT
Start: 2017-07-03 | End: 2017-07-03

## 2017-07-03 RX ORDER — DILTIAZEM HYDROCHLORIDE 120 MG/1
120 CAPSULE, COATED, EXTENDED RELEASE ORAL NIGHTLY
Status: DISCONTINUED | OUTPATIENT
Start: 2017-07-03 | End: 2017-07-05 | Stop reason: HOSPADM

## 2017-07-03 RX ORDER — HYDROMORPHONE HYDROCHLORIDE 4 MG/1
4 TABLET ORAL EVERY 4 HOURS PRN
Status: DISCONTINUED | OUTPATIENT
Start: 2017-07-03 | End: 2017-07-05 | Stop reason: HOSPADM

## 2017-07-03 RX ORDER — NICOTINE POLACRILEX 4 MG
15 LOZENGE BUCCAL
Status: DISCONTINUED | OUTPATIENT
Start: 2017-07-03 | End: 2017-07-05 | Stop reason: HOSPADM

## 2017-07-03 RX ORDER — PROMETHAZINE HYDROCHLORIDE 25 MG/ML
25 INJECTION, SOLUTION INTRAMUSCULAR; INTRAVENOUS ONCE
Status: COMPLETED | OUTPATIENT
Start: 2017-07-03 | End: 2017-07-03

## 2017-07-03 RX ORDER — SODIUM CHLORIDE 0.9 % (FLUSH) 0.9 %
10 SYRINGE (ML) INJECTION AS NEEDED
Status: DISCONTINUED | OUTPATIENT
Start: 2017-07-03 | End: 2017-07-05 | Stop reason: HOSPADM

## 2017-07-03 RX ORDER — FLUTICASONE PROPIONATE 50 MCG
2 SPRAY, SUSPENSION (ML) NASAL DAILY
Status: DISCONTINUED | OUTPATIENT
Start: 2017-07-03 | End: 2017-07-05 | Stop reason: HOSPADM

## 2017-07-03 RX ORDER — METOCLOPRAMIDE HYDROCHLORIDE 5 MG/ML
10 INJECTION INTRAMUSCULAR; INTRAVENOUS
Status: DISCONTINUED | OUTPATIENT
Start: 2017-07-03 | End: 2017-07-05 | Stop reason: HOSPADM

## 2017-07-03 RX ORDER — ONDANSETRON 4 MG/1
8 TABLET, FILM COATED ORAL EVERY 8 HOURS PRN
Status: DISCONTINUED | OUTPATIENT
Start: 2017-07-03 | End: 2017-07-05 | Stop reason: HOSPADM

## 2017-07-03 RX ORDER — TRAZODONE HYDROCHLORIDE 50 MG/1
50 TABLET ORAL NIGHTLY
Status: DISCONTINUED | OUTPATIENT
Start: 2017-07-03 | End: 2017-07-05 | Stop reason: HOSPADM

## 2017-07-03 RX ORDER — ASCORBIC ACID 500 MG
500 TABLET ORAL 3 TIMES DAILY
Status: DISCONTINUED | OUTPATIENT
Start: 2017-07-03 | End: 2017-07-05 | Stop reason: HOSPADM

## 2017-07-03 RX ORDER — VENLAFAXINE HYDROCHLORIDE 75 MG/1
75 CAPSULE, EXTENDED RELEASE ORAL NIGHTLY
Status: DISCONTINUED | OUTPATIENT
Start: 2017-07-03 | End: 2017-07-05 | Stop reason: HOSPADM

## 2017-07-03 RX ORDER — SODIUM CHLORIDE 9 MG/ML
125 INJECTION, SOLUTION INTRAVENOUS CONTINUOUS
Status: DISCONTINUED | OUTPATIENT
Start: 2017-07-03 | End: 2017-07-05 | Stop reason: HOSPADM

## 2017-07-03 RX ORDER — LUBIPROSTONE 24 UG/1
24 CAPSULE ORAL 2 TIMES DAILY WITH MEALS
Status: DISCONTINUED | OUTPATIENT
Start: 2017-07-03 | End: 2017-07-05 | Stop reason: HOSPADM

## 2017-07-03 RX ORDER — PROMETHAZINE HYDROCHLORIDE 25 MG/1
25 TABLET ORAL EVERY 6 HOURS PRN
Status: DISCONTINUED | OUTPATIENT
Start: 2017-07-03 | End: 2017-07-05 | Stop reason: HOSPADM

## 2017-07-03 RX ORDER — METOCLOPRAMIDE HYDROCHLORIDE 5 MG/ML
10 INJECTION INTRAMUSCULAR; INTRAVENOUS ONCE
Status: COMPLETED | OUTPATIENT
Start: 2017-07-03 | End: 2017-07-03

## 2017-07-03 RX ORDER — DEXTROSE MONOHYDRATE 25 G/50ML
25 INJECTION, SOLUTION INTRAVENOUS
Status: DISCONTINUED | OUTPATIENT
Start: 2017-07-03 | End: 2017-07-05 | Stop reason: HOSPADM

## 2017-07-03 RX ORDER — ONDANSETRON 2 MG/ML
4 INJECTION INTRAMUSCULAR; INTRAVENOUS EVERY 6 HOURS PRN
Status: DISCONTINUED | OUTPATIENT
Start: 2017-07-03 | End: 2017-07-05 | Stop reason: HOSPADM

## 2017-07-03 RX ORDER — ASCORBIC ACID 500 MG
500 TABLET ORAL 3 TIMES DAILY
COMMUNITY
End: 2018-10-12 | Stop reason: SDUPTHER

## 2017-07-03 RX ORDER — PANTOPRAZOLE SODIUM 40 MG/1
40 TABLET, DELAYED RELEASE ORAL
Status: DISCONTINUED | OUTPATIENT
Start: 2017-07-04 | End: 2017-07-05 | Stop reason: HOSPADM

## 2017-07-03 RX ORDER — SODIUM CHLORIDE 0.9 % (FLUSH) 0.9 %
1-10 SYRINGE (ML) INJECTION AS NEEDED
Status: DISCONTINUED | OUTPATIENT
Start: 2017-07-03 | End: 2017-07-05 | Stop reason: HOSPADM

## 2017-07-03 RX ORDER — CLONAZEPAM 2 MG/1
2 TABLET ORAL 2 TIMES DAILY PRN
Status: DISCONTINUED | OUTPATIENT
Start: 2017-07-03 | End: 2017-07-05 | Stop reason: HOSPADM

## 2017-07-03 RX ORDER — MORPHINE SULFATE 4 MG/ML
4 INJECTION, SOLUTION INTRAMUSCULAR; INTRAVENOUS ONCE
Status: COMPLETED | OUTPATIENT
Start: 2017-07-03 | End: 2017-07-03

## 2017-07-03 RX ORDER — ACETAMINOPHEN 325 MG/1
650 TABLET ORAL EVERY 4 HOURS PRN
Status: DISCONTINUED | OUTPATIENT
Start: 2017-07-03 | End: 2017-07-05 | Stop reason: HOSPADM

## 2017-07-03 RX ORDER — MORPHINE SULFATE 2 MG/ML
2 INJECTION, SOLUTION INTRAMUSCULAR; INTRAVENOUS ONCE
Status: COMPLETED | OUTPATIENT
Start: 2017-07-03 | End: 2017-07-03

## 2017-07-03 RX ORDER — OXYCODONE AND ACETAMINOPHEN 10; 325 MG/1; MG/1
1 TABLET ORAL EVERY 6 HOURS PRN
Status: DISCONTINUED | OUTPATIENT
Start: 2017-07-03 | End: 2017-07-05 | Stop reason: HOSPADM

## 2017-07-03 RX ORDER — DOCUSATE SODIUM 100 MG/1
200 CAPSULE, LIQUID FILLED ORAL 2 TIMES DAILY
Status: DISCONTINUED | OUTPATIENT
Start: 2017-07-03 | End: 2017-07-05 | Stop reason: HOSPADM

## 2017-07-03 RX ORDER — ONDANSETRON 4 MG/1
4 TABLET, FILM COATED ORAL EVERY 6 HOURS PRN
Status: DISCONTINUED | OUTPATIENT
Start: 2017-07-03 | End: 2017-07-05 | Stop reason: HOSPADM

## 2017-07-03 RX ORDER — BUDESONIDE AND FORMOTEROL FUMARATE DIHYDRATE 80; 4.5 UG/1; UG/1
2 AEROSOL RESPIRATORY (INHALATION)
Status: DISCONTINUED | OUTPATIENT
Start: 2017-07-03 | End: 2017-07-05 | Stop reason: HOSPADM

## 2017-07-03 RX ORDER — BISACODYL 10 MG
10 SUPPOSITORY, RECTAL RECTAL DAILY PRN
Status: DISCONTINUED | OUTPATIENT
Start: 2017-07-03 | End: 2017-07-05 | Stop reason: HOSPADM

## 2017-07-03 RX ORDER — MAGNESIUM SULFATE HEPTAHYDRATE 40 MG/ML
2 INJECTION, SOLUTION INTRAVENOUS ONCE
Status: COMPLETED | OUTPATIENT
Start: 2017-07-03 | End: 2017-07-03

## 2017-07-03 RX ORDER — ONDANSETRON 4 MG/1
4 TABLET, ORALLY DISINTEGRATING ORAL EVERY 6 HOURS PRN
Status: DISCONTINUED | OUTPATIENT
Start: 2017-07-03 | End: 2017-07-05 | Stop reason: HOSPADM

## 2017-07-03 RX ADMIN — ONDANSETRON 4 MG: 2 INJECTION INTRAMUSCULAR; INTRAVENOUS at 19:58

## 2017-07-03 RX ADMIN — TRAZODONE HYDROCHLORIDE 50 MG: 50 TABLET ORAL at 21:32

## 2017-07-03 RX ADMIN — MAGNESIUM OXIDE TAB 400 MG (241.3 MG ELEMENTAL MG) 400 MG: 400 (241.3 MG) TAB at 21:32

## 2017-07-03 RX ADMIN — ONDANSETRON 4 MG: 2 INJECTION INTRAMUSCULAR; INTRAVENOUS at 09:09

## 2017-07-03 RX ADMIN — VENLAFAXINE HYDROCHLORIDE 75 MG: 75 CAPSULE, EXTENDED RELEASE ORAL at 21:32

## 2017-07-03 RX ADMIN — CLONAZEPAM 2 MG: 2 TABLET ORAL at 21:31

## 2017-07-03 RX ADMIN — INSULIN DETEMIR 8 UNITS: 100 INJECTION, SOLUTION SUBCUTANEOUS at 21:39

## 2017-07-03 RX ADMIN — METOCLOPRAMIDE 10 MG: 5 INJECTION, SOLUTION INTRAMUSCULAR; INTRAVENOUS at 13:17

## 2017-07-03 RX ADMIN — FLUTICASONE PROPIONATE 2 SPRAY: 50 SPRAY, METERED NASAL at 21:33

## 2017-07-03 RX ADMIN — RIVAROXABAN 20 MG: 10 TABLET, FILM COATED ORAL at 21:31

## 2017-07-03 RX ADMIN — OXYCODONE HYDROCHLORIDE AND ACETAMINOPHEN 1 TABLET: 10; 325 TABLET ORAL at 19:58

## 2017-07-03 RX ADMIN — OXYCODONE HYDROCHLORIDE AND ACETAMINOPHEN 500 MG: 500 TABLET ORAL at 21:31

## 2017-07-03 RX ADMIN — SODIUM CHLORIDE 1000 ML: 9 INJECTION, SOLUTION INTRAVENOUS at 09:36

## 2017-07-03 RX ADMIN — FAMOTIDINE 20 MG: 20 TABLET ORAL at 21:31

## 2017-07-03 RX ADMIN — METOCLOPRAMIDE 10 MG: 5 INJECTION, SOLUTION INTRAMUSCULAR; INTRAVENOUS at 21:31

## 2017-07-03 RX ADMIN — PROMETHAZINE HYDROCHLORIDE 25 MG: 25 INJECTION INTRAMUSCULAR; INTRAVENOUS at 09:50

## 2017-07-03 RX ADMIN — SODIUM CHLORIDE 125 ML/HR: 9 INJECTION, SOLUTION INTRAVENOUS at 19:58

## 2017-07-03 RX ADMIN — DILTIAZEM HYDROCHLORIDE 120 MG: 120 CAPSULE, COATED, EXTENDED RELEASE ORAL at 21:32

## 2017-07-03 RX ADMIN — Medication 10 ML: at 19:59

## 2017-07-03 RX ADMIN — MORPHINE SULFATE 4 MG: 4 INJECTION, SOLUTION INTRAMUSCULAR; INTRAVENOUS at 09:35

## 2017-07-03 RX ADMIN — MORPHINE SULFATE 2 MG: 2 INJECTION, SOLUTION INTRAMUSCULAR; INTRAVENOUS at 13:36

## 2017-07-03 RX ADMIN — IOPAMIDOL 100 ML: 612 INJECTION, SOLUTION INTRAVENOUS at 12:41

## 2017-07-03 RX ADMIN — MAGNESIUM SULFATE HEPTAHYDRATE 2 G: 40 INJECTION, SOLUTION INTRAVENOUS at 13:13

## 2017-07-04 LAB
ALBUMIN SERPL-MCNC: 3.7 G/DL (ref 3.4–4.8)
ALBUMIN/GLOB SERPL: 1.3 G/DL (ref 1.1–1.8)
ALP SERPL-CCNC: 89 U/L (ref 38–126)
ALT SERPL W P-5'-P-CCNC: 36 U/L (ref 9–52)
ANION GAP SERPL CALCULATED.3IONS-SCNC: 10 MMOL/L (ref 5–15)
AST SERPL-CCNC: 25 U/L (ref 14–36)
BACTERIA SPEC AEROBE CULT: NORMAL
BASOPHILS # BLD AUTO: 0.01 10*3/MM3 (ref 0–0.2)
BASOPHILS NFR BLD AUTO: 0.2 % (ref 0–2)
BILIRUB SERPL-MCNC: 0.5 MG/DL (ref 0.2–1.3)
BUN BLD-MCNC: 18 MG/DL (ref 7–21)
BUN/CREAT SERPL: 22 (ref 7–25)
CALCIUM SPEC-SCNC: 7.2 MG/DL (ref 8.4–10.2)
CHLORIDE SERPL-SCNC: 105 MMOL/L (ref 95–110)
CO2 SERPL-SCNC: 25 MMOL/L (ref 22–31)
CREAT BLD-MCNC: 0.82 MG/DL (ref 0.5–1)
DEPRECATED RDW RBC AUTO: 44.6 FL (ref 36.4–46.3)
EOSINOPHIL # BLD AUTO: 0.07 10*3/MM3 (ref 0–0.7)
EOSINOPHIL NFR BLD AUTO: 1.2 % (ref 0–7)
ERYTHROCYTE [DISTWIDTH] IN BLOOD BY AUTOMATED COUNT: 13.8 % (ref 11.5–14.5)
GFR SERPL CREATININE-BSD FRML MDRD: 94 ML/MIN/1.73 (ref 58–135)
GLOBULIN UR ELPH-MCNC: 2.9 GM/DL (ref 2.3–3.5)
GLUCOSE BLD-MCNC: 102 MG/DL (ref 60–100)
GLUCOSE BLDC GLUCOMTR-MCNC: 91 MG/DL (ref 70–130)
GLUCOSE BLDC GLUCOMTR-MCNC: 94 MG/DL (ref 70–130)
HCT VFR BLD AUTO: 36.3 % (ref 35–45)
HGB BLD-MCNC: 11.9 G/DL (ref 12–15.5)
IMM GRANULOCYTES # BLD: 0.01 10*3/MM3 (ref 0–0.02)
IMM GRANULOCYTES NFR BLD: 0.2 % (ref 0–0.5)
LYMPHOCYTES # BLD AUTO: 1.43 10*3/MM3 (ref 0.6–4.2)
LYMPHOCYTES NFR BLD AUTO: 24.1 % (ref 10–50)
MAGNESIUM SERPL-MCNC: 2.1 MG/DL (ref 1.6–2.3)
MCH RBC QN AUTO: 29 PG (ref 26.5–34)
MCHC RBC AUTO-ENTMCNC: 32.8 G/DL (ref 31.4–36)
MCV RBC AUTO: 88.3 FL (ref 80–98)
MONOCYTES # BLD AUTO: 0.57 10*3/MM3 (ref 0–0.9)
MONOCYTES NFR BLD AUTO: 9.6 % (ref 0–12)
NEUTROPHILS # BLD AUTO: 3.85 10*3/MM3 (ref 2–8.6)
NEUTROPHILS NFR BLD AUTO: 64.7 % (ref 37–80)
NRBC BLD MANUAL-RTO: 0 /100 WBC (ref 0–0)
PLATELET # BLD AUTO: 199 10*3/MM3 (ref 150–450)
PMV BLD AUTO: 13.2 FL (ref 8–12)
POTASSIUM BLD-SCNC: 3.4 MMOL/L (ref 3.5–5.1)
PROT SERPL-MCNC: 6.6 G/DL (ref 6.3–8.6)
RBC # BLD AUTO: 4.11 10*6/MM3 (ref 3.77–5.16)
SODIUM BLD-SCNC: 140 MMOL/L (ref 137–145)
WBC NRBC COR # BLD: 5.94 10*3/MM3 (ref 3.2–9.8)

## 2017-07-04 PROCEDURE — 94640 AIRWAY INHALATION TREATMENT: CPT

## 2017-07-04 PROCEDURE — 63710000001 PROMETHAZINE PER 25 MG: Performed by: INTERNAL MEDICINE

## 2017-07-04 PROCEDURE — 80053 COMPREHEN METABOLIC PANEL: CPT | Performed by: INTERNAL MEDICINE

## 2017-07-04 PROCEDURE — 83735 ASSAY OF MAGNESIUM: CPT | Performed by: INTERNAL MEDICINE

## 2017-07-04 PROCEDURE — 96376 TX/PRO/DX INJ SAME DRUG ADON: CPT

## 2017-07-04 PROCEDURE — 94760 N-INVAS EAR/PLS OXIMETRY 1: CPT

## 2017-07-04 PROCEDURE — 85025 COMPLETE CBC W/AUTO DIFF WBC: CPT | Performed by: INTERNAL MEDICINE

## 2017-07-04 PROCEDURE — 63710000001 INSULIN DETEMIR PER 5 UNITS: Performed by: INTERNAL MEDICINE

## 2017-07-04 PROCEDURE — 82962 GLUCOSE BLOOD TEST: CPT

## 2017-07-04 PROCEDURE — 25010000002 METOCLOPRAMIDE PER 10 MG: Performed by: INTERNAL MEDICINE

## 2017-07-04 PROCEDURE — 94799 UNLISTED PULMONARY SVC/PX: CPT

## 2017-07-04 PROCEDURE — 96361 HYDRATE IV INFUSION ADD-ON: CPT

## 2017-07-04 PROCEDURE — G0378 HOSPITAL OBSERVATION PER HR: HCPCS

## 2017-07-04 RX ADMIN — OXYCODONE HYDROCHLORIDE AND ACETAMINOPHEN 1 TABLET: 10; 325 TABLET ORAL at 12:04

## 2017-07-04 RX ADMIN — FAMOTIDINE 20 MG: 20 TABLET ORAL at 08:25

## 2017-07-04 RX ADMIN — METOCLOPRAMIDE 10 MG: 5 INJECTION, SOLUTION INTRAMUSCULAR; INTRAVENOUS at 22:00

## 2017-07-04 RX ADMIN — OXYCODONE HYDROCHLORIDE AND ACETAMINOPHEN 500 MG: 500 TABLET ORAL at 08:24

## 2017-07-04 RX ADMIN — Medication 10 ML: at 08:30

## 2017-07-04 RX ADMIN — SODIUM CHLORIDE 125 ML/HR: 9 INJECTION, SOLUTION INTRAVENOUS at 03:55

## 2017-07-04 RX ADMIN — TRAZODONE HYDROCHLORIDE 50 MG: 50 TABLET ORAL at 21:18

## 2017-07-04 RX ADMIN — FLUTICASONE PROPIONATE 2 SPRAY: 50 SPRAY, METERED NASAL at 08:23

## 2017-07-04 RX ADMIN — OXYCODONE HYDROCHLORIDE AND ACETAMINOPHEN 1 TABLET: 10; 325 TABLET ORAL at 21:18

## 2017-07-04 RX ADMIN — SODIUM CHLORIDE 125 ML/HR: 9 INJECTION, SOLUTION INTRAVENOUS at 11:42

## 2017-07-04 RX ADMIN — SODIUM CHLORIDE 125 ML/HR: 9 INJECTION, SOLUTION INTRAVENOUS at 21:18

## 2017-07-04 RX ADMIN — MAGNESIUM OXIDE TAB 400 MG (241.3 MG ELEMENTAL MG) 400 MG: 400 (241.3 MG) TAB at 08:24

## 2017-07-04 RX ADMIN — LUBIPROSTONE 24 MCG: 24 CAPSULE, GELATIN COATED ORAL at 08:24

## 2017-07-04 RX ADMIN — METOCLOPRAMIDE 10 MG: 5 INJECTION, SOLUTION INTRAMUSCULAR; INTRAVENOUS at 11:55

## 2017-07-04 RX ADMIN — PANTOPRAZOLE SODIUM 40 MG: 40 TABLET, DELAYED RELEASE ORAL at 05:34

## 2017-07-04 RX ADMIN — METOCLOPRAMIDE 10 MG: 5 INJECTION, SOLUTION INTRAMUSCULAR; INTRAVENOUS at 16:34

## 2017-07-04 RX ADMIN — METOCLOPRAMIDE 10 MG: 5 INJECTION, SOLUTION INTRAMUSCULAR; INTRAVENOUS at 08:24

## 2017-07-04 RX ADMIN — VENLAFAXINE HYDROCHLORIDE 75 MG: 75 CAPSULE, EXTENDED RELEASE ORAL at 21:18

## 2017-07-04 RX ADMIN — OXYCODONE HYDROCHLORIDE AND ACETAMINOPHEN 1 TABLET: 10; 325 TABLET ORAL at 05:34

## 2017-07-04 RX ADMIN — CLONAZEPAM 2 MG: 2 TABLET ORAL at 23:29

## 2017-07-04 RX ADMIN — INSULIN DETEMIR 8 UNITS: 100 INJECTION, SOLUTION SUBCUTANEOUS at 21:00

## 2017-07-04 RX ADMIN — OXYCODONE HYDROCHLORIDE AND ACETAMINOPHEN 500 MG: 500 TABLET ORAL at 21:18

## 2017-07-04 RX ADMIN — LUBIPROSTONE 24 MCG: 24 CAPSULE, GELATIN COATED ORAL at 17:56

## 2017-07-04 RX ADMIN — ONDANSETRON 8 MG: 4 TABLET, FILM COATED ORAL at 08:23

## 2017-07-04 RX ADMIN — FAMOTIDINE 20 MG: 20 TABLET ORAL at 17:57

## 2017-07-04 RX ADMIN — PROMETHAZINE HYDROCHLORIDE 25 MG: 25 TABLET ORAL at 23:52

## 2017-07-04 RX ADMIN — MAGNESIUM OXIDE TAB 400 MG (241.3 MG ELEMENTAL MG) 400 MG: 400 (241.3 MG) TAB at 17:56

## 2017-07-04 RX ADMIN — DILTIAZEM HYDROCHLORIDE 120 MG: 120 CAPSULE, COATED, EXTENDED RELEASE ORAL at 21:18

## 2017-07-04 RX ADMIN — OXYCODONE HYDROCHLORIDE AND ACETAMINOPHEN 500 MG: 500 TABLET ORAL at 16:34

## 2017-07-05 VITALS
RESPIRATION RATE: 18 BRPM | HEART RATE: 73 BPM | WEIGHT: 231.48 LBS | BODY MASS INDEX: 38.57 KG/M2 | HEIGHT: 65 IN | TEMPERATURE: 98.1 F | DIASTOLIC BLOOD PRESSURE: 68 MMHG | SYSTOLIC BLOOD PRESSURE: 106 MMHG | OXYGEN SATURATION: 95 %

## 2017-07-05 LAB
ALBUMIN SERPL-MCNC: 3.2 G/DL (ref 3.4–4.8)
ALBUMIN/GLOB SERPL: 1.1 G/DL (ref 1.1–1.8)
ALP SERPL-CCNC: 76 U/L (ref 38–126)
ALT SERPL W P-5'-P-CCNC: 26 U/L (ref 9–52)
ANION GAP SERPL CALCULATED.3IONS-SCNC: 8 MMOL/L (ref 5–15)
AST SERPL-CCNC: 21 U/L (ref 14–36)
BASOPHILS # BLD AUTO: 0.01 10*3/MM3 (ref 0–0.2)
BASOPHILS NFR BLD AUTO: 0.2 % (ref 0–2)
BILIRUB SERPL-MCNC: 0.3 MG/DL (ref 0.2–1.3)
BUN BLD-MCNC: 7 MG/DL (ref 7–21)
BUN/CREAT SERPL: 9.6 (ref 7–25)
CALCIUM SPEC-SCNC: 6.9 MG/DL (ref 8.4–10.2)
CHLORIDE SERPL-SCNC: 108 MMOL/L (ref 95–110)
CO2 SERPL-SCNC: 26 MMOL/L (ref 22–31)
CREAT BLD-MCNC: 0.73 MG/DL (ref 0.5–1)
DEPRECATED RDW RBC AUTO: 42.7 FL (ref 36.4–46.3)
EOSINOPHIL # BLD AUTO: 0.12 10*3/MM3 (ref 0–0.7)
EOSINOPHIL NFR BLD AUTO: 2.5 % (ref 0–7)
ERYTHROCYTE [DISTWIDTH] IN BLOOD BY AUTOMATED COUNT: 13.3 % (ref 11.5–14.5)
GFR SERPL CREATININE-BSD FRML MDRD: 107 ML/MIN/1.73 (ref 58–135)
GLOBULIN UR ELPH-MCNC: 2.8 GM/DL (ref 2.3–3.5)
GLUCOSE BLD-MCNC: 89 MG/DL (ref 60–100)
GLUCOSE BLDC GLUCOMTR-MCNC: 89 MG/DL (ref 70–130)
GLUCOSE BLDC GLUCOMTR-MCNC: 93 MG/DL (ref 70–130)
HCT VFR BLD AUTO: 34.6 % (ref 35–45)
HGB BLD-MCNC: 11.2 G/DL (ref 12–15.5)
IMM GRANULOCYTES # BLD: 0.01 10*3/MM3 (ref 0–0.02)
IMM GRANULOCYTES NFR BLD: 0.2 % (ref 0–0.5)
LYMPHOCYTES # BLD AUTO: 1.5 10*3/MM3 (ref 0.6–4.2)
LYMPHOCYTES NFR BLD AUTO: 31 % (ref 10–50)
MAGNESIUM SERPL-MCNC: 2 MG/DL (ref 1.6–2.3)
MCH RBC QN AUTO: 28.8 PG (ref 26.5–34)
MCHC RBC AUTO-ENTMCNC: 32.4 G/DL (ref 31.4–36)
MCV RBC AUTO: 88.9 FL (ref 80–98)
MONOCYTES # BLD AUTO: 0.41 10*3/MM3 (ref 0–0.9)
MONOCYTES NFR BLD AUTO: 8.5 % (ref 0–12)
NEUTROPHILS # BLD AUTO: 2.79 10*3/MM3 (ref 2–8.6)
NEUTROPHILS NFR BLD AUTO: 57.6 % (ref 37–80)
PLATELET # BLD AUTO: 147 10*3/MM3 (ref 150–450)
PMV BLD AUTO: 11.5 FL (ref 8–12)
POTASSIUM BLD-SCNC: 3.5 MMOL/L (ref 3.5–5.1)
PROT SERPL-MCNC: 6 G/DL (ref 6.3–8.6)
RBC # BLD AUTO: 3.89 10*6/MM3 (ref 3.77–5.16)
SODIUM BLD-SCNC: 142 MMOL/L (ref 137–145)
WBC NRBC COR # BLD: 4.84 10*3/MM3 (ref 3.2–9.8)

## 2017-07-05 PROCEDURE — 96376 TX/PRO/DX INJ SAME DRUG ADON: CPT

## 2017-07-05 PROCEDURE — 96361 HYDRATE IV INFUSION ADD-ON: CPT

## 2017-07-05 PROCEDURE — G0378 HOSPITAL OBSERVATION PER HR: HCPCS

## 2017-07-05 PROCEDURE — 25010000002 METOCLOPRAMIDE PER 10 MG: Performed by: INTERNAL MEDICINE

## 2017-07-05 PROCEDURE — 85025 COMPLETE CBC W/AUTO DIFF WBC: CPT | Performed by: INTERNAL MEDICINE

## 2017-07-05 PROCEDURE — 80053 COMPREHEN METABOLIC PANEL: CPT | Performed by: INTERNAL MEDICINE

## 2017-07-05 PROCEDURE — 83735 ASSAY OF MAGNESIUM: CPT | Performed by: INTERNAL MEDICINE

## 2017-07-05 PROCEDURE — 82962 GLUCOSE BLOOD TEST: CPT

## 2017-07-05 RX ORDER — CEFACLOR 500 MG
500 CAPSULE ORAL 3 TIMES DAILY
Qty: 15 CAPSULE | Refills: 0 | Status: SHIPPED | OUTPATIENT
Start: 2017-07-05 | End: 2017-07-12

## 2017-07-05 RX ADMIN — OXYCODONE HYDROCHLORIDE AND ACETAMINOPHEN 500 MG: 500 TABLET ORAL at 08:36

## 2017-07-05 RX ADMIN — SODIUM CHLORIDE 125 ML/HR: 9 INJECTION, SOLUTION INTRAVENOUS at 08:36

## 2017-07-05 RX ADMIN — FAMOTIDINE 20 MG: 20 TABLET ORAL at 08:36

## 2017-07-05 RX ADMIN — RIVAROXABAN 20 MG: 10 TABLET, FILM COATED ORAL at 09:48

## 2017-07-05 RX ADMIN — METOCLOPRAMIDE 10 MG: 5 INJECTION, SOLUTION INTRAMUSCULAR; INTRAVENOUS at 08:36

## 2017-07-05 RX ADMIN — OXYCODONE HYDROCHLORIDE AND ACETAMINOPHEN 1 TABLET: 10; 325 TABLET ORAL at 08:51

## 2017-07-05 RX ADMIN — MAGNESIUM OXIDE TAB 400 MG (241.3 MG ELEMENTAL MG) 400 MG: 400 (241.3 MG) TAB at 08:36

## 2017-07-05 RX ADMIN — LUBIPROSTONE 24 MCG: 24 CAPSULE, GELATIN COATED ORAL at 08:37

## 2017-07-05 RX ADMIN — FLUTICASONE PROPIONATE 2 SPRAY: 50 SPRAY, METERED NASAL at 08:36

## 2017-07-05 RX ADMIN — PANTOPRAZOLE SODIUM 40 MG: 40 TABLET, DELAYED RELEASE ORAL at 06:03

## 2017-07-06 ENCOUNTER — INFUSION (OUTPATIENT)
Dept: ONCOLOGY | Facility: HOSPITAL | Age: 42
End: 2017-07-06

## 2017-07-06 DIAGNOSIS — C79.51 CARCINOMA OF RIGHT BREAST METASTATIC TO BONE (HCC): Primary | ICD-10-CM

## 2017-07-06 DIAGNOSIS — Z45.2 ENCOUNTER FOR VENOUS ACCESS DEVICE CARE: ICD-10-CM

## 2017-07-06 DIAGNOSIS — C50.911 CARCINOMA OF RIGHT BREAST METASTATIC TO BONE (HCC): Primary | ICD-10-CM

## 2017-07-06 PROCEDURE — G0463 HOSPITAL OUTPT CLINIC VISIT: HCPCS | Performed by: INTERNAL MEDICINE

## 2017-07-06 RX ORDER — SODIUM CHLORIDE 0.9 % (FLUSH) 0.9 %
10 SYRINGE (ML) INJECTION AS NEEDED
Status: CANCELLED | OUTPATIENT
Start: 2017-07-06

## 2017-07-06 RX ORDER — SODIUM CHLORIDE 0.9 % (FLUSH) 0.9 %
10 SYRINGE (ML) INJECTION AS NEEDED
Status: DISCONTINUED | OUTPATIENT
Start: 2017-07-06 | End: 2017-07-06 | Stop reason: HOSPADM

## 2017-07-06 RX ADMIN — Medication 10 ML: at 12:05

## 2017-07-12 ENCOUNTER — INFUSION (OUTPATIENT)
Dept: ONCOLOGY | Facility: HOSPITAL | Age: 42
End: 2017-07-12

## 2017-07-12 ENCOUNTER — OFFICE VISIT (OUTPATIENT)
Dept: ONCOLOGY | Facility: CLINIC | Age: 42
End: 2017-07-12

## 2017-07-12 VITALS
HEART RATE: 77 BPM | BODY MASS INDEX: 36.49 KG/M2 | WEIGHT: 219.3 LBS | SYSTOLIC BLOOD PRESSURE: 162 MMHG | TEMPERATURE: 97.1 F | DIASTOLIC BLOOD PRESSURE: 89 MMHG | RESPIRATION RATE: 16 BRPM

## 2017-07-12 DIAGNOSIS — C50.912 BREAST CANCER METASTASIZED TO BONE, LEFT (HCC): ICD-10-CM

## 2017-07-12 DIAGNOSIS — Z45.2 ENCOUNTER FOR ASSESSMENT OF PERIPHERALLY INSERTED CENTRAL VENOUS CATHETER (PICC): ICD-10-CM

## 2017-07-12 DIAGNOSIS — Z45.2 ENCOUNTER FOR VENOUS ACCESS DEVICE CARE: ICD-10-CM

## 2017-07-12 DIAGNOSIS — C79.51 CARCINOMA OF RIGHT BREAST METASTATIC TO BONE (HCC): Primary | ICD-10-CM

## 2017-07-12 DIAGNOSIS — C79.51 BREAST CANCER METASTASIZED TO BONE, LEFT (HCC): Primary | ICD-10-CM

## 2017-07-12 DIAGNOSIS — C50.911 CARCINOMA OF RIGHT BREAST METASTATIC TO BONE (HCC): ICD-10-CM

## 2017-07-12 DIAGNOSIS — C50.911 CARCINOMA OF RIGHT BREAST METASTATIC TO BONE (HCC): Primary | ICD-10-CM

## 2017-07-12 DIAGNOSIS — C50.912 BREAST CANCER METASTASIZED TO BONE, LEFT (HCC): Primary | ICD-10-CM

## 2017-07-12 DIAGNOSIS — C79.51 BREAST CANCER METASTASIZED TO BONE, LEFT (HCC): ICD-10-CM

## 2017-07-12 DIAGNOSIS — C79.51 CARCINOMA OF RIGHT BREAST METASTATIC TO BONE (HCC): ICD-10-CM

## 2017-07-12 DIAGNOSIS — T82.9XXD: ICD-10-CM

## 2017-07-12 LAB
ALBUMIN SERPL-MCNC: 4.5 G/DL (ref 3.4–4.8)
ALBUMIN/GLOB SERPL: 1.4 G/DL (ref 1.1–1.8)
ALP SERPL-CCNC: 105 U/L (ref 38–126)
ALT SERPL W P-5'-P-CCNC: 53 U/L (ref 9–52)
ANION GAP SERPL CALCULATED.3IONS-SCNC: 15 MMOL/L (ref 5–15)
AST SERPL-CCNC: 39 U/L (ref 14–36)
BASOPHILS # BLD AUTO: 0.03 10*3/MM3 (ref 0–0.2)
BASOPHILS NFR BLD AUTO: 0.4 % (ref 0–2)
BILIRUB SERPL-MCNC: 0.4 MG/DL (ref 0.2–1.3)
BUN BLD-MCNC: 20 MG/DL (ref 7–21)
BUN/CREAT SERPL: 22.7 (ref 7–25)
CALCIUM SPEC-SCNC: 8.7 MG/DL (ref 8.4–10.2)
CHLORIDE SERPL-SCNC: 104 MMOL/L (ref 95–110)
CO2 SERPL-SCNC: 22 MMOL/L (ref 22–31)
CREAT BLD-MCNC: 0.88 MG/DL (ref 0.5–1)
DEPRECATED RDW RBC AUTO: 42.9 FL (ref 36.4–46.3)
EOSINOPHIL # BLD AUTO: 0.2 10*3/MM3 (ref 0–0.7)
EOSINOPHIL NFR BLD AUTO: 2.7 % (ref 0–7)
ERYTHROCYTE [DISTWIDTH] IN BLOOD BY AUTOMATED COUNT: 13.5 % (ref 11.5–14.5)
GFR SERPL CREATININE-BSD FRML MDRD: 86 ML/MIN/1.73 (ref 58–135)
GLOBULIN UR ELPH-MCNC: 3.3 GM/DL (ref 2.3–3.5)
GLUCOSE BLD-MCNC: 109 MG/DL (ref 60–100)
HCT VFR BLD AUTO: 39.5 % (ref 35–45)
HGB BLD-MCNC: 13.2 G/DL (ref 12–15.5)
IMM GRANULOCYTES # BLD: 0.02 10*3/MM3 (ref 0–0.02)
IMM GRANULOCYTES NFR BLD: 0.3 % (ref 0–0.5)
LYMPHOCYTES # BLD AUTO: 1.9 10*3/MM3 (ref 0.6–4.2)
LYMPHOCYTES NFR BLD AUTO: 25.6 % (ref 10–50)
MCH RBC QN AUTO: 29.1 PG (ref 26.5–34)
MCHC RBC AUTO-ENTMCNC: 33.4 G/DL (ref 31.4–36)
MCV RBC AUTO: 87.2 FL (ref 80–98)
MONOCYTES # BLD AUTO: 0.52 10*3/MM3 (ref 0–0.9)
MONOCYTES NFR BLD AUTO: 7 % (ref 0–12)
NEUTROPHILS # BLD AUTO: 4.75 10*3/MM3 (ref 2–8.6)
NEUTROPHILS NFR BLD AUTO: 64 % (ref 37–80)
PLATELET # BLD AUTO: 182 10*3/MM3 (ref 150–450)
PMV BLD AUTO: 11.4 FL (ref 8–12)
POTASSIUM BLD-SCNC: 4.1 MMOL/L (ref 3.5–5.1)
PROT SERPL-MCNC: 7.8 G/DL (ref 6.3–8.6)
RBC # BLD AUTO: 4.53 10*6/MM3 (ref 3.77–5.16)
SODIUM BLD-SCNC: 141 MMOL/L (ref 137–145)
WBC NRBC COR # BLD: 7.42 10*3/MM3 (ref 3.2–9.8)

## 2017-07-12 PROCEDURE — 80053 COMPREHEN METABOLIC PANEL: CPT

## 2017-07-12 PROCEDURE — 36593 DECLOT VASCULAR DEVICE: CPT | Performed by: INTERNAL MEDICINE

## 2017-07-12 PROCEDURE — 25010000002 ALTEPLASE 2 MG RECONSTITUTED SOLUTION: Performed by: INTERNAL MEDICINE

## 2017-07-12 PROCEDURE — 86300 IMMUNOASSAY TUMOR CA 15-3: CPT

## 2017-07-12 PROCEDURE — 85025 COMPLETE CBC W/AUTO DIFF WBC: CPT

## 2017-07-12 PROCEDURE — G0463 HOSPITAL OUTPT CLINIC VISIT: HCPCS | Performed by: INTERNAL MEDICINE

## 2017-07-12 PROCEDURE — 99215 OFFICE O/P EST HI 40 MIN: CPT | Performed by: INTERNAL MEDICINE

## 2017-07-12 PROCEDURE — 96523 IRRIG DRUG DELIVERY DEVICE: CPT | Performed by: INTERNAL MEDICINE

## 2017-07-12 RX ORDER — SODIUM CHLORIDE 0.9 % (FLUSH) 0.9 %
10 SYRINGE (ML) INJECTION AS NEEDED
Status: DISCONTINUED | OUTPATIENT
Start: 2017-07-12 | End: 2017-07-12 | Stop reason: HOSPADM

## 2017-07-12 RX ORDER — ANASTROZOLE 1 MG/1
1 TABLET ORAL DAILY
Qty: 30 TABLET | Refills: 3 | Status: SHIPPED | OUTPATIENT
Start: 2017-07-12 | End: 2017-12-22 | Stop reason: SDUPTHER

## 2017-07-12 RX ORDER — SODIUM CHLORIDE 0.9 % (FLUSH) 0.9 %
10 SYRINGE (ML) INJECTION AS NEEDED
Status: CANCELLED | OUTPATIENT
Start: 2017-07-13

## 2017-07-12 RX ADMIN — ALTEPLASE 2 MG: 2.2 INJECTION, POWDER, LYOPHILIZED, FOR SOLUTION INTRAVENOUS at 09:28

## 2017-07-12 RX ADMIN — Medication 10 ML: at 09:27

## 2017-07-12 NOTE — PATIENT INSTRUCTIONS
Anastrozole tablets  What is this medicine?  ANASTROZOLE (an AS troe zole) is used to treat breast cancer in women who have gone through menopause. Some types of breast cancer depend on estrogen to grow, and this medicine can stop tumor growth by blocking estrogen production.  This medicine may be used for other purposes; ask your health care provider or pharmacist if you have questions.  COMMON BRAND NAME(S): Arimidex  What should I tell my health care provider before I take this medicine?  They need to know if you have any of these conditions:  -liver disease  -an unusual or allergic reaction to anastrozole, other medicines, foods, dyes, or preservatives  -pregnant or trying to get pregnant  -breast-feeding  How should I use this medicine?  Take this medicine by mouth with a glass of water. Follow the directions on the prescription label. You can take this medicine with or without food. Take your doses at regular intervals. Do not take your medicine more often than directed. Do not stop taking except on the advice of your doctor or health care professional.  Talk to your pediatrician regarding the use of this medicine in children. Special care may be needed.  Overdosage: If you think you have taken too much of this medicine contact a poison control center or emergency room at once.  NOTE: This medicine is only for you. Do not share this medicine with others.  What if I miss a dose?  If you miss a dose, take it as soon as you can. If it is almost time for your next dose, take only that dose. Do not take double or extra doses.  What may interact with this medicine?  Do not take this medicine with any of the following medications:  -female hormones, like estrogens or progestins and birth control pills  This medicine may also interact with the following medications:  -tamoxifen  This list may not describe all possible interactions. Give your health care provider a list of all the medicines, herbs, non-prescription  drugs, or dietary supplements you use. Also tell them if you smoke, drink alcohol, or use illegal drugs. Some items may interact with your medicine.  What should I watch for while using this medicine?  Visit your doctor or health care professional for regular checks on your progress. Let your doctor or health care professional know about any unusual vaginal bleeding.  Do not treat yourself for diarrhea, nausea, vomiting or other side effects. Ask your doctor or health care professional for advice.  What side effects may I notice from receiving this medicine?  Side effects that you should report to your doctor or health care professional as soon as possible:  -allergic reactions like skin rash, itching or hives, swelling of the face, lips, or tongue  -any new or unusual symptoms  -breathing problems  -chest pain  -leg pain or swelling  -vomiting  Side effects that usually do not require medical attention (report to your doctor or health care professional if they continue or are bothersome):  -back or bone pain  -cough, or throat infection  -diarrhea or constipation  -dizziness  -headache  -hot flashes  -loss of appetite  -nausea  -sweating  -weakness and tiredness  -weight gain  This list may not describe all possible side effects. Call your doctor for medical advice about side effects. You may report side effects to FDA at 1-813-FDA-9579.  Where should I keep my medicine?  Keep out of the reach of children.  Store at room temperature between 20 and 25 degrees C (68 and 77 degrees F). Throw away any unused medicine after the expiration date.  NOTE: This sheet is a summary. It may not cover all possible information. If you have questions about this medicine, talk to your doctor, pharmacist, or health care provider.     © 2017, [employer]. (2009-02-27 16:31:52)

## 2017-07-12 NOTE — PROGRESS NOTES
DATE OF VISIT: 7/12/2017    REASON FOR VISIT:  Metastatic right breast cancer    HISTORY OF PRESENT ILLNESS:    41-year-old female with a past medical history significant for metastatic right breast cancer with liver and bone metastases currently on treatment with Herceptin and perjeta with Faslodex.  She is here for follow-up visit today.   Treatment has been interrupted quite a few times in last 3 months secondary to her gastrointestinal bleed followed by a right ankle surgery.  Patient had MRI of lumbar spine done recently.  She is here to discuss the result of MRI of lumbar spine as well as further treatment recommendation.  Denies any blood in the stool or urine.  Denies any shortness of breath or chest pain.  Was recently admitted to the hospital with colitis and diarrhea.  States her diarrhea is completely resolved at this point.    PAST MEDICAL HISTORY:    1.  Metastatic right breast cancer with liver and bone metastasis  2.  History of pneumonia  3.  Right internal jugular vein DVT status post around to  4.  Rectal bleeding  5.  Dyslipidemia  6.  Diabetes mellitus  7.  Anxiety  8.  Bone metastasis    SOCIAL HISTORY:    Social History   Substance Use Topics   • Smoking status: Former Smoker     Quit date: 2012   • Smokeless tobacco: Never Used   • Alcohol use No       Surgical History :  Past Surgical History:   Procedure Laterality Date   • ANKLE LOOSE BODY EXCISION/REMOVAL Right 5/24/2017    Procedure: RIGTH FOOT EXCISION NAVICULAR FRACTURES FRAGMENT  DEBRIDEMENT TALONAVILCULA RJOIN T;  Surgeon: Armani Oliveira DPM;  Location: [facility] OR;  Service:    • BREAST SURGERY Right 2013     reconstruction of right breast, trans flap surgery   • CENTRAL VENOUS CATHETER TUNNELED INSERTION SINGLE LUMEN      Placement of indwelling Pleurx catheter right   • COLONOSCOPY N/A 1/26/2017    Procedure: COLONOSCOPY;  Surgeon: Robert Clifton MD;  Location: [facility] ENDOSCOPY;  Service:    • ENDOSCOPY N/A 1/26/2017     "Procedure: ESOPHAGOGASTRODUODENOSCOPY;  Surgeon: Robert Clifton MD;  Location: [facility] ENDOSCOPY;  Service:    • ENDOSCOPY N/A 2/24/2017    Procedure: ESOPHAGOGASTRODUODENOSCOPY;  Surgeon: Robert Clifton MD;  Location: [facility] ENDOSCOPY;  Service:    • HYSTERECTOMY      vaginal   • LIVER BIOPSY  2015   • LUNG VOLUME REDUCTION     • MASTECTOMY      partial   • OTHER SURGICAL HISTORY  05/05/2016    central line insertion , PICC line replacement   • OTHER SURGICAL HISTORY      place breast clip percut   • OTHER SURGICAL HISTORY      pulse oximetry   • OTHER SURGICAL HISTORY      remove cc cath w/ sc port or pump, Removal of right internal jugular MediPort   • OTHER SURGICAL HISTORY      spirometry   • OTHER SURGICAL HISTORY      tubal sterilization   • PAP SMEAR     • THORACENTESIS      aspiration of pleural space   • TUBAL ABDOMINAL LIGATION  2009   • UPPER GASTROINTESTINAL ENDOSCOPY  01/26/2017   • UPPER GASTROINTESTINAL ENDOSCOPY  02/24/2017   • VENOUS ACCESS DEVICE (PORT) INSERTION      Ultrasound guided right internal jugular Mediport placement under fluoroscopy       ALLERGIES:    Allergies   Allergen Reactions   • Lisinopril Shortness Of Breath   • Flagyl [Metronidazole] Other (See Comments)     pancreatitis   • Fentanyl Anxiety     \"goes out of her mind\"   • Latex Rash     FAMILY HISTORY:  Family History   Problem Relation Age of Onset   • Coronary artery disease Other    • Diabetes Other    • Hypertension Other    • Amblyopia Other    • Cataracts Maternal Grandfather    • Cataracts Paternal Grandmother      REVIEW OF SYSTEMS:      CONSTITUTIONAL: Positive for fatigue.  No fever, chills, or night sweats.     HEENT:  No epistaxis, mouth sores, or difficulty swallowing.    RESPIRATORY:  No new shortness of breath or cough at present.    CARDIOVASCULAR:  No chest pain or palpitations.    GASTROINTESTINAL:  No abdominal pain, nausea, vomiting, or blood in the stool.    GENITOURINARY:  No dysuria or " hematuria.    MUSCULOSKELETAL: Complains of discomfort And pain in right ankle.Complains of worsening back pain.    NEUROLOGICAL: Positive for intermittent tingling and numbness.. No new headache or dizziness.     LYMPHATICS:  Denies any abnormal swollen and anywhere in the body.    SKIN:  Denies any new skin rash.        PHYSICAL EXAMINATION:      VITAL SIGNS:    /89  Pulse 77  Temp 97.1 °F (36.2 °C)  Resp 16  Wt 219 lb 4.8 oz (99.5 kg)  BMI 36.49 kg/m2    GENERAL:  Not in any distress.     EYES:  Mild pallor. No icterus.  Extraocular movements intact.  No facial asymmetry.    NECK:  No adenopathy in cervical or supraclavicular area.    RESPIRATORY:  Fair air entry bilaterally. No rhonchi or wheezing.    CARDIOVASCULAR:  S1, S2. Regular rate and rhythm.    ABDOMEN:  Soft, nontender. Bowel sounds present.  No organomegaly appreciated.    EXTREMITIES:  No edema.  No calf  tenderness.  There is a surgical shoe present on right foot.PICC line present in left arm.    NEUROLOGICAL:  Alert, awake, oriented x3.  No motor or sensory deficit.  Cranial nerves II-12 grossly intact.        DIAGNOSTIC DATA:    Glucose   Date Value Ref Range Status   07/12/2017 109 (H) 60 - 100 mg/dL Final     Sodium   Date Value Ref Range Status   07/12/2017 141 137 - 145 mmol/L Final     Potassium   Date Value Ref Range Status   07/12/2017 4.1 3.5 - 5.1 mmol/L Final     CO2   Date Value Ref Range Status   07/12/2017 22.0 22.0 - 31.0 mmol/L Final     Chloride   Date Value Ref Range Status   07/12/2017 104 95 - 110 mmol/L Final     Anion Gap   Date Value Ref Range Status   07/12/2017 15.0 5.0 - 15.0 mmol/L Final     Creatinine   Date Value Ref Range Status   07/12/2017 0.88 0.50 - 1.00 mg/dL Final     BUN   Date Value Ref Range Status   07/12/2017 20 7 - 21 mg/dL Final     BUN/Creatinine Ratio   Date Value Ref Range Status   07/12/2017 22.7 7.0 - 25.0 Final     Calcium   Date Value Ref Range Status   07/12/2017 8.7 8.4 - 10.2 mg/dL  Final     Alkaline Phosphatase   Date Value Ref Range Status   07/12/2017 105 38 - 126 U/L Final     Total Protein   Date Value Ref Range Status   07/12/2017 7.8 6.3 - 8.6 g/dL Final     ALT (SGPT)   Date Value Ref Range Status   07/12/2017 53 (H) 9 - 52 U/L Final     AST (SGOT)   Date Value Ref Range Status   07/12/2017 39 (H) 14 - 36 U/L Final     Total Bilirubin   Date Value Ref Range Status   07/12/2017 0.4 0.2 - 1.3 mg/dL Final     Albumin   Date Value Ref Range Status   07/12/2017 4.50 3.40 - 4.80 g/dL Final     Globulin   Date Value Ref Range Status   07/12/2017 3.3 2.3 - 3.5 gm/dL Final     A/G Ratio   Date Value Ref Range Status   07/12/2017 1.4 1.1 - 1.8 g/dL Final     Lab Results   Component Value Date    WBC 7.42 07/12/2017    HGB 13.2 07/12/2017    HCT 39.5 07/12/2017    MCV 87.2 07/12/2017     07/12/2017     Lab Results   Component Value Date    NEUTROABS 4.75 07/12/2017    IRON 106 02/09/2017    TIBC 304 01/25/2017    LABIRON 6 (L) 01/25/2017    FERRITIN 51.50 01/25/2017    QKQXGKIB47 559 06/17/2016    FOLATE 14.10 06/17/2016     Lab Results   Component Value Date     29.7 (H) 05/31/2017    LABCA2 32.7 05/31/2017    AFPTM 156 01/25/2017    HCGQUANT 1 12/12/2016    CEA 3.2 09/22/2015     2D Echo done on July 7, 2017 showed:  Interpretation Summary   · Left ventricular systolic function is normal.  · Estimated EF appears to be in the range of 61 - 65%.  · All left ventricular wall segments contract normally  · Left ventricular diastolic function is normal.         RADIOLOGY DATA :  CTof abdomen and pelvis with contrast done on July 3, 2017 showed:  IMPRESSION:  CONCLUSION:     1. Stable areas of decreased attenuation segment seven and  segment eight of the liver. Irregular contour of the liver,  likely hepatic cirrhosis, stable.  2. Nondilated fluid-filled loops of small and large bowel without  wall enhancement. Correlation for enteritis/colitis.  3. Mild gallbladder distention.  4. Other  findings as above       MRI of lumbar spine without contrast done on June 23, 2017 showed:  IMPRESSION:     1. Multiple new areas of abnormal signal within several of the  thoracic and lumbar vertebral bodies suggests possibility of  metastatic disease.   2. Mild multilevel degenerative disc disease and degenerative  arthropathy of the lumbar spine.      ASSESSMENT AND PLAN:      1.  Stage IV metastatic right breast cancer with liver and bone metastasis.  Patient was initially diagnosed in 2011 with a stage III disease.  Initially patient had a Taxotere carboplatin and Herceptin for 6 cycles followed by 1 year of Herceptin maintenance.  After that patient was getting Lupron and tamoxifen.  In March 2015 she was diagnosed with a metastatic disease involving liver and bone.  Patient was again started on Taxotere Herceptin and perjeta.  She could not tolerate Taxotere at that point she had a recurrent pneumonias and cytopenias.  Subsequently she was maintained on Herceptin and perjeta until October 2016, at that point restaging CT scan showed there was anterior mediastinal mass for which patient was started on Taxol Herceptin and perjeta.  Patient received her last dose of Taxol on January 12, 2017.  Patient was started back on Herceptin and perjeta on February 9, 2017.  His February of this year patient's treatment with Herceptin and perjeta has been interrupted multiple times secondary to her gastrointestinal bleeding as well as requiring surgery on right ankle.  MRI of lumbar spine does show increased area of metastatic disease as compared to bone scan which was done in June 2016.  At this point treatment recommendation of changing therapy to Kadcyla as well as Arimidex were discussed with patient.  Side effects of Kadcyla including heart failure, risk of lowering of blood count, risk of infection, need for blood transfusion, diarrhea, risk of kidney failure and possibility of allergic reaction were discussed with  patient.  Side effect of Arimidex including hot flashes, mood swings, depression, worsening of joint pain, worsening of bone density were discussed with patient.  Patient is already getting Zometa for bone metastasis.  We will start her on Kadcyla next week.  Information about both Kadcyla and Arimidex were provided to patient.  Plan is to get staging CT scan of chest with contrast and this week before starting her on Kadcyla to see of there is any disease involving chest or mediastinum.    2.  Rectal bleeding: Patient is status post argon coagulation treatment on February 23, 2017.  Denies any rectal bleeding since then.    3.  Recurrent DVT: Patient was on Xarelto which was held secondary to rectal bleeding.  Patient is status post argon laser treatment on February 23, 2017.  Recommendation would be to support her on Lovenox and Coumadin Clinic visit if she does not have any bleeding at that point.  She is high risk of recurrent thrombosis due to active cancer.  Patient states it would be very difficult for her to come to Coumadin clinic for checks of INR.  Patient is currently on Xarelto.  Denies any rectal bleeding.    4.  Bone metastasis: Continue with Zometa as scheduled for now.      5.  Right ankle pain: States her pain is resolving.    6.  Health maintenance: Patient does not smoke.  She just had a colonoscopy done on January 2017 which was negative for any malignancy.  She remains full code.          Joselito Waddell MD  7/12/2017  5:20 PM

## 2017-07-13 ENCOUNTER — HOSPITAL ENCOUNTER (OUTPATIENT)
Dept: CT IMAGING | Facility: HOSPITAL | Age: 42
Discharge: HOME | End: 2017-07-13
Attending: INTERNAL MEDICINE | Admitting: INTERNAL MEDICINE

## 2017-07-13 DIAGNOSIS — C79.51 BREAST CANCER METASTASIZED TO BONE, LEFT (HCC): ICD-10-CM

## 2017-07-13 DIAGNOSIS — C50.912 BREAST CANCER METASTASIZED TO BONE, LEFT (HCC): ICD-10-CM

## 2017-07-13 LAB
CANCER AG15-3 SERPL-ACNC: 39.2 U/ML (ref 0–25)
CANCER AG27-29 SERPL-ACNC: 33.6 U/ML (ref 0–38.6)

## 2017-07-13 PROCEDURE — 71260 CT THORAX DX C+: CPT

## 2017-07-13 PROCEDURE — 0 IOPAMIDOL 61 % SOLUTION: Performed by: INTERNAL MEDICINE

## 2017-07-13 RX ORDER — DILTIAZEM HYDROCHLORIDE 120 MG/1
CAPSULE, COATED, EXTENDED RELEASE ORAL
Qty: 30 CAPSULE | Refills: 2 | Status: SHIPPED | OUTPATIENT
Start: 2017-07-13 | End: 2017-10-16 | Stop reason: SDUPTHER

## 2017-07-13 RX ADMIN — IOPAMIDOL 94 ML: 612 INJECTION, SOLUTION INTRAVENOUS at 16:45

## 2017-07-17 ENCOUNTER — INFUSION (OUTPATIENT)
Dept: ONCOLOGY | Facility: HOSPITAL | Age: 42
End: 2017-07-17

## 2017-07-17 ENCOUNTER — OFFICE VISIT (OUTPATIENT)
Dept: FAMILY MEDICINE CLINIC | Facility: CLINIC | Age: 42
End: 2017-07-17

## 2017-07-17 VITALS
DIASTOLIC BLOOD PRESSURE: 78 MMHG | BODY MASS INDEX: 37.49 KG/M2 | SYSTOLIC BLOOD PRESSURE: 128 MMHG | WEIGHT: 225 LBS | TEMPERATURE: 96.9 F | OXYGEN SATURATION: 94 % | HEART RATE: 77 BPM | HEIGHT: 65 IN

## 2017-07-17 DIAGNOSIS — J30.9 CHRONIC ALLERGIC RHINITIS: ICD-10-CM

## 2017-07-17 DIAGNOSIS — C79.51 BREAST CANCER METASTASIZED TO BONE, LEFT (HCC): ICD-10-CM

## 2017-07-17 DIAGNOSIS — Z45.2 ENCOUNTER FOR VENOUS ACCESS DEVICE CARE: ICD-10-CM

## 2017-07-17 DIAGNOSIS — C79.51 CARCINOMA OF RIGHT BREAST METASTATIC TO BONE (HCC): Primary | ICD-10-CM

## 2017-07-17 DIAGNOSIS — F51.01 PRIMARY INSOMNIA: ICD-10-CM

## 2017-07-17 DIAGNOSIS — C50.912 BREAST CANCER METASTASIZED TO BONE, LEFT (HCC): ICD-10-CM

## 2017-07-17 DIAGNOSIS — G89.3 CANCER ASSOCIATED PAIN: Primary | ICD-10-CM

## 2017-07-17 DIAGNOSIS — C50.911 CARCINOMA OF RIGHT BREAST METASTATIC TO BONE (HCC): Primary | ICD-10-CM

## 2017-07-17 DIAGNOSIS — M51.34 DEGENERATIVE DISC DISEASE, THORACIC: ICD-10-CM

## 2017-07-17 DIAGNOSIS — I82.409 RECURRENT DEEP VEIN THROMBOSIS (DVT) (HCC): ICD-10-CM

## 2017-07-17 PROCEDURE — 99214 OFFICE O/P EST MOD 30 MIN: CPT | Performed by: FAMILY MEDICINE

## 2017-07-17 PROCEDURE — 96523 IRRIG DRUG DELIVERY DEVICE: CPT | Performed by: INTERNAL MEDICINE

## 2017-07-17 RX ORDER — MONTELUKAST SODIUM 10 MG/1
10 TABLET ORAL NIGHTLY
Qty: 30 TABLET | Refills: 5 | Status: SHIPPED | OUTPATIENT
Start: 2017-07-17 | End: 2017-09-07 | Stop reason: SDUPTHER

## 2017-07-17 RX ORDER — TRAZODONE HYDROCHLORIDE 50 MG/1
50 TABLET ORAL NIGHTLY
Qty: 30 TABLET | Refills: 5 | Status: SHIPPED | OUTPATIENT
Start: 2017-07-17 | End: 2017-09-07 | Stop reason: SDUPTHER

## 2017-07-17 RX ORDER — HYDROMORPHONE HYDROCHLORIDE 4 MG/1
4 TABLET ORAL EVERY 6 HOURS PRN
Qty: 120 TABLET | Refills: 0 | Status: SHIPPED | OUTPATIENT
Start: 2017-07-17 | End: 2017-09-07 | Stop reason: SINTOL

## 2017-07-17 RX ORDER — SODIUM CHLORIDE 0.9 % (FLUSH) 0.9 %
10 SYRINGE (ML) INJECTION AS NEEDED
Status: DISCONTINUED | OUTPATIENT
Start: 2017-07-17 | End: 2017-07-17 | Stop reason: HOSPADM

## 2017-07-17 RX ORDER — POTASSIUM CHLORIDE 20 MEQ/1
20 TABLET, EXTENDED RELEASE ORAL 2 TIMES DAILY
Qty: 60 TABLET | Refills: 5 | Status: SHIPPED | OUTPATIENT
Start: 2017-07-17 | End: 2017-12-26 | Stop reason: SDUPTHER

## 2017-07-17 RX ORDER — SODIUM CHLORIDE 0.9 % (FLUSH) 0.9 %
10 SYRINGE (ML) INJECTION AS NEEDED
Status: CANCELLED | OUTPATIENT
Start: 2017-07-17

## 2017-07-17 RX ORDER — OXYCODONE AND ACETAMINOPHEN 10; 325 MG/1; MG/1
1 TABLET ORAL EVERY 6 HOURS PRN
Qty: 120 TABLET | Refills: 0 | Status: CANCELLED | OUTPATIENT
Start: 2017-07-17

## 2017-07-17 RX ADMIN — Medication 10 ML: at 11:47

## 2017-07-17 NOTE — PROGRESS NOTES
Subjective   Chief Complaint   Patient presents with   • Follow-up     4 week follow up   • Med Refill     Andra Villareal is a 41 y.o. female.   Follow-up (4 week follow up) and Med Refill    History of Present Illness     Jordin Oliveira scoped due to GI bleed, rectal bleed- diagnosed with gerd and chronic gastritis  Ceruloplasmin was elevated   Diagnosed with iron deficiency, cirrhosis of the liver, GERD, fatty liver, lesion on stomach  Rectal bleeding - controlled s/p argon coagulation treatment 2/23/17  Follow up scheduled in July    Chronic pain relating to metastatic breast cancer to bone  Chronic lumbar spine pain, history of metastatic lesions to the thoracic spine  Symptoms have worsened  Located across the lower back and radiates down the left lower extremity  Repeat MRI indicated:  Multiple new areas of abnormal signal within several of the thoracic and lumbar vertebral bodies suggests possibility of metastatic disease. Mild multilevel degenerative disc disease and degenerative  arthropathy of the lumbar spine.  Pain is not controlled with percocet  Tolerated dilaudid provided after surgery by Dr Oliveira - patient stated the medication helped more than the percocet    Metastatic breast cancer s/p right mastectomy with breast reconstructive surgery with metastases to liver and bone with new lesion noted on CT 10/26/16 of anterior mediastinal mass measuring 4.9x5.1x3.3cm  Followed at [facility] with Dr Waddell  Treatment for new lesion with chemotherapy - currently on treatment with herceptin, perjeta and faslodex  Bone metastasis - scheduled with zometa    STEVEN - currently controlled with klonopin PRN    Idiopathic chronic constipation with opioid use - controlled with amitiza  Failed miralax caused her rebound diarrhea  Failed movantik - caused her diarrhea    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  Patient is complaining of moderate pain in the foot  Provided dilaudid by Dr Oliveira  Instructed to wear CAM boot  Scheduled to see this week    The following portions of the patient's history were reviewed and updated as appropriate: allergies, current medications, past family history, past medical history, past social history, past surgical history and problem list.    Review of Systems   Constitutional: Negative for appetite change, chills, fatigue and fever.   HENT: Negative for congestion, ear pain, rhinorrhea and sore throat.    Eyes: Negative for pain.   Respiratory: Negative for cough and shortness of breath.    Cardiovascular: Negative for chest pain and palpitations.   Gastrointestinal: Negative for abdominal pain, constipation, diarrhea and nausea.   Genitourinary: Negative for dysuria.   Musculoskeletal: Positive for arthralgias and back pain. Negative for joint swelling and neck pain.   Skin: Negative for rash.   Neurological: Negative for dizziness and headaches.       Objective   /78  Pulse 77  Temp 96.9 °F (36.1 °C)  Ht 65\" (165.1 cm)  Wt 225 lb (102 kg)  SpO2 94%  BMI 37.44 kg/m2  Physical Exam   Constitutional: She is oriented to person, place, and time. She appears well-developed and well-nourished.   HENT:   Head: Normocephalic and atraumatic.   Eyes: Pupils are equal, round, and reactive to light.   Neck: Normal range of motion. Neck supple.   Cardiovascular: Normal rate, regular rhythm and normal heart sounds.    PICC line at the left upper extremity   Pulmonary/Chest: Breath sounds normal. No respiratory distress. She has no " wheezes. She has no rales.   Abdominal: Soft. Bowel sounds are normal.   Musculoskeletal:        Thoracic back: She exhibits decreased range of motion, bony tenderness and pain.        Lumbar back: She exhibits decreased range of motion, bony tenderness and pain.   Right CAM boot     Neurological: She is alert and oriented to person, place, and time.   Skin: Skin is warm and dry.   Psychiatric: She has a normal mood and affect.   Nursing note and vitals reviewed.      Assessment/Plan   Problems Addressed this Visit        Respiratory    Chronic allergic rhinitis    Relevant Medications    montelukast (SINGULAIR) 10 MG tablet       Musculoskeletal and Integument    Degenerative disc disease, thoracic    Breast cancer metastasized to bone    Relevant Medications    HYDROmorphone (DILAUDID) 4 MG tablet       Other    Cancer associated pain - Primary    Relevant Medications    HYDROmorphone (DILAUDID) 4 MG tablet    Recurrent deep vein thrombosis (DVT)    Relevant Medications    rivaroxaban (XARELTO) 20 MG tablet    Primary insomnia    Relevant Medications    traZODone (DESYREL) 50 MG tablet        Stop percocet  Start dilaudid    Refilled medications    Recheck in 4 weeks

## 2017-07-19 ENCOUNTER — OFFICE VISIT (OUTPATIENT)
Dept: GASTROENTEROLOGY | Facility: CLINIC | Age: 42
End: 2017-07-19

## 2017-07-19 VITALS
HEART RATE: 78 BPM | BODY MASS INDEX: 37.44 KG/M2 | HEIGHT: 65 IN | WEIGHT: 224.7 LBS | SYSTOLIC BLOOD PRESSURE: 96 MMHG | DIASTOLIC BLOOD PRESSURE: 69 MMHG

## 2017-07-19 DIAGNOSIS — K59.09 OTHER CONSTIPATION: ICD-10-CM

## 2017-07-19 DIAGNOSIS — K74.60 CIRRHOSIS OF LIVER WITHOUT ASCITES, UNSPECIFIED HEPATIC CIRRHOSIS TYPE (HCC): Primary | ICD-10-CM

## 2017-07-19 DIAGNOSIS — R74.8 ELEVATED LIVER ENZYMES: ICD-10-CM

## 2017-07-19 PROCEDURE — 99213 OFFICE O/P EST LOW 20 MIN: CPT | Performed by: PHYSICIAN ASSISTANT

## 2017-07-19 RX ORDER — CEFACLOR 250 MG
CAPSULE ORAL
Refills: 0 | COMMUNITY
Start: 2017-07-05 | End: 2017-09-05

## 2017-07-20 ENCOUNTER — INFUSION (OUTPATIENT)
Dept: ONCOLOGY | Facility: HOSPITAL | Age: 42
End: 2017-07-20

## 2017-07-20 VITALS
TEMPERATURE: 97.3 F | OXYGEN SATURATION: 99 % | DIASTOLIC BLOOD PRESSURE: 84 MMHG | SYSTOLIC BLOOD PRESSURE: 136 MMHG | HEART RATE: 80 BPM

## 2017-07-20 DIAGNOSIS — C50.911 CARCINOMA OF RIGHT BREAST METASTATIC TO BONE (HCC): Primary | ICD-10-CM

## 2017-07-20 DIAGNOSIS — C50.911 CARCINOMA OF RIGHT BREAST METASTATIC TO BONE (HCC): ICD-10-CM

## 2017-07-20 DIAGNOSIS — T50.905A HOT FLASH DUE TO MEDICATION: ICD-10-CM

## 2017-07-20 DIAGNOSIS — C79.51 CARCINOMA OF RIGHT BREAST METASTATIC TO BONE (HCC): ICD-10-CM

## 2017-07-20 DIAGNOSIS — R23.2 HOT FLASH DUE TO MEDICATION: ICD-10-CM

## 2017-07-20 DIAGNOSIS — C79.51 CARCINOMA OF RIGHT BREAST METASTATIC TO BONE (HCC): Primary | ICD-10-CM

## 2017-07-20 LAB
ALBUMIN SERPL-MCNC: 4.5 G/DL (ref 3.4–4.8)
ALBUMIN/GLOB SERPL: 1.3 G/DL (ref 1.1–1.8)
ALP SERPL-CCNC: 108 U/L (ref 38–126)
ALT SERPL W P-5'-P-CCNC: 49 U/L (ref 9–52)
ANION GAP SERPL CALCULATED.3IONS-SCNC: 11 MMOL/L (ref 5–15)
AST SERPL-CCNC: 34 U/L (ref 14–36)
BASOPHILS # BLD AUTO: 0.02 10*3/MM3 (ref 0–0.2)
BASOPHILS NFR BLD AUTO: 0.2 % (ref 0–2)
BILIRUB SERPL-MCNC: 0.4 MG/DL (ref 0.2–1.3)
BUN BLD-MCNC: 17 MG/DL (ref 7–21)
BUN/CREAT SERPL: 19.3 (ref 7–25)
CALCIUM SPEC-SCNC: 9.6 MG/DL (ref 8.4–10.2)
CHLORIDE SERPL-SCNC: 101 MMOL/L (ref 95–110)
CO2 SERPL-SCNC: 29 MMOL/L (ref 22–31)
CREAT BLD-MCNC: 0.88 MG/DL (ref 0.5–1)
DEPRECATED RDW RBC AUTO: 43.2 FL (ref 36.4–46.3)
EOSINOPHIL # BLD AUTO: 0.21 10*3/MM3 (ref 0–0.7)
EOSINOPHIL NFR BLD AUTO: 2.6 % (ref 0–7)
ERYTHROCYTE [DISTWIDTH] IN BLOOD BY AUTOMATED COUNT: 13.4 % (ref 11.5–14.5)
GFR SERPL CREATININE-BSD FRML MDRD: 86 ML/MIN/1.73 (ref 58–135)
GLOBULIN UR ELPH-MCNC: 3.6 GM/DL (ref 2.3–3.5)
GLUCOSE BLD-MCNC: 105 MG/DL (ref 60–100)
HCT VFR BLD AUTO: 39.9 % (ref 35–45)
HGB BLD-MCNC: 13 G/DL (ref 12–15.5)
IMM GRANULOCYTES # BLD: 0.01 10*3/MM3 (ref 0–0.02)
IMM GRANULOCYTES NFR BLD: 0.1 % (ref 0–0.5)
LYMPHOCYTES # BLD AUTO: 1.64 10*3/MM3 (ref 0.6–4.2)
LYMPHOCYTES NFR BLD AUTO: 20.1 % (ref 10–50)
MCH RBC QN AUTO: 28.6 PG (ref 26.5–34)
MCHC RBC AUTO-ENTMCNC: 32.6 G/DL (ref 31.4–36)
MCV RBC AUTO: 87.9 FL (ref 80–98)
MONOCYTES # BLD AUTO: 0.48 10*3/MM3 (ref 0–0.9)
MONOCYTES NFR BLD AUTO: 5.9 % (ref 0–12)
NEUTROPHILS # BLD AUTO: 5.78 10*3/MM3 (ref 2–8.6)
NEUTROPHILS NFR BLD AUTO: 71.1 % (ref 37–80)
PLATELET # BLD AUTO: 195 10*3/MM3 (ref 150–450)
PMV BLD AUTO: 11.5 FL (ref 8–12)
POTASSIUM BLD-SCNC: 4 MMOL/L (ref 3.5–5.1)
PROT SERPL-MCNC: 8.1 G/DL (ref 6.3–8.6)
RBC # BLD AUTO: 4.54 10*6/MM3 (ref 3.77–5.16)
SODIUM BLD-SCNC: 141 MMOL/L (ref 137–145)
WBC NRBC COR # BLD: 8.14 10*3/MM3 (ref 3.2–9.8)

## 2017-07-20 PROCEDURE — 25010000003 DEXAMETHASONE SODIUM PHOSPHATE 100 MG/10ML SOLUTION 10 ML VIAL: Performed by: INTERNAL MEDICINE

## 2017-07-20 PROCEDURE — 25010000002 ADO-TRASTUZUMAB 100 MG RECONSTITUTED SOLUTION 1 EACH VIAL: Performed by: INTERNAL MEDICINE

## 2017-07-20 PROCEDURE — 96413 CHEMO IV INFUSION 1 HR: CPT | Performed by: INTERNAL MEDICINE

## 2017-07-20 PROCEDURE — 80053 COMPREHEN METABOLIC PANEL: CPT

## 2017-07-20 PROCEDURE — 85025 COMPLETE CBC W/AUTO DIFF WBC: CPT

## 2017-07-20 PROCEDURE — 25010000002 DIPHENHYDRAMINE PER 50 MG: Performed by: INTERNAL MEDICINE

## 2017-07-20 PROCEDURE — 96375 TX/PRO/DX INJ NEW DRUG ADDON: CPT | Performed by: INTERNAL MEDICINE

## 2017-07-20 RX ORDER — SODIUM CHLORIDE 0.9 % (FLUSH) 0.9 %
10 SYRINGE (ML) INJECTION AS NEEDED
Status: DISCONTINUED | OUTPATIENT
Start: 2017-07-20 | End: 2017-07-20 | Stop reason: HOSPADM

## 2017-07-20 RX ORDER — DIPHENHYDRAMINE HYDROCHLORIDE 50 MG/ML
INJECTION INTRAMUSCULAR; INTRAVENOUS
Status: DISPENSED
Start: 2017-07-20 | End: 2017-07-21

## 2017-07-20 RX ORDER — SODIUM CHLORIDE 9 MG/ML
250 INJECTION, SOLUTION INTRAVENOUS ONCE
Status: CANCELLED | OUTPATIENT
Start: 2017-07-20

## 2017-07-20 RX ORDER — SODIUM CHLORIDE 0.9 % (FLUSH) 0.9 %
10 SYRINGE (ML) INJECTION AS NEEDED
Status: CANCELLED | OUTPATIENT
Start: 2017-07-20

## 2017-07-20 RX ORDER — SODIUM CHLORIDE 9 MG/ML
250 INJECTION, SOLUTION INTRAVENOUS ONCE
Status: COMPLETED | OUTPATIENT
Start: 2017-07-20 | End: 2017-07-20

## 2017-07-20 RX ORDER — DIPHENHYDRAMINE HYDROCHLORIDE 50 MG/ML
25 INJECTION INTRAMUSCULAR; INTRAVENOUS EVERY 6 HOURS PRN
Status: DISCONTINUED | OUTPATIENT
Start: 2017-07-20 | End: 2017-07-20 | Stop reason: HOSPADM

## 2017-07-20 RX ADMIN — Medication 10 ML: at 10:32

## 2017-07-20 RX ADMIN — ADO-TRASTUZUMAB EMTANSINE 360 MG: 20 INJECTION, POWDER, LYOPHILIZED, FOR SOLUTION INTRAVENOUS at 13:15

## 2017-07-20 RX ADMIN — DIPHENHYDRAMINE HYDROCHLORIDE 25 MG: 50 INJECTION INTRAMUSCULAR; INTRAVENOUS at 14:52

## 2017-07-20 RX ADMIN — DEXAMETHASONE SODIUM PHOSPHATE 12 MG: 10 INJECTION, SOLUTION INTRAMUSCULAR; INTRAVENOUS at 12:23

## 2017-07-20 RX ADMIN — SODIUM CHLORIDE 250 ML: 9 INJECTION, SOLUTION INTRAVENOUS at 11:41

## 2017-07-20 NOTE — PROGRESS NOTES
Patient feel like hot flash has resolved, desires to resume treatment.  Dot Rios RN  July 20, 2017  3:13 PM

## 2017-07-20 NOTE — PROGRESS NOTES
Called MD-Nadira, orders received to medicate, resume treatment.  Dot Rios RN  July 20, 2017  [id number]

## 2017-07-21 PROBLEM — T82.9XXA COMPLICATION ASSOCIATED WITH VASCULAR DEVICE: Status: ACTIVE | Noted: 2017-07-21

## 2017-07-25 DIAGNOSIS — E61.1 IRON DEFICIENCY: ICD-10-CM

## 2017-07-25 RX ORDER — FERROUS SULFATE 325(65) MG
325 TABLET ORAL 3 TIMES DAILY
Qty: 90 TABLET | Refills: 5 | Status: SHIPPED | OUTPATIENT
Start: 2017-07-25 | End: 2017-12-26 | Stop reason: SDUPTHER

## 2017-07-27 ENCOUNTER — INFUSION (OUTPATIENT)
Dept: ONCOLOGY | Facility: HOSPITAL | Age: 42
End: 2017-07-27

## 2017-07-27 DIAGNOSIS — T82.9XXD: ICD-10-CM

## 2017-07-27 DIAGNOSIS — C79.51 CARCINOMA OF RIGHT BREAST METASTATIC TO BONE (HCC): Primary | ICD-10-CM

## 2017-07-27 DIAGNOSIS — C50.911 CARCINOMA OF RIGHT BREAST METASTATIC TO BONE (HCC): Primary | ICD-10-CM

## 2017-07-27 DIAGNOSIS — Z45.2 ENCOUNTER FOR VENOUS ACCESS DEVICE CARE: ICD-10-CM

## 2017-07-27 LAB
ALBUMIN SERPL-MCNC: 4 G/DL (ref 3.4–4.8)
ALBUMIN/GLOB SERPL: 1.3 G/DL (ref 1.1–1.8)
ALP SERPL-CCNC: 165 U/L (ref 38–126)
ALT SERPL W P-5'-P-CCNC: 93 U/L (ref 9–52)
ANION GAP SERPL CALCULATED.3IONS-SCNC: 11 MMOL/L (ref 5–15)
AST SERPL-CCNC: 90 U/L (ref 14–36)
BASOPHILS # BLD AUTO: 0.01 10*3/MM3 (ref 0–0.2)
BASOPHILS NFR BLD AUTO: 0.2 % (ref 0–2)
BILIRUB SERPL-MCNC: 0.4 MG/DL (ref 0.2–1.3)
BUN BLD-MCNC: 13 MG/DL (ref 7–21)
BUN/CREAT SERPL: 14.6 (ref 7–25)
CALCIUM SPEC-SCNC: 8.5 MG/DL (ref 8.4–10.2)
CHLORIDE SERPL-SCNC: 101 MMOL/L (ref 95–110)
CLUMPED PLATELETS: NORMAL
CO2 SERPL-SCNC: 28 MMOL/L (ref 22–31)
CREAT BLD-MCNC: 0.89 MG/DL (ref 0.5–1)
DEPRECATED RDW RBC AUTO: 42.5 FL (ref 36.4–46.3)
EOSINOPHIL # BLD AUTO: 0.13 10*3/MM3 (ref 0–0.7)
EOSINOPHIL NFR BLD AUTO: 2 % (ref 0–7)
ERYTHROCYTE [DISTWIDTH] IN BLOOD BY AUTOMATED COUNT: 13.4 % (ref 11.5–14.5)
GFR SERPL CREATININE-BSD FRML MDRD: 85 ML/MIN/1.73 (ref 58–135)
GLOBULIN UR ELPH-MCNC: 3.2 GM/DL (ref 2.3–3.5)
GLUCOSE BLD-MCNC: 145 MG/DL (ref 60–100)
HCT VFR BLD AUTO: 37.7 % (ref 35–45)
HGB BLD-MCNC: 12.5 G/DL (ref 12–15.5)
IMM GRANULOCYTES # BLD: 0.05 10*3/MM3 (ref 0–0.02)
IMM GRANULOCYTES NFR BLD: 0.8 % (ref 0–0.5)
LYMPHOCYTES # BLD AUTO: 1.8 10*3/MM3 (ref 0.6–4.2)
LYMPHOCYTES NFR BLD AUTO: 28.3 % (ref 10–50)
MCH RBC QN AUTO: 28.7 PG (ref 26.5–34)
MCHC RBC AUTO-ENTMCNC: 33.2 G/DL (ref 31.4–36)
MCV RBC AUTO: 86.7 FL (ref 80–98)
MONOCYTES # BLD AUTO: 0.6 10*3/MM3 (ref 0–0.9)
MONOCYTES NFR BLD AUTO: 9.4 % (ref 0–12)
NEUTROPHILS # BLD AUTO: 3.76 10*3/MM3 (ref 2–8.6)
NEUTROPHILS NFR BLD AUTO: 59.3 % (ref 37–80)
NRBC BLD MANUAL-RTO: 0 /100 WBC (ref 0–0)
PLATELET # BLD AUTO: 53 10*3/MM3 (ref 150–450)
PMV BLD AUTO: 12.7 FL (ref 8–12)
POTASSIUM BLD-SCNC: 3.5 MMOL/L (ref 3.5–5.1)
PROT SERPL-MCNC: 7.2 G/DL (ref 6.3–8.6)
RBC # BLD AUTO: 4.35 10*6/MM3 (ref 3.77–5.16)
RBC MORPH BLD: NORMAL
SMALL PLATELETS BLD QL SMEAR: NORMAL
SODIUM BLD-SCNC: 140 MMOL/L (ref 137–145)
WBC MORPH BLD: NORMAL
WBC NRBC COR # BLD: 6.35 10*3/MM3 (ref 3.2–9.8)

## 2017-07-27 PROCEDURE — 80053 COMPREHEN METABOLIC PANEL: CPT | Performed by: INTERNAL MEDICINE

## 2017-07-27 PROCEDURE — 85025 COMPLETE CBC W/AUTO DIFF WBC: CPT | Performed by: INTERNAL MEDICINE

## 2017-07-27 PROCEDURE — 36592 COLLECT BLOOD FROM PICC: CPT | Performed by: INTERNAL MEDICINE

## 2017-07-27 PROCEDURE — 85007 BL SMEAR W/DIFF WBC COUNT: CPT | Performed by: INTERNAL MEDICINE

## 2017-07-27 RX ORDER — SODIUM CHLORIDE 0.9 % (FLUSH) 0.9 %
10 SYRINGE (ML) INJECTION AS NEEDED
Status: CANCELLED | OUTPATIENT
Start: 2017-07-27

## 2017-07-27 RX ORDER — SODIUM CHLORIDE 0.9 % (FLUSH) 0.9 %
10 SYRINGE (ML) INJECTION AS NEEDED
Status: DISCONTINUED | OUTPATIENT
Start: 2017-07-27 | End: 2017-07-27 | Stop reason: HOSPADM

## 2017-07-27 RX ADMIN — Medication 10 ML: at 14:45

## 2017-08-03 ENCOUNTER — DOCUMENTATION (OUTPATIENT)
Dept: ONCOLOGY | Facility: CLINIC | Age: 42
End: 2017-08-03

## 2017-08-03 ENCOUNTER — INFUSION (OUTPATIENT)
Dept: ONCOLOGY | Facility: HOSPITAL | Age: 42
End: 2017-08-03

## 2017-08-03 DIAGNOSIS — Z45.2 ENCOUNTER FOR VENOUS ACCESS DEVICE CARE: ICD-10-CM

## 2017-08-03 DIAGNOSIS — C79.51 CARCINOMA OF RIGHT BREAST METASTATIC TO BONE (HCC): ICD-10-CM

## 2017-08-03 DIAGNOSIS — C50.911 CARCINOMA OF RIGHT BREAST METASTATIC TO BONE (HCC): ICD-10-CM

## 2017-08-03 DIAGNOSIS — T82.9XXD: Primary | ICD-10-CM

## 2017-08-03 LAB
BASOPHILS # BLD AUTO: 0.03 10*3/MM3 (ref 0–0.2)
BASOPHILS NFR BLD AUTO: 0.4 % (ref 0–2)
DEPRECATED RDW RBC AUTO: 45.3 FL (ref 36.4–46.3)
EOSINOPHIL # BLD AUTO: 0.16 10*3/MM3 (ref 0–0.7)
EOSINOPHIL NFR BLD AUTO: 2 % (ref 0–7)
ERYTHROCYTE [DISTWIDTH] IN BLOOD BY AUTOMATED COUNT: 14 % (ref 11.5–14.5)
HCT VFR BLD AUTO: 37.2 % (ref 35–45)
HGB BLD-MCNC: 12 G/DL (ref 12–15.5)
IMM GRANULOCYTES # BLD: 0.02 10*3/MM3 (ref 0–0.02)
IMM GRANULOCYTES NFR BLD: 0.3 % (ref 0–0.5)
LYMPHOCYTES # BLD AUTO: 2.14 10*3/MM3 (ref 0.6–4.2)
LYMPHOCYTES NFR BLD AUTO: 26.9 % (ref 10–50)
MCH RBC QN AUTO: 28.6 PG (ref 26.5–34)
MCHC RBC AUTO-ENTMCNC: 32.3 G/DL (ref 31.4–36)
MCV RBC AUTO: 88.6 FL (ref 80–98)
MONOCYTES # BLD AUTO: 0.57 10*3/MM3 (ref 0–0.9)
MONOCYTES NFR BLD AUTO: 7.2 % (ref 0–12)
NEUTROPHILS # BLD AUTO: 5.04 10*3/MM3 (ref 2–8.6)
NEUTROPHILS NFR BLD AUTO: 63.2 % (ref 37–80)
PLATELET # BLD AUTO: 140 10*3/MM3 (ref 150–450)
PMV BLD AUTO: 12.1 FL (ref 8–12)
RBC # BLD AUTO: 4.2 10*6/MM3 (ref 3.77–5.16)
WBC NRBC COR # BLD: 7.96 10*3/MM3 (ref 3.2–9.8)

## 2017-08-03 PROCEDURE — 99211 OFF/OP EST MAY X REQ PHY/QHP: CPT | Performed by: INTERNAL MEDICINE

## 2017-08-03 PROCEDURE — 36591 DRAW BLOOD OFF VENOUS DEVICE: CPT | Performed by: INTERNAL MEDICINE

## 2017-08-03 PROCEDURE — 85025 COMPLETE CBC W/AUTO DIFF WBC: CPT

## 2017-08-03 RX ORDER — SODIUM CHLORIDE 0.9 % (FLUSH) 0.9 %
10 SYRINGE (ML) INJECTION AS NEEDED
Status: CANCELLED | OUTPATIENT
Start: 2017-08-03

## 2017-08-03 RX ORDER — SODIUM CHLORIDE 0.9 % (FLUSH) 0.9 %
10 SYRINGE (ML) INJECTION AS NEEDED
Status: DISCONTINUED | OUTPATIENT
Start: 2017-08-03 | End: 2017-08-03 | Stop reason: HOSPADM

## 2017-08-03 RX ADMIN — Medication 10 ML: at 09:48

## 2017-08-03 NOTE — PROGRESS NOTES
LCSW follow up with patient as she presents for labs.  SW spoke 1:1 with patient to assess for current coping, support and needs.  Pt. Describes her last treatment as difficult and shared the impairment in her daily functioning.  She discussed stressors with school upcoming. SW discussed area resources that may assist in school supplies and subsequent obtained 2 backpacks filled for her two sons returning to school next week. Also, advised that [employer] program will be offering backpack to her youngest.  Pt shared that she has obtained use of a car that has assisted in her transportation related stress.   Pt. Presents alert and oriented x 4  And demonstrates a bright and cheerful affect with mood congruent.  Pt. Responds receptive to SW feedback and ongoing support reinforced. SW will continue to inquire as to back to school supports and notify pt.

## 2017-08-10 ENCOUNTER — INFUSION (OUTPATIENT)
Dept: ONCOLOGY | Facility: HOSPITAL | Age: 42
End: 2017-08-10

## 2017-08-10 ENCOUNTER — OFFICE VISIT (OUTPATIENT)
Dept: ONCOLOGY | Facility: CLINIC | Age: 42
End: 2017-08-10

## 2017-08-10 VITALS
HEIGHT: 65 IN | SYSTOLIC BLOOD PRESSURE: 139 MMHG | HEART RATE: 90 BPM | RESPIRATION RATE: 18 BRPM | TEMPERATURE: 97.9 F | DIASTOLIC BLOOD PRESSURE: 93 MMHG | WEIGHT: 222 LBS | BODY MASS INDEX: 36.99 KG/M2

## 2017-08-10 DIAGNOSIS — C79.51 BREAST CANCER METASTASIZED TO BONE, LEFT (HCC): ICD-10-CM

## 2017-08-10 DIAGNOSIS — T50.905A HOT FLASH DUE TO MEDICATION: ICD-10-CM

## 2017-08-10 DIAGNOSIS — C79.51 CARCINOMA OF RIGHT BREAST METASTATIC TO BONE (HCC): ICD-10-CM

## 2017-08-10 DIAGNOSIS — Z45.2 ENCOUNTER FOR VENOUS ACCESS DEVICE CARE: ICD-10-CM

## 2017-08-10 DIAGNOSIS — C50.911 CARCINOMA OF RIGHT BREAST METASTATIC TO BONE (HCC): ICD-10-CM

## 2017-08-10 DIAGNOSIS — R23.2 HOT FLASH DUE TO MEDICATION: ICD-10-CM

## 2017-08-10 DIAGNOSIS — T82.9XXD: Primary | ICD-10-CM

## 2017-08-10 DIAGNOSIS — C50.912 BREAST CANCER METASTASIZED TO BONE, LEFT (HCC): ICD-10-CM

## 2017-08-10 LAB
ALBUMIN SERPL-MCNC: 4.3 G/DL (ref 3.4–4.8)
ALBUMIN/GLOB SERPL: 1.3 G/DL (ref 1.1–1.8)
ALP SERPL-CCNC: 166 U/L (ref 38–126)
ALT SERPL W P-5'-P-CCNC: 67 U/L (ref 9–52)
ANION GAP SERPL CALCULATED.3IONS-SCNC: 10 MMOL/L (ref 5–15)
AST SERPL-CCNC: 61 U/L (ref 14–36)
BASOPHILS # BLD AUTO: 0.03 10*3/MM3 (ref 0–0.2)
BASOPHILS NFR BLD AUTO: 0.4 % (ref 0–2)
BILIRUB SERPL-MCNC: 0.4 MG/DL (ref 0.2–1.3)
BUN BLD-MCNC: 14 MG/DL (ref 7–21)
BUN/CREAT SERPL: 17.5 (ref 7–25)
CALCIUM SPEC-SCNC: 9 MG/DL (ref 8.4–10.2)
CHLORIDE SERPL-SCNC: 101 MMOL/L (ref 95–110)
CO2 SERPL-SCNC: 29 MMOL/L (ref 22–31)
CREAT BLD-MCNC: 0.8 MG/DL (ref 0.5–1)
DEPRECATED RDW RBC AUTO: 44.3 FL (ref 36.4–46.3)
EOSINOPHIL # BLD AUTO: 0.12 10*3/MM3 (ref 0–0.7)
EOSINOPHIL NFR BLD AUTO: 1.6 % (ref 0–7)
ERYTHROCYTE [DISTWIDTH] IN BLOOD BY AUTOMATED COUNT: 13.6 % (ref 11.5–14.5)
GFR SERPL CREATININE-BSD FRML MDRD: 96 ML/MIN/1.73 (ref 58–135)
GLOBULIN UR ELPH-MCNC: 3.4 GM/DL (ref 2.3–3.5)
GLUCOSE BLD-MCNC: 146 MG/DL (ref 60–100)
HCT VFR BLD AUTO: 39.6 % (ref 35–45)
HGB BLD-MCNC: 12.7 G/DL (ref 12–15.5)
IMM GRANULOCYTES # BLD: 0.01 10*3/MM3 (ref 0–0.02)
IMM GRANULOCYTES NFR BLD: 0.1 % (ref 0–0.5)
LYMPHOCYTES # BLD AUTO: 1.59 10*3/MM3 (ref 0.6–4.2)
LYMPHOCYTES NFR BLD AUTO: 21.2 % (ref 10–50)
MCH RBC QN AUTO: 28.6 PG (ref 26.5–34)
MCHC RBC AUTO-ENTMCNC: 32.1 G/DL (ref 31.4–36)
MCV RBC AUTO: 89.2 FL (ref 80–98)
MONOCYTES # BLD AUTO: 0.44 10*3/MM3 (ref 0–0.9)
MONOCYTES NFR BLD AUTO: 5.9 % (ref 0–12)
NEUTROPHILS # BLD AUTO: 5.31 10*3/MM3 (ref 2–8.6)
NEUTROPHILS NFR BLD AUTO: 70.8 % (ref 37–80)
PLATELET # BLD AUTO: 222 10*3/MM3 (ref 150–450)
PMV BLD AUTO: 11.9 FL (ref 8–12)
POTASSIUM BLD-SCNC: 4 MMOL/L (ref 3.5–5.1)
PROT SERPL-MCNC: 7.7 G/DL (ref 6.3–8.6)
RBC # BLD AUTO: 4.44 10*6/MM3 (ref 3.77–5.16)
SODIUM BLD-SCNC: 140 MMOL/L (ref 137–145)
WBC NRBC COR # BLD: 7.5 10*3/MM3 (ref 3.2–9.8)

## 2017-08-10 PROCEDURE — 86300 IMMUNOASSAY TUMOR CA 15-3: CPT | Performed by: INTERNAL MEDICINE

## 2017-08-10 PROCEDURE — 96375 TX/PRO/DX INJ NEW DRUG ADDON: CPT | Performed by: INTERNAL MEDICINE

## 2017-08-10 PROCEDURE — 99214 OFFICE O/P EST MOD 30 MIN: CPT | Performed by: INTERNAL MEDICINE

## 2017-08-10 PROCEDURE — 25010000003 DEXAMETHASONE SODIUM PHOSPHATE 100 MG/10ML SOLUTION 10 ML VIAL: Performed by: INTERNAL MEDICINE

## 2017-08-10 PROCEDURE — 80053 COMPREHEN METABOLIC PANEL: CPT

## 2017-08-10 PROCEDURE — 25010000002 ADO-TRASTUZUMAB 100 MG RECONSTITUTED SOLUTION 1 EACH VIAL: Performed by: INTERNAL MEDICINE

## 2017-08-10 PROCEDURE — 85025 COMPLETE CBC W/AUTO DIFF WBC: CPT

## 2017-08-10 PROCEDURE — 25010000002 DIPHENHYDRAMINE PER 50 MG: Performed by: INTERNAL MEDICINE

## 2017-08-10 PROCEDURE — 96413 CHEMO IV INFUSION 1 HR: CPT | Performed by: INTERNAL MEDICINE

## 2017-08-10 RX ORDER — SODIUM CHLORIDE 0.9 % (FLUSH) 0.9 %
10 SYRINGE (ML) INJECTION AS NEEDED
Status: CANCELLED | OUTPATIENT
Start: 2017-08-10

## 2017-08-10 RX ORDER — SODIUM CHLORIDE 9 MG/ML
250 INJECTION, SOLUTION INTRAVENOUS ONCE
Status: COMPLETED | OUTPATIENT
Start: 2017-08-10 | End: 2017-08-10

## 2017-08-10 RX ORDER — DIPHENHYDRAMINE HYDROCHLORIDE 50 MG/ML
25 INJECTION INTRAMUSCULAR; INTRAVENOUS ONCE
Status: COMPLETED | OUTPATIENT
Start: 2017-08-10 | End: 2017-08-10

## 2017-08-10 RX ORDER — SODIUM CHLORIDE 0.9 % (FLUSH) 0.9 %
10 SYRINGE (ML) INJECTION AS NEEDED
Status: DISCONTINUED | OUTPATIENT
Start: 2017-08-10 | End: 2017-08-10 | Stop reason: HOSPADM

## 2017-08-10 RX ORDER — FAMOTIDINE 10 MG/ML
20 INJECTION, SOLUTION INTRAVENOUS ONCE
Status: CANCELLED | OUTPATIENT
Start: 2017-08-10

## 2017-08-10 RX ORDER — FAMOTIDINE 10 MG/ML
20 INJECTION, SOLUTION INTRAVENOUS ONCE
Status: COMPLETED | OUTPATIENT
Start: 2017-08-10 | End: 2017-08-10

## 2017-08-10 RX ORDER — SODIUM CHLORIDE 9 MG/ML
250 INJECTION, SOLUTION INTRAVENOUS ONCE
Status: CANCELLED | OUTPATIENT
Start: 2017-08-10

## 2017-08-10 RX ORDER — DIPHENHYDRAMINE HYDROCHLORIDE 50 MG/ML
25 INJECTION INTRAMUSCULAR; INTRAVENOUS ONCE
Status: CANCELLED | OUTPATIENT
Start: 2017-08-10

## 2017-08-10 RX ADMIN — Medication 10 ML: at 11:46

## 2017-08-10 RX ADMIN — DEXAMETHASONE SODIUM PHOSPHATE 12 MG: 10 INJECTION, SOLUTION INTRAMUSCULAR; INTRAVENOUS at 10:26

## 2017-08-10 RX ADMIN — DIPHENHYDRAMINE HYDROCHLORIDE 25 MG: 50 INJECTION INTRAMUSCULAR; INTRAVENOUS at 10:51

## 2017-08-10 RX ADMIN — ADO-TRASTUZUMAB EMTANSINE 360 MG: 20 INJECTION, POWDER, LYOPHILIZED, FOR SOLUTION INTRAVENOUS at 11:05

## 2017-08-10 RX ADMIN — SODIUM CHLORIDE 250 ML: 9 INJECTION, SOLUTION INTRAVENOUS at 09:55

## 2017-08-10 RX ADMIN — FAMOTIDINE 20 MG: 10 INJECTION INTRAVENOUS at 10:08

## 2017-08-10 NOTE — PROGRESS NOTES
DATE OF VISIT: 8/10/2017    REASON FOR VISIT:  Metastatic right breast cancer    HISTORY OF PRESENT ILLNESS:    41-year-old female with a past medical history significant for metastatic right breast cancer with liver and bone metastases currently on treatment with Kadcyla and Arimidex since 07/19/17.  Patient is here to get cycle 2 of Kadcyla today.  States week after getting last dose of Kadcyla she had a feeling of not being well.  She had some nausea without vomiting.  Complains of easy bruising and prolonged bleeding after minor cuts.  Denies any abnormal swollen and anywhere in the body.  States her back pain is improved.      PAST MEDICAL HISTORY:    1.  Metastatic right breast cancer with liver and bone metastasis  2.  History of pneumonia  3.  Right internal jugular vein DVT status post around to  4.  Rectal bleeding  5.  Dyslipidemia  6.  Diabetes mellitus  7.  Anxiety  8.  Bone metastasis    SOCIAL HISTORY:    Social History   Substance Use Topics   • Smoking status: Former Smoker     Quit date: 2012   • Smokeless tobacco: Never Used   • Alcohol use No       Surgical History :  Past Surgical History:   Procedure Laterality Date   • ANKLE LOOSE BODY EXCISION/REMOVAL Right 5/24/2017    Procedure: RIGTH FOOT EXCISION NAVICULAR FRACTURES FRAGMENT  DEBRIDEMENT TALONAVILCULA RJOIN T;  Surgeon: Armani Oliveira DPM;  Location: [facility] OR;  Service:    • BREAST SURGERY Right 2013     reconstruction of right breast, trans flap surgery   • CENTRAL VENOUS CATHETER TUNNELED INSERTION SINGLE LUMEN      Placement of indwelling Pleurx catheter right   • COLONOSCOPY N/A 1/26/2017    Procedure: COLONOSCOPY;  Surgeon: Robert Clifton MD;  Location: [facility] ENDOSCOPY;  Service:    • ENDOSCOPY N/A 1/26/2017    Procedure: ESOPHAGOGASTRODUODENOSCOPY;  Surgeon: Robert Clifton MD;  Location: [facility] ENDOSCOPY;  Service:    • ENDOSCOPY N/A 2/24/2017    Procedure: ESOPHAGOGASTRODUODENOSCOPY;  Surgeon: Robert Clifton MD;  Location:  "[facility] ENDOSCOPY;  Service:    • HYSTERECTOMY      vaginal   • LIVER BIOPSY  2015   • LUNG VOLUME REDUCTION     • MASTECTOMY      partial   • OTHER SURGICAL HISTORY  05/05/2016    central line insertion , PICC line replacement   • OTHER SURGICAL HISTORY      place breast clip percut   • OTHER SURGICAL HISTORY      pulse oximetry   • OTHER SURGICAL HISTORY      remove cc cath w/ sc port or pump, Removal of right internal jugular MediPort   • OTHER SURGICAL HISTORY      spirometry   • OTHER SURGICAL HISTORY      tubal sterilization   • PAP SMEAR     • THORACENTESIS      aspiration of pleural space   • TUBAL ABDOMINAL LIGATION  2009   • UPPER GASTROINTESTINAL ENDOSCOPY  01/26/2017   • UPPER GASTROINTESTINAL ENDOSCOPY  02/24/2017   • VENOUS ACCESS DEVICE (PORT) INSERTION      Ultrasound guided right internal jugular Mediport placement under fluoroscopy       ALLERGIES:    Allergies   Allergen Reactions   • Lisinopril Shortness Of Breath   • Flagyl [Metronidazole] Other (See Comments)     pancreatitis   • Fentanyl Anxiety     \"goes out of her mind\"   • Latex Rash     FAMILY HISTORY:  Family History   Problem Relation Age of Onset   • Coronary artery disease Other    • Diabetes Other    • Hypertension Other    • Amblyopia Other    • Cataracts Maternal Grandfather    • Cataracts Paternal Grandmother      REVIEW OF SYSTEMS:      CONSTITUTIONAL: Positive for fatigue.  No fever, chills, or night sweats.     HEENT:  No epistaxis, mouth sores, or difficulty swallowing.    RESPIRATORY:  No new shortness of breath or cough at present.    CARDIOVASCULAR:  No chest pain or palpitations.    GASTROINTESTINAL:Complains of nausea without vomiting since starting Kadcyla.  No abdominal pain,  vomiting, or blood in the stool.    GENITOURINARY:  No dysuria or hematuria.    MUSCULOSKELETAL: Complains of discomfort And pain in right ankle. States her back pain is improved.    NEUROLOGICAL: Positive for intermittent tingling and numbness.. No " "new headache or dizziness.     LYMPHATICS:  Denies any abnormal swollen and anywhere in the body.    SKIN:  Complains of easy bruising and prolonged bleeding after minor cuts since starting Kadcyla.        PHYSICAL EXAMINATION:      VITAL SIGNS:    /93  Pulse 90  Temp 97.9 °F (36.6 °C) (Temporal Artery )   Resp 18  Ht 65\" (165.1 cm)  Wt 222 lb (101 kg)  BMI 36.94 kg/m2    GENERAL:  Not in any distress.     EYES:  Mild pallor. No icterus.  Extraocular movements intact.  No facial asymmetry.    NECK:  No adenopathy in cervical or supraclavicular area.    RESPIRATORY:  Fair air entry bilaterally. No rhonchi or wheezing.    CARDIOVASCULAR:  S1, S2. Regular rate and rhythm.    ABDOMEN:  Soft, nontender. Bowel sounds present.  No organomegaly appreciated.    EXTREMITIES:  No edema.  No calf  tenderness.  There is a surgical shoe present on right foot.PICC line present in left arm.    NEUROLOGICAL:  Alert, awake, oriented x3.  No motor or sensory deficit.  Cranial nerves II-12 grossly intact.        DIAGNOSTIC DATA:    Glucose   Date Value Ref Range Status   08/10/2017 146 (H) 60 - 100 mg/dL Final     Sodium   Date Value Ref Range Status   08/10/2017 140 137 - 145 mmol/L Final     Potassium   Date Value Ref Range Status   08/10/2017 4.0 3.5 - 5.1 mmol/L Final     CO2   Date Value Ref Range Status   08/10/2017 29.0 22.0 - 31.0 mmol/L Final     Chloride   Date Value Ref Range Status   08/10/2017 101 95 - 110 mmol/L Final     Anion Gap   Date Value Ref Range Status   08/10/2017 10.0 5.0 - 15.0 mmol/L Final     Creatinine   Date Value Ref Range Status   08/10/2017 0.80 0.50 - 1.00 mg/dL Final     BUN   Date Value Ref Range Status   08/10/2017 14 7 - 21 mg/dL Final     BUN/Creatinine Ratio   Date Value Ref Range Status   08/10/2017 17.5 7.0 - 25.0 Final     Calcium   Date Value Ref Range Status   08/10/2017 9.0 8.4 - 10.2 mg/dL Final     Alkaline Phosphatase   Date Value Ref Range Status   08/10/2017 166 (H) 38 - " 126 U/L Final     Total Protein   Date Value Ref Range Status   08/10/2017 7.7 6.3 - 8.6 g/dL Final     ALT (SGPT)   Date Value Ref Range Status   08/10/2017 67 (H) 9 - 52 U/L Final     AST (SGOT)   Date Value Ref Range Status   08/10/2017 61 (H) 14 - 36 U/L Final     Total Bilirubin   Date Value Ref Range Status   08/10/2017 0.4 0.2 - 1.3 mg/dL Final     Albumin   Date Value Ref Range Status   08/10/2017 4.30 3.40 - 4.80 g/dL Final     Globulin   Date Value Ref Range Status   08/10/2017 3.4 2.3 - 3.5 gm/dL Final     A/G Ratio   Date Value Ref Range Status   08/10/2017 1.3 1.1 - 1.8 g/dL Final     Lab Results   Component Value Date    WBC 7.50 08/10/2017    HGB 12.7 08/10/2017    HCT 39.6 08/10/2017    MCV 89.2 08/10/2017     08/10/2017     Lab Results   Component Value Date    NEUTROABS 5.31 08/10/2017    IRON 106 02/09/2017    TIBC 304 01/25/2017    LABIRON 6 (L) 01/25/2017    FERRITIN 51.50 01/25/2017    PSBXTJDD81 559 06/17/2016    FOLATE 14.10 06/17/2016     Lab Results   Component Value Date     39.2 (H) 07/12/2017    LABCA2 33.6 07/12/2017    AFPTM 156 01/25/2017    HCGQUANT 1 12/12/2016    CEA 3.2 09/22/2015     2D Echo done on July 7, 2017 showed:  Interpretation Summary   · Left ventricular systolic function is normal.  · Estimated EF appears to be in the range of 61 - 65%.  · All left ventricular wall segments contract normally  · Left ventricular diastolic function is normal.         RADIOLOGY DATA :  CT Chest with contrast done on July 13, 2017 showed:  PULMONARY PARENCHYMA:         - air spaces:      negative         - interstitium:     grossly within normal limits for age         - misc.:      Tiny left upper lobe pulmonary nodules along  the periphery (axial 21/58, unchanged from the prior study.     MEDIASTINUM / SOBIA:       - heart:      normal size , no pericardial fluid       - aorta/great vessels:    normal caliber and configuration  for age       - misc.:      no mediastinal mass /  significant adenopathy     PLEURAL COMPARTMENT:         - misc.:      no pleural fluid or mass     MISC:       - inferior neck:     negative       - osseous/body wall:  negative       - subdiaphragmatic:    as visualized, limited, grossly  unremarkable       - misc:  .  IMPRESSION:  CONCLUSION:     1. Stable examination.        CTof abdomen and pelvis with contrast done on July 3, 2017 showed:  IMPRESSION:  CONCLUSION:     1. Stable areas of decreased attenuation segment seven and  segment eight of the liver. Irregular contour of the liver,  likely hepatic cirrhosis, stable.  2. Nondilated fluid-filled loops of small and large bowel without  wall enhancement. Correlation for enteritis/colitis.  3. Mild gallbladder distention.  4. Other findings as above       MRI of lumbar spine without contrast done on June 23, 2017 showed:  IMPRESSION:     1. Multiple new areas of abnormal signal within several of the  thoracic and lumbar vertebral bodies suggests possibility of  metastatic disease.   2. Mild multilevel degenerative disc disease and degenerative  arthropathy of the lumbar spine.      ASSESSMENT AND PLAN:      1.  Stage IV metastatic right breast cancer with liver and bone metastasis.  Patient was initially diagnosed in 2011 with a stage III disease.  Initially patient had a Taxotere carboplatin and Herceptin for 6 cycles followed by 1 year of Herceptin maintenance.  After that patient was getting Lupron and tamoxifen.  In March 2015 she was diagnosed with a metastatic disease involving liver and bone.  Patient was again started on Taxotere Herceptin and perjeta.  She could not tolerate Taxotere at that point she had a recurrent pneumonias and cytopenias.  Subsequently she was maintained on Herceptin and perjeta until October 2016, at that point restaging CT scan showed there was anterior mediastinal mass for which patient was started on Taxol Herceptin and perjeta.  Patient received her last dose of Taxol on  January 12, 2017.  Patient was started back on Herceptin and perjeta on February 9, 2017.  His February of this year patient's treatment with Herceptin and perjeta has been interrupted multiple times secondary to her gastrointestinal bleeding as well as requiring surgery on right ankle.  MRI of lumbar spine does show increased area of metastatic disease as compared to bone scan which was done in June 2016.  His therapy was changed to Kadcyla with Arimidex on July 19, 2017.  Patient has tolerated first round of Kadcyla well except for some flushing of face at the time of infusion.  Will give her Benadryl and Pepcid as a premedication today.  Patient also had a nausea without vomiting since Kadcyla.  Patient complains of easy bruising and prolonged bleeding after minor cuts which is related to thrombocytopenia after Kadcyla.  Patient was encouraged to monitor it closely.  We will go head with cycle 2 of Kadcyla today.  Plan is to repeat restaging CT scans after 4 cycles of Kadcyla.  We will see her back in about 3 weeks with a repeat CBC and CMP on that day.    2. H/O Rectal bleeding: Patient is status post argon coagulation treatment on February 23, 2017.  Denies any rectal bleeding since then.    3.  Recurrent DVT: Patient was on Xarelto which was held secondary to rectal bleeding.  Patient is status post argon laser treatment on February 23, 2017.  Recommendation would be to support her on Lovenox and Coumadin Clinic visit if she does not have any bleeding at that point.  She is high risk of recurrent thrombosis due to active cancer.  Patient states it would be very difficult for her to come to Coumadin clinic for checks of INR.  Patient is currently on Xarelto.  Denies any rectal bleeding.    4.  Bone metastasis: Continue with Zometa as scheduled for now.      5.  Right ankle pain: States her pain is resolving.    6.  Health maintenance: Patient does not smoke.  She just had a colonoscopy done on January 2017  which was negative for any malignancy.  She remains full code.          Joselito Waddell MD  8/10/2017  9:36 AM

## 2017-08-11 LAB
CANCER AG15-3 SERPL-ACNC: 35.5 U/ML (ref 0–25)
CANCER AG27-29 SERPL-ACNC: 45.5 U/ML (ref 0–38.6)

## 2017-08-17 ENCOUNTER — INFUSION (OUTPATIENT)
Dept: ONCOLOGY | Facility: HOSPITAL | Age: 42
End: 2017-08-17

## 2017-08-17 DIAGNOSIS — Z45.2 ENCOUNTER FOR VENOUS ACCESS DEVICE CARE: ICD-10-CM

## 2017-08-17 DIAGNOSIS — C79.51 CARCINOMA OF RIGHT BREAST METASTATIC TO BONE (HCC): ICD-10-CM

## 2017-08-17 DIAGNOSIS — C50.911 CARCINOMA OF RIGHT BREAST METASTATIC TO BONE (HCC): ICD-10-CM

## 2017-08-17 DIAGNOSIS — T82.9XXD: Primary | ICD-10-CM

## 2017-08-17 PROCEDURE — G0463 HOSPITAL OUTPT CLINIC VISIT: HCPCS | Performed by: INTERNAL MEDICINE

## 2017-08-17 RX ORDER — SODIUM CHLORIDE 0.9 % (FLUSH) 0.9 %
10 SYRINGE (ML) INJECTION AS NEEDED
Status: CANCELLED | OUTPATIENT
Start: 2017-08-17

## 2017-08-17 RX ORDER — SODIUM CHLORIDE 0.9 % (FLUSH) 0.9 %
10 SYRINGE (ML) INJECTION AS NEEDED
Status: DISCONTINUED | OUTPATIENT
Start: 2017-08-17 | End: 2017-08-17 | Stop reason: HOSPADM

## 2017-08-17 RX ADMIN — Medication 10 ML: at 14:25

## 2017-08-23 ENCOUNTER — APPOINTMENT (OUTPATIENT)
Dept: GENERAL RADIOLOGY | Facility: HOSPITAL | Age: 42
End: 2017-08-23

## 2017-08-23 ENCOUNTER — APPOINTMENT (OUTPATIENT)
Dept: CT IMAGING | Facility: HOSPITAL | Age: 42
End: 2017-08-23

## 2017-08-23 ENCOUNTER — HOSPITAL ENCOUNTER (EMERGENCY)
Facility: HOSPITAL | Age: 42
Discharge: HOME | End: 2017-08-24
Attending: EMERGENCY MEDICINE | Admitting: EMERGENCY MEDICINE

## 2017-08-23 ENCOUNTER — INFUSION (OUTPATIENT)
Dept: ONCOLOGY | Facility: HOSPITAL | Age: 42
End: 2017-08-23

## 2017-08-23 VITALS
RESPIRATION RATE: 20 BRPM | TEMPERATURE: 98.1 F | SYSTOLIC BLOOD PRESSURE: 101 MMHG | DIASTOLIC BLOOD PRESSURE: 48 MMHG | HEART RATE: 71 BPM

## 2017-08-23 DIAGNOSIS — R23.2 HOT FLASH DUE TO MEDICATION: ICD-10-CM

## 2017-08-23 DIAGNOSIS — C79.51 CARCINOMA OF RIGHT BREAST METASTATIC TO BONE (HCC): ICD-10-CM

## 2017-08-23 DIAGNOSIS — R10.84 GENERALIZED ABDOMINAL PAIN: ICD-10-CM

## 2017-08-23 DIAGNOSIS — K52.9 CHRONIC DIARRHEA: Primary | ICD-10-CM

## 2017-08-23 DIAGNOSIS — R79.89 ELEVATED LIVER FUNCTION TESTS: ICD-10-CM

## 2017-08-23 DIAGNOSIS — T50.905A HOT FLASH DUE TO MEDICATION: ICD-10-CM

## 2017-08-23 DIAGNOSIS — C50.912 BREAST CANCER METASTASIZED TO BONE, LEFT (HCC): ICD-10-CM

## 2017-08-23 DIAGNOSIS — K74.60 CIRRHOSIS OF LIVER WITHOUT ASCITES, UNSPECIFIED HEPATIC CIRRHOSIS TYPE (HCC): ICD-10-CM

## 2017-08-23 DIAGNOSIS — D69.6 THROMBOCYTOPENIA (HCC): ICD-10-CM

## 2017-08-23 DIAGNOSIS — C79.51 BREAST CANCER METASTASIZED TO BONE, LEFT (HCC): ICD-10-CM

## 2017-08-23 DIAGNOSIS — T82.9XXD: ICD-10-CM

## 2017-08-23 DIAGNOSIS — C50.911 CARCINOMA OF RIGHT BREAST METASTATIC TO BONE (HCC): Primary | ICD-10-CM

## 2017-08-23 DIAGNOSIS — C79.51 CARCINOMA OF RIGHT BREAST METASTATIC TO BONE (HCC): Primary | ICD-10-CM

## 2017-08-23 DIAGNOSIS — C50.911 CARCINOMA OF RIGHT BREAST METASTATIC TO BONE (HCC): ICD-10-CM

## 2017-08-23 DIAGNOSIS — R50.9 LOW GRADE FEVER: ICD-10-CM

## 2017-08-23 LAB
ALBUMIN SERPL-MCNC: 4.2 G/DL (ref 3.4–4.8)
ALBUMIN/GLOB SERPL: 1.2 G/DL (ref 1.1–1.8)
ALP SERPL-CCNC: 174 U/L (ref 38–126)
ALT SERPL W P-5'-P-CCNC: 61 U/L (ref 9–52)
ANION GAP SERPL CALCULATED.3IONS-SCNC: 10 MMOL/L (ref 5–15)
ANION GAP SERPL CALCULATED.3IONS-SCNC: 11 MMOL/L (ref 5–15)
AST SERPL-CCNC: 66 U/L (ref 14–36)
BACTERIA UR QL AUTO: ABNORMAL /HPF
BASOPHILS # BLD AUTO: 0.02 10*3/MM3 (ref 0–0.2)
BASOPHILS NFR BLD AUTO: 0.1 % (ref 0–2)
BILIRUB SERPL-MCNC: 0.6 MG/DL (ref 0.2–1.3)
BILIRUB UR QL STRIP: NEGATIVE
BUN BLD-MCNC: 15 MG/DL (ref 7–21)
BUN BLD-MCNC: 15 MG/DL (ref 7–21)
BUN/CREAT SERPL: 16.1 (ref 7–25)
BUN/CREAT SERPL: 16.3 (ref 7–25)
CALCIUM SPEC-SCNC: 8.4 MG/DL (ref 8.4–10.2)
CALCIUM SPEC-SCNC: 9.1 MG/DL (ref 8.4–10.2)
CHLORIDE SERPL-SCNC: 102 MMOL/L (ref 95–110)
CHLORIDE SERPL-SCNC: 102 MMOL/L (ref 95–110)
CLARITY UR: CLEAR
CO2 SERPL-SCNC: 28 MMOL/L (ref 22–31)
CO2 SERPL-SCNC: 28 MMOL/L (ref 22–31)
COLOR UR: YELLOW
CREAT BLD-MCNC: 0.92 MG/DL (ref 0.5–1)
CREAT BLD-MCNC: 0.93 MG/DL (ref 0.5–1)
D-LACTATE SERPL-SCNC: 1.2 MMOL/L (ref 0.5–2)
DEPRECATED RDW RBC AUTO: 43.4 FL (ref 36.4–46.3)
EOSINOPHIL # BLD AUTO: 0.11 10*3/MM3 (ref 0–0.7)
EOSINOPHIL NFR BLD AUTO: 0.8 % (ref 0–7)
ERYTHROCYTE [DISTWIDTH] IN BLOOD BY AUTOMATED COUNT: 13.6 % (ref 11.5–14.5)
GFR SERPL CREATININE-BSD FRML MDRD: 81 ML/MIN/1.73 (ref 58–135)
GFR SERPL CREATININE-BSD FRML MDRD: 82 ML/MIN/1.73 (ref 58–135)
GLOBULIN UR ELPH-MCNC: 3.5 GM/DL (ref 2.3–3.5)
GLUCOSE BLD-MCNC: 111 MG/DL (ref 60–100)
GLUCOSE BLD-MCNC: 163 MG/DL (ref 60–100)
GLUCOSE UR STRIP-MCNC: NEGATIVE MG/DL
HCT VFR BLD AUTO: 42.1 % (ref 35–45)
HGB BLD-MCNC: 13.9 G/DL (ref 12–15.5)
HGB UR QL STRIP.AUTO: NEGATIVE
HOLD SPECIMEN: NORMAL
HOLD SPECIMEN: NORMAL
HYALINE CASTS UR QL AUTO: ABNORMAL /LPF
IMM GRANULOCYTES # BLD: 0.03 10*3/MM3 (ref 0–0.02)
IMM GRANULOCYTES NFR BLD: 0.2 % (ref 0–0.5)
KETONES UR QL STRIP: NEGATIVE
LEUKOCYTE ESTERASE UR QL STRIP.AUTO: NEGATIVE
LIPASE SERPL-CCNC: 29 U/L (ref 23–300)
LYMPHOCYTES # BLD AUTO: 1.83 10*3/MM3 (ref 0.6–4.2)
LYMPHOCYTES NFR BLD AUTO: 13.6 % (ref 10–50)
MCH RBC QN AUTO: 28.9 PG (ref 26.5–34)
MCHC RBC AUTO-ENTMCNC: 33 G/DL (ref 31.4–36)
MCV RBC AUTO: 87.5 FL (ref 80–98)
MONOCYTES # BLD AUTO: 1.02 10*3/MM3 (ref 0–0.9)
MONOCYTES NFR BLD AUTO: 7.6 % (ref 0–12)
NEUTROPHILS # BLD AUTO: 10.47 10*3/MM3 (ref 2–8.6)
NEUTROPHILS NFR BLD AUTO: 77.7 % (ref 37–80)
NITRITE UR QL STRIP: NEGATIVE
PH UR STRIP.AUTO: 6 [PH] (ref 5–9)
PLATELET # BLD AUTO: 102 10*3/MM3 (ref 150–450)
PMV BLD AUTO: 12.6 FL (ref 8–12)
POTASSIUM BLD-SCNC: 3.5 MMOL/L (ref 3.5–5.1)
POTASSIUM BLD-SCNC: 3.5 MMOL/L (ref 3.5–5.1)
PROT SERPL-MCNC: 7.7 G/DL (ref 6.3–8.6)
PROT UR QL STRIP: ABNORMAL
RBC # BLD AUTO: 4.81 10*6/MM3 (ref 3.77–5.16)
RBC # UR: ABNORMAL /HPF
RBC MORPH BLD: NORMAL
REF LAB TEST METHOD: ABNORMAL
SMALL PLATELETS BLD QL SMEAR: NORMAL
SODIUM BLD-SCNC: 140 MMOL/L (ref 137–145)
SODIUM BLD-SCNC: 141 MMOL/L (ref 137–145)
SP GR UR STRIP: 1.02 (ref 1–1.03)
SQUAMOUS #/AREA URNS HPF: ABNORMAL /HPF
UROBILINOGEN UR QL STRIP: ABNORMAL
WBC MORPH BLD: NORMAL
WBC NRBC COR # BLD: 13.48 10*3/MM3 (ref 3.2–9.8)
WBC UR QL AUTO: ABNORMAL /HPF
WHOLE BLOOD HOLD SPECIMEN: NORMAL
WHOLE BLOOD HOLD SPECIMEN: NORMAL

## 2017-08-23 PROCEDURE — 93005 ELECTROCARDIOGRAM TRACING: CPT | Performed by: EMERGENCY MEDICINE

## 2017-08-23 PROCEDURE — 96365 THER/PROPH/DIAG IV INF INIT: CPT | Performed by: INTERNAL MEDICINE

## 2017-08-23 PROCEDURE — 25010000002 ZOLEDRONIC ACID PER 1 MG: Performed by: INTERNAL MEDICINE

## 2017-08-23 PROCEDURE — 25010000002 ONDANSETRON PER 1 MG: Performed by: EMERGENCY MEDICINE

## 2017-08-23 PROCEDURE — 85025 COMPLETE CBC W/AUTO DIFF WBC: CPT | Performed by: EMERGENCY MEDICINE

## 2017-08-23 PROCEDURE — 96361 HYDRATE IV INFUSION ADD-ON: CPT

## 2017-08-23 PROCEDURE — 83605 ASSAY OF LACTIC ACID: CPT | Performed by: EMERGENCY MEDICINE

## 2017-08-23 PROCEDURE — 85007 BL SMEAR W/DIFF WBC COUNT: CPT | Performed by: EMERGENCY MEDICINE

## 2017-08-23 PROCEDURE — 71020 HC CHEST PA AND LATERAL: CPT

## 2017-08-23 PROCEDURE — 81001 URINALYSIS AUTO W/SCOPE: CPT | Performed by: EMERGENCY MEDICINE

## 2017-08-23 PROCEDURE — 80053 COMPREHEN METABOLIC PANEL: CPT

## 2017-08-23 PROCEDURE — 87040 BLOOD CULTURE FOR BACTERIA: CPT | Performed by: EMERGENCY MEDICINE

## 2017-08-23 PROCEDURE — 83690 ASSAY OF LIPASE: CPT | Performed by: EMERGENCY MEDICINE

## 2017-08-23 PROCEDURE — 99284 EMERGENCY DEPT VISIT MOD MDM: CPT

## 2017-08-23 PROCEDURE — 96374 THER/PROPH/DIAG INJ IV PUSH: CPT

## 2017-08-23 PROCEDURE — 74176 CT ABD & PELVIS W/O CONTRAST: CPT

## 2017-08-23 PROCEDURE — 93010 ELECTROCARDIOGRAM REPORT: CPT | Performed by: INTERNAL MEDICINE

## 2017-08-23 RX ORDER — SODIUM CHLORIDE 9 MG/ML
125 INJECTION, SOLUTION INTRAVENOUS CONTINUOUS
Status: DISCONTINUED | OUTPATIENT
Start: 2017-08-23 | End: 2017-08-24 | Stop reason: HOSPADM

## 2017-08-23 RX ORDER — SODIUM CHLORIDE 0.9 % (FLUSH) 0.9 %
10 SYRINGE (ML) INJECTION AS NEEDED
Status: DISCONTINUED | OUTPATIENT
Start: 2017-08-23 | End: 2017-08-23 | Stop reason: HOSPADM

## 2017-08-23 RX ORDER — SODIUM CHLORIDE 0.9 % (FLUSH) 0.9 %
10 SYRINGE (ML) INJECTION AS NEEDED
Status: CANCELLED | OUTPATIENT
Start: 2017-08-24

## 2017-08-23 RX ORDER — ONDANSETRON 2 MG/ML
4 INJECTION INTRAMUSCULAR; INTRAVENOUS ONCE
Status: COMPLETED | OUTPATIENT
Start: 2017-08-23 | End: 2017-08-23

## 2017-08-23 RX ORDER — SODIUM CHLORIDE 9 MG/ML
250 INJECTION, SOLUTION INTRAVENOUS ONCE
Status: CANCELLED | OUTPATIENT
Start: 2017-08-23

## 2017-08-23 RX ORDER — SODIUM CHLORIDE 9 MG/ML
250 INJECTION, SOLUTION INTRAVENOUS ONCE
Status: COMPLETED | OUTPATIENT
Start: 2017-08-23 | End: 2017-08-23

## 2017-08-23 RX ORDER — SODIUM CHLORIDE 0.9 % (FLUSH) 0.9 %
10 SYRINGE (ML) INJECTION AS NEEDED
Status: DISCONTINUED | OUTPATIENT
Start: 2017-08-23 | End: 2017-08-24 | Stop reason: HOSPADM

## 2017-08-23 RX ADMIN — Medication 10 ML: at 10:54

## 2017-08-23 RX ADMIN — ONDANSETRON 4 MG: 2 INJECTION INTRAMUSCULAR; INTRAVENOUS at 21:54

## 2017-08-23 RX ADMIN — Medication 10 ML: at 09:13

## 2017-08-23 RX ADMIN — ZOLEDRONIC ACID 4 MG: 4 INJECTION, SOLUTION, CONCENTRATE INTRAVENOUS at 10:17

## 2017-08-23 RX ADMIN — SODIUM CHLORIDE 1000 ML: 9 INJECTION, SOLUTION INTRAVENOUS at 20:57

## 2017-08-23 RX ADMIN — SODIUM CHLORIDE 250 ML: 9 INJECTION, SOLUTION INTRAVENOUS at 10:10

## 2017-08-24 VITALS
RESPIRATION RATE: 18 BRPM | WEIGHT: 220 LBS | TEMPERATURE: 99.4 F | OXYGEN SATURATION: 93 % | DIASTOLIC BLOOD PRESSURE: 68 MMHG | HEIGHT: 65 IN | SYSTOLIC BLOOD PRESSURE: 138 MMHG | HEART RATE: 85 BPM | BODY MASS INDEX: 36.65 KG/M2

## 2017-08-24 LAB
ADV 40+41 DNA STL QL NAA+NON-PROBE: NOT DETECTED
ASTRO TYP 1-8 RNA STL QL NAA+NON-PROBE: NOT DETECTED
C CAYETANENSIS DNA STL QL NAA+NON-PROBE: NOT DETECTED
C DIFF TOX GENS STL QL NAA+PROBE: NOT DETECTED
CAMPY SP DNA.DIARRHEA STL QL NAA+PROBE: NOT DETECTED
CRYPTOSP STL CULT: NOT DETECTED
E COLI DNA SPEC QL NAA+PROBE: NOT DETECTED
E HISTOLYT AG STL-ACNC: NOT DETECTED
EAEC PAA PLAS AGGR+AATA ST NAA+NON-PRB: NOT DETECTED
EC STX1+STX2 GENES STL QL NAA+NON-PROBE: NOT DETECTED
EPEC EAE GENE STL QL NAA+NON-PROBE: NOT DETECTED
ETEC LTA+ST1A+ST1B TOX ST NAA+NON-PROBE: NOT DETECTED
G LAMBLIA DNA SPEC QL NAA+PROBE: NOT DETECTED
HEMOCCULT STL QL: NEGATIVE
LACTOFERRIN STL QL LA: POSITIVE
NOROVIRUS GI+II RNA STL QL NAA+NON-PROBE: NOT DETECTED
P SHIGELLOIDES DNA STL QL NAA+NON-PROBE: NOT DETECTED
RV RNA STL NAA+PROBE: NOT DETECTED
SALMONELLA DNA SPEC QL NAA+PROBE: NOT DETECTED
SAPO I+II+IV+V RNA STL QL NAA+NON-PROBE: NOT DETECTED
SHIGELLA SP+EIEC IPAH ST NAA+NON-PROBE: NOT DETECTED
V CHOLERAE DNA SPEC QL NAA+PROBE: NOT DETECTED
VIBRIO DNA SPEC NAA+PROBE: NOT DETECTED
YERSINIA STL CULT: NOT DETECTED

## 2017-08-24 PROCEDURE — 83631 LACTOFERRIN FECAL (QUANT): CPT | Performed by: EMERGENCY MEDICINE

## 2017-08-24 PROCEDURE — 25010000002 ONDANSETRON PER 1 MG: Performed by: EMERGENCY MEDICINE

## 2017-08-24 PROCEDURE — 87999 UNLISTED MICROBIOLOGY PX: CPT | Performed by: EMERGENCY MEDICINE

## 2017-08-24 PROCEDURE — 25010000002 HEPARIN FLUSH (PORCINE) 100 UNIT/ML SOLUTION: Performed by: EMERGENCY MEDICINE

## 2017-08-24 PROCEDURE — 96376 TX/PRO/DX INJ SAME DRUG ADON: CPT

## 2017-08-24 PROCEDURE — 82272 OCCULT BLD FECES 1-3 TESTS: CPT | Performed by: EMERGENCY MEDICINE

## 2017-08-24 PROCEDURE — 25010000002 HYDROMORPHONE PER 4 MG: Performed by: EMERGENCY MEDICINE

## 2017-08-24 PROCEDURE — 96375 TX/PRO/DX INJ NEW DRUG ADDON: CPT

## 2017-08-24 RX ORDER — ONDANSETRON 2 MG/ML
4 INJECTION INTRAMUSCULAR; INTRAVENOUS ONCE
Status: COMPLETED | OUTPATIENT
Start: 2017-08-24 | End: 2017-08-24

## 2017-08-24 RX ADMIN — SODIUM CHLORIDE, PRESERVATIVE FREE 500 UNITS: 5 INJECTION INTRAVENOUS at 03:08

## 2017-08-24 RX ADMIN — ONDANSETRON 4 MG: 2 INJECTION INTRAMUSCULAR; INTRAVENOUS at 01:07

## 2017-08-24 RX ADMIN — HYDROMORPHONE HYDROCHLORIDE 0.5 MG: 1 INJECTION, SOLUTION INTRAMUSCULAR; INTRAVENOUS; SUBCUTANEOUS at 01:08

## 2017-08-24 RX ADMIN — SODIUM CHLORIDE 500 ML: 9 INJECTION, SOLUTION INTRAVENOUS at 01:59

## 2017-08-24 NOTE — ED PROVIDER NOTES
Subjective   HPI Comments: Patient complaining of diarrhea since Monday, and she is feeling weak with chills with nausea her temperature on arrival was 99.4 she has lower abdominal pain no vomiting    Looking at old records she had episode of diarrhea since the past with abdominal pain  Patient said she had breast cancer with metastases to the bones and liver    PMHx   Degenerative disc disease, thoracic    STEVEN (generalized anxiety disorder)    Pedal edema    Carcinoma of right breast metastatic to bone    Cancer associated pain    Gastroesophageal reflux disease without esophagitis    Liver metastases    Breast cancer metastasized to bone    Type 2 diabetes mellitus with hyperglycemia, without long-term current use of insulin    Intractable generalized abdominal pain    Hypomagnesemia    Intractable diarrhea           Patient is a 41 y.o. female presenting with diarrhea.   History provided by:  Patient  Diarrhea   The primary symptoms include abdominal pain, nausea and diarrhea. Primary symptoms do not include fever, fatigue, dysuria or rash. The illness began 2 days ago. The onset was gradual. The problem has not changed since onset.  The abdominal pain began today. The abdominal pain has been unchanged since its onset. The abdominal pain radiates to the suprapubic region. The severity of the abdominal pain is 6/10.   Nausea began today.   The diarrhea began 2 days ago. The diarrhea occurs 2 to 4 times per day.   The illness is also significant for chills. The illness does not include bloating, back pain or itching. Associated medical issues do not include GERD or liver disease.       Review of Systems   Constitutional: Positive for chills. Negative for activity change, appetite change, fatigue and fever.   HENT: Negative for congestion, facial swelling, mouth sores, nosebleeds, sore throat and trouble swallowing.    Eyes: Negative for discharge, redness and itching.   Respiratory: Negative for apnea, cough and  wheezing.    Cardiovascular: Negative for chest pain and palpitations.   Gastrointestinal: Positive for abdominal pain, diarrhea and nausea. Negative for bloating and blood in stool.   Endocrine: Negative for cold intolerance, heat intolerance, polydipsia, polyphagia and polyuria.   Genitourinary: Negative for difficulty urinating, dysuria, flank pain, frequency and hematuria.   Musculoskeletal: Negative for back pain, gait problem, joint swelling and neck pain.   Skin: Negative.  Negative for color change, itching, pallor and rash.   Allergic/Immunologic: Negative for environmental allergies.   Neurological: Negative for dizziness, seizures, syncope, speech difficulty, light-headedness, numbness and headaches.   Hematological: Negative for adenopathy.   Psychiatric/Behavioral: Negative for agitation, behavioral problems, confusion and sleep disturbance. The patient is not nervous/anxious.        Past Medical History:   Diagnosis Date   • Abnormal breathing sounds    • Abscess of skin     and / or subcutaneous tissue   • Acute bronchitis     unspecified   • Adult body mass index 30+     obesity-BMI 33   • Allergic rhinitis    • Anasarca    • Anxiety     currently on xanax   • Anxiety    • Asthenia    • Asthma     moderate persistent   • Asthmatic bronchitis    • Astigmatism    • Bleeding external hemorrhoids    • Body mass index (BMI) of 34.0-34.9 in adult    • Body mass index (BMI) of 35.0-35.9 in adult    • Body mass index (BMI) of 36.0-36.9 in adult    • Body mass index 40.0-44.9, adult     SEVERELY OBESE   • Cellulitis    • Chemotherapy induced nausea and vomiting    • Chronic back pain    • Chronic cough    • Chronic hypoxemic respiratory failure     on NC at 3 LPM   • Cough    • Depressive disorder    • Diabetes mellitus     no retinopathy   • Dyslipidemia    • Edema of lower extremity    • Encounter for follow-up examination after completed treatment for malignant neoplasm    • Epidermoid cyst of skin      "excision with secondary intention healing   • Excessive and frequent menstruation with irregular cycle     Vaginal bleeding after 4 years of no bleeding secondary to chemotherapy for breast cancer; currently being treated for breast cancer for the second time, mets to bone and liver while on chemotherapy      • Flushing    • 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    • Gastroesophageal reflux disease    • H/O tubal ligation    • History of blood clots     clots around mediports x 2   • Hx of echocardiogram     w/ color flow, and w/o color flow   • Hyperglycemia    • Hypermetropia    • Hypertension    • Hypokalemia    • Impaired glucose tolerance     hgbalc 6.2   • Infection of skin     and /or subcutaneous tissue-around the catheter exit site   • Influenza     needs influenza immunization   • Irregular periods    • Lesion of lumbar spine     pain controlled with percocet and lidocaine patches   • Lumbago with sciatica    • Malignant neoplasm of female breast     s/p right mastectomy, mets to bone and liver on chemotherapy      • Menopausal flushing    • Muscle weakness    • Muscle weakness (generalized)    • Nonspecific interstitial pneumonia     symptomatically improved   • Pleural effusion     refractory massive right, most likely malignant effusion   • Primary atypical pneumonia    • Renal failure     acute drug-induced renal failure   • Sinus tachycardia    • Tobacco dependence syndrome    • Tobacco non-user    • Upper respiratory infection    • Wheezing        Allergies   Allergen Reactions   • Lisinopril Shortness Of Breath   • Flagyl [Metronidazole] Other (See Comments)     pancreatitis   • Fentanyl Anxiety     \"goes out of her mind\"   • Latex Rash       Past Surgical History:   Procedure Laterality Date   • ANKLE LOOSE BODY EXCISION/REMOVAL Right 5/24/2017    Procedure: RIGTH FOOT EXCISION NAVICULAR FRACTURES FRAGMENT  DEBRIDEMENT TALONAVILCULA RJOIN T;  Surgeon: Armani Oliveira DPM;  Location: [facility]" OR;  Service:    • BREAST SURGERY Right 2013     reconstruction of right breast, trans flap surgery   • CENTRAL VENOUS CATHETER TUNNELED INSERTION SINGLE LUMEN      Placement of indwelling Pleurx catheter right   • COLONOSCOPY N/A 1/26/2017    Procedure: COLONOSCOPY;  Surgeon: Robert Clifton MD;  Location: [facility] ENDOSCOPY;  Service:    • ENDOSCOPY N/A 1/26/2017    Procedure: ESOPHAGOGASTRODUODENOSCOPY;  Surgeon: Robert Clifton MD;  Location: [facility] ENDOSCOPY;  Service:    • ENDOSCOPY N/A 2/24/2017    Procedure: ESOPHAGOGASTRODUODENOSCOPY;  Surgeon: Robert Clifton MD;  Location: [facility] ENDOSCOPY;  Service:    • HYSTERECTOMY      vaginal   • LIVER BIOPSY  2015   • LUNG VOLUME REDUCTION     • MASTECTOMY      partial   • OTHER SURGICAL HISTORY  05/05/2016    central line insertion , PICC line replacement   • OTHER SURGICAL HISTORY      place breast clip percut   • OTHER SURGICAL HISTORY      pulse oximetry   • OTHER SURGICAL HISTORY      remove cc cath w/ sc port or pump, Removal of right internal jugular MediPort   • OTHER SURGICAL HISTORY      spirometry   • OTHER SURGICAL HISTORY      tubal sterilization   • PAP SMEAR     • THORACENTESIS      aspiration of pleural space   • TUBAL ABDOMINAL LIGATION  2009   • UPPER GASTROINTESTINAL ENDOSCOPY  01/26/2017   • UPPER GASTROINTESTINAL ENDOSCOPY  02/24/2017   • VENOUS ACCESS DEVICE (PORT) INSERTION      Ultrasound guided right internal jugular Mediport placement under fluoroscopy       Family History   Problem Relation Age of Onset   • Coronary artery disease Other    • Diabetes Other    • Hypertension Other    • Amblyopia Other    • Cataracts Maternal Grandfather    • Cataracts Paternal Grandmother        Social History     Social History   • Marital status:      Spouse name: N/A   • Number of children: N/A   • Years of education: N/A     Social History Main Topics   • Smoking status: Former Smoker     Quit date: 2012   • Smokeless tobacco: Never Used   •  Alcohol use No   • Drug use: No   • Sexual activity: Defer     Other Topics Concern   • None     Social History Narrative           Objective   Physical Exam   Constitutional: She is oriented to person, place, and time. She appears well-developed and well-nourished.   HENT:   Head: Normocephalic and atraumatic.   Nose: Nose normal.   Mouth/Throat: Oropharynx is clear and moist.   Eyes: Conjunctivae and EOM are normal. Pupils are equal, round, and reactive to light.   Neck: Normal range of motion. Neck supple.   Cardiovascular: Normal rate, regular rhythm, normal heart sounds and intact distal pulses.    Pulmonary/Chest: Effort normal and breath sounds normal.   Abdominal: Soft. Bowel sounds are normal. There is tenderness.   Musculoskeletal: Normal range of motion.   Neurological: She is alert and oriented to person, place, and time.   Skin: Skin is warm and dry.   Psychiatric: She has a normal mood and affect. Her behavior is normal. Judgment and thought content normal.   Nursing note and vitals reviewed.      ECG 12 Lead    Date/Time: 8/24/2017 1:10 AM  Performed by: TRE DAMON  Authorized by: TRE DAMON   Interpreted by physician  Comparison: not compared with previous ECG   Rhythm: sinus rhythm  Rate: normal  QRS axis: normal  Clinical impression: abnormal ECG               ED Course  ED Course   Value Comment By Time   Chloride: 102 (Reviewed) Tre Damon MD 08/23 6091   Hemoglobin: 13.9 (Reviewed) Tre Damon MD 08/23 4730      Labs Reviewed   COMPREHENSIVE METABOLIC PANEL - Abnormal; Notable for the following:        Result Value    Glucose 111 (*)     ALT (SGPT) 61 (*)     AST (SGOT) 66 (*)     Alkaline Phosphatase 174 (*)     All other components within normal limits   URINALYSIS W/ CULTURE IF INDICATED - Abnormal; Notable for the following:     Protein,  mg/dL (2+) (*)     All other components within normal limits   CBC WITH AUTO DIFFERENTIAL - Abnormal; Notable for the following:      WBC 13.48 (*)     MPV 12.6 (*)     Platelets 102 (*)     Neutrophils, Absolute 10.47 (*)     Monocytes, Absolute 1.02 (*)     Immature Grans, Absolute 0.03 (*)     All other components within normal limits   URINALYSIS, MICROSCOPIC ONLY - Abnormal; Notable for the following:     RBC, UA 0-2 (*)     All other components within normal limits   FECAL LACTOFERRIN - Abnormal; Notable for the following:     Lactoferrin, Qual Positive (*)     All other components within normal limits   LIPASE - Normal   LACTIC ACID, PLASMA - Normal   OCCULT BLOOD X 1, STOOL - Normal   BLOOD CULTURE   BLOOD CULTURE   GASTROINTESTINAL PANEL, PCR   RAINBOW DRAW    Narrative:     The following orders were created for panel order [place] Draw.  Procedure                               Abnormality         Status                     ---------                               -----------         ------                     Light Blue Top[[id number]]                                   Final result               Green Top (Gel)[[id number]]                                  Final result               Lavender Top[[id number]]                                     Final result               Gold Top - SST[[id number]]                                   Final result                 Please view results for these tests on the individual orders.   SCAN SLIDE   CBC AND DIFFERENTIAL    Narrative:     The following orders were created for panel order CBC & Differential.  Procedure                               Abnormality         Status                     ---------                               -----------         ------                     Scan Slide[[id number]]                                       Final result               CBC Auto Differential[[id number]]        Abnormal            Final result                 Please view results for these tests on the individual orders.   LIGHT BLUE TOP   GREEN TOP   LAVENDER TOP   GOLD TOP - SST        CT Abdomen Pelvis Without  Contrast   Final Result   1. Hepatic cirrhosis   2. Fluid-filled small bowel and colon. No bowel obstruction.   3. Normal appendix      Electronically signed by:  Richard Oliveira MD  8/23/2017 11:35 PM   CDT Workstation: [employer]       Chest 2 View   Final Result   No acute cardiopulmonary abnormality.      Electronically signed by:  Richard Oliveira MD  8/23/2017 10:31 PM   CDT Workstation: [employer]                    [facility]    Final diagnoses:   Chronic diarrhea   Generalized abdominal pain   Low grade fever   Cirrhosis of liver without ascites, unspecified hepatic cirrhosis type   Thrombocytopenia   Elevated liver function tests           Tre Balderas MD  08/24/17 0231

## 2017-08-24 NOTE — ED NOTES
Pt. Presents to the ED with complaints of vomiting, diarrhea after her zometa infusion.  Pt. Is a breast cancer pt.  Pt. States she has been unable to keep anything down since.      Glenis Ruiz RN  08/23/17 2026

## 2017-08-24 NOTE — ED NOTES
Discharge instructions reviewed with no additional questions at this time. NAD.  VSS.  Pt. Alert and oriented x4. No other needs voiced at this time.  Resps even and unlabored. Pt. Assisted to lobby via wheelchair and is using phone in lobby to call for a ride.  No other needs voiced at this time.         Glenis Ruiz RN  08/24/17 7873

## 2017-08-28 LAB
BACTERIA SPEC AEROBE CULT: NORMAL
BACTERIA SPEC AEROBE CULT: NORMAL

## 2017-08-31 ENCOUNTER — APPOINTMENT (OUTPATIENT)
Dept: ONCOLOGY | Facility: HOSPITAL | Age: 42
End: 2017-08-31

## 2017-09-05 ENCOUNTER — HOSPITAL ENCOUNTER (OUTPATIENT)
Dept: INTERVENTIONAL RADIOLOGY/VASCULAR | Facility: HOSPITAL | Age: 42
Discharge: HOME | End: 2017-09-05
Attending: INTERNAL MEDICINE | Admitting: INTERNAL MEDICINE

## 2017-09-05 ENCOUNTER — HOSPITAL ENCOUNTER (OUTPATIENT)
Dept: GENERAL RADIOLOGY | Facility: HOSPITAL | Age: 42
Discharge: HOME | End: 2017-09-05

## 2017-09-05 ENCOUNTER — INFUSION (OUTPATIENT)
Dept: ONCOLOGY | Facility: HOSPITAL | Age: 42
End: 2017-09-05

## 2017-09-05 ENCOUNTER — DOCUMENTATION (OUTPATIENT)
Dept: ONCOLOGY | Facility: CLINIC | Age: 42
End: 2017-09-05

## 2017-09-05 ENCOUNTER — OFFICE VISIT (OUTPATIENT)
Dept: ONCOLOGY | Facility: CLINIC | Age: 42
End: 2017-09-05

## 2017-09-05 VITALS
HEART RATE: 77 BPM | TEMPERATURE: 97.9 F | DIASTOLIC BLOOD PRESSURE: 88 MMHG | BODY MASS INDEX: 36.69 KG/M2 | WEIGHT: 220.5 LBS | SYSTOLIC BLOOD PRESSURE: 157 MMHG | RESPIRATION RATE: 20 BRPM

## 2017-09-05 DIAGNOSIS — C50.911 CARCINOMA OF RIGHT BREAST METASTATIC TO BONE (HCC): ICD-10-CM

## 2017-09-05 DIAGNOSIS — C79.51 BREAST CANCER METASTASIZED TO BONE, LEFT (HCC): ICD-10-CM

## 2017-09-05 DIAGNOSIS — R23.2 HOT FLASH DUE TO MEDICATION: ICD-10-CM

## 2017-09-05 DIAGNOSIS — C79.51 CARCINOMA OF RIGHT BREAST METASTATIC TO BONE (HCC): ICD-10-CM

## 2017-09-05 DIAGNOSIS — C50.912 BREAST CANCER METASTASIZED TO BONE, LEFT (HCC): ICD-10-CM

## 2017-09-05 DIAGNOSIS — T50.905A HOT FLASH DUE TO MEDICATION: ICD-10-CM

## 2017-09-05 DIAGNOSIS — T82.9XXD: Primary | ICD-10-CM

## 2017-09-05 DIAGNOSIS — T82.9XXD: ICD-10-CM

## 2017-09-05 LAB
ALBUMIN SERPL-MCNC: 4 G/DL (ref 3.4–4.8)
ALBUMIN/GLOB SERPL: 1.2 G/DL (ref 1.1–1.8)
ALP SERPL-CCNC: 170 U/L (ref 38–126)
ALT SERPL W P-5'-P-CCNC: 56 U/L (ref 9–52)
ANION GAP SERPL CALCULATED.3IONS-SCNC: 9 MMOL/L (ref 5–15)
AST SERPL-CCNC: 54 U/L (ref 14–36)
BASOPHILS # BLD AUTO: 0.02 10*3/MM3 (ref 0–0.2)
BASOPHILS NFR BLD AUTO: 0.4 % (ref 0–2)
BILIRUB SERPL-MCNC: 0.4 MG/DL (ref 0.2–1.3)
BUN BLD-MCNC: 15 MG/DL (ref 7–21)
BUN/CREAT SERPL: 17 (ref 7–25)
CALCIUM SPEC-SCNC: 8.8 MG/DL (ref 8.4–10.2)
CHLORIDE SERPL-SCNC: 105 MMOL/L (ref 95–110)
CO2 SERPL-SCNC: 25 MMOL/L (ref 22–31)
CREAT BLD-MCNC: 0.88 MG/DL (ref 0.5–1)
DEPRECATED RDW RBC AUTO: 46 FL (ref 36.4–46.3)
EOSINOPHIL # BLD AUTO: 0.14 10*3/MM3 (ref 0–0.7)
EOSINOPHIL NFR BLD AUTO: 2.5 % (ref 0–7)
ERYTHROCYTE [DISTWIDTH] IN BLOOD BY AUTOMATED COUNT: 14.2 % (ref 11.5–14.5)
GFR SERPL CREATININE-BSD FRML MDRD: 86 ML/MIN/1.73 (ref 58–135)
GLOBULIN UR ELPH-MCNC: 3.3 GM/DL (ref 2.3–3.5)
GLUCOSE BLD-MCNC: 127 MG/DL (ref 60–100)
HCT VFR BLD AUTO: 41 % (ref 35–45)
HGB BLD-MCNC: 13.3 G/DL (ref 12–15.5)
IMM GRANULOCYTES # BLD: 0.01 10*3/MM3 (ref 0–0.02)
IMM GRANULOCYTES NFR BLD: 0.2 % (ref 0–0.5)
LYMPHOCYTES # BLD AUTO: 1.7 10*3/MM3 (ref 0.6–4.2)
LYMPHOCYTES NFR BLD AUTO: 30.5 % (ref 10–50)
MCH RBC QN AUTO: 28.9 PG (ref 26.5–34)
MCHC RBC AUTO-ENTMCNC: 32.4 G/DL (ref 31.4–36)
MCV RBC AUTO: 88.9 FL (ref 80–98)
MONOCYTES # BLD AUTO: 0.47 10*3/MM3 (ref 0–0.9)
MONOCYTES NFR BLD AUTO: 8.4 % (ref 0–12)
NEUTROPHILS # BLD AUTO: 3.23 10*3/MM3 (ref 2–8.6)
NEUTROPHILS NFR BLD AUTO: 58 % (ref 37–80)
PLATELET # BLD AUTO: 131 10*3/MM3 (ref 150–450)
PMV BLD AUTO: 11.7 FL (ref 8–12)
POTASSIUM BLD-SCNC: 3.6 MMOL/L (ref 3.5–5.1)
PROT SERPL-MCNC: 7.3 G/DL (ref 6.3–8.6)
RBC # BLD AUTO: 4.61 10*6/MM3 (ref 3.77–5.16)
SODIUM BLD-SCNC: 139 MMOL/L (ref 137–145)
WBC NRBC COR # BLD: 5.57 10*3/MM3 (ref 3.2–9.8)

## 2017-09-05 PROCEDURE — C1751 CATH, INF, PER/CENT/MIDLINE: HCPCS

## 2017-09-05 PROCEDURE — 25010000002 DIPHENHYDRAMINE PER 50 MG: Performed by: INTERNAL MEDICINE

## 2017-09-05 PROCEDURE — 85025 COMPLETE CBC W/AUTO DIFF WBC: CPT

## 2017-09-05 PROCEDURE — 96375 TX/PRO/DX INJ NEW DRUG ADDON: CPT | Performed by: INTERNAL MEDICINE

## 2017-09-05 PROCEDURE — 80053 COMPREHEN METABOLIC PANEL: CPT

## 2017-09-05 PROCEDURE — 99214 OFFICE O/P EST MOD 30 MIN: CPT | Performed by: INTERNAL MEDICINE

## 2017-09-05 PROCEDURE — 25010000003 DEXAMETHASONE SODIUM PHOSPHATE 100 MG/10ML SOLUTION 10 ML VIAL: Performed by: INTERNAL MEDICINE

## 2017-09-05 PROCEDURE — 96413 CHEMO IV INFUSION 1 HR: CPT

## 2017-09-05 PROCEDURE — 25010000002 ADO-TRASTUZUMAB 100 MG RECONSTITUTED SOLUTION 1 EACH VIAL: Performed by: INTERNAL MEDICINE

## 2017-09-05 PROCEDURE — 36415 COLL VENOUS BLD VENIPUNCTURE: CPT

## 2017-09-05 RX ORDER — SODIUM CHLORIDE 0.9 % (FLUSH) 0.9 %
10 SYRINGE (ML) INJECTION AS NEEDED
Status: CANCELLED | OUTPATIENT
Start: 2017-09-05

## 2017-09-05 RX ORDER — SODIUM CHLORIDE 0.9 % (FLUSH) 0.9 %
10 SYRINGE (ML) INJECTION AS NEEDED
Status: DISCONTINUED | OUTPATIENT
Start: 2017-09-05 | End: 2017-09-05 | Stop reason: HOSPADM

## 2017-09-05 RX ORDER — SODIUM CHLORIDE 9 MG/ML
250 INJECTION, SOLUTION INTRAVENOUS ONCE
Status: COMPLETED | OUTPATIENT
Start: 2017-09-05 | End: 2017-09-05

## 2017-09-05 RX ORDER — FAMOTIDINE 10 MG/ML
20 INJECTION, SOLUTION INTRAVENOUS ONCE
Status: COMPLETED | OUTPATIENT
Start: 2017-09-05 | End: 2017-09-05

## 2017-09-05 RX ORDER — FAMOTIDINE 10 MG/ML
20 INJECTION, SOLUTION INTRAVENOUS ONCE
Status: CANCELLED | OUTPATIENT
Start: 2017-09-05

## 2017-09-05 RX ORDER — DIPHENHYDRAMINE HYDROCHLORIDE 50 MG/ML
25 INJECTION INTRAMUSCULAR; INTRAVENOUS ONCE
Status: COMPLETED | OUTPATIENT
Start: 2017-09-05 | End: 2017-09-05

## 2017-09-05 RX ORDER — DIPHENHYDRAMINE HYDROCHLORIDE 50 MG/ML
25 INJECTION INTRAMUSCULAR; INTRAVENOUS ONCE
Status: CANCELLED | OUTPATIENT
Start: 2017-09-05

## 2017-09-05 RX ORDER — SODIUM CHLORIDE 9 MG/ML
250 INJECTION, SOLUTION INTRAVENOUS ONCE
Status: CANCELLED | OUTPATIENT
Start: 2017-09-05

## 2017-09-05 RX ADMIN — ADO-TRASTUZUMAB EMTANSINE 360 MG: 20 INJECTION, POWDER, LYOPHILIZED, FOR SOLUTION INTRAVENOUS at 11:12

## 2017-09-05 RX ADMIN — DEXAMETHASONE SODIUM PHOSPHATE 12 MG: 10 INJECTION, SOLUTION INTRAMUSCULAR; INTRAVENOUS at 10:23

## 2017-09-05 RX ADMIN — Medication 10 ML: at 11:53

## 2017-09-05 RX ADMIN — FAMOTIDINE 20 MG: 10 INJECTION INTRAVENOUS at 10:20

## 2017-09-05 RX ADMIN — SODIUM CHLORIDE 250 ML: 9 INJECTION, SOLUTION INTRAVENOUS at 10:20

## 2017-09-05 RX ADMIN — DIPHENHYDRAMINE HYDROCHLORIDE 25 MG: 50 INJECTION INTRAMUSCULAR; INTRAVENOUS at 10:54

## 2017-09-05 NOTE — PROGRESS NOTES
THE PATIENT WAS DRAPED AND PREPPED IN STERILE TECHNIQUE. AN 0.018 WIRE WAS THREADED THROUGH THE PATIENT'S EXISTING  PICC LINE. THE PICC LINE WAS WITHDRAWN OVER THE WIRE. A 5FR   SHEATH WAS THREADED OVER THE 0.018 WIRE INTO THE LEFT  ARM. THE PICC LINE WAS TRIMMED AT 36 CM. THE PICC LINE WAS THEN THREADED OVER THE WIRE THROUGH THE SHEATH INTO THE CENTRAL VENOUS SYSTEM. THE SHEATH WAS PEELED AWAY AND WIRE WAS REMOVED. PICC LINE WAS FLUSHED WITH NORMAL SALINE. TIP AND BIOPATCH APPLIED. PICC WAS SECURED WITH STAT LOCK AND TEGADERM. PATIENT TOLERATED PROCEDURE WELL. PROCEDURE WAS DONE IN ANGIO SUITE      IMPRESSION: SUCCESSFUL RE-PLACEMENT  OF SINGLE  LUMEN SOLO PICC LINE          Daniella Soriano  9/5/2017  9:05 AM

## 2017-09-05 NOTE — PROGRESS NOTES
DATE OF VISIT: 9/5/2017    REASON FOR VISIT:  Metastatic right breast cancer    HISTORY OF PRESENT ILLNESS:    41-year-old female with a past medical history significant for metastatic right breast cancer with liver and bone metastases currently on treatment with Kadcyla and Arimidex since 07/19/17.  Patient is here to get cycle 3 of Kadcyla today.  States week after getting last dose of Kadcyla she had a feeling of not being well.  She had some nausea without vomiting.  Complains of easy bruising and prolonged bleeding after minor cuts.  Denies any abnormal swollen and anywhere in the body.  States her back pain is improved.      PAST MEDICAL HISTORY:    1.  Metastatic right breast cancer with liver and bone metastasis  2.  History of pneumonia  3.  Right internal jugular vein DVT status post around to  4.  Rectal bleeding  5.  Dyslipidemia  6.  Diabetes mellitus  7.  Anxiety  8.  Bone metastasis    SOCIAL HISTORY:    Social History   Substance Use Topics   • Smoking status: Former Smoker     Quit date: 2012   • Smokeless tobacco: Never Used   • Alcohol use No       Surgical History :  Past Surgical History:   Procedure Laterality Date   • ANKLE LOOSE BODY EXCISION/REMOVAL Right 5/24/2017    Procedure: RIGTH FOOT EXCISION NAVICULAR FRACTURES FRAGMENT  DEBRIDEMENT TALONAVILCULA RJOIN T;  Surgeon: Armani Oliveira DPM;  Location: [facility] OR;  Service:    • BREAST SURGERY Right 2013     reconstruction of right breast, trans flap surgery   • CENTRAL VENOUS CATHETER TUNNELED INSERTION SINGLE LUMEN      Placement of indwelling Pleurx catheter right   • COLONOSCOPY N/A 1/26/2017    Procedure: COLONOSCOPY;  Surgeon: Robert Clifton MD;  Location: [facility] ENDOSCOPY;  Service:    • ENDOSCOPY N/A 1/26/2017    Procedure: ESOPHAGOGASTRODUODENOSCOPY;  Surgeon: Robert Clifton MD;  Location: [facility] ENDOSCOPY;  Service:    • ENDOSCOPY N/A 2/24/2017    Procedure: ESOPHAGOGASTRODUODENOSCOPY;  Surgeon: Robert Clifton MD;  Location:  "[facility] ENDOSCOPY;  Service:    • HYSTERECTOMY      vaginal   • LIVER BIOPSY  2015   • LUNG VOLUME REDUCTION     • MASTECTOMY      partial   • OTHER SURGICAL HISTORY  05/05/2016    central line insertion , PICC line replacement   • OTHER SURGICAL HISTORY      place breast clip percut   • OTHER SURGICAL HISTORY      pulse oximetry   • OTHER SURGICAL HISTORY      remove cc cath w/ sc port or pump, Removal of right internal jugular MediPort   • OTHER SURGICAL HISTORY      spirometry   • OTHER SURGICAL HISTORY      tubal sterilization   • PAP SMEAR     • THORACENTESIS      aspiration of pleural space   • TUBAL ABDOMINAL LIGATION  2009   • UPPER GASTROINTESTINAL ENDOSCOPY  01/26/2017   • UPPER GASTROINTESTINAL ENDOSCOPY  02/24/2017   • VENOUS ACCESS DEVICE (PORT) INSERTION      Ultrasound guided right internal jugular Mediport placement under fluoroscopy       ALLERGIES:    Allergies   Allergen Reactions   • Lisinopril Shortness Of Breath   • Flagyl [Metronidazole] Other (See Comments)     pancreatitis   • Fentanyl Anxiety     \"goes out of her mind\"   • Latex Rash     FAMILY HISTORY:  Family History   Problem Relation Age of Onset   • Coronary artery disease Other    • Diabetes Other    • Hypertension Other    • Amblyopia Other    • Cataracts Maternal Grandfather    • Cataracts Paternal Grandmother      REVIEW OF SYSTEMS:      CONSTITUTIONAL: Positive for fatigue.  No fever, chills, or night sweats.     HEENT:  No epistaxis, mouth sores, or difficulty swallowing.    RESPIRATORY:  No new shortness of breath or cough at present.    CARDIOVASCULAR:  No chest pain or palpitations.    GASTROINTESTINAL:Complains of nausea without vomiting since starting Kadcyla.Complains of diarrhea for 1 week after treatment.  No abdominal pain,  vomiting, or blood in the stool.    GENITOURINARY:  No dysuria or hematuria.    MUSCULOSKELETAL: Complains of discomfort And pain in right ankle. States her back pain is improved.    NEUROLOGICAL: " Positive for intermittent tingling and numbness.. No new headache or dizziness.     LYMPHATICS:  Denies any abnormal swollen and anywhere in the body.    SKIN:  Complains of easy bruising and prolonged bleeding after minor cuts since starting Kadcyla.        PHYSICAL EXAMINATION:      VITAL SIGNS:    /88  Pulse 77  Temp 97.9 °F (36.6 °C)  Resp 20  Wt 220 lb 8 oz (100 kg)  BMI 36.69 kg/m2    GENERAL:  Not in any distress.     EYES:  Mild pallor. No icterus.  Extraocular movements intact.  No facial asymmetry.    NECK:  No adenopathy in cervical or supraclavicular area.    RESPIRATORY:  Fair air entry bilaterally. No rhonchi or wheezing.    CARDIOVASCULAR:  S1, S2. Regular rate and rhythm.    ABDOMEN:  Soft, nontender. Bowel sounds present.  No organomegaly appreciated.    EXTREMITIES:  No edema.  No calf  tenderness.  There is a surgical shoe present on right foot.PICC line present in left arm.    NEUROLOGICAL:  Alert, awake, oriented x3.  No motor or sensory deficit.  Cranial nerves II-12 grossly intact.        DIAGNOSTIC DATA:    Glucose   Date Value Ref Range Status   09/05/2017 127 (H) 60 - 100 mg/dL Final     Sodium   Date Value Ref Range Status   09/05/2017 139 137 - 145 mmol/L Final     Potassium   Date Value Ref Range Status   09/05/2017 3.6 3.5 - 5.1 mmol/L Final     CO2   Date Value Ref Range Status   09/05/2017 25.0 22.0 - 31.0 mmol/L Final     Chloride   Date Value Ref Range Status   09/05/2017 105 95 - 110 mmol/L Final     Anion Gap   Date Value Ref Range Status   09/05/2017 9.0 5.0 - 15.0 mmol/L Final     Creatinine   Date Value Ref Range Status   09/05/2017 0.88 0.50 - 1.00 mg/dL Final     BUN   Date Value Ref Range Status   09/05/2017 15 7 - 21 mg/dL Final     BUN/Creatinine Ratio   Date Value Ref Range Status   09/05/2017 17.0 7.0 - 25.0 Final     Calcium   Date Value Ref Range Status   09/05/2017 8.8 8.4 - 10.2 mg/dL Final     Alkaline Phosphatase   Date Value Ref Range Status    09/05/2017 170 (H) 38 - 126 U/L Final     Total Protein   Date Value Ref Range Status   09/05/2017 7.3 6.3 - 8.6 g/dL Final     ALT (SGPT)   Date Value Ref Range Status   09/05/2017 56 (H) 9 - 52 U/L Final     AST (SGOT)   Date Value Ref Range Status   09/05/2017 54 (H) 14 - 36 U/L Final     Total Bilirubin   Date Value Ref Range Status   09/05/2017 0.4 0.2 - 1.3 mg/dL Final     Albumin   Date Value Ref Range Status   09/05/2017 4.00 3.40 - 4.80 g/dL Final     Globulin   Date Value Ref Range Status   09/05/2017 3.3 2.3 - 3.5 gm/dL Final     A/G Ratio   Date Value Ref Range Status   09/05/2017 1.2 1.1 - 1.8 g/dL Final     Lab Results   Component Value Date    WBC 5.57 09/05/2017    HGB 13.3 09/05/2017    HCT 41.0 09/05/2017    MCV 88.9 09/05/2017     (L) 09/05/2017     Lab Results   Component Value Date    NEUTROABS 3.23 09/05/2017    IRON 106 02/09/2017    TIBC 304 01/25/2017    LABIRON 6 (L) 01/25/2017    FERRITIN 51.50 01/25/2017    BWVYLKTS15 559 06/17/2016    FOLATE 14.10 06/17/2016     Lab Results   Component Value Date     35.5 (H) 08/10/2017    LABCA2 45.5 (H) 08/10/2017    AFPTM 156 01/25/2017    HCGQUANT 1 12/12/2016    CEA 3.2 09/22/2015     2D Echo done on July 7, 2017 showed:  Interpretation Summary   · Left ventricular systolic function is normal.  · Estimated EF appears to be in the range of 61 - 65%.  · All left ventricular wall segments contract normally  · Left ventricular diastolic function is normal.         RADIOLOGY DATA :  CT Chest with contrast done on July 13, 2017 showed:  PULMONARY PARENCHYMA:         - air spaces:      negative         - interstitium:     grossly within normal limits for age         - misc.:      Tiny left upper lobe pulmonary nodules along  the periphery (axial 21/58, unchanged from the prior study.     MEDIASTINUM / SOBIA:       - heart:      normal size , no pericardial fluid       - aorta/great vessels:    normal caliber and configuration  for age       -  misc.:      no mediastinal mass / significant adenopathy     PLEURAL COMPARTMENT:         - misc.:      no pleural fluid or mass     MISC:       - inferior neck:     negative       - osseous/body wall:  negative       - subdiaphragmatic:    as visualized, limited, grossly  unremarkable       - misc:  .  IMPRESSION:  CONCLUSION:     1. Stable examination.        CTof abdomen and pelvis with contrast done on July 3, 2017 showed:  IMPRESSION:  CONCLUSION:     1. Stable areas of decreased attenuation segment seven and  segment eight of the liver. Irregular contour of the liver,  likely hepatic cirrhosis, stable.  2. Nondilated fluid-filled loops of small and large bowel without  wall enhancement. Correlation for enteritis/colitis.  3. Mild gallbladder distention.  4. Other findings as above       MRI of lumbar spine without contrast done on June 23, 2017 showed:  IMPRESSION:     1. Multiple new areas of abnormal signal within several of the  thoracic and lumbar vertebral bodies suggests possibility of  metastatic disease.   2. Mild multilevel degenerative disc disease and degenerative  arthropathy of the lumbar spine.      ASSESSMENT AND PLAN:      1.  Stage IV metastatic right breast cancer with liver and bone metastasis.  Patient was initially diagnosed in 2011 with a stage III disease.  Initially patient had a Taxotere carboplatin and Herceptin for 6 cycles followed by 1 year of Herceptin maintenance.  After that patient was getting Lupron and tamoxifen.  In March 2015 she was diagnosed with a metastatic disease involving liver and bone.  Patient was again started on Taxotere Herceptin and perjeta.  She could not tolerate Taxotere at that point she had a recurrent pneumonias and cytopenias.  Subsequently she was maintained on Herceptin and perjeta until October 2016, at that point restaging CT scan showed there was anterior mediastinal mass for which patient was started on Taxol Herceptin and perjeta.  Patient  received her last dose of Taxol on January 12, 2017.  Patient was started back on Herceptin and perjeta on February 9, 2017.  His February of this year patient's treatment with Herceptin and perjeta has been interrupted multiple times secondary to her gastrointestinal bleeding as well as requiring surgery on right ankle.  MRI of lumbar spine does show increased area of metastatic disease as compared to bone scan which was done in June 2016.  His therapy was changed to Kadcyla with Arimidex on July 19, 2017.  We will go ahead with cycle 3 of Kadcyla today.  Plan is to give her 4 cycles of Kadcyla and after that we will do restaging of CT of chest, abdomen and pelvis with contrast.  This recommendation were discussed with patient.    2. H/O Rectal bleeding: Patient is status post argon coagulation treatment on February 23, 2017.  Denies any rectal bleeding since then.    3.  Recurrent DVT: Patient was on Xarelto which was held secondary to rectal bleeding.  Patient is status post argon laser treatment on February 23, 2017.  Recommendation would be to support her on Lovenox and Coumadin Clinic visit if she does not have any bleeding at that point.  She is high risk of recurrent thrombosis due to active cancer.  Patient states it would be very difficult for her to come to Coumadin clinic for checks of INR.  Patient is currently on Xarelto.  Denies any rectal bleeding.    4.  Bone metastasis: Continue with Zometa as scheduled for now.      5.  Thrombocytopenia: Most likely secondary to Kadcyla.  Platelet count is 132,000 today without any active bleeding.  We will monitor CBC for now.    6.  Right ankle pain: States her pain is resolving.    7.  Health maintenance: Patient does not smoke.  She just had a colonoscopy done on January 2017 which was negative for any malignancy.  She remains full code.          Joselito Waddell MD  9/5/2017  6:21 PM

## 2017-09-06 NOTE — PROGRESS NOTES
LCSW follow up with patient as she presents for medical oncology and infusion.  SW met at bedside with patient offering supportive counseling as pt. Shared that her father  a week earlier.  50 minutes individual counseling  time spent with patient as she shared and processed her thoughts  and feelings related to interpersonal  family dynamics and conflicts, grief, loss and illness.   Pt. Presents tearful with mood and affect consistent with her situation of loss and distress.  Pt. Denies any thoughts of self inflicted harm, no plan and no intent.  Pt evidences adaptive coping and insight related to bereavement. Pt. Provided feedback related to grief, loss and counseling supports to include grief support group. Pt. Presented receptive and appreciative of individual time spent.

## 2017-09-07 ENCOUNTER — OFFICE VISIT (OUTPATIENT)
Dept: FAMILY MEDICINE CLINIC | Facility: CLINIC | Age: 42
End: 2017-09-07

## 2017-09-07 VITALS
HEART RATE: 65 BPM | BODY MASS INDEX: 36.65 KG/M2 | TEMPERATURE: 96.9 F | HEIGHT: 65 IN | DIASTOLIC BLOOD PRESSURE: 82 MMHG | WEIGHT: 220 LBS | SYSTOLIC BLOOD PRESSURE: 140 MMHG | OXYGEN SATURATION: 98 %

## 2017-09-07 DIAGNOSIS — E11.65 TYPE 2 DIABETES MELLITUS WITH HYPERGLYCEMIA, WITH LONG-TERM CURRENT USE OF INSULIN (HCC): ICD-10-CM

## 2017-09-07 DIAGNOSIS — C79.51 CARCINOMA OF RIGHT BREAST METASTATIC TO BONE (HCC): ICD-10-CM

## 2017-09-07 DIAGNOSIS — G89.29 CHRONIC MIDLINE LOW BACK PAIN WITH LEFT-SIDED SCIATICA: ICD-10-CM

## 2017-09-07 DIAGNOSIS — G89.3 CANCER ASSOCIATED PAIN: Primary | ICD-10-CM

## 2017-09-07 DIAGNOSIS — F51.01 PRIMARY INSOMNIA: ICD-10-CM

## 2017-09-07 DIAGNOSIS — M54.42 CHRONIC MIDLINE LOW BACK PAIN WITH LEFT-SIDED SCIATICA: ICD-10-CM

## 2017-09-07 DIAGNOSIS — C50.911 CARCINOMA OF RIGHT BREAST METASTATIC TO BONE (HCC): ICD-10-CM

## 2017-09-07 DIAGNOSIS — M51.34 DEGENERATIVE DISC DISEASE, THORACIC: ICD-10-CM

## 2017-09-07 DIAGNOSIS — J42 CHRONIC BRONCHITIS, UNSPECIFIED CHRONIC BRONCHITIS TYPE (HCC): ICD-10-CM

## 2017-09-07 DIAGNOSIS — F41.1 GAD (GENERALIZED ANXIETY DISORDER): ICD-10-CM

## 2017-09-07 DIAGNOSIS — Z79.4 TYPE 2 DIABETES MELLITUS WITH HYPERGLYCEMIA, WITH LONG-TERM CURRENT USE OF INSULIN (HCC): ICD-10-CM

## 2017-09-07 DIAGNOSIS — Z23 NEED FOR TETANUS BOOSTER: ICD-10-CM

## 2017-09-07 DIAGNOSIS — J30.9 CHRONIC ALLERGIC RHINITIS: ICD-10-CM

## 2017-09-07 PROCEDURE — 90715 TDAP VACCINE 7 YRS/> IM: CPT | Performed by: FAMILY MEDICINE

## 2017-09-07 PROCEDURE — 99214 OFFICE O/P EST MOD 30 MIN: CPT | Performed by: FAMILY MEDICINE

## 2017-09-07 PROCEDURE — 90471 IMMUNIZATION ADMIN: CPT | Performed by: FAMILY MEDICINE

## 2017-09-07 RX ORDER — CETIRIZINE HYDROCHLORIDE 10 MG/1
10 TABLET ORAL DAILY
Qty: 30 TABLET | Refills: 5 | Status: SHIPPED | OUTPATIENT
Start: 2017-09-07 | End: 2017-10-16 | Stop reason: SDUPTHER

## 2017-09-07 RX ORDER — TRAZODONE HYDROCHLORIDE 50 MG/1
50 TABLET ORAL NIGHTLY
Qty: 30 TABLET | Refills: 5 | Status: SHIPPED | OUTPATIENT
Start: 2017-09-07 | End: 2017-10-16 | Stop reason: SDUPTHER

## 2017-09-07 RX ORDER — MONTELUKAST SODIUM 10 MG/1
10 TABLET ORAL NIGHTLY
Qty: 30 TABLET | Refills: 5 | Status: SHIPPED | OUTPATIENT
Start: 2017-09-07 | End: 2017-10-16 | Stop reason: SDUPTHER

## 2017-09-07 RX ORDER — CLONAZEPAM 2 MG/1
2 TABLET ORAL 2 TIMES DAILY PRN
Qty: 60 TABLET | Refills: 2 | Status: SHIPPED | OUTPATIENT
Start: 2017-09-07 | End: 2017-10-16 | Stop reason: SDUPTHER

## 2017-09-07 RX ORDER — OXYCODONE HYDROCHLORIDE 15 MG/1
15 TABLET ORAL EVERY 6 HOURS PRN
Qty: 120 TABLET | Refills: 0 | Status: SHIPPED | OUTPATIENT
Start: 2017-09-07 | End: 2017-10-16 | Stop reason: SDUPTHER

## 2017-09-07 NOTE — PROGRESS NOTES
Subjective   Chief Complaint   Patient presents with   • Med Refill     Andra Villareal is a 41 y.o. female.   Med Refill    History of Present Illness     gerd with chronic gastritis, iron deficiency, cirrhosis of the liver, fatty liver, IBS-C  Anemia - stable, iron supplementation continued  Follow up in January    Chronic pain relating to metastatic breast cancer to bone  Chronic lumbar spine pain, history of metastatic lesions to the thoracic spine  Symptoms have worsened  Located across the lower back and radiates down the left lower extremity  Repeat MRI indicated:  Multiple new areas of abnormal signal within several of the thoracic and lumbar vertebral bodies suggests possibility of metastatic disease. Mild multilevel degenerative disc disease and degenerative  arthropathy of the lumbar spine.  Pain is not controlled with percocet  Recently failed dilaudid - caused severe constipation  Percocet 10/325mg tablet on average lasts about 2-3 hours PRN    Metastatic breast cancer s/p right mastectomy with breast reconstructive surgery with metastases to liver and bone with new lesion noted on CT 10/26/16 of anterior mediastinal mass measuring 4.9x5.1x3.3cm  Followed at [facility] with Dr Waddell  Treatment for new lesion with chemotherapy - currently on treatment with Kadcyla and Arimidex since 7/19/17  Plan is after 4 cycles of treatment to restage with CT of chest, abdomen, pelvis with contrast.    Bone metastasis - continued with zometa    STEVEN - currently controlled with klonopin PRN    Idiopathic chronic constipation with opioid use - controlled with amitiza  Failed miralax caused her rebound diarrhea  Failed movantik - caused her diarrhea    Drug induced type 2 diabetes mellitus with hyperglycemia - controlled with diet  Using lantus at bedtime and SSI - humalin  Needs dm eye exam  Needs dm foot exam    Depression - currently controlled with effexor    The following portions of the patient's history were  "reviewed and updated as appropriate: allergies, current medications, past family history, past medical history, past social history, past surgical history and problem list.    Review of Systems   Constitutional: Negative for appetite change, chills, fatigue and fever.   HENT: Negative for congestion, ear pain, rhinorrhea and sore throat.    Eyes: Negative for pain.   Respiratory: Negative for cough and shortness of breath.    Cardiovascular: Negative for chest pain and palpitations.   Gastrointestinal: Negative for abdominal pain, constipation, diarrhea and nausea.   Genitourinary: Negative for dysuria.   Musculoskeletal: Positive for arthralgias and back pain. Negative for joint swelling and neck pain.   Skin: Negative for rash.   Neurological: Negative for dizziness and headaches.       Objective   /82  Pulse 65  Temp 96.9 °F (36.1 °C)  Ht 65\" (165.1 cm)  Wt 220 lb (99.8 kg)  SpO2 98%  BMI 36.61 kg/m2  Physical Exam   Constitutional: She is oriented to person, place, and time. She appears well-developed and well-nourished.   HENT:   Head: Normocephalic and atraumatic.   Eyes: Pupils are equal, round, and reactive to light.   Neck: Normal range of motion. Neck supple.   Cardiovascular: Normal rate, regular rhythm and normal heart sounds.    Pulmonary/Chest: Effort normal and breath sounds normal. No respiratory distress. She has no wheezes. She has no rales.   Abdominal: Soft. Bowel sounds are normal.   Musculoskeletal:        Thoracic back: She exhibits decreased range of motion and pain.        Lumbar back: She exhibits decreased range of motion and pain.   Neurological: She is alert and oriented to person, place, and time.   Skin: Skin is warm and dry.   Psychiatric: She has a normal mood and affect. Her behavior is normal.   Nursing note and vitals reviewed.      Assessment/Plan   Problems Addressed this Visit        Respiratory    Chronic allergic rhinitis    Relevant Medications    cetirizine " (zyrTEC) 10 MG tablet    montelukast (SINGULAIR) 10 MG tablet    Chronic bronchitis    Relevant Medications    cetirizine (zyrTEC) 10 MG tablet    montelukast (SINGULAIR) 10 MG tablet    Fluticasone Furoate-Vilanterol (BREO ELLIPTA) 100-25 MCG/INH aerosol powder     albuterol (PROAIR RESPICLICK) 108 (90 Base) MCG/ACT inhaler       Endocrine    Type 2 diabetes mellitus with hyperglycemia, with long-term current use of insulin    Relevant Medications    insulin detemir (LEVEMIR) 100 UNIT/ML injection    Insulin Lispro (HUMALOG KWIKPEN) 100 UNIT/ML solution pen-injector       Musculoskeletal and Integument    Degenerative disc disease, thoracic    Carcinoma of right breast metastatic to bone       Other    STEVEN (generalized anxiety disorder)    Relevant Medications    traZODone (DESYREL) 50 MG tablet    clonazePAM (KlonoPIN) 2 MG tablet    Cancer associated pain - Primary    Primary insomnia    Relevant Medications    traZODone (DESYREL) 50 MG tablet      Other Visit Diagnoses     Chronic midline low back pain with left-sided sciatica        Need for tetanus booster        Relevant Orders    Tdap Vaccine Greater Than or Equal To 6yo IM (Completed)        Stop dilaudid  Start roxicodone    Refilled klonopin    Refilled medications    Tetanus booster  Recommended flu vaccine    Recheck in 4 weeks

## 2017-09-12 ENCOUNTER — INFUSION (OUTPATIENT)
Dept: ONCOLOGY | Facility: HOSPITAL | Age: 42
End: 2017-09-12

## 2017-09-12 DIAGNOSIS — C79.51 CARCINOMA OF RIGHT BREAST METASTATIC TO BONE (HCC): ICD-10-CM

## 2017-09-12 DIAGNOSIS — Z45.2 ENCOUNTER FOR VENOUS ACCESS DEVICE CARE: ICD-10-CM

## 2017-09-12 DIAGNOSIS — C50.911 CARCINOMA OF RIGHT BREAST METASTATIC TO BONE (HCC): ICD-10-CM

## 2017-09-12 DIAGNOSIS — T82.9XXD: Primary | ICD-10-CM

## 2017-09-12 PROCEDURE — G0463 HOSPITAL OUTPT CLINIC VISIT: HCPCS | Performed by: INTERNAL MEDICINE

## 2017-09-12 RX ORDER — SODIUM CHLORIDE 0.9 % (FLUSH) 0.9 %
10 SYRINGE (ML) INJECTION AS NEEDED
Status: DISCONTINUED | OUTPATIENT
Start: 2017-09-12 | End: 2017-09-12 | Stop reason: HOSPADM

## 2017-09-12 RX ORDER — SODIUM CHLORIDE 0.9 % (FLUSH) 0.9 %
10 SYRINGE (ML) INJECTION AS NEEDED
Status: CANCELLED | OUTPATIENT
Start: 2017-09-12

## 2017-09-12 RX ADMIN — Medication 10 ML: at 10:04

## 2017-09-19 ENCOUNTER — DOCUMENTATION (OUTPATIENT)
Dept: NUTRITION | Facility: HOSPITAL | Age: 42
End: 2017-09-19

## 2017-09-19 ENCOUNTER — INFUSION (OUTPATIENT)
Dept: ONCOLOGY | Facility: HOSPITAL | Age: 42
End: 2017-09-19

## 2017-09-19 DIAGNOSIS — C50.911 CARCINOMA OF RIGHT BREAST METASTATIC TO BONE (HCC): ICD-10-CM

## 2017-09-19 DIAGNOSIS — C79.51 CARCINOMA OF RIGHT BREAST METASTATIC TO BONE (HCC): ICD-10-CM

## 2017-09-19 DIAGNOSIS — T82.9XXD: Primary | ICD-10-CM

## 2017-09-19 DIAGNOSIS — Z45.2 ENCOUNTER FOR VENOUS ACCESS DEVICE CARE: ICD-10-CM

## 2017-09-19 PROCEDURE — G0463 HOSPITAL OUTPT CLINIC VISIT: HCPCS | Performed by: INTERNAL MEDICINE

## 2017-09-19 RX ORDER — SODIUM CHLORIDE 0.9 % (FLUSH) 0.9 %
10 SYRINGE (ML) INJECTION AS NEEDED
Status: DISCONTINUED | OUTPATIENT
Start: 2017-09-19 | End: 2017-09-19 | Stop reason: HOSPADM

## 2017-09-19 RX ORDER — SODIUM CHLORIDE 0.9 % (FLUSH) 0.9 %
10 SYRINGE (ML) INJECTION AS NEEDED
Status: CANCELLED | OUTPATIENT
Start: 2017-09-19

## 2017-09-19 RX ADMIN — Medication 10 ML: at 10:08

## 2017-09-19 NOTE — PROGRESS NOTES
Adult Outpatient Nutrition  Assessment    Patient Name:  Andra Villareal  YOB: 1975  MRN: [id number]        Assessment Date:  9/19/2017                        Comments: Pleasant 42BF coming to  due to breast cancer with bone and liver mets. Ht 65 in  Wt 220 lb.--was 191 lb when first met RD on 8/11/15. Is diabetic.  9/5. Pt stated has no felt well  lately. Appetite/intake less due to intermit nausea. Pt open to supplementation if intake remains decreased.  Provided samples of Glucerna and High Protein Ensure.        Electronically signed by:  Nesha Pham RD  09/19/17 12:35 PM

## 2017-09-22 ENCOUNTER — INFUSION (OUTPATIENT)
Dept: ONCOLOGY | Facility: HOSPITAL | Age: 42
End: 2017-09-22

## 2017-09-22 DIAGNOSIS — Z45.2 ENCOUNTER FOR VENOUS ACCESS DEVICE CARE: ICD-10-CM

## 2017-09-22 PROCEDURE — G0463 HOSPITAL OUTPT CLINIC VISIT: HCPCS | Performed by: INTERNAL MEDICINE

## 2017-09-25 ENCOUNTER — TELEPHONE (OUTPATIENT)
Dept: ONCOLOGY | Facility: HOSPITAL | Age: 42
End: 2017-09-25

## 2017-09-25 NOTE — TELEPHONE ENCOUNTER
----- Message from Joselito Waddell MD sent at 9/19/2017 12:46 PM CDT -----  Regarding: RE: new med  She is already on zantac. If she wants to stop it, I can send prescription for protonix to her pharmacy.Please let me know. Thanks  ----- Message -----     From: Dalila Odom RN     Sent: 9/19/2017  10:09 AM       To: Joselito Waddell MD  Subject: new med                                          Pt is requesting something for her stomach.  She states that she is having a discomfort that is present with or without food but seems to worsen when she eats.  States feeling is similar to when she had area cauterized in the past.

## 2017-09-25 NOTE — TELEPHONE ENCOUNTER
Called and spoke with pt, pain in stomach is much better and pt does not feel like she needs any additional meds at this time.

## 2017-09-26 ENCOUNTER — HOSPITAL ENCOUNTER (OUTPATIENT)
Dept: GENERAL RADIOLOGY | Facility: HOSPITAL | Age: 42
Discharge: HOME | End: 2017-09-26

## 2017-09-26 ENCOUNTER — HOSPITAL ENCOUNTER (OUTPATIENT)
Dept: INTERVENTIONAL RADIOLOGY/VASCULAR | Facility: HOSPITAL | Age: 42
Discharge: HOME | End: 2017-09-26
Admitting: INTERNAL MEDICINE

## 2017-09-26 ENCOUNTER — OFFICE VISIT (OUTPATIENT)
Dept: ONCOLOGY | Facility: CLINIC | Age: 42
End: 2017-09-26

## 2017-09-26 ENCOUNTER — HOSPITAL ENCOUNTER (OUTPATIENT)
Dept: ULTRASOUND IMAGING | Facility: HOSPITAL | Age: 42
Discharge: HOME | End: 2017-09-26

## 2017-09-26 ENCOUNTER — INFUSION (OUTPATIENT)
Dept: ONCOLOGY | Facility: HOSPITAL | Age: 42
End: 2017-09-26

## 2017-09-26 VITALS
HEART RATE: 81 BPM | WEIGHT: 217.3 LBS | DIASTOLIC BLOOD PRESSURE: 82 MMHG | TEMPERATURE: 98 F | BODY MASS INDEX: 36.21 KG/M2 | SYSTOLIC BLOOD PRESSURE: 128 MMHG | HEIGHT: 65 IN | RESPIRATION RATE: 18 BRPM

## 2017-09-26 DIAGNOSIS — C50.911 CARCINOMA OF RIGHT BREAST METASTATIC TO BONE (HCC): ICD-10-CM

## 2017-09-26 DIAGNOSIS — C79.51 CARCINOMA OF RIGHT BREAST METASTATIC TO BONE (HCC): ICD-10-CM

## 2017-09-26 DIAGNOSIS — C50.912 BREAST CANCER METASTASIZED TO BONE, LEFT (HCC): ICD-10-CM

## 2017-09-26 DIAGNOSIS — C79.51 BREAST CANCER METASTASIZED TO BONE, LEFT (HCC): ICD-10-CM

## 2017-09-26 DIAGNOSIS — T50.905A HOT FLASH DUE TO MEDICATION: Primary | ICD-10-CM

## 2017-09-26 DIAGNOSIS — R23.2 HOT FLASH DUE TO MEDICATION: ICD-10-CM

## 2017-09-26 DIAGNOSIS — R23.2 HOT FLASH DUE TO MEDICATION: Primary | ICD-10-CM

## 2017-09-26 DIAGNOSIS — Z51.11 ENCOUNTER FOR ANTINEOPLASTIC CHEMOTHERAPY AND IMMUNOTHERAPY: Primary | ICD-10-CM

## 2017-09-26 DIAGNOSIS — Z51.12 ENCOUNTER FOR ANTINEOPLASTIC CHEMOTHERAPY AND IMMUNOTHERAPY: Primary | ICD-10-CM

## 2017-09-26 DIAGNOSIS — C50.911 CARCINOMA OF RIGHT BREAST METASTATIC TO BONE (HCC): Primary | ICD-10-CM

## 2017-09-26 DIAGNOSIS — C79.51 CARCINOMA OF RIGHT BREAST METASTATIC TO BONE (HCC): Primary | ICD-10-CM

## 2017-09-26 DIAGNOSIS — D69.6 THROMBOCYTOPENIA (HCC): ICD-10-CM

## 2017-09-26 DIAGNOSIS — T50.905A HOT FLASH DUE TO MEDICATION: ICD-10-CM

## 2017-09-26 LAB
ALBUMIN SERPL-MCNC: 4.1 G/DL (ref 3.4–4.8)
ALBUMIN/GLOB SERPL: 1.2 G/DL (ref 1.1–1.8)
ALP SERPL-CCNC: 184 U/L (ref 38–126)
ALT SERPL W P-5'-P-CCNC: 58 U/L (ref 9–52)
ANION GAP SERPL CALCULATED.3IONS-SCNC: 10 MMOL/L (ref 5–15)
AST SERPL-CCNC: 52 U/L (ref 14–36)
BASOPHILS # BLD AUTO: 0.03 10*3/MM3 (ref 0–0.2)
BASOPHILS NFR BLD AUTO: 0.5 % (ref 0–2)
BILIRUB SERPL-MCNC: 0.5 MG/DL (ref 0.2–1.3)
BUN BLD-MCNC: 12 MG/DL (ref 7–21)
BUN/CREAT SERPL: 13.5 (ref 7–25)
CALCIUM SPEC-SCNC: 9 MG/DL (ref 8.4–10.2)
CHLORIDE SERPL-SCNC: 101 MMOL/L (ref 95–110)
CO2 SERPL-SCNC: 29 MMOL/L (ref 22–31)
CREAT BLD-MCNC: 0.89 MG/DL (ref 0.5–1)
DEPRECATED RDW RBC AUTO: 43.6 FL (ref 36.4–46.3)
EOSINOPHIL # BLD AUTO: 0.1 10*3/MM3 (ref 0–0.7)
EOSINOPHIL NFR BLD AUTO: 1.8 % (ref 0–7)
ERYTHROCYTE [DISTWIDTH] IN BLOOD BY AUTOMATED COUNT: 13.5 % (ref 11.5–14.5)
GFR SERPL CREATININE-BSD FRML MDRD: 85 ML/MIN/1.73 (ref 58–135)
GLOBULIN UR ELPH-MCNC: 3.4 GM/DL (ref 2.3–3.5)
GLUCOSE BLD-MCNC: 127 MG/DL (ref 60–100)
HCT VFR BLD AUTO: 43 % (ref 35–45)
HGB BLD-MCNC: 13.6 G/DL (ref 12–15.5)
IMM GRANULOCYTES # BLD: 0.02 10*3/MM3 (ref 0–0.02)
IMM GRANULOCYTES NFR BLD: 0.4 % (ref 0–0.5)
LYMPHOCYTES # BLD AUTO: 1.23 10*3/MM3 (ref 0.6–4.2)
LYMPHOCYTES NFR BLD AUTO: 21.6 % (ref 10–50)
MCH RBC QN AUTO: 27.9 PG (ref 26.5–34)
MCHC RBC AUTO-ENTMCNC: 31.6 G/DL (ref 31.4–36)
MCV RBC AUTO: 88.1 FL (ref 80–98)
MONOCYTES # BLD AUTO: 0.47 10*3/MM3 (ref 0–0.9)
MONOCYTES NFR BLD AUTO: 8.3 % (ref 0–12)
NEUTROPHILS # BLD AUTO: 3.84 10*3/MM3 (ref 2–8.6)
NEUTROPHILS NFR BLD AUTO: 67.4 % (ref 37–80)
PLATELET # BLD AUTO: 101 10*3/MM3 (ref 150–450)
PMV BLD AUTO: 12 FL (ref 8–12)
POTASSIUM BLD-SCNC: 3.9 MMOL/L (ref 3.5–5.1)
PROT SERPL-MCNC: 7.5 G/DL (ref 6.3–8.6)
RBC # BLD AUTO: 4.88 10*6/MM3 (ref 3.77–5.16)
SODIUM BLD-SCNC: 140 MMOL/L (ref 137–145)
WBC NRBC COR # BLD: 5.69 10*3/MM3 (ref 3.2–9.8)

## 2017-09-26 PROCEDURE — 99214 OFFICE O/P EST MOD 30 MIN: CPT | Performed by: INTERNAL MEDICINE

## 2017-09-26 PROCEDURE — 76937 US GUIDE VASCULAR ACCESS: CPT

## 2017-09-26 PROCEDURE — 96375 TX/PRO/DX INJ NEW DRUG ADDON: CPT | Performed by: INTERNAL MEDICINE

## 2017-09-26 PROCEDURE — 85025 COMPLETE CBC W/AUTO DIFF WBC: CPT

## 2017-09-26 PROCEDURE — 86300 IMMUNOASSAY TUMOR CA 15-3: CPT

## 2017-09-26 PROCEDURE — 36415 COLL VENOUS BLD VENIPUNCTURE: CPT

## 2017-09-26 PROCEDURE — 80053 COMPREHEN METABOLIC PANEL: CPT

## 2017-09-26 PROCEDURE — 25010000003 DEXAMETHASONE SODIUM PHOSPHATE 100 MG/10ML SOLUTION 10 ML VIAL: Performed by: INTERNAL MEDICINE

## 2017-09-26 PROCEDURE — C1751 CATH, INF, PER/CENT/MIDLINE: HCPCS

## 2017-09-26 PROCEDURE — 25010000002 DIPHENHYDRAMINE PER 50 MG: Performed by: INTERNAL MEDICINE

## 2017-09-26 PROCEDURE — 96413 CHEMO IV INFUSION 1 HR: CPT

## 2017-09-26 PROCEDURE — 25010000002 ADO-TRASTUZUMAB 100 MG RECONSTITUTED SOLUTION 1 EACH VIAL: Performed by: INTERNAL MEDICINE

## 2017-09-26 RX ORDER — SODIUM CHLORIDE 9 MG/ML
250 INJECTION, SOLUTION INTRAVENOUS ONCE
Status: CANCELLED | OUTPATIENT
Start: 2017-09-26

## 2017-09-26 RX ORDER — FAMOTIDINE 10 MG/ML
20 INJECTION, SOLUTION INTRAVENOUS ONCE
Status: CANCELLED | OUTPATIENT
Start: 2017-09-26

## 2017-09-26 RX ORDER — SODIUM CHLORIDE 9 MG/ML
250 INJECTION, SOLUTION INTRAVENOUS ONCE
Status: COMPLETED | OUTPATIENT
Start: 2017-09-26 | End: 2017-09-26

## 2017-09-26 RX ORDER — DIPHENHYDRAMINE HYDROCHLORIDE 50 MG/ML
25 INJECTION INTRAMUSCULAR; INTRAVENOUS ONCE
Status: COMPLETED | OUTPATIENT
Start: 2017-09-26 | End: 2017-09-26

## 2017-09-26 RX ORDER — DIPHENHYDRAMINE HYDROCHLORIDE 50 MG/ML
25 INJECTION INTRAMUSCULAR; INTRAVENOUS ONCE
Status: CANCELLED | OUTPATIENT
Start: 2017-09-26

## 2017-09-26 RX ORDER — FAMOTIDINE 10 MG/ML
20 INJECTION, SOLUTION INTRAVENOUS ONCE
Status: COMPLETED | OUTPATIENT
Start: 2017-09-26 | End: 2017-09-26

## 2017-09-26 RX ADMIN — FAMOTIDINE 20 MG: 10 INJECTION, SOLUTION INTRAVENOUS at 10:47

## 2017-09-26 RX ADMIN — DEXAMETHASONE SODIUM PHOSPHATE 12 MG: 10 INJECTION, SOLUTION INTRAMUSCULAR; INTRAVENOUS at 11:09

## 2017-09-26 RX ADMIN — SODIUM CHLORIDE 250 ML: 9 INJECTION, SOLUTION INTRAVENOUS at 10:35

## 2017-09-26 RX ADMIN — DIPHENHYDRAMINE HYDROCHLORIDE 25 MG: 50 INJECTION INTRAMUSCULAR; INTRAVENOUS at 10:42

## 2017-09-26 RX ADMIN — ADO-TRASTUZUMAB EMTANSINE 360 MG: 20 INJECTION, POWDER, LYOPHILIZED, FOR SOLUTION INTRAVENOUS at 11:58

## 2017-09-26 NOTE — PATIENT INSTRUCTIONS
Patient Instructions for CT Scan    · Your CT scan is being done without any oral contrast.  Your CT scan may be done with IV contrast, if you have an allergy to iodine--please tell your nurse.    · Do not eat or drink 4 hours prior to scan.     · You may take your medications with sips of water, except DO NOT take your diabetic pill the morning of the test.    Arrive at the Outpatient Surgery entrance at [facility] 20 minutes prior to appointment time.    You will receive a phone call with an appointment for your CT scan.  Please call our office, if someone does not contact you with 3 days.    Prudence Manzano RN  September 26, 2017  9:35 AM

## 2017-09-26 NOTE — PROGRESS NOTES
TWO PATIENT IDENTIFIERS WERE USED. CONSENT WAS SIGNED PER PATIENT EDUCATION MATERIAL WAS GIVEN TO PATIENT AND / OR FAMILY. THE PATIENT WAS DRAPED WITH FULL BODY DRAPE AND PATIENT'SLEFT   ARM WAS PREPPED WITH CHLORAPREP.  ULTRASOUND WAS USED TO LOCALIZE THELEFT BASILIC VEIN. SUBCUTANEOUS TISSUE AT THE CATHETER SITE WAS INFILTRATED WITH 2% LIDOCAINE. UNDER ULTRASOUND GUIDANCE, THE VEIN WAS ACCESSED WITH A 21GAUGE  NEEDLE. AN 0.018 WIRE WAS THEN THREADED THROUGH THE NEEDLE INTO THE CENTRAL VENOUS SYSTEM. THE 21GAUGE  NEEDLE WAS REMOVED AND A 5 FRENCHPEEL AWAY SHEATH WAS PLACED OVER THE WIRE. THE PICC LINE CATHETER WAS CUT AT 34 CM. THE PICC LINE CATHETER WAS THEN PLACED OVER THE WIRE INTO THE VEIN, THE SHEATH WAS PEELED AWAY,WIRE WAS REMOVED. CATHETER WAS FLUSHED WITH NORMAL SALINE AND TIPS APPLIED. BIOPATCH PLACED. CATHETER SECURED WITHSTATLOCK  AND TEGADERM. PATIENT TOLERATED PROCEDURE WELL. THIS WAS DONE IN   ANGIOSUITE      IMPRESSION: SUCCESSFUL PLACEMENT OF SINGLE LUMEN SOLO PICC        Daniella Soriano  9/26/2017  10:27 AM

## 2017-09-26 NOTE — PROGRESS NOTES
DATE OF VISIT: 9/26/2017    REASON FOR VISIT:  Metastatic right breast cancer    HISTORY OF PRESENT ILLNESS:    42-year-old female with a past medical history significant for metastatic right breast cancer with liver and bone metastases currently on treatment with Kadcyla and Arimidex since 07/19/17.  Patient is here to get cycle 4 of Kadcyla today.  States week after getting last dose of Kadcyla she had a feeling of not being well.   Complains of easy bruising and prolonged bleeding after minor cuts.  Denies any abnormal swollen and anywhere in the body.  States her back pain is somewhat improved.      PAST MEDICAL HISTORY:    1.  Metastatic right breast cancer with liver and bone metastasis  2.  History of pneumonia  3.  Right internal jugular vein DVT status post around to  4.  Rectal bleeding  5.  Dyslipidemia  6.  Diabetes mellitus  7.  Anxiety  8.  Bone metastasis    SOCIAL HISTORY:    Social History   Substance Use Topics   • Smoking status: Former Smoker     Quit date: 2012   • Smokeless tobacco: Never Used   • Alcohol use No       Surgical History :  Past Surgical History:   Procedure Laterality Date   • ANKLE LOOSE BODY EXCISION/REMOVAL Right 5/24/2017    Procedure: RIGTH FOOT EXCISION NAVICULAR FRACTURES FRAGMENT  DEBRIDEMENT TALONAVILCULA RJOIN T;  Surgeon: Armani Oliveira DPM;  Location: [facility] OR;  Service:    • BREAST SURGERY Right 2013     reconstruction of right breast, trans flap surgery   • CENTRAL VENOUS CATHETER TUNNELED INSERTION SINGLE LUMEN      Placement of indwelling Pleurx catheter right   • COLONOSCOPY N/A 1/26/2017    Procedure: COLONOSCOPY;  Surgeon: Robert Clifton MD;  Location: [facility] ENDOSCOPY;  Service:    • ENDOSCOPY N/A 1/26/2017    Procedure: ESOPHAGOGASTRODUODENOSCOPY;  Surgeon: Robert Clifton MD;  Location: [facility] ENDOSCOPY;  Service:    • ENDOSCOPY N/A 2/24/2017    Procedure: ESOPHAGOGASTRODUODENOSCOPY;  Surgeon: Robert Clifton MD;  Location: [facility] ENDOSCOPY;  Service:   "  • HYSTERECTOMY      vaginal   • LIVER BIOPSY  2015   • LUNG VOLUME REDUCTION     • MASTECTOMY      partial   • OTHER SURGICAL HISTORY  05/05/2016    central line insertion , PICC line replacement   • OTHER SURGICAL HISTORY      place breast clip percut   • OTHER SURGICAL HISTORY      pulse oximetry   • OTHER SURGICAL HISTORY      remove cc cath w/ sc port or pump, Removal of right internal jugular MediPort   • OTHER SURGICAL HISTORY      spirometry   • OTHER SURGICAL HISTORY      tubal sterilization   • PAP SMEAR     • THORACENTESIS      aspiration of pleural space   • TUBAL ABDOMINAL LIGATION  2009   • UPPER GASTROINTESTINAL ENDOSCOPY  01/26/2017   • UPPER GASTROINTESTINAL ENDOSCOPY  02/24/2017   • VENOUS ACCESS DEVICE (PORT) INSERTION      Ultrasound guided right internal jugular Mediport placement under fluoroscopy       ALLERGIES:    Allergies   Allergen Reactions   • Lisinopril Shortness Of Breath   • Flagyl [Metronidazole] Other (See Comments)     pancreatitis   • Fentanyl Anxiety     \"goes out of her mind\"   • Latex Rash     FAMILY HISTORY:  Family History   Problem Relation Age of Onset   • Coronary artery disease Other    • Diabetes Other    • Hypertension Other    • Amblyopia Other    • Cataracts Maternal Grandfather    • Cataracts Paternal Grandmother      REVIEW OF SYSTEMS:      CONSTITUTIONAL: Positive for fatigue.  No fever, chills, or night sweats.     HEENT:  No epistaxis, mouth sores, or difficulty swallowing.    RESPIRATORY:  No new shortness of breath or cough at present.    CARDIOVASCULAR:  No chest pain or palpitations.    GASTROINTESTINAL:Denies any excessive nausea,vomitting and diarrhea after last cycle of kadcyla.  No abdominal pain,  vomiting, or blood in the stool.    GENITOURINARY:  No dysuria or hematuria.    MUSCULOSKELETAL: Complains of discomfort And pain in right ankle. States her back pain is improved.Complains of generalized bone pain since starting Arimidex.    NEUROLOGICAL: " "Positive for intermittent tingling and numbness.. No new headache or dizziness.     LYMPHATICS:  Denies any abnormal swollen and anywhere in the body.    SKIN:  Complains of easy bruising and prolonged bleeding after minor cuts since starting Kadcyla.        PHYSICAL EXAMINATION:      VITAL SIGNS:    /82  Pulse 81  Temp 98 °F (36.7 °C) (Temporal Artery )   Resp 18  Ht 65\" (165.1 cm)  Wt 217 lb 4.8 oz (98.6 kg)  BMI 36.16 kg/m2    GENERAL:  Not in any distress.     EYES:  Mild pallor. No icterus.  Extraocular movements intact.  No facial asymmetry.    NECK:  No adenopathy in cervical or supraclavicular area.    RESPIRATORY:  Fair air entry bilaterally. No rhonchi or wheezing.    CARDIOVASCULAR:  S1, S2. Regular rate and rhythm.    ABDOMEN:  Soft, nontender. Bowel sounds present.  No organomegaly appreciated.    EXTREMITIES:  No edema.  No calf  tenderness.      NEUROLOGICAL:  Alert, awake, oriented x3.  No motor or sensory deficit.  Cranial nerves II-12 grossly intact.        DIAGNOSTIC DATA:    Glucose   Date Value Ref Range Status   09/26/2017 127 (H) 60 - 100 mg/dL Final     Sodium   Date Value Ref Range Status   09/26/2017 140 137 - 145 mmol/L Final     Potassium   Date Value Ref Range Status   09/26/2017 3.9 3.5 - 5.1 mmol/L Final     CO2   Date Value Ref Range Status   09/26/2017 29.0 22.0 - 31.0 mmol/L Final     Chloride   Date Value Ref Range Status   09/26/2017 101 95 - 110 mmol/L Final     Anion Gap   Date Value Ref Range Status   09/26/2017 10.0 5.0 - 15.0 mmol/L Final     Creatinine   Date Value Ref Range Status   09/26/2017 0.89 0.50 - 1.00 mg/dL Final     BUN   Date Value Ref Range Status   09/26/2017 12 7 - 21 mg/dL Final     BUN/Creatinine Ratio   Date Value Ref Range Status   09/26/2017 13.5 7.0 - 25.0 Final     Calcium   Date Value Ref Range Status   09/26/2017 9.0 8.4 - 10.2 mg/dL Final     Alkaline Phosphatase   Date Value Ref Range Status   09/26/2017 184 (H) 38 - 126 U/L Final "     Total Protein   Date Value Ref Range Status   09/26/2017 7.5 6.3 - 8.6 g/dL Final     ALT (SGPT)   Date Value Ref Range Status   09/26/2017 58 (H) 9 - 52 U/L Final     AST (SGOT)   Date Value Ref Range Status   09/26/2017 52 (H) 14 - 36 U/L Final     Total Bilirubin   Date Value Ref Range Status   09/26/2017 0.5 0.2 - 1.3 mg/dL Final     Albumin   Date Value Ref Range Status   09/26/2017 4.10 3.40 - 4.80 g/dL Final     Globulin   Date Value Ref Range Status   09/26/2017 3.4 2.3 - 3.5 gm/dL Final     A/G Ratio   Date Value Ref Range Status   09/26/2017 1.2 1.1 - 1.8 g/dL Final     Lab Results   Component Value Date    WBC 5.69 09/26/2017    HGB 13.6 09/26/2017    HCT 43.0 09/26/2017    MCV 88.1 09/26/2017     (L) 09/26/2017     Lab Results   Component Value Date    NEUTROABS 3.84 09/26/2017    IRON 106 02/09/2017    TIBC 304 01/25/2017    LABIRON 6 (L) 01/25/2017    FERRITIN 51.50 01/25/2017    QADWPMUW44 559 06/17/2016    FOLATE 14.10 06/17/2016     Lab Results   Component Value Date     35.5 (H) 08/10/2017    LABCA2 45.5 (H) 08/10/2017    AFPTM 156 01/25/2017    HCGQUANT 1 12/12/2016    CEA 3.2 09/22/2015     2D Echo done on July 7, 2017 showed:  Interpretation Summary   · Left ventricular systolic function is normal.  · Estimated EF appears to be in the range of 61 - 65%.  · All left ventricular wall segments contract normally  · Left ventricular diastolic function is normal.         RADIOLOGY DATA :  CT Chest with contrast done on July 13, 2017 showed:  PULMONARY PARENCHYMA:         - air spaces:      negative         - interstitium:     grossly within normal limits for age         - misc.:      Tiny left upper lobe pulmonary nodules along  the periphery (axial 21/58, unchanged from the prior study.     MEDIASTINUM / SOBIA:       - heart:      normal size , no pericardial fluid       - aorta/great vessels:    normal caliber and configuration  for age       - misc.:      no mediastinal mass /  significant adenopathy     PLEURAL COMPARTMENT:         - misc.:      no pleural fluid or mass     MISC:       - inferior neck:     negative       - osseous/body wall:  negative       - subdiaphragmatic:    as visualized, limited, grossly  unremarkable       - misc:  .  IMPRESSION:  CONCLUSION:     1. Stable examination.        CTof abdomen and pelvis with contrast done on July 3, 2017 showed:  IMPRESSION:  CONCLUSION:     1. Stable areas of decreased attenuation segment seven and  segment eight of the liver. Irregular contour of the liver,  likely hepatic cirrhosis, stable.  2. Nondilated fluid-filled loops of small and large bowel without  wall enhancement. Correlation for enteritis/colitis.  3. Mild gallbladder distention.  4. Other findings as above       MRI of lumbar spine without contrast done on June 23, 2017 showed:  IMPRESSION:     1. Multiple new areas of abnormal signal within several of the  thoracic and lumbar vertebral bodies suggests possibility of  metastatic disease.   2. Mild multilevel degenerative disc disease and degenerative  arthropathy of the lumbar spine.      ASSESSMENT AND PLAN:      1.  Stage IV metastatic right breast cancer with liver and bone metastasis.  Patient was initially diagnosed in 2011 with a stage III disease.  Initially patient had a Taxotere carboplatin and Herceptin for 6 cycles followed by 1 year of Herceptin maintenance.  After that patient was getting Lupron and tamoxifen.  In March 2015 she was diagnosed with a metastatic disease involving liver and bone.  Patient was again started on Taxotere Herceptin and perjeta.  She could not tolerate Taxotere at that point she had a recurrent pneumonias and cytopenias.  Subsequently she was maintained on Herceptin and perjeta until October 2016, at that point restaging CT scan showed there was anterior mediastinal mass for which patient was started on Taxol Herceptin and perjeta.  Patient received her last dose of Taxol on  January 12, 2017.  Patient was started back on Herceptin and perjeta on February 9, 2017.  His February of this year patient's treatment with Herceptin and perjeta has been interrupted multiple times secondary to her gastrointestinal bleeding as well as requiring surgery on right ankle.  MRI of lumbar spine does show increased area of metastatic disease as compared to bone scan which was done in June 2016.  His therapy was changed to Kadcyla with Arimidex on July 19, 2017.  We will go ahead with cycle 4 of Kadcyla today.  We will repeat restaging CT of chest, abdomen and pelvis with contrast prior to next clinic visit in 3 weeks.  Further recommendation will depend on the result of CT scan.  Patient's last 2-D echo was done on July 7, 2017.  She will be due for 2-D echo prior to next clinic visit in 3 weeks which has been ordered today.    2. H/O Rectal bleeding: Patient is status post argon coagulation treatment on February 23, 2017.  Denies any rectal bleeding since then.    3.  Recurrent DVT: Patient was on Xarelto which was held secondary to rectal bleeding.  Patient is status post argon laser treatment on February 23, 2017.  Recommendation would be to support her on Lovenox and Coumadin Clinic visit if she does not have any bleeding at that point.  She is high risk of recurrent thrombosis due to active cancer.  Patient states it would be very difficult for her to come to Coumadin clinic for checks of INR.  Patient is currently on Xarelto.  Denies any rectal bleeding.    4.  Bone metastasis: Continue with Zometa as scheduled for now.      5.  Thrombocytopenia: Most likely secondary to Kadcyla.  Platelet count is 101,000 today without any active bleeding.  We will monitor CBC for now.    6.  Right ankle pain: States her pain is resolving.    7.  Health maintenance: Patient does not smoke.  She just had a colonoscopy done on January 2017 which was negative for any malignancy.  She remains full code.          Joselito  EDUAR Waddell MD  9/26/2017  12:31 PM

## 2017-09-27 LAB
CANCER AG15-3 SERPL-ACNC: 33.9 U/ML (ref 0–25)
CANCER AG27-29 SERPL-ACNC: 46.2 U/ML (ref 0–38.6)

## 2017-09-28 ENCOUNTER — INFUSION (OUTPATIENT)
Dept: ONCOLOGY | Facility: HOSPITAL | Age: 42
End: 2017-09-28

## 2017-09-28 DIAGNOSIS — C79.51 CARCINOMA OF RIGHT BREAST METASTATIC TO BONE (HCC): ICD-10-CM

## 2017-09-28 DIAGNOSIS — T82.9XXD: Primary | ICD-10-CM

## 2017-09-28 DIAGNOSIS — C50.911 CARCINOMA OF RIGHT BREAST METASTATIC TO BONE (HCC): ICD-10-CM

## 2017-09-28 PROCEDURE — G0463 HOSPITAL OUTPT CLINIC VISIT: HCPCS | Performed by: INTERNAL MEDICINE

## 2017-09-28 RX ORDER — SODIUM CHLORIDE 0.9 % (FLUSH) 0.9 %
10 SYRINGE (ML) INJECTION AS NEEDED
Status: DISCONTINUED | OUTPATIENT
Start: 2017-09-28 | End: 2017-09-28 | Stop reason: HOSPADM

## 2017-09-28 RX ORDER — SODIUM CHLORIDE 0.9 % (FLUSH) 0.9 %
10 SYRINGE (ML) INJECTION AS NEEDED
Status: CANCELLED | OUTPATIENT
Start: 2017-09-28

## 2017-09-28 RX ADMIN — Medication 10 ML: at 15:53

## 2017-09-29 ENCOUNTER — HOSPITAL ENCOUNTER (OUTPATIENT)
Dept: CT IMAGING | Facility: HOSPITAL | Age: 42
End: 2017-09-29
Attending: INTERNAL MEDICINE

## 2017-09-29 ENCOUNTER — HOSPITAL ENCOUNTER (OUTPATIENT)
Dept: CT IMAGING | Facility: HOSPITAL | Age: 42
Discharge: HOME | End: 2017-09-29
Attending: INTERNAL MEDICINE | Admitting: INTERNAL MEDICINE

## 2017-09-29 DIAGNOSIS — C50.911 CARCINOMA OF RIGHT BREAST METASTATIC TO BONE (HCC): ICD-10-CM

## 2017-09-29 DIAGNOSIS — C79.51 CARCINOMA OF RIGHT BREAST METASTATIC TO BONE (HCC): ICD-10-CM

## 2017-09-29 PROCEDURE — 0 IOPAMIDOL 61 % SOLUTION: Performed by: INTERNAL MEDICINE

## 2017-09-29 PROCEDURE — 71260 CT THORAX DX C+: CPT

## 2017-09-29 PROCEDURE — 74177 CT ABD & PELVIS W/CONTRAST: CPT

## 2017-09-29 RX ADMIN — IOPAMIDOL 90 ML: 612 INJECTION, SOLUTION INTRAVENOUS at 16:45

## 2017-10-03 ENCOUNTER — INFUSION (OUTPATIENT)
Dept: ONCOLOGY | Facility: HOSPITAL | Age: 42
End: 2017-10-03

## 2017-10-03 DIAGNOSIS — C79.51 CARCINOMA OF RIGHT BREAST METASTATIC TO BONE (HCC): ICD-10-CM

## 2017-10-03 DIAGNOSIS — T82.9XXD: Primary | ICD-10-CM

## 2017-10-03 DIAGNOSIS — C50.911 CARCINOMA OF RIGHT BREAST METASTATIC TO BONE (HCC): ICD-10-CM

## 2017-10-03 DIAGNOSIS — Z45.2 ENCOUNTER FOR VENOUS ACCESS DEVICE CARE: ICD-10-CM

## 2017-10-03 PROCEDURE — G0463 HOSPITAL OUTPT CLINIC VISIT: HCPCS | Performed by: INTERNAL MEDICINE

## 2017-10-03 RX ORDER — SODIUM CHLORIDE 0.9 % (FLUSH) 0.9 %
10 SYRINGE (ML) INJECTION AS NEEDED
Status: DISCONTINUED | OUTPATIENT
Start: 2017-10-03 | End: 2017-10-03 | Stop reason: HOSPADM

## 2017-10-03 RX ORDER — SODIUM CHLORIDE 0.9 % (FLUSH) 0.9 %
10 SYRINGE (ML) INJECTION AS NEEDED
Status: CANCELLED | OUTPATIENT
Start: 2017-10-05

## 2017-10-03 RX ADMIN — Medication 10 ML: at 16:18

## 2017-10-11 ENCOUNTER — TELEPHONE (OUTPATIENT)
Dept: ONCOLOGY | Facility: HOSPITAL | Age: 42
End: 2017-10-11

## 2017-10-11 ENCOUNTER — LAB (OUTPATIENT)
Dept: ONCOLOGY | Facility: HOSPITAL | Age: 42
End: 2017-10-11

## 2017-10-11 DIAGNOSIS — R20.0 NUMBNESS AND TINGLING IN BOTH HANDS: Primary | ICD-10-CM

## 2017-10-11 DIAGNOSIS — C79.51 CARCINOMA OF RIGHT BREAST METASTATIC TO BONE (HCC): ICD-10-CM

## 2017-10-11 DIAGNOSIS — C50.911 CARCINOMA OF RIGHT BREAST METASTATIC TO BONE (HCC): ICD-10-CM

## 2017-10-11 DIAGNOSIS — R20.2 NUMBNESS AND TINGLING IN BOTH HANDS: Primary | ICD-10-CM

## 2017-10-11 DIAGNOSIS — Z45.2 ENCOUNTER FOR VENOUS ACCESS DEVICE CARE: ICD-10-CM

## 2017-10-11 DIAGNOSIS — R20.2 NUMBNESS AND TINGLING IN BOTH HANDS: ICD-10-CM

## 2017-10-11 DIAGNOSIS — R20.0 NUMBNESS AND TINGLING IN BOTH HANDS: ICD-10-CM

## 2017-10-11 DIAGNOSIS — T82.9XXD: Primary | ICD-10-CM

## 2017-10-11 LAB
FOLATE SERPL-MCNC: 9.33 NG/ML (ref 2.76–21)
VIT B12 BLD-MCNC: 744 PG/ML (ref 239–931)

## 2017-10-11 PROCEDURE — 82607 VITAMIN B-12: CPT

## 2017-10-11 PROCEDURE — 82746 ASSAY OF FOLIC ACID SERUM: CPT

## 2017-10-11 PROCEDURE — G0463 HOSPITAL OUTPT CLINIC VISIT: HCPCS | Performed by: INTERNAL MEDICINE

## 2017-10-11 RX ORDER — SODIUM CHLORIDE 0.9 % (FLUSH) 0.9 %
10 SYRINGE (ML) INJECTION AS NEEDED
Status: CANCELLED | OUTPATIENT
Start: 2017-10-11

## 2017-10-11 RX ORDER — SODIUM CHLORIDE 0.9 % (FLUSH) 0.9 %
10 SYRINGE (ML) INJECTION AS NEEDED
Status: DISCONTINUED | OUTPATIENT
Start: 2017-10-11 | End: 2017-10-11 | Stop reason: HOSPADM

## 2017-10-11 RX ADMIN — Medication 10 ML: at 13:26

## 2017-10-11 RX ADMIN — Medication 10 ML: at 12:52

## 2017-10-11 NOTE — TELEPHONE ENCOUNTER
Patient called back inquiring about lab results. Dr. Waddell notified of patient's complaint of joint pain and also tingling/numbness in fingers. Dr. Waddell also has reviewed labs and said they were normal. Informed patient that Dr. Waddell wants to wait until he sees her on Tuesday and he will evaluate joint pain and tingling/numbness in fingers then. Instructed patient to call us back before then if it gets significantly worse. Patient states understanding.

## 2017-10-12 DIAGNOSIS — F41.1 GAD (GENERALIZED ANXIETY DISORDER): ICD-10-CM

## 2017-10-13 RX ORDER — CLONAZEPAM 2 MG/1
TABLET ORAL
Qty: 60 TABLET | Refills: 0 | OUTPATIENT
Start: 2017-10-13

## 2017-10-16 ENCOUNTER — OFFICE VISIT (OUTPATIENT)
Dept: FAMILY MEDICINE CLINIC | Facility: CLINIC | Age: 42
End: 2017-10-16

## 2017-10-16 VITALS
WEIGHT: 216 LBS | DIASTOLIC BLOOD PRESSURE: 82 MMHG | BODY MASS INDEX: 35.99 KG/M2 | HEART RATE: 81 BPM | HEIGHT: 65 IN | OXYGEN SATURATION: 98 % | SYSTOLIC BLOOD PRESSURE: 134 MMHG | TEMPERATURE: 97.3 F

## 2017-10-16 DIAGNOSIS — I82.409 RECURRENT DEEP VEIN THROMBOSIS (DVT) (HCC): ICD-10-CM

## 2017-10-16 DIAGNOSIS — F32.5 MAJOR DEPRESSIVE DISORDER WITH SINGLE EPISODE, IN FULL REMISSION (HCC): ICD-10-CM

## 2017-10-16 DIAGNOSIS — G62.0 NEUROPATHY DUE TO CHEMOTHERAPEUTIC DRUG (HCC): Primary | ICD-10-CM

## 2017-10-16 DIAGNOSIS — E11.65 TYPE 2 DIABETES MELLITUS WITH HYPERGLYCEMIA, WITH LONG-TERM CURRENT USE OF INSULIN (HCC): ICD-10-CM

## 2017-10-16 DIAGNOSIS — K59.09 CHRONIC CONSTIPATION: ICD-10-CM

## 2017-10-16 DIAGNOSIS — T45.1X5A NEUROPATHY DUE TO CHEMOTHERAPEUTIC DRUG (HCC): Primary | ICD-10-CM

## 2017-10-16 DIAGNOSIS — E83.42 HYPOMAGNESEMIA: ICD-10-CM

## 2017-10-16 DIAGNOSIS — Z79.4 TYPE 2 DIABETES MELLITUS WITH HYPERGLYCEMIA, WITH LONG-TERM CURRENT USE OF INSULIN (HCC): ICD-10-CM

## 2017-10-16 DIAGNOSIS — F51.01 PRIMARY INSOMNIA: ICD-10-CM

## 2017-10-16 DIAGNOSIS — K21.9 GASTROESOPHAGEAL REFLUX DISEASE WITHOUT ESOPHAGITIS: ICD-10-CM

## 2017-10-16 DIAGNOSIS — F41.1 GAD (GENERALIZED ANXIETY DISORDER): ICD-10-CM

## 2017-10-16 DIAGNOSIS — J30.9 CHRONIC ALLERGIC RHINITIS, UNSPECIFIED SEASONALITY, UNSPECIFIED TRIGGER: ICD-10-CM

## 2017-10-16 DIAGNOSIS — R23.2 HOT FLASHES: ICD-10-CM

## 2017-10-16 PROCEDURE — 99214 OFFICE O/P EST MOD 30 MIN: CPT | Performed by: FAMILY MEDICINE

## 2017-10-16 RX ORDER — OXYCODONE HYDROCHLORIDE 15 MG/1
15 TABLET ORAL EVERY 6 HOURS PRN
Qty: 120 TABLET | Refills: 0 | Status: SHIPPED | OUTPATIENT
Start: 2017-10-16 | End: 2018-01-10 | Stop reason: SDUPTHER

## 2017-10-16 RX ORDER — GABAPENTIN 300 MG/1
300 CAPSULE ORAL 3 TIMES DAILY
Qty: 90 CAPSULE | Refills: 0 | Status: SHIPPED | OUTPATIENT
Start: 2017-10-16 | End: 2018-01-10 | Stop reason: SDUPTHER

## 2017-10-16 RX ORDER — CLONAZEPAM 2 MG/1
2 TABLET ORAL 2 TIMES DAILY PRN
Qty: 60 TABLET | Refills: 2 | Status: SHIPPED | OUTPATIENT
Start: 2017-10-16 | End: 2018-01-10 | Stop reason: SDUPTHER

## 2017-10-16 RX ORDER — LUBIPROSTONE 24 UG/1
24 CAPSULE ORAL 2 TIMES DAILY WITH MEALS
Qty: 60 CAPSULE | Refills: 5 | Status: SHIPPED | OUTPATIENT
Start: 2017-10-16 | End: 2018-08-24

## 2017-10-16 RX ORDER — ANASTROZOLE 1 MG/1
1 TABLET ORAL DAILY
Qty: 30 TABLET | Refills: 3 | Status: CANCELLED | OUTPATIENT
Start: 2017-10-16

## 2017-10-16 RX ORDER — VENLAFAXINE HYDROCHLORIDE 75 MG/1
75 CAPSULE, EXTENDED RELEASE ORAL DAILY
Qty: 30 CAPSULE | Refills: 5 | Status: SHIPPED | OUTPATIENT
Start: 2017-10-16 | End: 2018-03-05 | Stop reason: SDUPTHER

## 2017-10-16 RX ORDER — RANITIDINE 150 MG/1
150 TABLET ORAL 2 TIMES DAILY
Qty: 60 TABLET | Refills: 5 | Status: SHIPPED | OUTPATIENT
Start: 2017-10-16 | End: 2018-08-14 | Stop reason: SDUPTHER

## 2017-10-16 RX ORDER — CETIRIZINE HYDROCHLORIDE 10 MG/1
10 TABLET ORAL DAILY
Qty: 30 TABLET | Refills: 5 | Status: SHIPPED | OUTPATIENT
Start: 2017-10-16 | End: 2017-12-26 | Stop reason: SDUPTHER

## 2017-10-16 RX ORDER — MONTELUKAST SODIUM 10 MG/1
10 TABLET ORAL NIGHTLY
Qty: 30 TABLET | Refills: 5 | Status: SHIPPED | OUTPATIENT
Start: 2017-10-16 | End: 2018-08-14 | Stop reason: SDUPTHER

## 2017-10-16 RX ORDER — DILTIAZEM HYDROCHLORIDE 120 MG/1
120 CAPSULE, COATED, EXTENDED RELEASE ORAL DAILY
Qty: 30 CAPSULE | Refills: 5 | Status: SHIPPED | OUTPATIENT
Start: 2017-10-16 | End: 2018-03-05

## 2017-10-16 RX ORDER — TRAZODONE HYDROCHLORIDE 50 MG/1
50 TABLET ORAL NIGHTLY
Qty: 30 TABLET | Refills: 5 | Status: SHIPPED | OUTPATIENT
Start: 2017-10-16 | End: 2018-03-05 | Stop reason: SDUPTHER

## 2017-10-16 NOTE — PROGRESS NOTES
Subjective   Chief Complaint   Patient presents with   • Pain     4 week follow up   • Med Refill     last dose yesterday     Andra Villareal is a 42 y.o. female.   Pain (4 week follow up) and Med Refill (last dose yesterday)    History of Present Illness     Complaining of new onset of neuropathy of the upper extremities located in bilateral hands and fingertips  Worse on the right than the left side  Associated with joint pain  Complaining of numbness - cannot feel items when trying to pick things up  Started noticing the progression of the symptoms following chemotherapy treatments  Interested in starting lyrica  Previously used neurontin  Believes this contributes to her chronic pain significantly - not controlled with oxycodone    gerd with chronic gastritis, iron deficiency, cirrhosis of the liver, fatty liver, IBS-C  Anemia - stable, iron supplementation continued  Follow up scheduled in January    Chronic pain relating to metastatic breast cancer to bone  Chronic lumbar spine pain, history of metastatic lesions to the thoracic spine  Symptoms have worsened  Located across the lower back and radiates down the left lower extremity  Repeat MRI indicated:  Multiple new areas of abnormal signal within several of the thoracic and lumbar vertebral bodies suggests possibility of metastatic disease. Mild multilevel degenerative disc disease and degenerative arthropathy of the lumbar spine.  Pain is controlled with percocet  Recently failed dilaudid - caused severe constipation  Failed percocet 10/325mg - ineffective    Metastatic breast cancer s/p right mastectomy with breast reconstructive surgery with metastases to liver and bone with new lesion noted on CT 10/26/16 of anterior mediastinal mass measuring 4.9x5.1x3.3cm  Followed at [facility] with Dr Waddell  Treatment for new lesion with chemotherapy - currently on treatment with Kadcyla and Arimidex since 7/19/17  Plan is for 5 cycles of treatment  restage with  "CT of chest, abdomen, pelvis with contrast.    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    Review of Systems   Constitutional: Negative for appetite change, chills, fatigue and fever.   HENT: Negative for congestion, ear pain, rhinorrhea and sore throat.    Eyes: Negative for pain.   Respiratory: Negative for cough and shortness of breath.    Cardiovascular: Negative for chest pain and palpitations.   Gastrointestinal: Negative for abdominal pain, constipation, diarrhea and nausea.   Genitourinary: Negative for dysuria.   Musculoskeletal: Positive for arthralgias and back pain. Negative for joint swelling and neck pain.   Skin: Negative for rash.   Neurological: Positive for numbness. Negative for dizziness and headaches.       Objective   /82  Pulse 81  Temp 97.3 °F (36.3 °C)  Ht 65\" (165.1 cm)  Wt 216 lb (98 kg)  SpO2 98%  BMI 35.94 kg/m2  Physical Exam   Constitutional: She is oriented to person, place, and time. She appears well-developed and well-nourished.   HENT:   Head: Normocephalic and atraumatic.   Eyes: Pupils are equal, round, and reactive to light.   Neck: Normal range of motion. Neck supple.   Cardiovascular: Normal rate, regular rhythm and normal heart sounds.    Pulmonary/Chest: Effort normal and breath sounds " normal. No respiratory distress. She has no wheezes. She has no rales.   Abdominal: Soft. Bowel sounds are normal.   Musculoskeletal: Normal range of motion.   Neurological: She is alert and oriented to person, place, and time.   Skin: Skin is warm and dry.   Psychiatric: She has a normal mood and affect.   Nursing note and vitals reviewed.      Assessment/Plan   Problems Addressed this Visit        Cardiovascular and Mediastinum    Hot flashes    Relevant Medications    venlafaxine XR (EFFEXOR-XR) 75 MG 24 hr capsule       Respiratory    Chronic allergic rhinitis    Relevant Medications    montelukast (SINGULAIR) 10 MG tablet    cetirizine (zyrTEC) 10 MG tablet       Digestive    Gastroesophageal reflux disease without esophagitis    Relevant Medications    raNITIdine (ZANTAC) 150 MG tablet    Chronic constipation    Relevant Medications    lubiprostone (AMITIZA) 24 MCG capsule       Endocrine    Type 2 diabetes mellitus with hyperglycemia, with long-term current use of insulin    Relevant Medications    Insulin Lispro (HUMALOG KWIKPEN) 100 UNIT/ML solution pen-injector    insulin detemir (LEVEMIR) 100 UNIT/ML injection       Nervous and Auditory    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       Other    STEVEN (generalized anxiety disorder)    Relevant Medications    clonazePAM (KlonoPIN) 2 MG tablet    venlafaxine XR (EFFEXOR-XR) 75 MG 24 hr capsule    traZODone (DESYREL) 50 MG tablet    Recurrent deep vein thrombosis (DVT)    Relevant Medications    rivaroxaban (XARELTO) 20 MG tablet    Primary insomnia    Relevant Medications    traZODone (DESYREL) 50 MG tablet    Hypomagnesemia    Relevant Medications    magnesium oxide (MAGNESIUM-OXIDE) 400 (241.3 Mg) MG tablet tablet      Other Visit Diagnoses     Major depressive disorder with single episode, in full remission        Relevant Medications    venlafaxine XR (EFFEXOR-XR) 75 MG 24 hr capsule    traZODone (DESYREL) 50 MG tablet        Refilled  medications    Refilled klonopin  Refilled oxycodone    Started neurontin    Boost coupons, samples provided    Recommended flu vaccine  Will further discuss with Dr Waddell at appointment tomorrow    Recheck in 4 weeks

## 2017-10-17 ENCOUNTER — INFUSION (OUTPATIENT)
Dept: ONCOLOGY | Facility: HOSPITAL | Age: 42
End: 2017-10-17

## 2017-10-17 ENCOUNTER — OFFICE VISIT (OUTPATIENT)
Dept: ONCOLOGY | Facility: CLINIC | Age: 42
End: 2017-10-17

## 2017-10-17 VITALS
WEIGHT: 216 LBS | RESPIRATION RATE: 18 BRPM | SYSTOLIC BLOOD PRESSURE: 139 MMHG | TEMPERATURE: 98 F | HEART RATE: 74 BPM | DIASTOLIC BLOOD PRESSURE: 87 MMHG | BODY MASS INDEX: 35.99 KG/M2 | HEIGHT: 65 IN

## 2017-10-17 DIAGNOSIS — Z45.2 ENCOUNTER FOR VENOUS ACCESS DEVICE CARE: ICD-10-CM

## 2017-10-17 DIAGNOSIS — C79.51 CARCINOMA OF RIGHT BREAST METASTATIC TO BONE (HCC): ICD-10-CM

## 2017-10-17 DIAGNOSIS — T50.905A HOT FLASH DUE TO MEDICATION: Primary | ICD-10-CM

## 2017-10-17 DIAGNOSIS — C50.911 CARCINOMA OF RIGHT BREAST METASTATIC TO BONE (HCC): ICD-10-CM

## 2017-10-17 DIAGNOSIS — T82.9XXD: ICD-10-CM

## 2017-10-17 DIAGNOSIS — R23.2 HOT FLASH DUE TO MEDICATION: Primary | ICD-10-CM

## 2017-10-17 LAB
ALBUMIN SERPL-MCNC: 4.3 G/DL (ref 3.4–4.8)
ALBUMIN/GLOB SERPL: 1.2 G/DL (ref 1.1–1.8)
ALP SERPL-CCNC: 219 U/L (ref 38–126)
ALT SERPL W P-5'-P-CCNC: 55 U/L (ref 9–52)
ANION GAP SERPL CALCULATED.3IONS-SCNC: 10 MMOL/L (ref 5–15)
AST SERPL-CCNC: 59 U/L (ref 14–36)
BASOPHILS # BLD AUTO: 0.04 10*3/MM3 (ref 0–0.2)
BASOPHILS NFR BLD AUTO: 0.9 % (ref 0–2)
BILIRUB SERPL-MCNC: 0.8 MG/DL (ref 0.2–1.3)
BUN BLD-MCNC: 12 MG/DL (ref 7–21)
BUN/CREAT SERPL: 14.1 (ref 7–25)
CALCIUM SPEC-SCNC: 9.5 MG/DL (ref 8.4–10.2)
CHLORIDE SERPL-SCNC: 103 MMOL/L (ref 95–110)
CO2 SERPL-SCNC: 28 MMOL/L (ref 22–31)
CREAT BLD-MCNC: 0.85 MG/DL (ref 0.5–1)
DEPRECATED RDW RBC AUTO: 43.9 FL (ref 36.4–46.3)
EOSINOPHIL # BLD AUTO: 0.12 10*3/MM3 (ref 0–0.7)
EOSINOPHIL NFR BLD AUTO: 2.7 % (ref 0–7)
ERYTHROCYTE [DISTWIDTH] IN BLOOD BY AUTOMATED COUNT: 13.9 % (ref 11.5–14.5)
GFR SERPL CREATININE-BSD FRML MDRD: 89 ML/MIN/1.73 (ref 58–135)
GLOBULIN UR ELPH-MCNC: 3.5 GM/DL (ref 2.3–3.5)
GLUCOSE BLD-MCNC: 149 MG/DL (ref 60–100)
HCT VFR BLD AUTO: 43.5 % (ref 35–45)
HGB BLD-MCNC: 13.8 G/DL (ref 12–15.5)
IMM GRANULOCYTES # BLD: 0.01 10*3/MM3 (ref 0–0.02)
IMM GRANULOCYTES NFR BLD: 0.2 % (ref 0–0.5)
LYMPHOCYTES # BLD AUTO: 1.01 10*3/MM3 (ref 0.6–4.2)
LYMPHOCYTES NFR BLD AUTO: 22.4 % (ref 10–50)
MCH RBC QN AUTO: 27.5 PG (ref 26.5–34)
MCHC RBC AUTO-ENTMCNC: 31.7 G/DL (ref 31.4–36)
MCV RBC AUTO: 86.7 FL (ref 80–98)
MONOCYTES # BLD AUTO: 0.29 10*3/MM3 (ref 0–0.9)
MONOCYTES NFR BLD AUTO: 6.4 % (ref 0–12)
NEUTROPHILS # BLD AUTO: 3.04 10*3/MM3 (ref 2–8.6)
NEUTROPHILS NFR BLD AUTO: 67.4 % (ref 37–80)
NRBC BLD MANUAL-RTO: 0 /100 WBC (ref 0–0)
PLATELET # BLD AUTO: 60 10*3/MM3 (ref 150–450)
PMV BLD AUTO: 12.6 FL (ref 8–12)
POTASSIUM BLD-SCNC: 4 MMOL/L (ref 3.5–5.1)
PROT SERPL-MCNC: 7.8 G/DL (ref 6.3–8.6)
RBC # BLD AUTO: 5.02 10*6/MM3 (ref 3.77–5.16)
RBC MORPH BLD: NORMAL
SMALL PLATELETS BLD QL SMEAR: NORMAL
SODIUM BLD-SCNC: 141 MMOL/L (ref 137–145)
WBC MORPH BLD: NORMAL
WBC NRBC COR # BLD: 4.51 10*3/MM3 (ref 3.2–9.8)

## 2017-10-17 PROCEDURE — 80053 COMPREHEN METABOLIC PANEL: CPT | Performed by: INTERNAL MEDICINE

## 2017-10-17 PROCEDURE — 85025 COMPLETE CBC W/AUTO DIFF WBC: CPT | Performed by: INTERNAL MEDICINE

## 2017-10-17 PROCEDURE — 99214 OFFICE O/P EST MOD 30 MIN: CPT | Performed by: INTERNAL MEDICINE

## 2017-10-17 PROCEDURE — 85007 BL SMEAR W/DIFF WBC COUNT: CPT | Performed by: INTERNAL MEDICINE

## 2017-10-17 PROCEDURE — 36592 COLLECT BLOOD FROM PICC: CPT | Performed by: INTERNAL MEDICINE

## 2017-10-17 PROCEDURE — 36415 COLL VENOUS BLD VENIPUNCTURE: CPT | Performed by: INTERNAL MEDICINE

## 2017-10-17 RX ORDER — SODIUM CHLORIDE 0.9 % (FLUSH) 0.9 %
10 SYRINGE (ML) INJECTION AS NEEDED
Status: DISCONTINUED | OUTPATIENT
Start: 2017-10-17 | End: 2017-10-17 | Stop reason: HOSPADM

## 2017-10-17 RX ORDER — SODIUM CHLORIDE 0.9 % (FLUSH) 0.9 %
10 SYRINGE (ML) INJECTION AS NEEDED
Status: CANCELLED | OUTPATIENT
Start: 2017-10-17

## 2017-10-17 RX ADMIN — Medication 10 ML: at 10:53

## 2017-10-17 NOTE — PATIENT INSTRUCTIONS
Trastuzumab injection for infusion  What is this medicine?  TRASTUZUMAB (tras TOO zoo mab) is a monoclonal antibody. It is used to treat breast cancer and stomach cancer.  This medicine may be used for other purposes; ask your health care provider or pharmacist if you have questions.  COMMON BRAND NAME(S): Herceptin  What should I tell my health care provider before I take this medicine?  They need to know if you have any of these conditions:  -heart disease  -heart failure  -infection (especially a virus infection such as chickenpox, cold sores, or herpes)  -lung or breathing disease, like asthma  -recent or ongoing radiation therapy  -an unusual or allergic reaction to trastuzumab, benzyl alcohol, or other medications, foods, dyes, or preservatives  -pregnant or trying to get pregnant  -breast-feeding  How should I use this medicine?  This drug is given as an infusion into a vein. It is administered in a hospital or clinic by a specially trained health care professional.  Talk to your pediatrician regarding the use of this medicine in children. This medicine is not approved for use in children.  Overdosage: If you think you have taken too much of this medicine contact a poison control center or emergency room at once.  NOTE: This medicine is only for you. Do not share this medicine with others.  What if I miss a dose?  It is important not to miss a dose. Call your doctor or health care professional if you are unable to keep an appointment.  What may interact with this medicine?  -doxorubicin  -warfarin  This list may not describe all possible interactions. Give your health care provider a list of all the medicines, herbs, non-prescription drugs, or dietary supplements you use. Also tell them if you smoke, drink alcohol, or use illegal drugs. Some items may interact with your medicine.  What should I watch for while using this medicine?  Visit your doctor for checks on your progress. Report any side effects.  Continue your course of treatment even though you feel ill unless your doctor tells you to stop.  Call your doctor or health care professional for advice if you get a fever, chills or sore throat, or other symptoms of a cold or flu. Do not treat yourself. Try to avoid being around people who are sick.  You may experience fever, chills and shaking during your first infusion. These effects are usually mild and can be treated with other medicines. Report any side effects during the infusion to your health care professional. Fever and chills usually do not happen with later infusions.  Do not become pregnant while taking this medicine or for 7 months after stopping it. Women should inform their doctor if they wish to become pregnant or think they might be pregnant. Women of child-bearing potential will need to have a negative pregnancy test before starting this medicine. There is a potential for serious side effects to an unborn child. Talk to your health care professional or pharmacist for more information. Do not breast-feed an infant while taking this medicine or for 7 months after stopping it.  Women must use effective birth control with this medicine.  What side effects may I notice from receiving this medicine?  Side effects that you should report to your doctor or health care professional as soon as possible:  -breathing difficulties  -chest pain or palpitations  -cough  -dizziness or fainting  -fever or chills, sore throat  -skin rash, itching or hives  -swelling of the legs or ankles  -unusually weak or tired  Side effects that usually do not require medical attention (report to your doctor or health care professional if they continue or are bothersome):  -loss of appetite  -headache  -muscle aches  -nausea  This list may not describe all possible side effects. Call your doctor for medical advice about side effects. You may report side effects to FDA at 7-207-FDA-8337.  Where should I keep my medicine?  This  drug is given in a hospital or clinic and will not be stored at home.  NOTE: This sheet is a summary. It may not cover all possible information. If you have questions about this medicine, talk to your doctor, pharmacist, or health care provider.     © 2017, [employer]. (2017-01-18 17:16:44)

## 2017-10-17 NOTE — PROGRESS NOTES
DATE OF VISIT: 10/17/2017    REASON FOR VISIT:  Metastatic right breast cancer    HISTORY OF PRESENT ILLNESS:    42-year-old female with a past medical history significant for metastatic right breast cancer with liver and bone metastases currently on treatment with Kadcyla and Arimidex since 07/19/17.  Vision had a restaging CT of chest, abdomen and pelvis with contrast done last week after cycle 4 of Kadcyla.  She is here to discuss the result of CT scan.  Complains of tingling and numbness affecting bilateral hand and bilateral feet.  States she has been dropping things from the hand secondary to tingling and numbness.  Denies any new shortness of breath or chest pain.  Denies any abnormal swollen and anywhere in the body.  States her back pain is somewhat improved.      PAST MEDICAL HISTORY:    1.  Metastatic right breast cancer with liver and bone metastasis  2.  History of pneumonia  3.  Right internal jugular vein DVT status post around to  4.  Rectal bleeding  5.  Dyslipidemia  6.  Diabetes mellitus  7.  Anxiety  8.  Bone metastasis    SOCIAL HISTORY:    Social History   Substance Use Topics   • Smoking status: Former Smoker     Quit date: 2012   • Smokeless tobacco: Never Used   • Alcohol use No       Surgical History :  Past Surgical History:   Procedure Laterality Date   • ANKLE LOOSE BODY EXCISION/REMOVAL Right 5/24/2017    Procedure: RIGTH FOOT EXCISION NAVICULAR FRACTURES FRAGMENT  DEBRIDEMENT TALONAVILCULA RJOIN T;  Surgeon: Armani Oliveira DPM;  Location: [facility] OR;  Service:    • BREAST SURGERY Right 2013     reconstruction of right breast, trans flap surgery   • CENTRAL VENOUS CATHETER TUNNELED INSERTION SINGLE LUMEN      Placement of indwelling Pleurx catheter right   • COLONOSCOPY N/A 1/26/2017    Procedure: COLONOSCOPY;  Surgeon: Robert Clifton MD;  Location: [facility] ENDOSCOPY;  Service:    • ENDOSCOPY N/A 1/26/2017    Procedure: ESOPHAGOGASTRODUODENOSCOPY;  Surgeon: Robert Clifton MD;   "Location: [facility] ENDOSCOPY;  Service:    • ENDOSCOPY N/A 2/24/2017    Procedure: ESOPHAGOGASTRODUODENOSCOPY;  Surgeon: Robert Clifton MD;  Location: [facility] ENDOSCOPY;  Service:    • HYSTERECTOMY      vaginal   • LIVER BIOPSY  2015   • LUNG VOLUME REDUCTION     • MASTECTOMY      partial   • OTHER SURGICAL HISTORY  05/05/2016    central line insertion , PICC line replacement   • OTHER SURGICAL HISTORY      place breast clip percut   • OTHER SURGICAL HISTORY      pulse oximetry   • OTHER SURGICAL HISTORY      remove cc cath w/ sc port or pump, Removal of right internal jugular MediPort   • OTHER SURGICAL HISTORY      spirometry   • OTHER SURGICAL HISTORY      tubal sterilization   • PAP SMEAR     • THORACENTESIS      aspiration of pleural space   • TUBAL ABDOMINAL LIGATION  2009   • UPPER GASTROINTESTINAL ENDOSCOPY  01/26/2017   • UPPER GASTROINTESTINAL ENDOSCOPY  02/24/2017   • VENOUS ACCESS DEVICE (PORT) INSERTION      Ultrasound guided right internal jugular Mediport placement under fluoroscopy       ALLERGIES:    Allergies   Allergen Reactions   • Lisinopril Shortness Of Breath   • Flagyl [Metronidazole] Other (See Comments)     pancreatitis   • Fentanyl Anxiety     \"goes out of her mind\"   • Latex Rash     FAMILY HISTORY:  Family History   Problem Relation Age of Onset   • Coronary artery disease Other    • Diabetes Other    • Hypertension Other    • Amblyopia Other    • Cataracts Maternal Grandfather    • Cataracts Paternal Grandmother            REVIEW OF SYSTEMS:      CONSTITUTIONAL: Positive for fatigue.  No fever, chills, or night sweats.     HEENT:  No epistaxis, mouth sores, or difficulty swallowing.    RESPIRATORY:  No new shortness of breath or cough at present.    CARDIOVASCULAR:  No chest pain or palpitations.    GASTROINTESTINAL:Denies any excessive nausea,vomitting and diarrhea after last cycle of kadcyla.  Complains of blood in the stool if she gets constipated.  No abdominal pain.    GENITOURINARY: " " No dysuria or hematuria.    MUSCULOSKELETAL: Complains of discomfort And pain in right ankle. States her back pain is improved.Complains of generalized bone pain since starting Arimidex.    NEUROLOGICAL: Complains of  persistent tingling and numbness affecting bilateral hand and bilateral feet. No new headache or dizziness.     LYMPHATICS:  Denies any abnormal swollen and anywhere in the body.    SKIN:  Complains of easy bruising and prolonged bleeding after minor cuts since starting Kadcyla.        PHYSICAL EXAMINATION:      VITAL SIGNS:    /87  Pulse 74  Temp 98 °F (36.7 °C) (Temporal Artery )   Resp 18  Ht 65\" (165.1 cm)  Wt 216 lb (98 kg)  BMI 35.94 kg/m2    GENERAL:  Not in any distress.     EYES:  Mild pallor. No icterus.  Extraocular movements intact.  No facial asymmetry.    NECK:  No adenopathy in cervical or supraclavicular area.    RESPIRATORY:  Fair air entry bilaterally. No rhonchi or wheezing.    CARDIOVASCULAR:  S1, S2. Regular rate and rhythm.    ABDOMEN:  Soft, nontender. Bowel sounds present.  No organomegaly appreciated.    EXTREMITIES:  No edema.  No calf  tenderness.      NEUROLOGICAL:  Alert, awake, oriented x3.  No motor or sensory deficit.  Cranial nerves II-12 grossly intact.        DIAGNOSTIC DATA:    Glucose   Date Value Ref Range Status   10/17/2017 149 (H) 60 - 100 mg/dL Final     Sodium   Date Value Ref Range Status   10/17/2017 141 137 - 145 mmol/L Final     Potassium   Date Value Ref Range Status   10/17/2017 4.0 3.5 - 5.1 mmol/L Final     CO2   Date Value Ref Range Status   10/17/2017 28.0 22.0 - 31.0 mmol/L Final     Chloride   Date Value Ref Range Status   10/17/2017 103 95 - 110 mmol/L Final     Anion Gap   Date Value Ref Range Status   10/17/2017 10.0 5.0 - 15.0 mmol/L Final     Creatinine   Date Value Ref Range Status   10/17/2017 0.85 0.50 - 1.00 mg/dL Final     BUN   Date Value Ref Range Status   10/17/2017 12 7 - 21 mg/dL Final     BUN/Creatinine Ratio   Date " Value Ref Range Status   10/17/2017 14.1 7.0 - 25.0 Final     Calcium   Date Value Ref Range Status   10/17/2017 9.5 8.4 - 10.2 mg/dL Final     Alkaline Phosphatase   Date Value Ref Range Status   10/17/2017 219 (H) 38 - 126 U/L Final     Total Protein   Date Value Ref Range Status   10/17/2017 7.8 6.3 - 8.6 g/dL Final     ALT (SGPT)   Date Value Ref Range Status   10/17/2017 55 (H) 9 - 52 U/L Final     AST (SGOT)   Date Value Ref Range Status   10/17/2017 59 (H) 14 - 36 U/L Final     Total Bilirubin   Date Value Ref Range Status   10/17/2017 0.8 0.2 - 1.3 mg/dL Final     Albumin   Date Value Ref Range Status   10/17/2017 4.30 3.40 - 4.80 g/dL Final     Globulin   Date Value Ref Range Status   10/17/2017 3.5 2.3 - 3.5 gm/dL Final     A/G Ratio   Date Value Ref Range Status   10/17/2017 1.2 1.1 - 1.8 g/dL Final     Lab Results   Component Value Date    WBC 4.51 10/17/2017    HGB 13.8 10/17/2017    HCT 43.5 10/17/2017    MCV 86.7 10/17/2017    PLT 60 (L) 10/17/2017     Lab Results   Component Value Date    NEUTROABS 3.04 10/17/2017    IRON 106 02/09/2017    TIBC 304 01/25/2017    LABIRON 6 (L) 01/25/2017    FERRITIN 51.50 01/25/2017    FCVBCSVY14 744 10/11/2017    FOLATE 9.33 10/11/2017     Lab Results   Component Value Date     33.9 (H) 09/26/2017    LABCA2 46.2 (H) 09/26/2017    AFPTM 156 01/25/2017    HCGQUANT 1 12/12/2016    CEA 3.2 09/22/2015      2 D Echo done on October 10, 2017 showed:  Interpretation Summary   · The quality of the study is limited due to poor acoustic windows related to patient body habitus  · Left Ventricle: Left ventricular systolic function is normal. Estimated EF appears to be in the range of 51 - 55%.  · Left ventricular wall thickness is consistent with mild concentric hypertrophy  · Left ventricular diastolic function is normal  · Right Ventricle: Normal right ventricular cavity size, wall thickness, systolic function and septal motion noted  · No evidence of pulmonary  hypertension is present  · There is no evidence of pericardial effusion               RADIOLOGY DATA :  Ct of chest, Abdomen and pelvis with contrast on September 29, 2017 showed:  IMPRESSION:  CONCLUSION: New subtle ground glass infiltrative changes right  upper lobe. Evidence of prior right mastectomy and TRAM flap  reconstruction.     Stable-appearing compression deformity inferior aspect of T6  unchanged since prior exam. New questionable radiolucency  posterior aspect of T7 possibly new metastatic lesion.     Cirrhosis. New 1 cm area of flash type enhancement inferior  aspect left lobe of liver of uncertain significance. CT abdomen  pelvis is otherwise unremarkable.      MRI of lumbar spine without contrast done on June 23, 2017 showed:  IMPRESSION:     1. Multiple new areas of abnormal signal within several of the  thoracic and lumbar vertebral bodies suggests possibility of  metastatic disease.   2. Mild multilevel degenerative disc disease and degenerative  arthropathy of the lumbar spine.            ASSESSMENT AND PLAN:      1.  Stage IV metastatic right breast cancer with liver and bone metastasis.  Patient was initially diagnosed in 2011 with a stage III disease.  Initially patient had a Taxotere carboplatin and Herceptin for 6 cycles followed by 1 year of Herceptin maintenance.  After that patient was getting Lupron and tamoxifen.  In March 2015 she was diagnosed with a metastatic disease involving liver and bone.  Patient was again started on Taxotere Herceptin and perjeta.  She could not tolerate Taxotere at that point she had a recurrent pneumonias and cytopenias.  Subsequently she was maintained on Herceptin and perjeta until October 2016, at that point restaging CT scan showed there was anterior mediastinal mass for which patient was started on Taxol Herceptin and perjeta.  Patient received her last dose of Taxol on January 12, 2017.  Patient was started back on Herceptin and perjeta on February 9,  2017.  His February of this year patient's treatment with Herceptin and perjeta has been interrupted multiple times secondary to her gastrointestinal bleeding as well as requiring surgery on right ankle.  MRI of lumbar spine does show increased area of metastatic disease as compared to bone scan which was done in June 2017.  Her therapy was changed to Kadcyla with Arimidex on July 19, 2017.  And received 4 cycles of Kadcyla from July 19, 2017 until September 26, 2017.  In view of grade 2 neuropathy affecting bilateral hand will discontinue Kadcyla.  Neuropathy is common side effect of Kadcyla which was discussed with patient.  Recently done CT of chest, abdomen and pelvis with contrast does not show any evidence of true progression.  Result of CT scan were discussed with patient.  We'll start her on Herceptin next week.  Patient will continue taking Arimidex along with calcium and vitamin D every day.    2. H/O Rectal bleeding: Patient is status post argon coagulation treatment on February 23, 2017.  Denies any rectal bleeding since then.    3.  Recurrent DVT: Patient was on Xarelto which was held secondary to rectal bleeding.  Patient is status post argon laser treatment on February 23, 2017.  Recommendation would be to support her on Lovenox and Coumadin Clinic visit if she does not have any bleeding at that point.  She is high risk of recurrent thrombosis due to active cancer.  Patient states it would be very difficult for her to come to Coumadin clinic for checks of INR.  Patient is currently on Xarelto.  Denies any rectal bleeding.    4.  Bone metastasis: Continue with Zometa as scheduled for now.      5.  Thrombocytopenia: Most likely secondary to Kadcyla.  Platelet count is 60,000 today without any active bleeding.  We will monitor CBC for now.    6.  Peripheral neuropathy affecting bilateral hand and feet: Most likely secondary to Kadcyla.  Kadcyla has been discontinued from today.  Patient remains on  Neurontin.    7.  Health maintenance: Patient does not smoke.  She just had a colonoscopy done on January 2017 which was negative for any malignancy.  She remains full code.          Joselito Waddell MD  10/17/2017  2:34 PM

## 2017-10-24 ENCOUNTER — INFUSION (OUTPATIENT)
Dept: ONCOLOGY | Facility: HOSPITAL | Age: 42
End: 2017-10-24

## 2017-10-24 DIAGNOSIS — C50.911 CARCINOMA OF RIGHT BREAST METASTATIC TO BONE (HCC): Primary | ICD-10-CM

## 2017-10-24 DIAGNOSIS — C79.51 CARCINOMA OF RIGHT BREAST METASTATIC TO BONE (HCC): ICD-10-CM

## 2017-10-24 DIAGNOSIS — C79.51 CARCINOMA OF RIGHT BREAST METASTATIC TO BONE (HCC): Primary | ICD-10-CM

## 2017-10-24 DIAGNOSIS — T82.9XXD: ICD-10-CM

## 2017-10-24 DIAGNOSIS — C50.911 CARCINOMA OF RIGHT BREAST METASTATIC TO BONE (HCC): ICD-10-CM

## 2017-10-24 LAB
ALBUMIN SERPL-MCNC: 3.9 G/DL (ref 3.4–4.8)
ALBUMIN/GLOB SERPL: 1.1 G/DL (ref 1.1–1.8)
ALP SERPL-CCNC: 191 U/L (ref 38–126)
ALT SERPL W P-5'-P-CCNC: 42 U/L (ref 9–52)
ANION GAP SERPL CALCULATED.3IONS-SCNC: 11 MMOL/L (ref 5–15)
AST SERPL-CCNC: 35 U/L (ref 14–36)
BASOPHILS # BLD AUTO: 0.01 10*3/MM3 (ref 0–0.2)
BASOPHILS NFR BLD AUTO: 0.2 % (ref 0–2)
BILIRUB SERPL-MCNC: 0.4 MG/DL (ref 0.2–1.3)
BUN BLD-MCNC: 12 MG/DL (ref 7–21)
BUN/CREAT SERPL: 16 (ref 7–25)
CALCIUM SPEC-SCNC: 9.2 MG/DL (ref 8.4–10.2)
CHLORIDE SERPL-SCNC: 102 MMOL/L (ref 95–110)
CO2 SERPL-SCNC: 28 MMOL/L (ref 22–31)
CREAT BLD-MCNC: 0.75 MG/DL (ref 0.5–1)
DEPRECATED RDW RBC AUTO: 44.8 FL (ref 36.4–46.3)
EOSINOPHIL # BLD AUTO: 0.1 10*3/MM3 (ref 0–0.7)
EOSINOPHIL NFR BLD AUTO: 2.5 % (ref 0–7)
ERYTHROCYTE [DISTWIDTH] IN BLOOD BY AUTOMATED COUNT: 14 % (ref 11.5–14.5)
GFR SERPL CREATININE-BSD FRML MDRD: 103 ML/MIN/1.73 (ref 58–135)
GLOBULIN UR ELPH-MCNC: 3.5 GM/DL (ref 2.3–3.5)
GLUCOSE BLD-MCNC: 286 MG/DL (ref 60–100)
HCT VFR BLD AUTO: 40.6 % (ref 35–45)
HGB BLD-MCNC: 13.4 G/DL (ref 12–15.5)
IMM GRANULOCYTES # BLD: 0.01 10*3/MM3 (ref 0–0.02)
IMM GRANULOCYTES NFR BLD: 0.2 % (ref 0–0.5)
LYMPHOCYTES # BLD AUTO: 0.9 10*3/MM3 (ref 0.6–4.2)
LYMPHOCYTES NFR BLD AUTO: 22.2 % (ref 10–50)
MCH RBC QN AUTO: 28.6 PG (ref 26.5–34)
MCHC RBC AUTO-ENTMCNC: 33 G/DL (ref 31.4–36)
MCV RBC AUTO: 86.8 FL (ref 80–98)
MONOCYTES # BLD AUTO: 0.36 10*3/MM3 (ref 0–0.9)
MONOCYTES NFR BLD AUTO: 8.9 % (ref 0–12)
NEUTROPHILS # BLD AUTO: 2.67 10*3/MM3 (ref 2–8.6)
NEUTROPHILS NFR BLD AUTO: 66 % (ref 37–80)
NRBC BLD MANUAL-RTO: 0 /100 WBC (ref 0–0)
PLATELET # BLD AUTO: 75 10*3/MM3 (ref 150–450)
PMV BLD AUTO: 11.6 FL (ref 8–12)
POTASSIUM BLD-SCNC: 3.9 MMOL/L (ref 3.5–5.1)
PROT SERPL-MCNC: 7.4 G/DL (ref 6.3–8.6)
RBC # BLD AUTO: 4.68 10*6/MM3 (ref 3.77–5.16)
SODIUM BLD-SCNC: 141 MMOL/L (ref 137–145)
WBC NRBC COR # BLD: 4.05 10*3/MM3 (ref 3.2–9.8)

## 2017-10-24 PROCEDURE — 25010000002 DIPHENHYDRAMINE PER 50 MG: Performed by: INTERNAL MEDICINE

## 2017-10-24 PROCEDURE — 96375 TX/PRO/DX INJ NEW DRUG ADDON: CPT | Performed by: INTERNAL MEDICINE

## 2017-10-24 PROCEDURE — 25010000002 TRASTUZUMAB PER 10 MG: Performed by: INTERNAL MEDICINE

## 2017-10-24 PROCEDURE — 36415 COLL VENOUS BLD VENIPUNCTURE: CPT | Performed by: INTERNAL MEDICINE

## 2017-10-24 PROCEDURE — 96413 CHEMO IV INFUSION 1 HR: CPT | Performed by: INTERNAL MEDICINE

## 2017-10-24 PROCEDURE — 80053 COMPREHEN METABOLIC PANEL: CPT | Performed by: INTERNAL MEDICINE

## 2017-10-24 PROCEDURE — 85025 COMPLETE CBC W/AUTO DIFF WBC: CPT | Performed by: INTERNAL MEDICINE

## 2017-10-24 RX ORDER — SODIUM CHLORIDE 9 MG/ML
250 INJECTION, SOLUTION INTRAVENOUS ONCE
Status: COMPLETED | OUTPATIENT
Start: 2017-10-24 | End: 2017-10-24

## 2017-10-24 RX ORDER — DIPHENHYDRAMINE HYDROCHLORIDE 50 MG/ML
25 INJECTION INTRAMUSCULAR; INTRAVENOUS ONCE
Status: CANCELLED
Start: 2017-10-24 | End: 2017-10-24

## 2017-10-24 RX ORDER — SODIUM CHLORIDE 9 MG/ML
250 INJECTION, SOLUTION INTRAVENOUS ONCE
Status: CANCELLED | OUTPATIENT
Start: 2017-10-24

## 2017-10-24 RX ORDER — DIPHENHYDRAMINE HYDROCHLORIDE 50 MG/ML
25 INJECTION INTRAMUSCULAR; INTRAVENOUS ONCE
Status: CANCELLED
Start: 2017-11-14 | End: 2017-11-14

## 2017-10-24 RX ORDER — SODIUM CHLORIDE 0.9 % (FLUSH) 0.9 %
10 SYRINGE (ML) INJECTION AS NEEDED
Status: CANCELLED | OUTPATIENT
Start: 2017-10-24

## 2017-10-24 RX ORDER — ACETAMINOPHEN 325 MG/1
650 TABLET ORAL ONCE
Status: CANCELLED
Start: 2017-11-14 | End: 2017-11-14

## 2017-10-24 RX ORDER — DIPHENHYDRAMINE HYDROCHLORIDE 50 MG/ML
25 INJECTION INTRAMUSCULAR; INTRAVENOUS ONCE
Status: COMPLETED | OUTPATIENT
Start: 2017-10-24 | End: 2017-10-24

## 2017-10-24 RX ORDER — SODIUM CHLORIDE 0.9 % (FLUSH) 0.9 %
10 SYRINGE (ML) INJECTION AS NEEDED
Status: DISCONTINUED | OUTPATIENT
Start: 2017-10-24 | End: 2017-10-24 | Stop reason: HOSPADM

## 2017-10-24 RX ORDER — ACETAMINOPHEN 325 MG/1
650 TABLET ORAL ONCE
Status: CANCELLED
Start: 2017-10-24 | End: 2017-10-24

## 2017-10-24 RX ORDER — ACETAMINOPHEN 325 MG/1
650 TABLET ORAL ONCE
Status: COMPLETED | OUTPATIENT
Start: 2017-10-24 | End: 2017-10-24

## 2017-10-24 RX ADMIN — Medication 10 ML: at 10:19

## 2017-10-24 RX ADMIN — Medication 10 ML: at 14:39

## 2017-10-24 RX ADMIN — ACETAMINOPHEN 650 MG: 325 TABLET ORAL at 12:06

## 2017-10-24 RX ADMIN — SODIUM CHLORIDE 250 ML: 9 INJECTION, SOLUTION INTRAVENOUS at 11:51

## 2017-10-24 RX ADMIN — DIPHENHYDRAMINE HYDROCHLORIDE 25 MG: 50 INJECTION INTRAMUSCULAR; INTRAVENOUS at 12:07

## 2017-10-24 RX ADMIN — TRASTUZUMAB 780 MG: 150 INJECTION, POWDER, LYOPHILIZED, FOR SOLUTION INTRAVENOUS at 12:34

## 2017-10-25 PROBLEM — Z23 FLU VACCINE NEED: Status: ACTIVE | Noted: 2017-10-25

## 2017-11-01 ENCOUNTER — INFUSION (OUTPATIENT)
Dept: ONCOLOGY | Facility: HOSPITAL | Age: 42
End: 2017-11-01

## 2017-11-01 ENCOUNTER — APPOINTMENT (OUTPATIENT)
Dept: ONCOLOGY | Facility: HOSPITAL | Age: 42
End: 2017-11-01

## 2017-11-01 DIAGNOSIS — C50.911 CARCINOMA OF RIGHT BREAST METASTATIC TO BONE (HCC): ICD-10-CM

## 2017-11-01 DIAGNOSIS — C79.51 CARCINOMA OF RIGHT BREAST METASTATIC TO BONE (HCC): ICD-10-CM

## 2017-11-01 DIAGNOSIS — Z23 FLU VACCINE NEED: ICD-10-CM

## 2017-11-01 DIAGNOSIS — T82.9XXD: Primary | ICD-10-CM

## 2017-11-01 PROCEDURE — 25010000002 INFLUENZA VAC SPLIT QUAD SUSPENSION: Performed by: INTERNAL MEDICINE

## 2017-11-01 PROCEDURE — 96523 IRRIG DRUG DELIVERY DEVICE: CPT | Performed by: INTERNAL MEDICINE

## 2017-11-01 PROCEDURE — 90682 RIV4 VACC RECOMBINANT DNA IM: CPT | Performed by: INTERNAL MEDICINE

## 2017-11-01 PROCEDURE — G0008 ADMIN INFLUENZA VIRUS VAC: HCPCS | Performed by: INTERNAL MEDICINE

## 2017-11-01 RX ORDER — SODIUM CHLORIDE 0.9 % (FLUSH) 0.9 %
10 SYRINGE (ML) INJECTION AS NEEDED
Status: CANCELLED | OUTPATIENT
Start: 2017-11-01

## 2017-11-01 RX ORDER — SODIUM CHLORIDE 0.9 % (FLUSH) 0.9 %
10 SYRINGE (ML) INJECTION AS NEEDED
Status: DISCONTINUED | OUTPATIENT
Start: 2017-11-01 | End: 2017-11-01 | Stop reason: HOSPADM

## 2017-11-01 RX ADMIN — INFLUENZA A VIRUS A/MICHIGAN/45/2015 X-275 (H1N1) ANTIGEN (FORMALDEHYDE INACTIVATED), INFLUENZA A VIRUS A/HONG KONG/4801/2014 X-263B (H3N2) ANTIGEN (FORMALDEHYDE INACTIVATED), INFLUENZA B VIRUS B/PHUKET/3073/2013 ANTIGEN (FORMALDEHYDE INACTIVATED), AND INFLUENZA B VIRUS B/BRISBANE/60/2008 ANTIGEN (FORMALDEHYDE INACTIVATED) 0.5 ML: 15; 15; 15; 15 INJECTION, SUSPENSION INTRAMUSCULAR at 11:30

## 2017-11-01 RX ADMIN — Medication 10 ML: at 11:12

## 2017-11-07 ENCOUNTER — INFUSION (OUTPATIENT)
Dept: ONCOLOGY | Facility: HOSPITAL | Age: 42
End: 2017-11-07

## 2017-11-07 DIAGNOSIS — C79.51 CARCINOMA OF RIGHT BREAST METASTATIC TO BONE (HCC): ICD-10-CM

## 2017-11-07 DIAGNOSIS — C50.911 CARCINOMA OF RIGHT BREAST METASTATIC TO BONE (HCC): ICD-10-CM

## 2017-11-07 DIAGNOSIS — Z45.2 ENCOUNTER FOR VENOUS ACCESS DEVICE CARE: ICD-10-CM

## 2017-11-07 DIAGNOSIS — T82.9XXD: Primary | ICD-10-CM

## 2017-11-07 PROCEDURE — 96523 IRRIG DRUG DELIVERY DEVICE: CPT | Performed by: INTERNAL MEDICINE

## 2017-11-07 RX ORDER — SODIUM CHLORIDE 0.9 % (FLUSH) 0.9 %
10 SYRINGE (ML) INJECTION AS NEEDED
Status: DISCONTINUED | OUTPATIENT
Start: 2017-11-07 | End: 2017-11-07 | Stop reason: HOSPADM

## 2017-11-07 RX ORDER — SODIUM CHLORIDE 0.9 % (FLUSH) 0.9 %
10 SYRINGE (ML) INJECTION AS NEEDED
Status: CANCELLED | OUTPATIENT
Start: 2017-11-07

## 2017-11-07 RX ADMIN — Medication 10 ML: at 10:35

## 2017-11-13 ENCOUNTER — TELEPHONE (OUTPATIENT)
Dept: ONCOLOGY | Facility: CLINIC | Age: 42
End: 2017-11-13

## 2017-11-13 NOTE — TELEPHONE ENCOUNTER
Pt called and stated that she went to urgent care on 11/12 for an ear infection and dizziness. Pt was put on an antibiotics (cefuroxime) and Meclizine for dizziness. Pt has an appt tomorrow 11/14 for chemo. Pt is wanting to know if she should still come in to see you and get treatment tomorrow?

## 2017-11-14 ENCOUNTER — APPOINTMENT (OUTPATIENT)
Dept: ONCOLOGY | Facility: HOSPITAL | Age: 42
End: 2017-11-14

## 2017-11-15 ENCOUNTER — TELEPHONE (OUTPATIENT)
Dept: ONCOLOGY | Facility: CLINIC | Age: 42
End: 2017-11-15

## 2017-11-15 ENCOUNTER — LAB (OUTPATIENT)
Dept: ONCOLOGY | Facility: HOSPITAL | Age: 42
End: 2017-11-15

## 2017-11-15 VITALS — SYSTOLIC BLOOD PRESSURE: 124 MMHG | TEMPERATURE: 97.9 F | DIASTOLIC BLOOD PRESSURE: 85 MMHG | HEART RATE: 76 BPM

## 2017-11-15 DIAGNOSIS — C79.51 CARCINOMA OF RIGHT BREAST METASTATIC TO BONE (HCC): ICD-10-CM

## 2017-11-15 DIAGNOSIS — R42 DIZZINESS: Primary | ICD-10-CM

## 2017-11-15 DIAGNOSIS — T82.9XXD: ICD-10-CM

## 2017-11-15 DIAGNOSIS — C50.911 CARCINOMA OF RIGHT BREAST METASTATIC TO BONE (HCC): ICD-10-CM

## 2017-11-15 LAB
BASOPHILS # BLD AUTO: 0.01 10*3/MM3 (ref 0–0.2)
BASOPHILS NFR BLD AUTO: 0.2 % (ref 0–2)
DEPRECATED RDW RBC AUTO: 42.7 FL (ref 36.4–46.3)
EOSINOPHIL # BLD AUTO: 0.08 10*3/MM3 (ref 0–0.7)
EOSINOPHIL NFR BLD AUTO: 1.9 % (ref 0–7)
ERYTHROCYTE [DISTWIDTH] IN BLOOD BY AUTOMATED COUNT: 13.8 % (ref 11.5–14.5)
HCT VFR BLD AUTO: 43 % (ref 35–45)
HGB BLD-MCNC: 14.1 G/DL (ref 12–15.5)
IMM GRANULOCYTES # BLD: 0.01 10*3/MM3 (ref 0–0.02)
IMM GRANULOCYTES NFR BLD: 0.2 % (ref 0–0.5)
LYMPHOCYTES # BLD AUTO: 1.37 10*3/MM3 (ref 0.6–4.2)
LYMPHOCYTES NFR BLD AUTO: 32 % (ref 10–50)
MCH RBC QN AUTO: 27.8 PG (ref 26.5–34)
MCHC RBC AUTO-ENTMCNC: 32.8 G/DL (ref 31.4–36)
MCV RBC AUTO: 84.8 FL (ref 80–98)
MONOCYTES # BLD AUTO: 0.28 10*3/MM3 (ref 0–0.9)
MONOCYTES NFR BLD AUTO: 6.5 % (ref 0–12)
NEUTROPHILS # BLD AUTO: 2.53 10*3/MM3 (ref 2–8.6)
NEUTROPHILS NFR BLD AUTO: 59.2 % (ref 37–80)
PLATELET # BLD AUTO: 60 10*3/MM3 (ref 150–450)
PMV BLD AUTO: ABNORMAL FL (ref 8–12)
RBC # BLD AUTO: 5.07 10*6/MM3 (ref 3.77–5.16)
RBC MORPH BLD: NORMAL
SMALL PLATELETS BLD QL SMEAR: NORMAL
WBC MORPH BLD: NORMAL
WBC NRBC COR # BLD: 4.28 10*3/MM3 (ref 3.2–9.8)

## 2017-11-15 PROCEDURE — 85007 BL SMEAR W/DIFF WBC COUNT: CPT | Performed by: INTERNAL MEDICINE

## 2017-11-15 PROCEDURE — 85025 COMPLETE CBC W/AUTO DIFF WBC: CPT | Performed by: INTERNAL MEDICINE

## 2017-11-15 PROCEDURE — 36415 COLL VENOUS BLD VENIPUNCTURE: CPT | Performed by: INTERNAL MEDICINE

## 2017-11-15 PROCEDURE — 36592 COLLECT BLOOD FROM PICC: CPT | Performed by: INTERNAL MEDICINE

## 2017-11-15 RX ORDER — SODIUM CHLORIDE 0.9 % (FLUSH) 0.9 %
10 SYRINGE (ML) INJECTION AS NEEDED
Status: CANCELLED | OUTPATIENT
Start: 2017-11-15

## 2017-11-15 RX ORDER — SODIUM CHLORIDE 0.9 % (FLUSH) 0.9 %
10 SYRINGE (ML) INJECTION AS NEEDED
Status: DISCONTINUED | OUTPATIENT
Start: 2017-11-15 | End: 2017-11-15 | Stop reason: HOSPADM

## 2017-11-15 RX ADMIN — Medication 10 ML: at 14:31

## 2017-11-15 NOTE — TELEPHONE ENCOUNTER
Called patient. She wants PICC dressing change and said she is having dizziness and headache and wants labs checked. Notified Dr. Waddell and he said to do CBC. Gave patient an appointment time for this afternoon.

## 2017-11-19 ENCOUNTER — APPOINTMENT (OUTPATIENT)
Dept: CT IMAGING | Facility: HOSPITAL | Age: 42
End: 2017-11-19

## 2017-11-19 ENCOUNTER — HOSPITAL ENCOUNTER (EMERGENCY)
Facility: HOSPITAL | Age: 42
Discharge: ANOTHER HOSPITAL | End: 2017-11-19
Attending: EMERGENCY MEDICINE | Admitting: EMERGENCY MEDICINE

## 2017-11-19 VITALS
WEIGHT: 210 LBS | SYSTOLIC BLOOD PRESSURE: 131 MMHG | HEIGHT: 65 IN | OXYGEN SATURATION: 94 % | HEART RATE: 73 BPM | BODY MASS INDEX: 34.99 KG/M2 | RESPIRATION RATE: 18 BRPM | TEMPERATURE: 98.8 F | DIASTOLIC BLOOD PRESSURE: 75 MMHG

## 2017-11-19 DIAGNOSIS — G93.6 VASOGENIC BRAIN EDEMA (HCC): Primary | ICD-10-CM

## 2017-11-19 PROCEDURE — 93010 ELECTROCARDIOGRAM REPORT: CPT | Performed by: INTERNAL MEDICINE

## 2017-11-19 PROCEDURE — 25010000002 METOCLOPRAMIDE PER 10 MG: Performed by: EMERGENCY MEDICINE

## 2017-11-19 PROCEDURE — 25010000002 KETOROLAC TROMETHAMINE PER 15 MG: Performed by: EMERGENCY MEDICINE

## 2017-11-19 PROCEDURE — 96375 TX/PRO/DX INJ NEW DRUG ADDON: CPT

## 2017-11-19 PROCEDURE — 25010000002 LEVETIRACETAM IN NACL 0.82% 500 MG/100ML SOLUTION: Performed by: EMERGENCY MEDICINE

## 2017-11-19 PROCEDURE — 99285 EMERGENCY DEPT VISIT HI MDM: CPT

## 2017-11-19 PROCEDURE — 25010000002 DIPHENHYDRAMINE PER 50 MG: Performed by: EMERGENCY MEDICINE

## 2017-11-19 PROCEDURE — 25010000002 METHYLPREDNISOLONE PER 125 MG: Performed by: EMERGENCY MEDICINE

## 2017-11-19 PROCEDURE — 25010000002 DEXAMETHASONE PER 1 MG: Performed by: EMERGENCY MEDICINE

## 2017-11-19 PROCEDURE — 93005 ELECTROCARDIOGRAM TRACING: CPT

## 2017-11-19 PROCEDURE — 96365 THER/PROPH/DIAG IV INF INIT: CPT

## 2017-11-19 PROCEDURE — 96361 HYDRATE IV INFUSION ADD-ON: CPT

## 2017-11-19 PROCEDURE — 70450 CT HEAD/BRAIN W/O DYE: CPT

## 2017-11-19 RX ORDER — METHYLPREDNISOLONE SODIUM SUCCINATE 125 MG/2ML
125 INJECTION, POWDER, LYOPHILIZED, FOR SOLUTION INTRAMUSCULAR; INTRAVENOUS ONCE
Status: COMPLETED | OUTPATIENT
Start: 2017-11-19 | End: 2017-11-19

## 2017-11-19 RX ORDER — METOCLOPRAMIDE HYDROCHLORIDE 5 MG/ML
10 INJECTION INTRAMUSCULAR; INTRAVENOUS ONCE
Status: COMPLETED | OUTPATIENT
Start: 2017-11-19 | End: 2017-11-19

## 2017-11-19 RX ORDER — KETOROLAC TROMETHAMINE 15 MG/ML
15 INJECTION, SOLUTION INTRAMUSCULAR; INTRAVENOUS ONCE
Status: COMPLETED | OUTPATIENT
Start: 2017-11-19 | End: 2017-11-19

## 2017-11-19 RX ORDER — LEVETIRACETAM 5 MG/ML
500 INJECTION INTRAVASCULAR EVERY 12 HOURS SCHEDULED
Status: DISCONTINUED | OUTPATIENT
Start: 2017-11-19 | End: 2017-11-19 | Stop reason: HOSPADM

## 2017-11-19 RX ORDER — MECLIZINE HYDROCHLORIDE 25 MG/1
25 TABLET ORAL ONCE
Status: COMPLETED | OUTPATIENT
Start: 2017-11-19 | End: 2017-11-19

## 2017-11-19 RX ORDER — DIPHENHYDRAMINE HYDROCHLORIDE 50 MG/ML
50 INJECTION INTRAMUSCULAR; INTRAVENOUS ONCE
Status: COMPLETED | OUTPATIENT
Start: 2017-11-19 | End: 2017-11-19

## 2017-11-19 RX ORDER — DEXAMETHASONE SODIUM PHOSPHATE 10 MG/ML
10 INJECTION INTRAMUSCULAR; INTRAVENOUS ONCE
Status: COMPLETED | OUTPATIENT
Start: 2017-11-19 | End: 2017-11-19

## 2017-11-19 RX ADMIN — MECLIZINE HYDROCHLORIDE 25 MG: 25 TABLET ORAL at 16:31

## 2017-11-19 RX ADMIN — KETOROLAC TROMETHAMINE 15 MG: 15 INJECTION, SOLUTION INTRAMUSCULAR; INTRAVENOUS at 16:39

## 2017-11-19 RX ADMIN — METHYLPREDNISOLONE SODIUM SUCCINATE 125 MG: 125 INJECTION, POWDER, FOR SOLUTION INTRAMUSCULAR; INTRAVENOUS at 16:45

## 2017-11-19 RX ADMIN — SODIUM CHLORIDE 1000 ML: 9 INJECTION, SOLUTION INTRAVENOUS at 16:38

## 2017-11-19 RX ADMIN — DEXAMETHASONE SODIUM PHOSPHATE 10 MG: 10 INJECTION, SOLUTION INTRAMUSCULAR; INTRAVENOUS at 17:39

## 2017-11-19 RX ADMIN — DIPHENHYDRAMINE HYDROCHLORIDE 50 MG: 50 INJECTION INTRAMUSCULAR; INTRAVENOUS at 16:44

## 2017-11-19 RX ADMIN — METOCLOPRAMIDE 10 MG: 5 INJECTION, SOLUTION INTRAMUSCULAR; INTRAVENOUS at 16:46

## 2017-11-19 RX ADMIN — LEVETIRACETAM 500 MG: 5 INJECTION INTRAVENOUS at 18:31

## 2017-11-19 NOTE — ED NOTES
Patient presented to ED with C/O headache, dizziness, weakness for about 2 weeks, reports doen in back, reports had breast cancer that has spread to other places.     Yuli Farris LPN  11/19/17 1995

## 2017-11-19 NOTE — ED PROVIDER NOTES
Subjective   HPI Comments: Patient with history of back CVA and vertigo [facility] a chief complaint of headache it's been going on for the last 2 weeks patient reports a progressively worsening headache for 2 weeks with associated dizziness that she says is for her usual vertigo patient states that the headache is been progressively getting worse and she developed some increased dizziness and vomiting today which causes her headache to get worse causing presentation to the emergency department.  She denies any recent head or neck trauma.  She denies any new neurological deficits.  She denies any others in her facility with similar headache.  Denies any fevers or neck stiffness      History provided by:  Patient   used: No        Review of Systems   Constitutional: Negative.    HENT: Negative.    Eyes: Negative.    Respiratory: Negative.    Cardiovascular: Negative.    Gastrointestinal: Positive for nausea and vomiting.   Genitourinary: Negative.    Musculoskeletal: Negative.    Skin: Negative.    Neurological: Positive for dizziness. Negative for tremors, seizures, syncope, facial asymmetry, speech difficulty, weakness, light-headedness, numbness and headaches.       Past Medical History:   Diagnosis Date   • Abnormal breathing sounds    • Abscess of skin     and / or subcutaneous tissue   • Acute bronchitis     unspecified   • Adult body mass index 30+     obesity-BMI 33   • Allergic rhinitis    • Anasarca    • Anxiety     currently on xanax   • Anxiety    • Asthenia    • Asthma     moderate persistent   • Asthmatic bronchitis    • Astigmatism    • Bleeding external hemorrhoids    • Body mass index (BMI) of 34.0-34.9 in adult    • Body mass index (BMI) of 35.0-35.9 in adult    • Body mass index (BMI) of 36.0-36.9 in adult    • Body mass index 40.0-44.9, adult     SEVERELY OBESE   • Cellulitis    • Chemotherapy induced nausea and vomiting    • Chronic back pain    • Chronic cough     • Chronic hypoxemic respiratory failure     on NC at 3 LPM   • Cough    • Depressive disorder    • Diabetes mellitus     no retinopathy   • Dyslipidemia    • Edema of lower extremity    • Encounter for follow-up examination after completed treatment for malignant neoplasm    • Epidermoid cyst of skin     excision with secondary intention healing   • Excessive and frequent menstruation with irregular cycle     Vaginal bleeding after 4 years of no bleeding secondary to chemotherapy for breast cancer; currently being treated for breast cancer for the second time, mets to bone and liver while on chemotherapy      • Flushing    • 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    • Gastroesophageal reflux disease    • H/O tubal ligation    • History of blood clots     clots around mediports x 2   • Hx of echocardiogram     w/ color flow, and w/o color flow   • Hyperglycemia    • Hypermetropia    • Hypertension    • Hypokalemia    • Impaired glucose tolerance     hgbalc 6.2   • Infection of skin     and /or subcutaneous tissue-around the catheter exit site   • Influenza     needs influenza immunization   • Irregular periods    • Lesion of lumbar spine     pain controlled with percocet and lidocaine patches   • Lumbago with sciatica    • Malignant neoplasm of female breast     s/p right mastectomy, mets to bone and liver on chemotherapy      • Menopausal flushing    • Muscle weakness    • Muscle weakness (generalized)    • Nonspecific interstitial pneumonia     symptomatically improved   • Pleural effusion     refractory massive right, most likely malignant effusion   • Primary atypical pneumonia    • Renal failure     acute drug-induced renal failure   • Sinus tachycardia    • Tobacco dependence syndrome    • Tobacco non-user    • Upper respiratory infection    • Wheezing        Allergies   Allergen Reactions   • Lisinopril Shortness Of Breath   • Flagyl [Metronidazole] Other (See Comments)     pancreatitis   • Fentanyl  "Anxiety     \"goes out of her mind\"   • Latex Rash       Past Surgical History:   Procedure Laterality Date   • ANKLE LOOSE BODY EXCISION/REMOVAL Right 5/24/2017    Procedure: RIGTH FOOT EXCISION NAVICULAR FRACTURES FRAGMENT  DEBRIDEMENT TALONAVILCULA RJOIN T;  Surgeon: Armani Oliveira DPM;  Location: [facility] OR;  Service:    • BREAST SURGERY Right 2013     reconstruction of right breast, trans flap surgery   • CENTRAL VENOUS CATHETER TUNNELED INSERTION SINGLE LUMEN      Placement of indwelling Pleurx catheter right   • COLONOSCOPY N/A 1/26/2017    Procedure: COLONOSCOPY;  Surgeon: Robert Clifton MD;  Location: [facility] ENDOSCOPY;  Service:    • ENDOSCOPY N/A 1/26/2017    Procedure: ESOPHAGOGASTRODUODENOSCOPY;  Surgeon: Robert Clifton MD;  Location: [facility] ENDOSCOPY;  Service:    • ENDOSCOPY N/A 2/24/2017    Procedure: ESOPHAGOGASTRODUODENOSCOPY;  Surgeon: Robert Clifton MD;  Location: [facility] ENDOSCOPY;  Service:    • HYSTERECTOMY      vaginal   • LIVER BIOPSY  2015   • LUNG VOLUME REDUCTION     • MASTECTOMY      partial   • OTHER SURGICAL HISTORY  05/05/2016    central line insertion , PICC line replacement   • OTHER SURGICAL HISTORY      place breast clip percut   • OTHER SURGICAL HISTORY      pulse oximetry   • OTHER SURGICAL HISTORY      remove cc cath w/ sc port or pump, Removal of right internal jugular MediPort   • OTHER SURGICAL HISTORY      spirometry   • OTHER SURGICAL HISTORY      tubal sterilization   • PAP SMEAR     • THORACENTESIS      aspiration of pleural space   • TUBAL ABDOMINAL LIGATION  2009   • UPPER GASTROINTESTINAL ENDOSCOPY  01/26/2017   • UPPER GASTROINTESTINAL ENDOSCOPY  02/24/2017   • VENOUS ACCESS DEVICE (PORT) INSERTION      Ultrasound guided right internal jugular Mediport placement under fluoroscopy       Family History   Problem Relation Age of Onset   • Coronary artery disease Other    • Diabetes Other    • Hypertension Other    • Amblyopia Other    • Cataracts Maternal Grandfather "    • Cataracts Paternal Grandmother        Social History     Social History   • Marital status:      Spouse name: N/A   • Number of children: N/A   • Years of education: N/A     Social History Main Topics   • Smoking status: Former Smoker     Quit date: 2012   • Smokeless tobacco: Never Used   • Alcohol use No   • Drug use: No   • Sexual activity: Defer     Other Topics Concern   • Not on file     Social History Narrative           Objective   Physical Exam   Constitutional: She is oriented to person, place, and time. She appears well-developed and well-nourished. No distress.   HENT:   Head: Normocephalic and atraumatic.   Right Ear: External ear normal.   Left Ear: External ear normal.   Nose: Nose normal.   Mouth/Throat: Oropharynx is clear and moist.   Eyes: Conjunctivae and EOM are normal. Pupils are equal, round, and reactive to light.   Horizontal nystagmus.  Negative test of skew positive doll's eye reflex funduscopic exam negative for papilledema   Neck: Normal range of motion. Neck supple. No JVD present. No tracheal deviation present. No thyromegaly present.   Cardiovascular: Normal rate, regular rhythm, normal heart sounds and intact distal pulses.  Exam reveals no gallop and no friction rub.    No murmur heard.  Pulmonary/Chest: Effort normal and breath sounds normal. No stridor. No respiratory distress. She has no wheezes. She has no rales. She exhibits no tenderness.   Abdominal: Soft. Bowel sounds are normal. She exhibits no distension and no mass. There is no tenderness. There is no rebound and no guarding. No hernia.   Musculoskeletal: Normal range of motion. She exhibits no edema, tenderness or deformity.   Lymphadenopathy:     She has no cervical adenopathy.   Neurological: She is alert and oriented to person, place, and time. She has normal reflexes. She displays normal reflexes. No cranial nerve deficit. She exhibits normal muscle tone. Coordination normal.   Cerebellar function is  intact   Skin: Skin is warm and dry. No rash noted. No erythema. No pallor.   Psychiatric: She has a normal mood and affect. Her behavior is normal.   Nursing note and vitals reviewed.      ECG 12 Lead    Date/Time: 11/19/2017 4:01 PM  Performed by: JOLEEN SNELL  Authorized by: JOLEEN SNELL   Interpreted by physician  Rhythm: sinus rhythm  Rate: normal  QRS axis: normal  ST Segments: ST segments normal  T Waves: T waves normal  Clinical impression: non-specific ECG               ED Course  ED Course        Labs Reviewed - No data to display    CT Head Without Contrast   Final Result   CONCLUSION:   1.  2 cm sized lesion with associated vasogenic edema in the   right parietal lobe posteriorly presumably representing an   intracranial metastasis.          If signs or symptoms persist beyond reasonable expectations, a   MRI examination is suggested as is deemed clinically appropriate.                 Electronically signed by:  DALILA Castro MD  11/19/2017 5:00   PM CST Workstation: [id number]                          MDM  Number of Diagnoses or Management Options  Vasogenic brain edema:   Diagnosis management comments: Differential tension headache migraine headache subarachnoid hemorrhage less likely meningitis less likely mass    Patient Progress  Patient progress: (Review of patient's medical records show that she actually does have primary breast cancer with metastases to the spine and the liver she has a new lesion on her parietal lobe today on the right with some vasogenic edema she still has persistent headaches with position change and some nausea with position change plan of care is transferred to [facility] for further evaluation and higher level of care given dose of Decadron and Keppra here in the emergency Department accepted by Dr. Jones)      Final diagnoses:   Vasogenic brain edema            Joleen Snell MD  11/19/17 [id number]

## 2017-11-20 ENCOUNTER — APPOINTMENT (OUTPATIENT)
Dept: ONCOLOGY | Facility: HOSPITAL | Age: 42
End: 2017-11-20

## 2017-11-20 NOTE — ED NOTES
Report called to Eliza at [facility].     Yuli Farris LPN  11/19/17 1929

## 2017-11-27 ENCOUNTER — DOCUMENTATION (OUTPATIENT)
Dept: ONCOLOGY | Facility: CLINIC | Age: 42
End: 2017-11-27

## 2017-11-27 NOTE — PROGRESS NOTES
Oncology SW advised by radiation RN that pt is currently in [facility] and undergoing radiation for brain mets.  Referral received for radiation to be completed as out patient at [facility]. Undersigned contacted [facility] and spoke with charge nurse on her floor. Pt. Remains admitted and has to date received 3 of 15 radiation treatments to brain.  LCSW left message for RN/SW  to contact in the am to discuss pt. Needs and out patient arrangements to insure continuity of care related to her treatments. Pt. Reportedly anticipated to discharge within next couple of days.

## 2017-11-28 DIAGNOSIS — C50.919 MALIGNANT NEOPLASM OF FEMALE BREAST, UNSPECIFIED ESTROGEN RECEPTOR STATUS, UNSPECIFIED LATERALITY, UNSPECIFIED SITE OF BREAST (HCC): Primary | ICD-10-CM

## 2017-11-30 ENCOUNTER — DOCUMENTATION (OUTPATIENT)
Dept: ONCOLOGY | Facility: CLINIC | Age: 42
End: 2017-11-30

## 2017-11-30 ENCOUNTER — CONSULT (OUTPATIENT)
Dept: RADIATION ONCOLOGY | Facility: HOSPITAL | Age: 42
End: 2017-11-30

## 2017-11-30 ENCOUNTER — HOSPITAL ENCOUNTER (OUTPATIENT)
Dept: RADIATION ONCOLOGY | Facility: HOSPITAL | Age: 42
Setting detail: RADIATION/ONCOLOGY SERIES
End: 2017-11-30

## 2017-11-30 VITALS
DIASTOLIC BLOOD PRESSURE: 100 MMHG | TEMPERATURE: 98.6 F | HEART RATE: 91 BPM | SYSTOLIC BLOOD PRESSURE: 171 MMHG | RESPIRATION RATE: 20 BRPM

## 2017-11-30 DIAGNOSIS — C50.919 MALIGNANT NEOPLASM OF BREAST METASTATIC TO BRAIN, UNSPECIFIED LATERALITY (HCC): Primary | ICD-10-CM

## 2017-11-30 DIAGNOSIS — C79.31 MALIGNANT NEOPLASM OF BREAST METASTATIC TO BRAIN, UNSPECIFIED LATERALITY (HCC): Primary | ICD-10-CM

## 2017-11-30 DIAGNOSIS — Z87.891 FORMER SMOKER, STOPPED SMOKING IN DISTANT PAST: ICD-10-CM

## 2017-11-30 DIAGNOSIS — Z71.9 ENCOUNTER FOR CONSULTATION: ICD-10-CM

## 2017-11-30 DIAGNOSIS — Z92.3 HX OF RADIATION THERAPY: ICD-10-CM

## 2017-11-30 PROCEDURE — 77290 THER RAD SIMULAJ FIELD CPLX: CPT | Performed by: RADIOLOGY

## 2017-11-30 PROCEDURE — 77307 TELETHX ISODOSE PLAN CPLX: CPT | Performed by: RADIOLOGY

## 2017-11-30 PROCEDURE — 77412 RADIATION TX DELIVERY LVL 3: CPT | Performed by: RADIOLOGY

## 2017-11-30 PROCEDURE — 77334 RADIATION TREATMENT AID(S): CPT | Performed by: RADIOLOGY

## 2017-11-30 PROCEDURE — 77263 THER RADIOLOGY TX PLNG CPLX: CPT | Performed by: RADIOLOGY

## 2017-11-30 RX ORDER — DEXAMETHASONE 4 MG/1
4 TABLET ORAL DAILY
COMMUNITY
Start: 2017-11-29 | End: 2018-03-05

## 2017-12-01 ENCOUNTER — HOSPITAL ENCOUNTER (EMERGENCY)
Facility: HOSPITAL | Age: 42
Discharge: HOME | End: 2017-12-01
Attending: FAMILY MEDICINE | Admitting: FAMILY MEDICINE

## 2017-12-01 ENCOUNTER — HOSPITAL ENCOUNTER (OUTPATIENT)
Dept: RADIATION ONCOLOGY | Facility: HOSPITAL | Age: 42
Setting detail: RADIATION/ONCOLOGY SERIES
End: 2017-12-01

## 2017-12-01 VITALS
DIASTOLIC BLOOD PRESSURE: 98 MMHG | BODY MASS INDEX: 33.32 KG/M2 | OXYGEN SATURATION: 96 % | TEMPERATURE: 98.1 F | SYSTOLIC BLOOD PRESSURE: 188 MMHG | RESPIRATION RATE: 18 BRPM | WEIGHT: 200 LBS | HEART RATE: 92 BPM | HEIGHT: 65 IN

## 2017-12-01 DIAGNOSIS — T83.038A LEAKAGE FROM URINARY CATHETER, INITIAL ENCOUNTER (HCC): Primary | ICD-10-CM

## 2017-12-01 PROCEDURE — 77412 RADIATION TX DELIVERY LVL 3: CPT

## 2017-12-01 PROCEDURE — 99282 EMERGENCY DEPT VISIT SF MDM: CPT

## 2017-12-01 PROCEDURE — 77417 THER RADIOLOGY PORT IMAGE(S): CPT | Performed by: RADIOLOGY

## 2017-12-01 NOTE — ED TRIAGE NOTES
Pt presents to ED with c/o urinary catheter leaking and pain in her bladder. Pt reports needing the catheter removed.

## 2017-12-01 NOTE — ED PROVIDER NOTES
Subjective     History provided by:  Patient   used: No    patient said that she came to ER due to leaking of the urinary cathter. Patient is nit sure why she had it. Denied any fever.  Review of Systems   All other systems reviewed and are negative.      Past Medical History:   Diagnosis Date   • Abnormal breathing sounds    • Abscess of skin     and / or subcutaneous tissue   • Acute bronchitis     unspecified   • Adult body mass index 30+     obesity-BMI 33   • Allergic rhinitis    • Anasarca    • Anxiety     currently on xanax   • Anxiety    • Asthenia    • Asthma     moderate persistent   • Asthmatic bronchitis    • Astigmatism    • Bleeding external hemorrhoids    • Body mass index (BMI) of 34.0-34.9 in adult    • Body mass index (BMI) of 35.0-35.9 in adult    • Body mass index (BMI) of 36.0-36.9 in adult    • Body mass index 40.0-44.9, adult     SEVERELY OBESE   • Brain cancer 11/19/2017   • Cellulitis    • Chemotherapy induced nausea and vomiting    • Chronic back pain    • Chronic cough    • Chronic hypoxemic respiratory failure     on NC at 3 LPM   • Cough    • Depressive disorder    • Diabetes mellitus     no retinopathy   • Dyslipidemia    • Edema of lower extremity    • Encounter for follow-up examination after completed treatment for malignant neoplasm    • Epidermoid cyst of skin     excision with secondary intention healing   • Excessive and frequent menstruation with irregular cycle     Vaginal bleeding after 4 years of no bleeding secondary to chemotherapy for breast cancer; currently being treated for breast cancer for the second time, mets to bone and liver while on chemotherapy      • Flushing    • 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    • Gastroesophageal reflux disease    • H/O tubal ligation    • History of blood clots     clots around mediports x 2   • Hx of echocardiogram     w/ color flow, and w/o color flow   • Hyperglycemia    • Hypermetropia    • Hypertension   "  • Hypokalemia    • Impaired glucose tolerance     hgbalc 6.2   • Infection of skin     and /or subcutaneous tissue-around the catheter exit site   • Influenza     needs influenza immunization   • Irregular periods    • Lesion of lumbar spine     pain controlled with percocet and lidocaine patches   • Lumbago with sciatica    • Malignant neoplasm of female breast     s/p right mastectomy, mets to bone and liver on chemotherapy      • Menopausal flushing    • Muscle weakness    • Muscle weakness (generalized)    • Nonspecific interstitial pneumonia     symptomatically improved   • Pleural effusion     refractory massive right, most likely malignant effusion   • Primary atypical pneumonia    • Renal failure     acute drug-induced renal failure   • Sinus tachycardia    • Tobacco dependence syndrome    • Tobacco non-user    • Upper respiratory infection    • Wheezing        Allergies   Allergen Reactions   • Lisinopril Shortness Of Breath   • Flagyl [Metronidazole] Other (See Comments)     pancreatitis   • Fentanyl Anxiety     \"goes out of her mind\"   • Latex Rash       Past Surgical History:   Procedure Laterality Date   • ANKLE LOOSE BODY EXCISION/REMOVAL Right 5/24/2017    Procedure: RIGTH FOOT EXCISION NAVICULAR FRACTURES FRAGMENT  DEBRIDEMENT TALONAVILCULA RJOIN T;  Surgeon: Armani Oliveira DPM;  Location: [facility] OR;  Service:    • BREAST SURGERY Right 2013     reconstruction of right breast, trans flap surgery   • CENTRAL VENOUS CATHETER TUNNELED INSERTION SINGLE LUMEN      Placement of indwelling Pleurx catheter right   • COLONOSCOPY N/A 1/26/2017    Procedure: COLONOSCOPY;  Surgeon: Robert Clifton MD;  Location: [facility] ENDOSCOPY;  Service:    • ENDOSCOPY N/A 1/26/2017    Procedure: ESOPHAGOGASTRODUODENOSCOPY;  Surgeon: Robert Clifton MD;  Location: [facility] ENDOSCOPY;  Service:    • ENDOSCOPY N/A 2/24/2017    Procedure: ESOPHAGOGASTRODUODENOSCOPY;  Surgeon: Robert Clifton MD;  Location: [facility] ENDOSCOPY;  " "Service:    • HYSTERECTOMY      vaginal   • LIVER BIOPSY  2015   • LUNG VOLUME REDUCTION     • MASTECTOMY      partial   • OTHER SURGICAL HISTORY  05/05/2016    central line insertion , PICC line replacement   • OTHER SURGICAL HISTORY      place breast clip percut   • OTHER SURGICAL HISTORY      pulse oximetry   • OTHER SURGICAL HISTORY      remove cc cath w/ sc port or pump, Removal of right internal jugular MediPort   • OTHER SURGICAL HISTORY      spirometry   • OTHER SURGICAL HISTORY      tubal sterilization   • PAP SMEAR     • THORACENTESIS      aspiration of pleural space   • TUBAL ABDOMINAL LIGATION  2009   • UPPER GASTROINTESTINAL ENDOSCOPY  01/26/2017   • UPPER GASTROINTESTINAL ENDOSCOPY  02/24/2017   • VENOUS ACCESS DEVICE (PORT) INSERTION      Ultrasound guided right internal jugular Mediport placement under fluoroscopy       Family History   Problem Relation Age of Onset   • Coronary artery disease Other    • Diabetes Other    • Hypertension Other    • Amblyopia Other    • Cataracts Maternal Grandfather    • Cataracts Paternal Grandmother        Social History     Social History   • Marital status:      Spouse name: N/A   • Number of children: N/A   • Years of education: N/A     Social History Main Topics   • Smoking status: Former Smoker     Quit date: 2012   • Smokeless tobacco: Never Used   • Alcohol use No   • Drug use: No   • Sexual activity: Defer     Other Topics Concern   • None     Social History Narrative     /85 (BP Location: Left arm, Patient Position: Lying)  Pulse 98  Temp 98.1 °F (36.7 °C) (Oral)   Resp 18  Ht 65\" (165.1 cm)  Wt 200 lb (90.7 kg)  SpO2 98%  BMI 33.28 kg/m2      Objective   Physical Exam   Constitutional: She is oriented to person, place, and time. She appears well-developed and well-nourished.   Neck: Normal range of motion. Neck supple.   Cardiovascular: Normal rate, regular rhythm, normal heart sounds and intact distal pulses.    Pulmonary/Chest: " Effort normal and breath sounds normal.   Abdominal: Soft. Bowel sounds are normal.   Neurological: She is alert and oriented to person, place, and time. She has normal reflexes.   Skin: Skin is warm.   Nursing note and vitals reviewed.      Procedures         ED Course  ED Course    patient refuse a urinary catheter.               MDM    Final diagnoses:   Leakage from urinary catheter, initial encounter            Boogie Nath MD  12/01/17 1106       Boogie Nath MD  12/02/17 4206

## 2017-12-04 ENCOUNTER — APPOINTMENT (OUTPATIENT)
Dept: GENERAL RADIOLOGY | Facility: HOSPITAL | Age: 42
End: 2017-12-04

## 2017-12-04 ENCOUNTER — HOSPITAL ENCOUNTER (EMERGENCY)
Facility: HOSPITAL | Age: 42
Discharge: ANOTHER HOSPITAL | End: 2017-12-04
Attending: EMERGENCY MEDICINE | Admitting: EMERGENCY MEDICINE

## 2017-12-04 ENCOUNTER — APPOINTMENT (OUTPATIENT)
Dept: CT IMAGING | Facility: HOSPITAL | Age: 42
End: 2017-12-04

## 2017-12-04 VITALS
SYSTOLIC BLOOD PRESSURE: 118 MMHG | HEIGHT: 62 IN | DIASTOLIC BLOOD PRESSURE: 85 MMHG | HEART RATE: 80 BPM | OXYGEN SATURATION: 95 % | TEMPERATURE: 98.2 F | BODY MASS INDEX: 37.73 KG/M2 | RESPIRATION RATE: 16 BRPM | WEIGHT: 205 LBS

## 2017-12-04 DIAGNOSIS — G91.9 HYDROCEPHALUS (HCC): Primary | ICD-10-CM

## 2017-12-04 DIAGNOSIS — C50.919 METASTATIC BREAST CANCER: ICD-10-CM

## 2017-12-04 DIAGNOSIS — E87.1 HYPONATREMIA: ICD-10-CM

## 2017-12-04 PROBLEM — C79.31 MALIGNANT NEOPLASM OF BREAST METASTATIC TO BRAIN (HCC): Status: ACTIVE | Noted: 2017-12-04

## 2017-12-04 PROBLEM — Z87.891 FORMER SMOKER, STOPPED SMOKING IN DISTANT PAST: Status: ACTIVE | Noted: 2017-12-04

## 2017-12-04 PROBLEM — Z92.3 HX OF RADIATION THERAPY: Status: ACTIVE | Noted: 2017-12-04

## 2017-12-04 PROBLEM — Z71.9 ENCOUNTER FOR CONSULTATION: Status: ACTIVE | Noted: 2017-12-04

## 2017-12-04 LAB
ALBUMIN SERPL-MCNC: 3.6 G/DL (ref 3.4–4.8)
ALBUMIN/GLOB SERPL: 1.2 G/DL (ref 1.1–1.8)
ALP SERPL-CCNC: 573 U/L (ref 38–126)
ALT SERPL W P-5'-P-CCNC: 642 U/L (ref 9–52)
ANION GAP SERPL CALCULATED.3IONS-SCNC: 10 MMOL/L (ref 5–15)
APTT PPP: 22.7 SECONDS (ref 20–40.3)
AST SERPL-CCNC: 404 U/L (ref 14–36)
BACTERIA UR QL AUTO: ABNORMAL /HPF
BASOPHILS # BLD AUTO: 0 10*3/MM3 (ref 0–0.2)
BASOPHILS NFR BLD AUTO: 0 % (ref 0–2)
BILIRUB SERPL-MCNC: 2.5 MG/DL (ref 0.2–1.3)
BILIRUB UR QL STRIP: ABNORMAL
BUN BLD-MCNC: 23 MG/DL (ref 7–21)
BUN/CREAT SERPL: 28.8 (ref 7–25)
CALCIUM SPEC-SCNC: 8.3 MG/DL (ref 8.4–10.2)
CHLORIDE SERPL-SCNC: 93 MMOL/L (ref 95–110)
CK MB SERPL-CCNC: 1.36 NG/ML (ref 0–5)
CK SERPL-CCNC: 31 U/L (ref 30–135)
CLARITY UR: ABNORMAL
CO2 SERPL-SCNC: 24 MMOL/L (ref 22–31)
COLOR UR: ABNORMAL
CREAT BLD-MCNC: 0.8 MG/DL (ref 0.5–1)
DEPRECATED RDW RBC AUTO: 45 FL (ref 36.4–46.3)
EOSINOPHIL # BLD AUTO: 0 10*3/MM3 (ref 0–0.7)
EOSINOPHIL NFR BLD AUTO: 0 % (ref 0–7)
ERYTHROCYTE [DISTWIDTH] IN BLOOD BY AUTOMATED COUNT: 15.4 % (ref 11.5–14.5)
GFR SERPL CREATININE-BSD FRML MDRD: 96 ML/MIN/1.73 (ref 58–135)
GLOBULIN UR ELPH-MCNC: 2.9 GM/DL (ref 2.3–3.5)
GLUCOSE BLD-MCNC: 275 MG/DL (ref 60–100)
GLUCOSE BLDC GLUCOMTR-MCNC: 237 MG/DL (ref 70–130)
GLUCOSE UR STRIP-MCNC: ABNORMAL MG/DL
HCT VFR BLD AUTO: 46 % (ref 35–45)
HGB BLD-MCNC: 15.9 G/DL (ref 12–15.5)
HGB UR QL STRIP.AUTO: NEGATIVE
HOLD SPECIMEN: NORMAL
HYALINE CASTS UR QL AUTO: ABNORMAL /LPF
IMM GRANULOCYTES # BLD: 0.1 10*3/MM3 (ref 0–0.02)
IMM GRANULOCYTES NFR BLD: 0.7 % (ref 0–0.5)
INR PPP: 1.02 (ref 0.8–1.2)
KETONES UR QL STRIP: ABNORMAL
LEUKOCYTE ESTERASE UR QL STRIP.AUTO: ABNORMAL
LYMPHOCYTES # BLD AUTO: 0.2 10*3/MM3 (ref 0.6–4.2)
LYMPHOCYTES NFR BLD AUTO: 1.4 % (ref 10–50)
MAGNESIUM SERPL-MCNC: 2.1 MG/DL (ref 1.6–2.3)
MCH RBC QN AUTO: 27.9 PG (ref 26.5–34)
MCHC RBC AUTO-ENTMCNC: 34.6 G/DL (ref 31.4–36)
MCV RBC AUTO: 80.8 FL (ref 80–98)
MONOCYTES # BLD AUTO: 0.76 10*3/MM3 (ref 0–0.9)
MONOCYTES NFR BLD AUTO: 5.2 % (ref 0–12)
NEUTROPHILS # BLD AUTO: 13.6 10*3/MM3 (ref 2–8.6)
NEUTROPHILS NFR BLD AUTO: 92.7 % (ref 37–80)
NITRITE UR QL STRIP: NEGATIVE
PH UR STRIP.AUTO: 6 [PH] (ref 5–9)
PLATELET # BLD AUTO: 95 10*3/MM3 (ref 150–450)
PMV BLD AUTO: 11.8 FL (ref 8–12)
POTASSIUM BLD-SCNC: 3.4 MMOL/L (ref 3.5–5.1)
PROT SERPL-MCNC: 6.5 G/DL (ref 6.3–8.6)
PROT UR QL STRIP: ABNORMAL
PROTHROMBIN TIME: 13.3 SECONDS (ref 11.1–15.3)
RBC # BLD AUTO: 5.69 10*6/MM3 (ref 3.77–5.16)
RBC # UR: ABNORMAL /HPF
RBC MORPH BLD: NORMAL
REF LAB TEST METHOD: ABNORMAL
SMALL PLATELETS BLD QL SMEAR: NORMAL
SODIUM BLD-SCNC: 127 MMOL/L (ref 137–145)
SP GR UR STRIP: 1.04 (ref 1–1.03)
SQUAMOUS #/AREA URNS HPF: ABNORMAL /HPF
TROPONIN I SERPL-MCNC: 0.01 NG/ML
UROBILINOGEN UR QL STRIP: ABNORMAL
WBC MORPH BLD: NORMAL
WBC NRBC COR # BLD: 14.66 10*3/MM3 (ref 3.2–9.8)
WBC UR QL AUTO: ABNORMAL /HPF

## 2017-12-04 PROCEDURE — 87086 URINE CULTURE/COLONY COUNT: CPT | Performed by: EMERGENCY MEDICINE

## 2017-12-04 PROCEDURE — 80053 COMPREHEN METABOLIC PANEL: CPT | Performed by: EMERGENCY MEDICINE

## 2017-12-04 PROCEDURE — 0 IOPAMIDOL 61 % SOLUTION: Performed by: EMERGENCY MEDICINE

## 2017-12-04 PROCEDURE — 83735 ASSAY OF MAGNESIUM: CPT | Performed by: EMERGENCY MEDICINE

## 2017-12-04 PROCEDURE — 85730 THROMBOPLASTIN TIME PARTIAL: CPT | Performed by: EMERGENCY MEDICINE

## 2017-12-04 PROCEDURE — 82553 CREATINE MB FRACTION: CPT | Performed by: EMERGENCY MEDICINE

## 2017-12-04 PROCEDURE — 77412 RADIATION TX DELIVERY LVL 3: CPT

## 2017-12-04 PROCEDURE — 84484 ASSAY OF TROPONIN QUANT: CPT | Performed by: EMERGENCY MEDICINE

## 2017-12-04 PROCEDURE — 70470 CT HEAD/BRAIN W/O & W/DYE: CPT

## 2017-12-04 PROCEDURE — 99285 EMERGENCY DEPT VISIT HI MDM: CPT

## 2017-12-04 PROCEDURE — 82962 GLUCOSE BLOOD TEST: CPT

## 2017-12-04 PROCEDURE — 25010000002 DEXAMETHASONE PER 1 MG: Performed by: EMERGENCY MEDICINE

## 2017-12-04 PROCEDURE — 96374 THER/PROPH/DIAG INJ IV PUSH: CPT

## 2017-12-04 PROCEDURE — 87186 SC STD MICRODIL/AGAR DIL: CPT | Performed by: EMERGENCY MEDICINE

## 2017-12-04 PROCEDURE — 85610 PROTHROMBIN TIME: CPT | Performed by: EMERGENCY MEDICINE

## 2017-12-04 PROCEDURE — 96375 TX/PRO/DX INJ NEW DRUG ADDON: CPT

## 2017-12-04 PROCEDURE — 93005 ELECTROCARDIOGRAM TRACING: CPT | Performed by: EMERGENCY MEDICINE

## 2017-12-04 PROCEDURE — 93010 ELECTROCARDIOGRAM REPORT: CPT | Performed by: INTERNAL MEDICINE

## 2017-12-04 PROCEDURE — 87077 CULTURE AEROBIC IDENTIFY: CPT | Performed by: EMERGENCY MEDICINE

## 2017-12-04 PROCEDURE — 82550 ASSAY OF CK (CPK): CPT | Performed by: EMERGENCY MEDICINE

## 2017-12-04 PROCEDURE — 96361 HYDRATE IV INFUSION ADD-ON: CPT

## 2017-12-04 PROCEDURE — 25010000002 LORAZEPAM PER 2 MG: Performed by: EMERGENCY MEDICINE

## 2017-12-04 PROCEDURE — 85007 BL SMEAR W/DIFF WBC COUNT: CPT | Performed by: EMERGENCY MEDICINE

## 2017-12-04 PROCEDURE — 81001 URINALYSIS AUTO W/SCOPE: CPT | Performed by: EMERGENCY MEDICINE

## 2017-12-04 PROCEDURE — 71010 HC CHEST PA OR AP: CPT

## 2017-12-04 PROCEDURE — 77336 RADIATION PHYSICS CONSULT: CPT | Performed by: RADIOLOGY

## 2017-12-04 PROCEDURE — 85025 COMPLETE CBC W/AUTO DIFF WBC: CPT | Performed by: EMERGENCY MEDICINE

## 2017-12-04 PROCEDURE — 63710000001 INSULIN REGULAR HUMAN PER 5 UNITS: Performed by: EMERGENCY MEDICINE

## 2017-12-04 RX ORDER — DEXAMETHASONE SODIUM PHOSPHATE 10 MG/ML
6 INJECTION INTRAMUSCULAR; INTRAVENOUS ONCE
Status: COMPLETED | OUTPATIENT
Start: 2017-12-04 | End: 2017-12-04

## 2017-12-04 RX ORDER — SODIUM CHLORIDE 9 MG/ML
125 INJECTION, SOLUTION INTRAVENOUS CONTINUOUS
Status: DISCONTINUED | OUTPATIENT
Start: 2017-12-04 | End: 2017-12-04 | Stop reason: HOSPADM

## 2017-12-04 RX ORDER — POTASSIUM CHLORIDE 750 MG/1
20 CAPSULE, EXTENDED RELEASE ORAL ONCE
Status: DISCONTINUED | OUTPATIENT
Start: 2017-12-04 | End: 2017-12-04 | Stop reason: HOSPADM

## 2017-12-04 RX ORDER — LORAZEPAM 2 MG/ML
1 INJECTION INTRAMUSCULAR ONCE
Status: COMPLETED | OUTPATIENT
Start: 2017-12-04 | End: 2017-12-04

## 2017-12-04 RX ORDER — SODIUM CHLORIDE 0.9 % (FLUSH) 0.9 %
10 SYRINGE (ML) INJECTION AS NEEDED
Status: DISCONTINUED | OUTPATIENT
Start: 2017-12-04 | End: 2017-12-04 | Stop reason: HOSPADM

## 2017-12-04 RX ADMIN — DEXAMETHASONE SODIUM PHOSPHATE 6 MG: 10 INJECTION, SOLUTION INTRAMUSCULAR; INTRAVENOUS at 19:17

## 2017-12-04 RX ADMIN — HUMAN INSULIN 2 UNITS: 100 INJECTION, SOLUTION SUBCUTANEOUS at 17:54

## 2017-12-04 RX ADMIN — SODIUM CHLORIDE 125 ML/HR: 9 INJECTION, SOLUTION INTRAVENOUS at 16:24

## 2017-12-04 RX ADMIN — LORAZEPAM 1 MG: 2 INJECTION, SOLUTION INTRAMUSCULAR; INTRAVENOUS at 16:24

## 2017-12-04 RX ADMIN — IOPAMIDOL 92 ML: 612 INJECTION, SOLUTION INTRAVENOUS at 17:16

## 2017-12-04 NOTE — ED NOTES
CHARLEE, RN called ems for transport to [facility] for specialty services.  Will not have an EMS available to [facility].  Informed Dr. Matt.     Rica Sousa RN  12/04/17 8175

## 2017-12-04 NOTE — PROGRESS NOTES
[facility], SRadhaMD Rose Rowell BSN, PA-C  _______________________________________________  [facility]  Department of Radiation Oncology  [address] [place] [id number]  Office: [phone]  Fax: [phone]      DATE:  11/30/2017    PATIENT:   Andra Villareal 1975                                 MEDICAL RECORD #:  [id number]                                                       REASON FOR CONSULTATION:     1. Malignant neoplasm of breast metastatic to brain, unspecified laterality    2. Hx of radiation therapy    3. Former smoker, stopped smoking in distant past    4. Encounter for consultation         BRIEF HISTORY:    Andra Villareal is a very pleasant 41-year-old female with a past medical history significant for metastatic right breast cancer with liver and bone metastases currently on Arimidex since 07/19/17 now referred to our office to discuss radiotherapy options for metastatic disease of the brain.  Referral received for radiation to be completed as out patient at [facility], she had received 3 of 15 radiation treatments to brain as an inpatient at [facility].  On November 19th, 2017, a 2 cm sized lesion with associated vasogenic edema in theright parietal lobe posteriorly was noted on CT scan.       Mrs. Villareal has a history of Stage IV metastatic right breast cancer with liver and bone metastasis.  Patient was initially diagnosed in 2011 with a stage III disease.  Initially patient had a Taxotere carboplatin and Herceptin for 6 cycles followed by 1 year of Herceptin maintenance.  After that patient was getting Lupron and tamoxifen.  In March 2015 she was diagnosed with a metastatic disease involving liver and bone.  Patient was again started on Taxotere Herceptin and perjeta.  She could not tolerate Taxotere at that point she had a recurrent pneumonias and cytopenias.  Subsequently she was maintained on Herceptin and  perjeta until October 2016, at that point restaging CT scan showed there was anterior mediastinal mass for which patient was started on Taxol Herceptin and perjeta.  Patient received her last dose of Taxol on January 12, 2017.  Patient was started back on Herceptin and perjeta on February 9, 2017.   MRI of lumbar spine does show increased area of metastatic disease as compared to bone scan which was done in June 2017.  Her therapy was changed to Kadcyla with Arimidex on July 19, 2017.  And received 4 cycles of Kadcyla from July 19, 2017 until September 26, 2017.   Recently done CT of chest, abdomen and pelvis with contrast does not show any evidence of true progression.  Result of CT scan were discussed with patient.  We'll start her on Herceptin next week.  Patient will continue taking Arimidex along with calcium and vitamin D every day.    History obtained from  PATIENT, FAMILY and CHART    PAST MEDICAL HISTORY  Past Medical History:   Diagnosis Date   • Abnormal breathing sounds    • Abscess of skin     and / or subcutaneous tissue   • Acute bronchitis     unspecified   • Adult body mass index 30+     obesity-BMI 33   • Allergic rhinitis    • Anasarca    • Anxiety     currently on xanax   • Anxiety    • Asthenia    • Asthma     moderate persistent   • Asthmatic bronchitis    • Astigmatism    • Bleeding external hemorrhoids    • Body mass index (BMI) of 34.0-34.9 in adult    • Body mass index (BMI) of 35.0-35.9 in adult    • Body mass index (BMI) of 36.0-36.9 in adult    • Body mass index 40.0-44.9, adult     SEVERELY OBESE   • Brain cancer 11/19/2017   • Cellulitis    • Chemotherapy induced nausea and vomiting    • Chronic back pain    • Chronic cough    • Chronic hypoxemic respiratory failure     on NC at 3 LPM   • Cough    • Depressive disorder    • Diabetes mellitus     no retinopathy   • Dyslipidemia    • Edema of lower extremity    • Encounter for follow-up examination after completed treatment for malignant  neoplasm    • Epidermoid cyst of skin     excision with secondary intention healing   • Excessive and frequent menstruation with irregular cycle     Vaginal bleeding after 4 years of no bleeding secondary to chemotherapy for breast cancer; currently being treated for breast cancer for the second time, mets to bone and liver while on chemotherapy      • Flushing    • 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    • Gastroesophageal reflux disease    • H/O tubal ligation    • History of blood clots     clots around mediports x 2   • Hx of echocardiogram     w/ color flow, and w/o color flow   • Hyperglycemia    • Hypermetropia    • Hypertension    • Hypokalemia    • Impaired glucose tolerance     hgbalc 6.2   • Infection of skin     and /or subcutaneous tissue-around the catheter exit site   • Influenza     needs influenza immunization   • Irregular periods    • Lesion of lumbar spine     pain controlled with percocet and lidocaine patches   • Lumbago with sciatica    • Malignant neoplasm of female breast     s/p right mastectomy, mets to bone and liver on chemotherapy      • Menopausal flushing    • Muscle weakness    • Muscle weakness (generalized)    • Nonspecific interstitial pneumonia     symptomatically improved   • Pleural effusion     refractory massive right, most likely malignant effusion   • Primary atypical pneumonia    • Renal failure     acute drug-induced renal failure   • Sinus tachycardia    • Tobacco dependence syndrome    • Tobacco non-user    • Upper respiratory infection    • Wheezing       PAST SURGICAL HISTORY  Past Surgical History:   Procedure Laterality Date   • ANKLE LOOSE BODY EXCISION/REMOVAL Right 5/24/2017    Procedure: RIGTH FOOT EXCISION NAVICULAR FRACTURES FRAGMENT  DEBRIDEMENT TALONAVILCULA BRONSON WALLACE;  Surgeon: Armani Oliveira DPM;  Location: [facility];  Service:    • BREAST SURGERY Right 2013     reconstruction of right breast, trans flap surgery   • CENTRAL VENOUS CATHETER  TUNNELED INSERTION SINGLE LUMEN      Placement of indwelling Pleurx catheter right   • COLONOSCOPY N/A 1/26/2017    Procedure: COLONOSCOPY;  Surgeon: Robert Clifton MD;  Location: [facility] ENDOSCOPY;  Service:    • ENDOSCOPY N/A 1/26/2017    Procedure: ESOPHAGOGASTRODUODENOSCOPY;  Surgeon: Robert Clifton MD;  Location: [facility] ENDOSCOPY;  Service:    • ENDOSCOPY N/A 2/24/2017    Procedure: ESOPHAGOGASTRODUODENOSCOPY;  Surgeon: Robert Clifton MD;  Location: [facility] ENDOSCOPY;  Service:    • HYSTERECTOMY      vaginal   • LIVER BIOPSY  2015   • LUNG VOLUME REDUCTION     • MASTECTOMY      partial   • OTHER SURGICAL HISTORY  05/05/2016    central line insertion , PICC line replacement   • OTHER SURGICAL HISTORY      place breast clip percut   • OTHER SURGICAL HISTORY      pulse oximetry   • OTHER SURGICAL HISTORY      remove cc cath w/ sc port or pump, Removal of right internal jugular MediPort   • OTHER SURGICAL HISTORY      spirometry   • OTHER SURGICAL HISTORY      tubal sterilization   • PAP SMEAR     • THORACENTESIS      aspiration of pleural space   • TUBAL ABDOMINAL LIGATION  2009   • UPPER GASTROINTESTINAL ENDOSCOPY  01/26/2017   • UPPER GASTROINTESTINAL ENDOSCOPY  02/24/2017   • VENOUS ACCESS DEVICE (PORT) INSERTION      Ultrasound guided right internal jugular Mediport placement under fluoroscopy      SOCIAL HISTORY  Social History   Substance Use Topics   • Smoking status: Former Smoker     Quit date: 2012   • Smokeless tobacco: Never Used   • Alcohol use No     ALLERGIES  Lisinopril; Flagyl [metronidazole]; Fentanyl; and Latex     MEDICATIONS  Current Outpatient Prescriptions:   •  albuterol (PROAIR RESPICLICK) 108 (90 Base) MCG/ACT inhaler, Inhale 2 puffs Every 6 (Six) Hours As Needed for Wheezing or Shortness of Air., Disp: 1 inhaler, Rfl: 11  •  anastrozole (ARIMIDEX) 1 MG tablet, Take 1 tablet by mouth Daily., Disp: 30 tablet, Rfl: 3  •  Calcium Carb-Cholecalciferol (CALCIUM 1000 + D) 1000-800 MG-UNIT  tablet, Take 1 tablet by mouth Daily., Disp: 30 tablet, Rfl: 5  •  cetirizine (zyrTEC) 10 MG tablet, Take 1 tablet by mouth Daily., Disp: 30 tablet, Rfl: 5  •  clonazePAM (KlonoPIN) 2 MG tablet, Take 1 tablet by mouth 2 (Two) Times a Day As Needed for Anxiety., Disp: 60 tablet, Rfl: 2  •  dexamethasone (DECADRON) 4 MG tablet, Take 4 mg by mouth Daily., Disp: , Rfl:   •  diltiaZEM CD (CARDIZEM CD) 120 MG 24 hr capsule, Take 1 capsule by mouth Daily., Disp: 30 capsule, Rfl: 5  •  ferrous sulfate 325 (65 FE) MG tablet, Take 1 tablet by mouth 3 (Three) Times a Day., Disp: 90 tablet, Rfl: 5  •  fluticasone (FLONASE) 50 MCG/ACT nasal spray, 2 sprays into each nostril Daily., Disp: 16 g, Rfl: 12  •  Fluticasone Furoate-Vilanterol (BREO ELLIPTA) 100-25 MCG/INH aerosol powder , Inhale 1 puff Daily., Disp: 28 each, Rfl: 12  •  gabapentin (NEURONTIN) 300 MG capsule, Take 1 capsule by mouth 3 (Three) Times a Day., Disp: 90 capsule, Rfl: 0  •  insulin detemir (LEVEMIR) 100 UNIT/ML injection, Inject 8 Units under the skin Every Night., Disp: 10 mL, Rfl: 5  •  Insulin Lispro (HUMALOG KWIKPEN) 100 UNIT/ML solution pen-injector, To be used with sliding scale per protocol, Disp: 3 mL, Rfl: 11  •  lubiprostone (AMITIZA) 24 MCG capsule, Take 1 capsule by mouth 2 (Two) Times a Day With Meals., Disp: 60 capsule, Rfl: 5  •  magnesium oxide (MAGNESIUM-OXIDE) 400 (241.3 Mg) MG tablet tablet, Take 1 tablet by mouth 2 (Two) Times a Day., Disp: 60 tablet, Rfl: 11  •  montelukast (SINGULAIR) 10 MG tablet, Take 1 tablet by mouth Every Night., Disp: 30 tablet, Rfl: 5  •  Omega-3 Fatty Acids (FISH OIL) 1000 MG capsule capsule, Take 2 capsules by mouth Daily With Breakfast. (Patient taking differently: Take 1,000 mg by mouth 2 (Two) Times a Day.), Disp: 60 capsule, Rfl: 11  •  ondansetron (ZOFRAN) 8 MG tablet, Take 1 tablet by mouth Every 8 (Eight) Hours As Needed for Nausea or Vomiting., Disp: 30 tablet, Rfl: 0  •  oxyCODONE (ROXICODONE) 15 MG  immediate release tablet, Take 1 tablet by mouth Every 6 (Six) Hours As Needed for Moderate Pain ., Disp: 120 tablet, Rfl: 0  •  potassium chloride (K-DUR,KLOR-CON) 20 MEQ CR tablet, Take 1 tablet by mouth 2 (Two) Times a Day., Disp: 60 tablet, Rfl: 5  •  promethazine (PHENERGAN) 25 MG tablet, Take 1 tablet by mouth Every 6 (Six) Hours As Needed for nausea or vomiting., Disp: 120 tablet, Rfl: 5  •  raNITIdine (ZANTAC) 150 MG tablet, Take 1 tablet by mouth 2 (Two) Times a Day., Disp: 60 tablet, Rfl: 5  •  rivaroxaban (XARELTO) 20 MG tablet, Take 1 tablet by mouth Daily., Disp: 30 tablet, Rfl: 5  •  traZODone (DESYREL) 50 MG tablet, Take 1 tablet by mouth Every Night., Disp: 30 tablet, Rfl: 5  •  venlafaxine XR (EFFEXOR-XR) 75 MG 24 hr capsule, Take 1 capsule by mouth Daily., Disp: 30 capsule, Rfl: 5  •  vitamin C (ASCORBIC ACID) 500 MG tablet, Take 500 mg by mouth 3 (Three) Times a Day., Disp: , Rfl:   No current facility-administered medications for this visit.     Facility-Administered Medications Ordered in Other Visits:   •  sodium chloride 0.9 % flush 10 mL, 10 mL, Intravenous, PRN, Joselito Waddell MD, 10 mL at 01/26/17 1129    The following portions of the patient's history were reviewed and updated as appropriate: allergies, current medications, past family history, past medical history, past social history, past surgical history and problem list.    REVIEW OF SYSTEMS  Review of Systems     CONSTITUTIONAL: Positive for fatigue.  No fever, chills, or night sweats.    HEENT:  No epistaxis, mouth sores, or difficulty swallowing.  RESPIRATORY:  No new shortness of breath or cough at present.  CARDIOVASCULAR:  No chest pain or palpitations.  GASTROINTESTINAL:Denies any excessive nausea,vomitting and diarrhea after last cycle of kadcyla.  Complains of blood in the stool if she gets constipated.  No abdominal pain.  GENITOURINARY:  No dysuria or hematuria.  MUSCULOSKELETAL: Complains of discomfort And pain in right  ankle. States her back pain is improved.Complains of generalized bone pain since starting Arimidex.  NEUROLOGICAL: Complains of  persistent tingling and numbness affecting bilateral hand and bilateral feet. No new headache or dizziness.   LYMPHATICS:  Denies any abnormal swollen and anywhere in the body.  SKIN:  Complains of easy bruising and prolonged bleeding after minor cuts since starting Kadcyla.    PHYSICAL EXAM  VITAL SIGNS:   Vitals:    11/30/17 0918   BP: 171/100   Pulse: 91   Resp: 20   Temp: 98.6 °F (37 °C)   PainSc: 0-No pain   There is no height or weight on file to calculate BMI.  General Appearance:  awake, alert, oriented, in no acute distress, well developed, well nourished, alert and cooperative  Skin:  there are no suspicious lesions or rashes of concern, skin color, texture, turgor are normal  Eyes:  No gross abnormalities., PERRL and Sclera nonicteric  Ears:  canals and TMs NI and Hearing- normal to conversation  Nose/Sinuses:  Nares normal. Septum midline. Mucosa normal. No drainage or sinus tenderness.  Mouth/Throat:  Mucosa moist, no lesions; pharynx without erythema, edema or exudate.  Neck:  neck- supple, no mass, non-tender  Back:  Symmetric, no curvature, ROM normal, no CVA tenderness   Lungs:  Normal expansion.  Clear to auscultation.  No rales, rhonchi, or wheezing.  Heart:  Heart sounds are normal.  Regular rate and rhythm without murmur, gallop or rub.  Abdomen:  Soft, non-tender, normal bowel sounds; no bruits, organomegaly or masses.  Extremities: Extremities warm to touch, pink, with no edema.  Joint:  normal range of motion, no swelling, tenderness, or inflammation  Musculoskeletal:  Spine range of motion normal. Muscular strength intact., Range of motion normal in hips, knees, shoulders, and spine.  Peripheral Pulses:  Normal  Neurologic:  Alert and oriented x 3, gait normal., reflexes normal and symmetric, strength and  sensation grossly normal  Psych exam:alert,oriented, in NAD  with a full range of affect, normal behavior and no psychotic features    Clinical Quality Measures   Pain Documented PQRS #131 Pain severity quantified; no pain present, no followup plan required.   Care Plan: ADVANCED CARE PQRS #47 Care plan discussed, no care plan provided   Performance Status: ECOG (1) Restricted in physically strenuous activity, ambulatory and able to do work of light nature   TOBACCO SCREENING AND INTERVENTION  PQRS #226 Screened & identified as tobacco non-user. Former smoker    Medications Documented PQRS #130 Medications documented   WEIGHT SCREENING/BMI  Not eligible, overweight & managed by other physician    ASSESSMENT AND PLAN  1. Malignant neoplasm of breast metastatic to brain, unspecified laterality    2. Hx of radiation therapy    3. Former smoker, stopped smoking in distant past    4. Encounter for consultation      No orders of the defined types were placed in this encounter.      RECOMMENDATIONS: Mrs. Villareal is referred to our office to complete whole brain radiation after receiving 3 treatments in [facility].Reports foggy thinking. Is on Decadron 4 mg daily. Indications and rationale of radiation therapy according to the NCCN Guidelines to the brain have been discussed with the patient today. I have extensively reviewed the risks, benefits and alternatives of therapy, risks include headaches, hair loss, nausea, vomiting, fatigue, hearing loss, skin and scalp changes, trouble with memory and speech, seizures and progression of disease in spite of therapy with either local or systemic failure.      I am recommending radiation therapy to the brain which will consist of approximately 10 treatment fractions for total of 2500 cGy.  The patient verbalizes understanding of this discussion and voices no further questions and wishes to proceed with recommended therapy.  We will proceed with CT simulation today to initiate the treatment planning and notify the patient when complete  to begin.  No Follow-up on file.    Today, total time spent with this patient was 50  minutes and of that time, well over 50% was spent in counseling and coordination of care as follows: diagnosis, intent of treatment, discussing radiation therapy specifics: logistics, possible and probable side effects and after effects, staging of cancer, standard of care in for this stage of this cancer and treatment options  Shamir Sweet MD  11/30/2017 9:05 AM

## 2017-12-04 NOTE — ED PROVIDER NOTES
Subjective   HPI Comments: 41yo female pmh significant metastatic breast ca with brain, liver, bone metastases/htn/hyperlipidemia/dvt, presents ED via EMS with mother reporting patient exhibiting decreased responsiveness/lethargy onset 1400hrs.  Pt does not provide any history at time of exam.    Patient is a 42 y.o. female presenting with general illness.   Illness   Severity:  Unable to specify  Onset quality:  Sudden  Chronicity:  New  Associated symptoms: fatigue        Review of Systems   Unable to perform ROS: Mental status change   Constitutional: Positive for fatigue.       Past Medical History:   Diagnosis Date   • Abnormal breathing sounds    • Abscess of skin     and / or subcutaneous tissue   • Acute bronchitis     unspecified   • Adult body mass index 30+     obesity-BMI 33   • Allergic rhinitis    • Anasarca    • Anxiety     currently on xanax   • Anxiety    • Asthenia    • Asthma     moderate persistent   • Asthmatic bronchitis    • Astigmatism    • Bleeding external hemorrhoids    • Body mass index (BMI) of 34.0-34.9 in adult    • Body mass index (BMI) of 35.0-35.9 in adult    • Body mass index (BMI) of 36.0-36.9 in adult    • Body mass index 40.0-44.9, adult     SEVERELY OBESE   • Brain cancer 11/19/2017   • Cellulitis    • Chemotherapy induced nausea and vomiting    • Chronic back pain    • Chronic cough    • Chronic hypoxemic respiratory failure     on NC at 3 LPM   • Cough    • Depressive disorder    • Diabetes mellitus     no retinopathy   • Dyslipidemia    • Edema of lower extremity    • Encounter for follow-up examination after completed treatment for malignant neoplasm    • Epidermoid cyst of skin     excision with secondary intention healing   • Excessive and frequent menstruation with irregular cycle     Vaginal bleeding after 4 years of no bleeding secondary to chemotherapy for breast cancer; currently being treated for breast cancer for the second time, mets to bone and liver while on  "chemotherapy      • Flushing    • 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    • Gastroesophageal reflux disease    • H/O tubal ligation    • History of blood clots     clots around mediports x 2   • Hx of echocardiogram     w/ color flow, and w/o color flow   • Hyperglycemia    • Hypermetropia    • Hypertension    • Hypokalemia    • Impaired glucose tolerance     hgbalc 6.2   • Infection of skin     and /or subcutaneous tissue-around the catheter exit site   • Influenza     needs influenza immunization   • Irregular periods    • Lesion of lumbar spine     pain controlled with percocet and lidocaine patches   • Lumbago with sciatica    • Malignant neoplasm of female breast     s/p right mastectomy, mets to bone and liver on chemotherapy      • Menopausal flushing    • Muscle weakness    • Muscle weakness (generalized)    • Nonspecific interstitial pneumonia     symptomatically improved   • Pleural effusion     refractory massive right, most likely malignant effusion   • Primary atypical pneumonia    • Renal failure     acute drug-induced renal failure   • Sinus tachycardia    • Tobacco dependence syndrome    • Tobacco non-user    • Upper respiratory infection    • Wheezing        Allergies   Allergen Reactions   • Lisinopril Shortness Of Breath   • Flagyl [Metronidazole] Other (See Comments)     pancreatitis   • Fentanyl Anxiety     \"goes out of her mind\"   • Latex Rash       Past Surgical History:   Procedure Laterality Date   • ANKLE LOOSE BODY EXCISION/REMOVAL Right 5/24/2017    Procedure: RIGTH FOOT EXCISION NAVICULAR FRACTURES FRAGMENT  DEBRIDEMENT TALONAVILCULA RJOIN T;  Surgeon: Armani Oliveira DPM;  Location: [facility];  Service:    • BREAST SURGERY Right 2013     reconstruction of right breast, trans flap surgery   • CENTRAL VENOUS CATHETER TUNNELED INSERTION SINGLE LUMEN      Placement of indwelling Pleurx catheter right   • COLONOSCOPY N/A 1/26/2017    Procedure: COLONOSCOPY;  Surgeon: Robert GEORGE" MD Etienne;  Location: [facility] ENDOSCOPY;  Service:    • ENDOSCOPY N/A 1/26/2017    Procedure: ESOPHAGOGASTRODUODENOSCOPY;  Surgeon: Robert Clifton MD;  Location: [facility] ENDOSCOPY;  Service:    • ENDOSCOPY N/A 2/24/2017    Procedure: ESOPHAGOGASTRODUODENOSCOPY;  Surgeon: Robert Clifton MD;  Location: [facility] ENDOSCOPY;  Service:    • HYSTERECTOMY      vaginal   • LIVER BIOPSY  2015   • LUNG VOLUME REDUCTION     • MASTECTOMY      partial   • OTHER SURGICAL HISTORY  05/05/2016    central line insertion , PICC line replacement   • OTHER SURGICAL HISTORY      place breast clip percut   • OTHER SURGICAL HISTORY      pulse oximetry   • OTHER SURGICAL HISTORY      remove cc cath w/ sc port or pump, Removal of right internal jugular MediPort   • OTHER SURGICAL HISTORY      spirometry   • OTHER SURGICAL HISTORY      tubal sterilization   • PAP SMEAR     • THORACENTESIS      aspiration of pleural space   • TUBAL ABDOMINAL LIGATION  2009   • UPPER GASTROINTESTINAL ENDOSCOPY  01/26/2017   • UPPER GASTROINTESTINAL ENDOSCOPY  02/24/2017   • VENOUS ACCESS DEVICE (PORT) INSERTION      Ultrasound guided right internal jugular Mediport placement under fluoroscopy       Family History   Problem Relation Age of Onset   • Coronary artery disease Other    • Diabetes Other    • Hypertension Other    • Amblyopia Other    • Cataracts Maternal Grandfather    • Cataracts Paternal Grandmother        Social History     Social History   • Marital status:      Spouse name: N/A   • Number of children: N/A   • Years of education: N/A     Social History Main Topics   • Smoking status: Former Smoker     Quit date: 2012   • Smokeless tobacco: Never Used   • Alcohol use No   • Drug use: No   • Sexual activity: Defer     Other Topics Concern   • None     Social History Narrative           Objective   Physical Exam   Constitutional: She appears well-developed and well-nourished. She appears lethargic.   HENT:   Head: Normocephalic and  atraumatic.   Right Ear: External ear normal.   Left Ear: External ear normal.   Nose: Nose normal.   Mouth/Throat: Oropharynx is clear and moist.   Eyes: EOM are normal. Pupils are equal, round, and reactive to light.   Neck: Neck supple. No JVD present. No tracheal deviation present. No thyromegaly present.   Neg meningismus   Cardiovascular: Normal rate, regular rhythm, normal heart sounds and intact distal pulses.  Exam reveals no gallop and no friction rub.    No murmur heard.  Pulmonary/Chest: Effort normal and breath sounds normal. No stridor. She has no wheezes. She has no rales.   Abdominal: Soft. Bowel sounds are normal. There is no tenderness. There is no rebound and no guarding.   Lymphadenopathy:     She has no cervical adenopathy.   Neurological: She appears lethargic. No sensory deficit. GCS eye subscore is 3. GCS verbal subscore is 4. GCS motor subscore is 6.   Motor: generalized motor weakness noted. RUE 3/5, LUE 3/5, RLE 2/5, LLE 2.5  Sensation: intact x 4 extremities   Skin: Skin is warm and dry.   Nursing note and vitals reviewed.      ECG 12 Lead    Date/Time: 12/4/2017 4:43 PM  Performed by: MARYJANE MORALES  Authorized by: MARYJANE MORALES   Interpreted by physician  Rhythm: sinus rhythm  Rate: normal  BPM: 88  QRS axis: normal  Conduction: conduction normal  ST Segments: ST segments normal  Other findings: PRWP  Q waves: V4, V5 and V6  Clinical impression: abnormal ECG               ED Course  ED Course   Comment By Time   D/w Dr. Waddell. Recommends interfacility transfer to [facility]. Maryjane Morales MD 12/04 0152   [facility] transfer line contacted Maryjane Morales MD 12/04 1738      Labs Reviewed   COMPREHENSIVE METABOLIC PANEL - Abnormal; Notable for the following:        Result Value    Glucose 275 (*)     BUN 23 (*)     Sodium 127 (*)     Potassium 3.4 (*)     Chloride 93 (*)     Calcium 8.3 (*)     ALT (SGPT) 642 (*)     AST (SGOT) 404 (*)     Alkaline Phosphatase 573 (*)      Total Bilirubin 2.5 (*)     BUN/Creatinine Ratio 28.8 (*)     All other components within normal limits   CBC WITH AUTO DIFFERENTIAL - Abnormal; Notable for the following:     WBC 14.66 (*)     RBC 5.69 (*)     Hemoglobin 15.9 (*)     Hematocrit 46.0 (*)     RDW 15.4 (*)     Platelets 95 (*)     Neutrophil % 92.7 (*)     Lymphocyte % 1.4 (*)     Immature Grans % 0.7 (*)     Neutrophils, Absolute 13.60 (*)     Lymphocytes, Absolute 0.20 (*)     Immature Grans, Absolute 0.10 (*)     All other components within normal limits   PROTIME-INR - Normal    Narrative:     Therapeutic range for most indications is 2.0-3.0 INR,  or 2.5-3.5 for mechanical heart valves.   APTT - Normal    Narrative:     The recommended Heparin therapeutic range is 68-97 seconds.   TROPONIN (IN-HOUSE) - Normal   CK - Normal   CK MB - Normal   MAGNESIUM - Normal   URINALYSIS W/ CULTURE IF INDICATED   TROPONIN (IN-HOUSE)   SCAN SLIDE   POCT GLUCOSE FINGERSTICK   EXTRA TUBES    Narrative:     The following orders were created for panel order Extra Tubes.  Procedure                               Abnormality         Status                     ---------                               -----------         ------                     Gold Top - SST[[id number]]                                   Final result                 Please view results for these tests on the individual orders.   [facility]   CBC AND DIFFERENTIAL    Narrative:     The following orders were created for panel order CBC & Differential.  Procedure                               Abnormality         Status                     ---------                               -----------         ------                     Scan Slide[[id number]]                                       In process                 CBC Auto Differential[[id number]]        Abnormal            Final result                 Please view results for these tests on the individual orders.     Ct Head Without Contrast    Result Date:  11/19/2017  Narrative: .    EXAMINATION:  Computed Tomography    REGION:  Head         INDICATION:  Headache   HISTORY:  Breast carcinoma, renal failure   CORRELATIVE IMAGING:    none  TECHNIQUE:  iv contrast:  no  This exam was performed according to the departmental dose-optimization program which includes automated exposure control, adjustment of the mA and/or kV according to patient size and/or use of iterative reconstruction technique.          COMMENTS:            - atrophy:             none   - cortex: 2.1 cm focal cortical lesion in the right parietal lobe with a small amount of adjacent vasogenic edema. (Axial 33/60).    - deep white mat: wnl   - hemorrhage:        none     - fluid collection:  no intra/extra axial fluid collection   - mass / lesion:     no focal parenchymal lesion(s)     - gray/white jxn:  borders preserved       - brain stem:        wnl     - cerebellum:       wnl     - globes / retro:    wnl     - ventricles:         normal size / configuration     - midline shift:     no     - sinuses:             wnl     - mastoids:           wnl      - osseous:             wnl     - misc.: .       Impression: CONCLUSION: 1.  2 cm sized lesion with associated vasogenic edema in the right parietal lobe posteriorly presumably representing an intracranial metastasis.    If signs or symptoms persist beyond reasonable expectations, a MRI examination is suggested as is deemed clinically appropriate.      Electronically signed by:  DALILA Castro MD  11/19/2017 5:00 PM CST Workstation: [id number]    Ct Head With & Without Contrast    Result Date: 12/4/2017  Narrative: EXAM DESCRIPTION: CT HEAD W WO CONTRAST CLINICAL HISTORY:  ams  ; history of breast cancer.  COMPARISON: 11/19/2017 DOSE LENGTH PRODUCT: 1942.20 CONTRAST: 92 mL Isovue-300 TECHNIQUE:  Axial images from skull base to vertex prior to and following intravenous contrast administration.  This exam was performed according to our departmental  dose optimization program, which includes automated exposure control, adjustment of the mA and/or KV according to patient size and/or use of iterative reconstruction technique. FINDINGS: No Chiari malformation. Extra-axial spaces: No abnormal extra-axial fluid collections. Intracranial hemorrhage: No evidence of intracranial hemorrhage or suspicious extra-axial fluid collections. Ventricular system: There is mild dilation of the third and lateral ventricles related to a mid line enhancing intra-axial mass within the cerebellum compressing the fourth ventricle as detailed below. Basal cisterns: Mildly effaced. Cerebral parenchyma: There is an approximate 2.1 x 1.7 x 1.9 cm enhancing intra-axial mass within the right frontoparietal lobe peripherally with surrounding vasogenic edema. There is small focus of central necrosis. Within the left posterior para midline parietal lobe there is an additional 1.2 x 1.2 x 0.8 cm enhancing mass with mild surrounding edema. There is a 1.1 x 1.4 x 1.1 cm enhancing lesion right posterior para midline occipital lobe. Lateral to this abutting the falx is additional 0.6 x 0.9 x 0.8 cm intra-axial enhancing mass. There are other areas of faint enhancement that is also suspicious for micrometastatic disease. Midline shift: None.  Cerebellum: There are additional enhancing metastatic lesions within the posterior fossa. Within the midline posterior to the fourth ventricle is a 2.6 x 2.6 x 3.2 cm enhancing mass compressing the fourth ventricle contributing to the obstructive hydrocephalus. Left posterior cerebellum there is a 1.5 x 2.6 x 1.5 cm mass. There is a large somewhat amorphic area of heterogeneous enhancement involving the superior cerebellum and vermix bilaterally consistent with metastatic disease. Brainstem: There is mass effect upon the dorsum of the brainstem displacing the brainstem anteriorly with effacement of the prepontine cistern. Calvarium: No acute or suspicious  calvarial lesion.  Vascular system: Unremarkable.  Paranasal sinuses and mastoid air cells: Clear.  Visualized orbits: Unremarkable.  Visualized upper cervical spine: Not included. Sella: Unremarkable.  Skull base: Unremarkable. ADDITIONAL FINDINGS: None     Impression: As detailed above there is rather extensive supratentorial and infratentorial metastatic disease with multiple avidly enhancing intra-axial lesions seen bilaterally and in various lobes. There is surrounding vasogenic edema without midline shift. The large lesion within the central posterior fossa just posterior to the fourth ventricle in the midline contributes to compression of the fourth ventricle and mild obstructive hydrocephalus. It also contributes to mass effect upon the brainstem and effacement of the prepontine cistern. Basal cisterns are mildly effaced. Neurosurgical consultation is recommended. Comment: Findings are discussed with Dr. Aries Matt on 12/4/2017 at 5:40 PM CST. Electronically signed by:  Pascual Nicholas MD  12/4/2017 5:51 PM CST Workstation: [id number]    Xr Chest 1 View    Result Date: 12/4/2017  Narrative: PROCEDURE: Single chest view AP REASON FOR EXAM:ams FINDINGS: Comparison exam dated September 26, 2017. Left PICC line with tip overlying the region of the left subclavian vein. Cardiac and pulmonary vasculature are normal. Lungs are clear. Pleural spaces are normal. No acute osseous abnormality.     Impression: No acute cardiopulmonary abnormality. Electronically signed by:  Eduardo Delgado MD  12/4/2017 4:10 PM CST Workstation: [id number]                [place]    Final diagnoses:   Hydrocephalus   Metastatic breast cancer   Hyponatremia            Aries Matt MD  12/04/17 1752

## 2017-12-05 NOTE — ED NOTES
Transfer to [facility] ICU.  [facility] arranged for transportation.  Chart copied, face sheet faxed, radiology is being placed on a disk.

## 2017-12-06 LAB
BACTERIA SPEC AEROBE CULT: ABNORMAL
BACTERIA SPEC AEROBE CULT: ABNORMAL

## 2017-12-19 ENCOUNTER — DOCUMENTATION (OUTPATIENT)
Dept: ONCOLOGY | Facility: CLINIC | Age: 42
End: 2017-12-19

## 2017-12-19 NOTE — PROGRESS NOTES
Patient is being treated for metastatic breast cancer, with brain metastases. She was seen in ER and then transferred to [facility]. Her radiation plan for whole brain was to give 2000 cGy total. At the time of her admittance to [facility], she has received a total of 750 cGy for 3 out of 8 fractions.

## 2017-12-22 ENCOUNTER — TELEPHONE (OUTPATIENT)
Dept: ONCOLOGY | Facility: CLINIC | Age: 42
End: 2017-12-22

## 2017-12-22 RX ORDER — ANASTROZOLE 1 MG/1
1 TABLET ORAL DAILY
Qty: 30 TABLET | Refills: 3 | Status: SHIPPED | OUTPATIENT
Start: 2017-12-22 | End: 2017-12-26 | Stop reason: SDUPTHER

## 2017-12-26 DIAGNOSIS — J30.9 CHRONIC ALLERGIC RHINITIS, UNSPECIFIED SEASONALITY, UNSPECIFIED TRIGGER: ICD-10-CM

## 2017-12-26 DIAGNOSIS — E61.1 IRON DEFICIENCY: ICD-10-CM

## 2017-12-26 DIAGNOSIS — E83.42 HYPOMAGNESEMIA: ICD-10-CM

## 2017-12-26 RX ORDER — POTASSIUM CHLORIDE 20 MEQ/1
20 TABLET, EXTENDED RELEASE ORAL
Qty: 60 TABLET | Refills: 5 | Status: SHIPPED | OUTPATIENT
Start: 2017-12-26 | End: 2018-10-12 | Stop reason: SDUPTHER

## 2017-12-26 RX ORDER — FERROUS SULFATE 325(65) MG
325 TABLET ORAL 3 TIMES DAILY
Qty: 90 TABLET | Refills: 5 | Status: SHIPPED | OUTPATIENT
Start: 2017-12-26 | End: 2018-10-12 | Stop reason: SDUPTHER

## 2017-12-26 RX ORDER — ANASTROZOLE 1 MG/1
1 TABLET ORAL DAILY
Qty: 30 TABLET | Refills: 5 | Status: SHIPPED | OUTPATIENT
Start: 2017-12-26 | End: 2018-06-11 | Stop reason: SDUPTHER

## 2017-12-26 RX ORDER — CETIRIZINE HYDROCHLORIDE 10 MG/1
10 TABLET ORAL DAILY
Qty: 30 TABLET | Refills: 5 | Status: SHIPPED | OUTPATIENT
Start: 2017-12-26 | End: 2018-10-10

## 2017-12-27 RX ORDER — BLOOD-GLUCOSE METER
KIT MISCELLANEOUS
Qty: 100 EACH | Refills: 0 | Status: SHIPPED | OUTPATIENT
Start: 2017-12-27 | End: 2018-06-11 | Stop reason: SDUPTHER

## 2017-12-28 ENCOUNTER — APPOINTMENT (OUTPATIENT)
Dept: RADIATION ONCOLOGY | Facility: HOSPITAL | Age: 42
End: 2017-12-28

## 2017-12-28 DIAGNOSIS — Z79.4 TYPE 2 DIABETES MELLITUS WITH HYPERGLYCEMIA, WITH LONG-TERM CURRENT USE OF INSULIN (HCC): Primary | ICD-10-CM

## 2017-12-28 DIAGNOSIS — E11.65 TYPE 2 DIABETES MELLITUS WITH HYPERGLYCEMIA, WITH LONG-TERM CURRENT USE OF INSULIN (HCC): Primary | ICD-10-CM

## 2017-12-28 RX ORDER — SYRINGE-NEEDLE,INSULIN,0.5 ML 27GX1/2"
SYRINGE, EMPTY DISPOSABLE MISCELLANEOUS
Qty: 50 EACH | Refills: 0 | Status: SHIPPED | OUTPATIENT
Start: 2017-12-28 | End: 2018-06-11 | Stop reason: SDUPTHER

## 2017-12-28 NOTE — TELEPHONE ENCOUNTER
Covering for Dr. Rosi Brown, Received a request from patients pharmacy for refill on the Ulticare Insulin syr w/ needle 30G x 5/16 0.5ml.

## 2018-01-10 ENCOUNTER — OFFICE VISIT (OUTPATIENT)
Dept: FAMILY MEDICINE CLINIC | Facility: CLINIC | Age: 43
End: 2018-01-10

## 2018-01-10 VITALS
HEART RATE: 94 BPM | BODY MASS INDEX: 38.83 KG/M2 | SYSTOLIC BLOOD PRESSURE: 138 MMHG | OXYGEN SATURATION: 98 % | HEIGHT: 62 IN | DIASTOLIC BLOOD PRESSURE: 86 MMHG | WEIGHT: 211 LBS | TEMPERATURE: 96.4 F

## 2018-01-10 DIAGNOSIS — C50.912 CARCINOMA OF LEFT BREAST METASTATIC TO BONE (HCC): ICD-10-CM

## 2018-01-10 DIAGNOSIS — T45.1X5A NEUROPATHY DUE TO CHEMOTHERAPEUTIC DRUG (HCC): ICD-10-CM

## 2018-01-10 DIAGNOSIS — C79.31 MALIGNANT NEOPLASM OF BREAST METASTATIC TO BRAIN, UNSPECIFIED LATERALITY (HCC): Primary | ICD-10-CM

## 2018-01-10 DIAGNOSIS — C79.51 CARCINOMA OF LEFT BREAST METASTATIC TO BONE (HCC): ICD-10-CM

## 2018-01-10 DIAGNOSIS — F41.1 GAD (GENERALIZED ANXIETY DISORDER): ICD-10-CM

## 2018-01-10 DIAGNOSIS — K21.9 GASTROESOPHAGEAL REFLUX DISEASE WITHOUT ESOPHAGITIS: ICD-10-CM

## 2018-01-10 DIAGNOSIS — C78.7 LIVER METASTASES: ICD-10-CM

## 2018-01-10 DIAGNOSIS — G62.0 NEUROPATHY DUE TO CHEMOTHERAPEUTIC DRUG (HCC): ICD-10-CM

## 2018-01-10 DIAGNOSIS — G89.3 CANCER ASSOCIATED PAIN: ICD-10-CM

## 2018-01-10 DIAGNOSIS — T40.2X5A CONSTIPATION DUE TO OPIOID THERAPY: ICD-10-CM

## 2018-01-10 DIAGNOSIS — C50.919 MALIGNANT NEOPLASM OF BREAST METASTATIC TO BRAIN, UNSPECIFIED LATERALITY (HCC): Primary | ICD-10-CM

## 2018-01-10 DIAGNOSIS — K59.03 CONSTIPATION DUE TO OPIOID THERAPY: ICD-10-CM

## 2018-01-10 DIAGNOSIS — F33.1 MODERATE EPISODE OF RECURRENT MAJOR DEPRESSIVE DISORDER (HCC): ICD-10-CM

## 2018-01-10 DIAGNOSIS — E66.9 CLASS 2 OBESITY WITHOUT SERIOUS COMORBIDITY WITH BODY MASS INDEX (BMI) OF 38.0 TO 38.9 IN ADULT, UNSPECIFIED OBESITY TYPE: ICD-10-CM

## 2018-01-10 PROCEDURE — 99214 OFFICE O/P EST MOD 30 MIN: CPT | Performed by: FAMILY MEDICINE

## 2018-01-10 RX ORDER — CLONAZEPAM 2 MG/1
2 TABLET ORAL 2 TIMES DAILY PRN
Qty: 60 TABLET | Refills: 2 | Status: SHIPPED | OUTPATIENT
Start: 2018-01-10 | End: 2018-06-11 | Stop reason: SDUPTHER

## 2018-01-10 RX ORDER — OXYCODONE HYDROCHLORIDE 15 MG/1
15 TABLET ORAL EVERY 6 HOURS PRN
Qty: 120 TABLET | Refills: 0 | Status: SHIPPED | OUTPATIENT
Start: 2018-01-10 | End: 2018-03-05 | Stop reason: SDUPTHER

## 2018-01-10 RX ORDER — GABAPENTIN 300 MG/1
300 CAPSULE ORAL 3 TIMES DAILY
Qty: 90 CAPSULE | Refills: 2 | Status: SHIPPED | OUTPATIENT
Start: 2018-01-10 | End: 2018-07-24 | Stop reason: SDUPTHER

## 2018-01-10 NOTE — PROGRESS NOTES
Subjective   Chief Complaint   Patient presents with   • hospital follow up     [facility] and [facility]   • Med Refill     wants diabetic supplies sent to [facility]     Andra Villareal is a 42 y.o. female.   hospital follow up ([facility] and [facility]) and Med Refill (wants diabetic supplies sent to [facility])    History of Present Illness     Hospital follow up from [facility] and [facility]  Was seen mulitple times in the ER - 11/19, 12/01, and 12/04  She was admitted 11/19 and 12/01 but transferred to [facility]  Diagnosed with hydrocephalus - CT head indicated - As detailed above there is rather extensive supratentorial and infratentorial metastatic disease with multiple avidly enhancing intra-axial lesions seen bilaterally and in various lobes. There  is surrounding vasogenic edema without midline shift. The large  lesion within the central posterior fossa just posterior to the  fourth ventricle in the midline contributes to compression of the  fourth ventricle and mild obstructive hydrocephalus. It also  contributes to mass effect upon the brainstem and effacement of  the prepontine cistern. Basal cisterns are mildly effaced.  Neurosurgical consultation is recommended.  Being followed by Dr De Anda, hematology/ oncologist at [facility] - plan is for 1/17 to start herceptin and another chemotherapeutic agent  Has finished a course of radiation.    Currently set up with home health - [facility] - currently helping with daily activites (bathing), occupation and physical therapy.  RN visits weekly.  Received orders from [facility] today.    Peripheral neuropathy located in bilateral hands and fingertips  Worse on the right than the left side  Associated with joint pain  Complaining of numbness - cannot feel items when trying to pick things up  Managed with neurontin    GERD with chronic gastritis, iron deficiency, cirrhosis of the liver, fatty liver, IBS-C  Anemia - stable, iron supplementation continued  Follow  up scheduled in January    Chronic pain relating to metastatic breast cancer to bone, brain, liver  Chronic lumbar spine pain, history of metastatic lesions to the thoracic spine  Symptoms have worsened  Located across the lower back and radiates down the left lower extremity  Repeat MRI indicated:  Multiple new areas of abnormal signal within several of the thoracic and lumbar vertebral bodies suggests possibility of metastatic disease. Mild multilevel degenerative disc disease and degenerative arthropathy of the lumbar spine.  Pain is controlled with percocet  Recently failed dilaudid - caused severe constipation  Failed percocet 10/325mg - ineffective    Metastatic breast cancer s/p right mastectomy with breast reconstructive surgery with metastases to liver and bone with new lesion noted on CT 10/26/16 of anterior mediastinal mass measuring 4.9x5.1x3.3cm  Bone metastasis - continued with zometa  Transfer of care from [facility] to [facility] due to new lesion on brain    STEVEN - currently controlled with klonopin PRN    Idiopathic chronic constipation with opioid use - controlled with amitiza  Failed miralax caused her rebound diarrhea  Failed movantik - caused her diarrhea    Drug induced type 2 diabetes mellitus with hyperglycemia - controlled with diet  Using lantus at bedtime and SSI - humalin  Needs dm eye exam  Needs dm foot exam    Depression - currently controlled with effexor    The following portions of the patient's history were reviewed and updated as appropriate: allergies, current medications, past family history, past medical history, past social history, past surgical history and problem list.    Review of Systems   Constitutional: Negative for appetite change, chills, fatigue and fever.   HENT: Negative for congestion, ear pain, rhinorrhea and sore throat.    Eyes: Negative for pain.   Respiratory: Negative for cough and shortness of breath.    Cardiovascular: Negative for chest pain and palpitations.  "  Gastrointestinal: Negative for abdominal pain, constipation, diarrhea and nausea.   Genitourinary: Negative for dysuria.   Musculoskeletal: Positive for back pain. Negative for joint swelling and neck pain.   Skin: Negative for rash.   Neurological: Negative for dizziness and headaches.       Objective   /86  Pulse 94  Temp 96.4 °F (35.8 °C)  Ht 157.5 cm (62.01\")  Wt 95.7 kg (211 lb)  SpO2 98%  BMI 38.58 kg/m2  Physical Exam   Constitutional: She is oriented to person, place, and time. She appears well-developed and well-nourished.   HENT:   Head: Normocephalic and atraumatic.   Eyes: Pupils are equal, round, and reactive to light.   Neck: Normal range of motion. Neck supple.   Cardiovascular: Normal rate, regular rhythm and normal heart sounds.    Pulmonary/Chest: Effort normal and breath sounds normal. No respiratory distress. She has no wheezes. She has no rales.   Abdominal: Soft. Bowel sounds are normal.   Musculoskeletal:        Thoracic back: She exhibits bony tenderness and pain.        Lumbar back: She exhibits pain.   Neurological: She is alert and oriented to person, place, and time.   Skin: Skin is warm and dry.   Psychiatric: She has a normal mood and affect.   Nursing note and vitals reviewed.      Assessment/Plan   Problems Addressed this Visit        Digestive    Constipation due to opioid therapy    Gastroesophageal reflux disease without esophagitis    Liver metastases    Class 2 obesity without serious comorbidity with body mass index (BMI) of 38.0 to 38.9 in adult       Nervous and Auditory    Neuropathy due to chemotherapeutic drug    Malignant neoplasm of breast metastatic to brain - Primary       Musculoskeletal and Integument    Breast cancer metastasized to bone       Other    STEVEN (generalized anxiety disorder)    Relevant Medications    clonazePAM (KlonoPIN) 2 MG tablet    Cancer associated pain    Moderate episode of recurrent major depressive disorder        Refilled " medications - klonopin, neurontin, oxycodone    Provided boost coupons  Recommended good nutritional intake  Samples of boost    Follow up with [facility] as scheduled    Continue with home health    [facility] orders signed and faxed    Recheck in 4-6 weeks

## 2018-01-11 PROBLEM — E66.9 CLASS 2 OBESITY WITHOUT SERIOUS COMORBIDITY WITH BODY MASS INDEX (BMI) OF 38.0 TO 38.9 IN ADULT: Status: ACTIVE | Noted: 2018-01-11

## 2018-01-11 PROBLEM — T50.905A HOT FLASH DUE TO MEDICATION: Status: RESOLVED | Noted: 2017-07-20 | Resolved: 2018-01-11

## 2018-01-11 PROBLEM — R23.2 HOT FLASH DUE TO MEDICATION: Status: RESOLVED | Noted: 2017-07-20 | Resolved: 2018-01-11

## 2018-01-11 PROBLEM — Z71.9 ENCOUNTER FOR CONSULTATION: Status: RESOLVED | Noted: 2017-12-04 | Resolved: 2018-01-11

## 2018-01-11 PROBLEM — T82.9XXA COMPLICATION ASSOCIATED WITH VASCULAR DEVICE: Status: RESOLVED | Noted: 2017-07-21 | Resolved: 2018-01-11

## 2018-01-11 PROBLEM — R10.84 INTRACTABLE GENERALIZED ABDOMINAL PAIN: Status: RESOLVED | Noted: 2017-07-03 | Resolved: 2018-01-11

## 2018-01-11 PROBLEM — Z23 FLU VACCINE NEED: Status: RESOLVED | Noted: 2017-10-25 | Resolved: 2018-01-11

## 2018-01-11 PROBLEM — E83.42 HYPOMAGNESEMIA: Status: RESOLVED | Noted: 2017-07-03 | Resolved: 2018-01-11

## 2018-01-11 PROBLEM — Z45.2 ENCOUNTER FOR VENOUS ACCESS DEVICE CARE: Status: RESOLVED | Noted: 2017-06-21 | Resolved: 2018-01-11

## 2018-01-11 PROBLEM — C50.919 MALIGNANT NEOPLASM OF FEMALE BREAST (HCC): Status: RESOLVED | Noted: 2017-12-04 | Resolved: 2018-01-11

## 2018-01-20 ENCOUNTER — HOSPITAL ENCOUNTER (EMERGENCY)
Facility: HOSPITAL | Age: 43
Discharge: ANOTHER HOSPITAL | End: 2018-01-20
Attending: EMERGENCY MEDICINE | Admitting: EMERGENCY MEDICINE

## 2018-01-20 ENCOUNTER — APPOINTMENT (OUTPATIENT)
Dept: CT IMAGING | Facility: HOSPITAL | Age: 43
End: 2018-01-20

## 2018-01-20 VITALS
WEIGHT: 210 LBS | SYSTOLIC BLOOD PRESSURE: 141 MMHG | TEMPERATURE: 98.5 F | OXYGEN SATURATION: 95 % | DIASTOLIC BLOOD PRESSURE: 78 MMHG | RESPIRATION RATE: 20 BRPM | BODY MASS INDEX: 34.99 KG/M2 | HEART RATE: 105 BPM | HEIGHT: 65 IN

## 2018-01-20 DIAGNOSIS — C50.919 MALIGNANT NEOPLASM OF BREAST METASTATIC TO BRAIN, UNSPECIFIED LATERALITY (HCC): Primary | ICD-10-CM

## 2018-01-20 DIAGNOSIS — M54.41 ACUTE BILATERAL LOW BACK PAIN WITH BILATERAL SCIATICA: ICD-10-CM

## 2018-01-20 DIAGNOSIS — M54.42 ACUTE BILATERAL LOW BACK PAIN WITH BILATERAL SCIATICA: ICD-10-CM

## 2018-01-20 DIAGNOSIS — C79.31 MALIGNANT NEOPLASM OF BREAST METASTATIC TO BRAIN, UNSPECIFIED LATERALITY (HCC): Primary | ICD-10-CM

## 2018-01-20 DIAGNOSIS — R11.2 INTRACTABLE VOMITING WITH NAUSEA, UNSPECIFIED VOMITING TYPE: ICD-10-CM

## 2018-01-20 LAB
ALBUMIN SERPL-MCNC: 4 G/DL (ref 3.4–4.8)
ALBUMIN/GLOB SERPL: 1.3 G/DL (ref 1.1–1.8)
ALP SERPL-CCNC: 367 U/L (ref 38–126)
ALT SERPL W P-5'-P-CCNC: 150 U/L (ref 9–52)
ANION GAP SERPL CALCULATED.3IONS-SCNC: 10 MMOL/L (ref 5–15)
AST SERPL-CCNC: 110 U/L (ref 14–36)
BACTERIA UR QL AUTO: ABNORMAL /HPF
BASOPHILS # BLD AUTO: 0.01 10*3/MM3 (ref 0–0.2)
BASOPHILS NFR BLD AUTO: 0.1 % (ref 0–2)
BILIRUB SERPL-MCNC: 1 MG/DL (ref 0.2–1.3)
BILIRUB UR QL STRIP: NEGATIVE
BUN BLD-MCNC: 17 MG/DL (ref 7–21)
BUN/CREAT SERPL: 27.4 (ref 7–25)
CALCIUM SPEC-SCNC: 8.9 MG/DL (ref 8.4–10.2)
CHLORIDE SERPL-SCNC: 102 MMOL/L (ref 95–110)
CLARITY UR: ABNORMAL
CO2 SERPL-SCNC: 25 MMOL/L (ref 22–31)
COLOR UR: YELLOW
CREAT BLD-MCNC: 0.62 MG/DL (ref 0.5–1)
DEPRECATED RDW RBC AUTO: 53.7 FL (ref 36.4–46.3)
EOSINOPHIL # BLD AUTO: 0.06 10*3/MM3 (ref 0–0.7)
EOSINOPHIL NFR BLD AUTO: 0.6 % (ref 0–7)
ERYTHROCYTE [DISTWIDTH] IN BLOOD BY AUTOMATED COUNT: 16.9 % (ref 11.5–14.5)
GFR SERPL CREATININE-BSD FRML MDRD: 129 ML/MIN/1.73 (ref 58–135)
GLOBULIN UR ELPH-MCNC: 3.2 GM/DL (ref 2.3–3.5)
GLUCOSE BLD-MCNC: 158 MG/DL (ref 60–100)
GLUCOSE UR STRIP-MCNC: NEGATIVE MG/DL
HCT VFR BLD AUTO: 36.7 % (ref 35–45)
HGB BLD-MCNC: 12.1 G/DL (ref 12–15.5)
HGB UR QL STRIP.AUTO: NEGATIVE
HOLD SPECIMEN: NORMAL
HOLD SPECIMEN: NORMAL
HYALINE CASTS UR QL AUTO: ABNORMAL /LPF
IMM GRANULOCYTES # BLD: 0.03 10*3/MM3 (ref 0–0.02)
IMM GRANULOCYTES NFR BLD: 0.3 % (ref 0–0.5)
KETONES UR QL STRIP: NEGATIVE
LEUKOCYTE ESTERASE UR QL STRIP.AUTO: ABNORMAL
LYMPHOCYTES # BLD AUTO: 0.71 10*3/MM3 (ref 0.6–4.2)
LYMPHOCYTES NFR BLD AUTO: 7.7 % (ref 10–50)
MCH RBC QN AUTO: 28.6 PG (ref 26.5–34)
MCHC RBC AUTO-ENTMCNC: 33 G/DL (ref 31.4–36)
MCV RBC AUTO: 86.8 FL (ref 80–98)
MONOCYTES # BLD AUTO: 0.05 10*3/MM3 (ref 0–0.9)
MONOCYTES NFR BLD AUTO: 0.5 % (ref 0–12)
NEUTROPHILS # BLD AUTO: 8.38 10*3/MM3 (ref 2–8.6)
NEUTROPHILS NFR BLD AUTO: 90.8 % (ref 37–80)
NITRITE UR QL STRIP: POSITIVE
PH UR STRIP.AUTO: 6 [PH] (ref 5–9)
PLATELET # BLD AUTO: 103 10*3/MM3 (ref 150–450)
PMV BLD AUTO: 11.3 FL (ref 8–12)
POTASSIUM BLD-SCNC: 3.3 MMOL/L (ref 3.5–5.1)
PROT SERPL-MCNC: 7.2 G/DL (ref 6.3–8.6)
PROT UR QL STRIP: ABNORMAL
RBC # BLD AUTO: 4.23 10*6/MM3 (ref 3.77–5.16)
RBC # UR: ABNORMAL /HPF
REF LAB TEST METHOD: ABNORMAL
SODIUM BLD-SCNC: 137 MMOL/L (ref 137–145)
SP GR UR STRIP: 1.06 (ref 1–1.03)
SQUAMOUS #/AREA URNS HPF: ABNORMAL /HPF
UROBILINOGEN UR QL STRIP: ABNORMAL
WBC NRBC COR # BLD: 9.24 10*3/MM3 (ref 3.2–9.8)
WBC UR QL AUTO: ABNORMAL /HPF
WHOLE BLOOD HOLD SPECIMEN: NORMAL
WHOLE BLOOD HOLD SPECIMEN: NORMAL

## 2018-01-20 PROCEDURE — 70470 CT HEAD/BRAIN W/O & W/DYE: CPT

## 2018-01-20 PROCEDURE — 96361 HYDRATE IV INFUSION ADD-ON: CPT

## 2018-01-20 PROCEDURE — 93005 ELECTROCARDIOGRAM TRACING: CPT | Performed by: PHYSICIAN ASSISTANT

## 2018-01-20 PROCEDURE — 81001 URINALYSIS AUTO W/SCOPE: CPT | Performed by: PHYSICIAN ASSISTANT

## 2018-01-20 PROCEDURE — 25010000002 MORPHINE PER 10 MG: Performed by: EMERGENCY MEDICINE

## 2018-01-20 PROCEDURE — 99284 EMERGENCY DEPT VISIT MOD MDM: CPT

## 2018-01-20 PROCEDURE — 96374 THER/PROPH/DIAG INJ IV PUSH: CPT

## 2018-01-20 PROCEDURE — 96376 TX/PRO/DX INJ SAME DRUG ADON: CPT

## 2018-01-20 PROCEDURE — 25010000002 ONDANSETRON PER 1 MG: Performed by: EMERGENCY MEDICINE

## 2018-01-20 PROCEDURE — 96375 TX/PRO/DX INJ NEW DRUG ADDON: CPT

## 2018-01-20 PROCEDURE — 87086 URINE CULTURE/COLONY COUNT: CPT | Performed by: PHYSICIAN ASSISTANT

## 2018-01-20 PROCEDURE — 85025 COMPLETE CBC W/AUTO DIFF WBC: CPT | Performed by: EMERGENCY MEDICINE

## 2018-01-20 PROCEDURE — 93010 ELECTROCARDIOGRAM REPORT: CPT | Performed by: INTERNAL MEDICINE

## 2018-01-20 PROCEDURE — 0 IOPAMIDOL 61 % SOLUTION: Performed by: EMERGENCY MEDICINE

## 2018-01-20 PROCEDURE — 80053 COMPREHEN METABOLIC PANEL: CPT | Performed by: EMERGENCY MEDICINE

## 2018-01-20 RX ORDER — ONDANSETRON 2 MG/ML
4 INJECTION INTRAMUSCULAR; INTRAVENOUS ONCE
Status: COMPLETED | OUTPATIENT
Start: 2018-01-20 | End: 2018-01-20

## 2018-01-20 RX ORDER — ACETAMINOPHEN 325 MG/1
650 TABLET ORAL ONCE
Status: COMPLETED | OUTPATIENT
Start: 2018-01-20 | End: 2018-01-20

## 2018-01-20 RX ORDER — SODIUM CHLORIDE 0.9 % (FLUSH) 0.9 %
10 SYRINGE (ML) INJECTION AS NEEDED
Status: DISCONTINUED | OUTPATIENT
Start: 2018-01-20 | End: 2018-01-20 | Stop reason: HOSPADM

## 2018-01-20 RX ADMIN — MORPHINE SULFATE 4 MG: 4 INJECTION, SOLUTION INTRAMUSCULAR; INTRAVENOUS at 13:28

## 2018-01-20 RX ADMIN — ONDANSETRON 4 MG: 2 INJECTION INTRAMUSCULAR; INTRAVENOUS at 10:06

## 2018-01-20 RX ADMIN — SODIUM CHLORIDE 1000 ML: 9 INJECTION, SOLUTION INTRAVENOUS at 11:17

## 2018-01-20 RX ADMIN — MORPHINE SULFATE 4 MG: 4 INJECTION, SOLUTION INTRAMUSCULAR; INTRAVENOUS at 10:06

## 2018-01-20 RX ADMIN — MORPHINE SULFATE 4 MG: 4 INJECTION, SOLUTION INTRAMUSCULAR; INTRAVENOUS at 11:17

## 2018-01-20 RX ADMIN — ACETAMINOPHEN 650 MG: 325 TABLET ORAL at 10:49

## 2018-01-20 RX ADMIN — IOPAMIDOL 90 ML: 612 INJECTION, SOLUTION INTRAVENOUS at 12:18

## 2018-01-20 RX ADMIN — Medication 10 ML: at 11:17

## 2018-01-20 NOTE — ED NOTES
Patient presented to ED from home via EMS with C/O N/V, bilatral leg pain and back pain for last 2 days. Reports had chemo Wednesday.  EMS: [facility]     Yuli Farris LPN  01/20/18 0942

## 2018-01-20 NOTE — ED PROVIDER NOTES
Subjective   HPI Comments: Patient presents to emergency department for nausea, vomiting, bilateral back and leg pain.  Endorses she is currently undergoing chemotherapy for breast cancer with brain metastasis.  Oncologist is Dr De Anda at [facility].      Patient is a 42 y.o. female presenting with nausea and back pain.   History provided by:  Patient   used: No    Nausea   The primary symptoms include nausea, vomiting and myalgias (bilateral leg). Primary symptoms do not include fever, abdominal pain, diarrhea, hematochezia, dysuria, arthralgias or rash. The illness began more than 7 days ago. The onset was gradual. The problem has been gradually worsening.   The myalgias are not associated with weakness.     The illness is also significant for constipation (from chronic opioid use) and back pain. The illness does not include chills, anorexia, dysphagia, odynophagia, bloating, tenesmus or itching.   Back Pain   Location:  Lumbar spine  Quality:  Aching  Radiates to:  L knee and R knee  Pain severity:  Severe  Pain is:  Same all the time  Onset quality:  Sudden  Duration:  2 days  Timing:  Constant  Progression:  Worsening  Chronicity:  New  Associated symptoms: headaches and leg pain (bilateral)    Associated symptoms: no abdominal pain, no abdominal swelling, no bladder incontinence, no bowel incontinence, no chest pain, no dysuria, no fever, no numbness, no paresthesias, no tingling and no weakness    Risk factors: hx of cancer        Review of Systems   Constitutional: Negative for chills and fever.   Respiratory: Negative for shortness of breath and wheezing.    Cardiovascular: Negative for chest pain, palpitations and leg swelling.   Gastrointestinal: Positive for constipation (from chronic opioid use), nausea and vomiting. Negative for abdominal pain, anorexia, bloating, bowel incontinence, diarrhea, dysphagia and hematochezia.   Genitourinary: Negative for bladder incontinence, dysuria,  flank pain, frequency, hematuria, vaginal bleeding and vaginal discharge.   Musculoskeletal: Positive for back pain and myalgias (bilateral leg). Negative for arthralgias, neck pain and neck stiffness.   Skin: Negative for color change, itching and rash.   Allergic/Immunologic: Negative for immunocompromised state.   Neurological: Positive for headaches. Negative for dizziness, tingling, syncope, speech difficulty, weakness, light-headedness, numbness and paresthesias.   Hematological: Does not bruise/bleed easily.   Psychiatric/Behavioral: Negative for confusion.       Past Medical History:   Diagnosis Date   • Abnormal breathing sounds    • Abscess of skin     and / or subcutaneous tissue   • Acute bronchitis     unspecified   • Adult body mass index 30+     obesity-BMI 33   • Allergic rhinitis    • Anasarca    • Anxiety     currently on xanax   • Anxiety    • Asthenia    • Asthma     moderate persistent   • Asthmatic bronchitis    • Astigmatism    • Bleeding external hemorrhoids    • Body mass index (BMI) of 34.0-34.9 in adult    • Body mass index (BMI) of 35.0-35.9 in adult    • Body mass index (BMI) of 36.0-36.9 in adult    • Body mass index 40.0-44.9, adult     SEVERELY OBESE   • Brain cancer 11/19/2017   • Cellulitis    • Chemotherapy induced nausea and vomiting    • Chronic back pain    • Chronic cough    • Chronic hypoxemic respiratory failure     on NC at 3 LPM   • Cough    • Depressive disorder    • Diabetes mellitus     no retinopathy   • Dyslipidemia    • Edema of lower extremity    • Encounter for follow-up examination after completed treatment for malignant neoplasm    • Epidermoid cyst of skin     excision with secondary intention healing   • Excessive and frequent menstruation with irregular cycle     Vaginal bleeding after 4 years of no bleeding secondary to chemotherapy for breast cancer; currently being treated for breast cancer for the second time, mets to bone and liver while on chemotherapy     "  • Flushing    • 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    • Gastroesophageal reflux disease    • H/O tubal ligation    • History of blood clots     clots around mediports x 2   • Hx of echocardiogram     w/ color flow, and w/o color flow   • Hyperglycemia    • Hypermetropia    • Hypertension    • Hypokalemia    • Impaired glucose tolerance     hgbalc 6.2   • Infection of skin     and /or subcutaneous tissue-around the catheter exit site   • Influenza     needs influenza immunization   • Irregular periods    • Lesion of lumbar spine     pain controlled with percocet and lidocaine patches   • Lumbago with sciatica    • Malignant neoplasm of female breast     s/p right mastectomy, mets to bone and liver on chemotherapy      • Menopausal flushing    • Muscle weakness    • Muscle weakness (generalized)    • Nonspecific interstitial pneumonia     symptomatically improved   • Pleural effusion     refractory massive right, most likely malignant effusion   • Primary atypical pneumonia    • Renal failure     acute drug-induced renal failure   • Sinus tachycardia    • Tobacco dependence syndrome    • Tobacco non-user    • Upper respiratory infection    • Wheezing        Allergies   Allergen Reactions   • Lisinopril Shortness Of Breath   • Flagyl [Metronidazole] Other (See Comments)     pancreatitis   • Fentanyl Anxiety     \"goes out of her mind\"   • Latex Rash       Past Surgical History:   Procedure Laterality Date   • ANKLE LOOSE BODY EXCISION/REMOVAL Right 5/24/2017    Procedure: RIGTH FOOT EXCISION NAVICULAR FRACTURES FRAGMENT  DEBRIDEMENT TALONAVILCULA RJOIN T;  Surgeon: Armani Oliveira DPM;  Location: [facility];  Service:    • BREAST SURGERY Right 2013     reconstruction of right breast, trans flap surgery   • CENTRAL VENOUS CATHETER TUNNELED INSERTION SINGLE LUMEN      Placement of indwelling Pleurx catheter right   • COLONOSCOPY N/A 1/26/2017    Procedure: COLONOSCOPY;  Surgeon: Robert Clifton MD;  Location: " "[facility] ENDOSCOPY;  Service:    • ENDOSCOPY N/A 1/26/2017    Procedure: ESOPHAGOGASTRODUODENOSCOPY;  Surgeon: Robert Clifton MD;  Location: [facility] ENDOSCOPY;  Service:    • ENDOSCOPY N/A 2/24/2017    Procedure: ESOPHAGOGASTRODUODENOSCOPY;  Surgeon: Robert Clifton MD;  Location: [facility] ENDOSCOPY;  Service:    • HYSTERECTOMY      vaginal   • LIVER BIOPSY  2015   • LUNG VOLUME REDUCTION     • MASTECTOMY      partial   • OTHER SURGICAL HISTORY  05/05/2016    central line insertion , PICC line replacement   • OTHER SURGICAL HISTORY      place breast clip percut   • OTHER SURGICAL HISTORY      pulse oximetry   • OTHER SURGICAL HISTORY      remove cc cath w/ sc port or pump, Removal of right internal jugular MediPort   • OTHER SURGICAL HISTORY      spirometry   • OTHER SURGICAL HISTORY      tubal sterilization   • PAP SMEAR     • THORACENTESIS      aspiration of pleural space   • TUBAL ABDOMINAL LIGATION  2009   • UPPER GASTROINTESTINAL ENDOSCOPY  01/26/2017   • UPPER GASTROINTESTINAL ENDOSCOPY  02/24/2017   • VENOUS ACCESS DEVICE (PORT) INSERTION      Ultrasound guided right internal jugular Mediport placement under fluoroscopy       Family History   Problem Relation Age of Onset   • Coronary artery disease Other    • Diabetes Other    • Hypertension Other    • Amblyopia Other    • Cataracts Maternal Grandfather    • Cataracts Paternal Grandmother        Social History     Social History   • Marital status:      Spouse name: N/A   • Number of children: N/A   • Years of education: N/A     Social History Main Topics   • Smoking status: Former Smoker     Quit date: 2012   • Smokeless tobacco: Never Used   • Alcohol use No   • Drug use: No   • Sexual activity: Defer     Other Topics Concern   • None     Social History Narrative           Objective      /76  Pulse 100  Temp 98.5 °F (36.9 °C) (Oral)   Resp 16  Ht 165.1 cm (65\")  Wt 95.3 kg (210 lb)  SpO2 95%  BMI 34.95 kg/m2    Physical Exam "   Constitutional: She is oriented to person, place, and time. She appears well-developed and well-nourished.   HENT:   Head: Normocephalic and atraumatic.   Eyes: EOM are normal. Pupils are equal, round, and reactive to light.   Neck: Normal range of motion. Neck supple.   Cardiovascular: Normal rate, regular rhythm, normal heart sounds and intact distal pulses.    Pulmonary/Chest: Effort normal and breath sounds normal. No respiratory distress. She has no wheezes.   Abdominal: Soft. Bowel sounds are normal. She exhibits no distension and no mass. There is no tenderness. There is no guarding.   Musculoskeletal:        Lumbar back: She exhibits tenderness and pain. She exhibits normal range of motion, no deformity and no spasm.        Right upper leg: She exhibits tenderness. She exhibits no swelling and no deformity.        Left upper leg: She exhibits tenderness. She exhibits no swelling and no deformity.   Negative SLR.  Radicular pain from low back to to level of knees bilaterally.  No numbness, tingling, paresthesia.     Neurological: She is alert and oriented to person, place, and time. No cranial nerve deficit. She exhibits normal muscle tone.   Skin: Skin is warm and dry.   Psychiatric: She has a normal mood and affect. Her behavior is normal. Thought content normal.   Nursing note and vitals reviewed.      ECG 12 Lead    Date/Time: 1/20/2018 10:59 AM  Performed by: JACI GORDON  Authorized by: JACI GORDON   Interpreted by physician  Comparison: compared with previous ECG from 12/4/2017  Similar to previous ECG  Rhythm: sinus rhythm  Rate: tachycardic  BPM: 100  ST Segments: ST segments normal  Clinical impression: abnormal ECG and non-specific ECG               ED Course  ED Course      Results for orders placed or performed during the hospital encounter of 01/20/18   Comprehensive Metabolic Panel   Result Value Ref Range    Glucose 158 (H) 60 - 100 mg/dL    BUN 17 7 - 21 mg/dL    Creatinine  0.62 0.50 - 1.00 mg/dL    Sodium 137 137 - 145 mmol/L    Potassium 3.3 (L) 3.5 - 5.1 mmol/L    Chloride 102 95 - 110 mmol/L    CO2 25.0 22.0 - 31.0 mmol/L    Calcium 8.9 8.4 - 10.2 mg/dL    Total Protein 7.2 6.3 - 8.6 g/dL    Albumin 4.00 3.40 - 4.80 g/dL    ALT (SGPT) 150 (H) 9 - 52 U/L    AST (SGOT) 110 (H) 14 - 36 U/L    Alkaline Phosphatase 367 (H) 38 - 126 U/L    Total Bilirubin 1.0 0.2 - 1.3 mg/dL    eGFR  African Amer 129 58 - 135 mL/min/1.73    Globulin 3.2 2.3 - 3.5 gm/dL    A/G Ratio 1.3 1.1 - 1.8 g/dL    BUN/Creatinine Ratio 27.4 (H) 7.0 - 25.0    Anion Gap 10.0 5.0 - 15.0 mmol/L   CBC Auto Differential   Result Value Ref Range    WBC 9.24 3.20 - 9.80 10*3/mm3    RBC 4.23 3.77 - 5.16 10*6/mm3    Hemoglobin 12.1 12.0 - 15.5 g/dL    Hematocrit 36.7 35.0 - 45.0 %    MCV 86.8 80.0 - 98.0 fL    MCH 28.6 26.5 - 34.0 pg    MCHC 33.0 31.4 - 36.0 g/dL    RDW 16.9 (H) 11.5 - 14.5 %    RDW-SD 53.7 (H) 36.4 - 46.3 fl    MPV 11.3 8.0 - 12.0 fL    Platelets 103 (L) 150 - 450 10*3/mm3    Neutrophil % 90.8 (H) 37.0 - 80.0 %    Lymphocyte % 7.7 (L) 10.0 - 50.0 %    Monocyte % 0.5 0.0 - 12.0 %    Eosinophil % 0.6 0.0 - 7.0 %    Basophil % 0.1 0.0 - 2.0 %    Immature Grans % 0.3 0.0 - 0.5 %    Neutrophils, Absolute 8.38 2.00 - 8.60 10*3/mm3    Lymphocytes, Absolute 0.71 0.60 - 4.20 10*3/mm3    Monocytes, Absolute 0.05 0.00 - 0.90 10*3/mm3    Eosinophils, Absolute 0.06 0.00 - 0.70 10*3/mm3    Basophils, Absolute 0.01 0.00 - 0.20 10*3/mm3    Immature Grans, Absolute 0.03 (H) 0.00 - 0.02 10*3/mm3   Light Blue Top   Result Value Ref Range    Extra Tube hold for add-on    Green Top (Gel)   Result Value Ref Range    Extra Tube Hold for add-ons.    Lavender Top   Result Value Ref Range    Extra Tube hold for add-on    Gold Top - SST   Result Value Ref Range    Extra Tube Hold for add-ons.      Ct Head With & Without Contrast    Result Date: 1/20/2018  Narrative: Pre and postcontrast CT examination of the brain. INDICATION: Right  parietal lobe brain mass, evaluate for recurrence. Technique: Axial 5 mm contiguous images with brain parenchymal and bone windows. Images obtained both prior to and following intravenous infusion of 90 mL of Isovue 300. This exam was performed according to our departmental dose-optimization program, which includes automated exposure control, adjustment of the mA and/or kV according to patient size and/or use of iterative reconstruction technique. Prior relevant examination: November 19, 2017. Small enhancing mass right parietal lobe series 7 image 16 with a surrounding area of slightly decreased CT attenuation, either encephalomalacia  or vasogenic edema. This mass appears smaller in comparison to prior CT November 19, 2017. Large enhancing mass posterior fossa inferior aspect of the vermis just to the left of midline. This mass is 2.32 cm in diameter. Two additional enhancing masses are noted more superiorly either arising from the superior aspect of the cerebellum or adjacent portions of the tentorium cerebelli. In comparison prior examination the lateral ventricles are now more normal in size a hydrocephalus noted on prior exam.     Impression: CONCLUSION: Multiple enhancing intracranial masses, metastases. Right parietal lobe mass is smaller in comparison to noncontrast CT December 14, 2017. Posterior fossa, cerebellar masses are not described on prior CT examination (noncontrast exam). Ventricular size however is more normal on today's examination. Hydrocephalus was noted on prior exam. Electronically signed by:  Tao Olson MD  1/20/2018 12:36 PM CST Workstation: [id number]                MANNIE    Final diagnoses:   Malignant neoplasm of breast metastatic to brain, unspecified laterality   Intractable vomiting with nausea, unspecified vomiting type   Acute bilateral low back pain with bilateral sciatica            Thomas Courtney PA-C  01/20/18 [id number]

## 2018-01-21 LAB
BACTERIA SPEC AEROBE CULT: NORMAL
BACTERIA SPEC AEROBE CULT: NORMAL

## 2018-02-09 ENCOUNTER — TELEPHONE (OUTPATIENT)
Dept: FAMILY MEDICINE CLINIC | Facility: CLINIC | Age: 43
End: 2018-02-09

## 2018-02-09 NOTE — TELEPHONE ENCOUNTER
Called pt to check on her since she missed her last 2 appts. Pt stated she is fine and that she had other appts that kept her from coming to our office. She said that she will call our office later today to schedule an appt. 2/9/18

## 2018-02-26 RX ORDER — ONDANSETRON HYDROCHLORIDE 8 MG/1
8 TABLET, FILM COATED ORAL EVERY 8 HOURS PRN
Qty: 30 TABLET | Refills: 2 | Status: SHIPPED | OUTPATIENT
Start: 2018-02-26

## 2018-03-05 ENCOUNTER — OFFICE VISIT (OUTPATIENT)
Dept: FAMILY MEDICINE CLINIC | Facility: CLINIC | Age: 43
End: 2018-03-05

## 2018-03-05 VITALS
OXYGEN SATURATION: 98 % | HEART RATE: 84 BPM | BODY MASS INDEX: 33.99 KG/M2 | SYSTOLIC BLOOD PRESSURE: 118 MMHG | HEIGHT: 65 IN | WEIGHT: 204 LBS | DIASTOLIC BLOOD PRESSURE: 80 MMHG | TEMPERATURE: 98.2 F

## 2018-03-05 DIAGNOSIS — C50.919 MALIGNANT NEOPLASM OF BREAST METASTATIC TO BRAIN, UNSPECIFIED LATERALITY (HCC): ICD-10-CM

## 2018-03-05 DIAGNOSIS — T45.1X5A CHEMOTHERAPY-INDUCED NAUSEA: ICD-10-CM

## 2018-03-05 DIAGNOSIS — F32.5 MAJOR DEPRESSIVE DISORDER WITH SINGLE EPISODE, IN FULL REMISSION (HCC): ICD-10-CM

## 2018-03-05 DIAGNOSIS — C50.912 CARCINOMA OF LEFT BREAST METASTATIC TO BONE (HCC): ICD-10-CM

## 2018-03-05 DIAGNOSIS — R11.0 CHEMOTHERAPY-INDUCED NAUSEA: ICD-10-CM

## 2018-03-05 DIAGNOSIS — C79.51 CARCINOMA OF LEFT BREAST METASTATIC TO BONE (HCC): ICD-10-CM

## 2018-03-05 DIAGNOSIS — K21.9 GASTROESOPHAGEAL REFLUX DISEASE WITHOUT ESOPHAGITIS: ICD-10-CM

## 2018-03-05 DIAGNOSIS — Z79.4 TYPE 2 DIABETES MELLITUS WITH HYPERGLYCEMIA, WITH LONG-TERM CURRENT USE OF INSULIN (HCC): ICD-10-CM

## 2018-03-05 DIAGNOSIS — E11.65 TYPE 2 DIABETES MELLITUS WITH HYPERGLYCEMIA, WITH LONG-TERM CURRENT USE OF INSULIN (HCC): ICD-10-CM

## 2018-03-05 DIAGNOSIS — I82.409 RECURRENT DEEP VEIN THROMBOSIS (DVT) (HCC): ICD-10-CM

## 2018-03-05 DIAGNOSIS — F51.01 PRIMARY INSOMNIA: ICD-10-CM

## 2018-03-05 DIAGNOSIS — C79.31 MALIGNANT NEOPLASM OF BREAST METASTATIC TO BRAIN, UNSPECIFIED LATERALITY (HCC): ICD-10-CM

## 2018-03-05 DIAGNOSIS — F33.1 MODERATE EPISODE OF RECURRENT MAJOR DEPRESSIVE DISORDER (HCC): ICD-10-CM

## 2018-03-05 DIAGNOSIS — E66.09 CLASS 1 OBESITY DUE TO EXCESS CALORIES WITHOUT SERIOUS COMORBIDITY WITH BODY MASS INDEX (BMI) OF 33.0 TO 33.9 IN ADULT: ICD-10-CM

## 2018-03-05 DIAGNOSIS — R23.2 HOT FLASHES: ICD-10-CM

## 2018-03-05 DIAGNOSIS — G89.3 CANCER ASSOCIATED PAIN: Primary | ICD-10-CM

## 2018-03-05 PROCEDURE — 99214 OFFICE O/P EST MOD 30 MIN: CPT | Performed by: FAMILY MEDICINE

## 2018-03-05 RX ORDER — VENLAFAXINE HYDROCHLORIDE 75 MG/1
75 CAPSULE, EXTENDED RELEASE ORAL DAILY
Qty: 90 CAPSULE | Refills: 1 | Status: SHIPPED | OUTPATIENT
Start: 2018-03-05 | End: 2018-09-27

## 2018-03-05 RX ORDER — TRAZODONE HYDROCHLORIDE 50 MG/1
50 TABLET ORAL NIGHTLY
Qty: 90 TABLET | Refills: 1 | Status: SHIPPED | OUTPATIENT
Start: 2018-03-05 | End: 2018-03-05 | Stop reason: SDUPTHER

## 2018-03-05 RX ORDER — OXYCODONE HYDROCHLORIDE 15 MG/1
15 TABLET ORAL EVERY 6 HOURS PRN
Qty: 120 TABLET | Refills: 0 | Status: SHIPPED | OUTPATIENT
Start: 2018-03-05 | End: 2018-11-19

## 2018-03-05 RX ORDER — TRAZODONE HYDROCHLORIDE 100 MG/1
100 TABLET ORAL NIGHTLY
Qty: 90 TABLET | Refills: 1 | Status: SHIPPED | OUTPATIENT
Start: 2018-03-05 | End: 2018-09-10 | Stop reason: SDUPTHER

## 2018-03-05 RX ORDER — PROMETHAZINE HYDROCHLORIDE 25 MG/1
25 TABLET ORAL EVERY 6 HOURS PRN
Qty: 120 TABLET | Refills: 5 | Status: SHIPPED | OUTPATIENT
Start: 2018-03-05

## 2018-03-05 NOTE — PROGRESS NOTES
Subjective   Chief Complaint   Patient presents with   • Follow-up   • Med Refill     oxycodone last dose last night     Andra Villareal is a 42 y.o. female.   Follow-up and Med Refill (oxycodone last dose last night)    History of Present Illness     Peripheral neuropathy located in bilateral upper extremities - managed with neurontin    GERD with chronic gastritis, iron deficiency, cirrhosis of the liver, fatty liver, IBS-C  Anemia - stable, iron supplementation continued    Chronic pain relating to metastatic breast cancer to bone, brain, liver  Chronic lumbar spine pain, history of metastatic lesions to the thoracic spine  Symptoms have worsened  Located across the lower back and radiates down the left lower extremity  Repeat MRI indicated:  Multiple new areas of abnormal signal within several of the thoracic and lumbar vertebral bodies suggests possibility of metastatic disease. Mild multilevel degenerative disc disease and degenerative arthropathy of the lumbar spine.  Pain is managed with oxycodone  Recently failed dilaudid - caused severe constipation  Failed percocet 10/325mg - ineffective    Metastatic breast cancer s/p right mastectomy with breast reconstructive surgery with metastases to liver and bone with new lesion noted on CT 10/26/16 of anterior mediastinal mass measuring 4.9x5.1x3.3cm  Bone metastasis - continued with zometa  [facility] from [facility] to [facility] due to new lesion on the brain    STEVEN - currently managed with klonopin PRN, neurontin, effexor, trazodone    Idiopathic chronic constipation with opioid use - managed with amitiza  Failed miralax caused her rebound diarrhea  Failed movantik - caused her diarrhea    Drug induced type 2 diabetes mellitus with hyperglycemia - controlled with diet  Using lantus at bedtime and SSI - humalin  Diabetic eye exam is current  Due for diabetic lab work  Due for a diabetic foot exam    Depression -  managed with effexor, trazodone  Has  "complaints today with depressed mood not improved by current medications  Associated with trouble sleeping    Hot flashes - managed with effexor    The following portions of the patient's history were reviewed and updated as appropriate: allergies, current medications, past family history, past medical history, past social history, past surgical history and problem list.    Review of Systems   Constitutional: Negative for appetite change, chills, fatigue and fever.   HENT: Negative for congestion, ear pain, rhinorrhea and sore throat.    Eyes: Negative for pain.   Respiratory: Negative for cough and shortness of breath.    Cardiovascular: Negative for chest pain and palpitations.   Gastrointestinal: Positive for nausea. Negative for abdominal pain, constipation and diarrhea.   Genitourinary: Negative for dysuria.   Musculoskeletal: Positive for arthralgias and back pain. Negative for joint swelling and neck pain.   Skin: Negative for rash.   Neurological: Negative for dizziness and headaches.   Psychiatric/Behavioral: Positive for decreased concentration and dysphoric mood.       Objective   /80  Pulse 84  Temp 98.2 °F (36.8 °C)  Ht 165.1 cm (65\")  Wt 92.5 kg (204 lb)  SpO2 98%  BMI 33.95 kg/m2  Physical Exam   Constitutional: She is oriented to person, place, and time. She appears well-developed and well-nourished.   HENT:   Head: Normocephalic and atraumatic.   Eyes: Pupils are equal, round, and reactive to light.   Neck: Normal range of motion. Neck supple.   Cardiovascular: Normal rate, regular rhythm and normal heart sounds.    Pulmonary/Chest: Effort normal and breath sounds normal. No respiratory distress. She has no wheezes. She has no rales.   Abdominal: Soft. Bowel sounds are normal.   Central obesity   Musculoskeletal:        Thoracic back: She exhibits decreased range of motion, bony tenderness and pain.        Lumbar back: She exhibits decreased range of motion, bony tenderness and pain. "   Neurological: She is alert and oriented to person, place, and time.   Skin: Skin is warm and dry.   Psychiatric: She has a normal mood and affect.   Nursing note and vitals reviewed.      Assessment/Plan   Problems Addressed this Visit        Cardiovascular and Mediastinum    Hot flashes    Relevant Medications    venlafaxine XR (EFFEXOR-XR) 75 MG 24 hr capsule       Digestive    Gastroesophageal reflux disease without esophagitis    Class 1 obesity without serious comorbidity with body mass index (BMI) of 33.0 to 33.9 in adult       Endocrine    Type 2 diabetes mellitus with hyperglycemia, with long-term current use of insulin    Relevant Orders    Hemoglobin A1c       Nervous and Auditory    Malignant neoplasm of breast metastatic to brain       Musculoskeletal and Integument    Breast cancer metastasized to bone       Other    Cancer associated pain - Primary    Relevant Medications    oxyCODONE (ROXICODONE) 15 MG immediate release tablet    Recurrent deep vein thrombosis (DVT)    Relevant Medications    rivaroxaban (XARELTO) 20 MG tablet    Moderate episode of recurrent major depressive disorder    Relevant Medications    venlafaxine XR (EFFEXOR-XR) 75 MG 24 hr capsule    traZODone (DESYREL) 100 MG tablet    Primary insomnia    Relevant Medications    traZODone (DESYREL) 100 MG tablet      Other Visit Diagnoses     Major depressive disorder with single episode, in full remission        Relevant Medications    venlafaxine XR (EFFEXOR-XR) 75 MG 24 hr capsule    traZODone (DESYREL) 100 MG tablet    Chemotherapy-induced nausea        Relevant Medications    promethazine (PHENERGAN) 25 MG tablet        Script for blood pressure cuff    Refilled medications    Stop cardiazem - home health monitor hr and bp  Call if needed to restart medication    Adjusted trazodone  Continue with effexor    Samples provided for boost    Recheck in 4 weeks  Due for diabetic foot exam    Due for lab work - ordered

## 2018-06-11 DIAGNOSIS — F41.1 GAD (GENERALIZED ANXIETY DISORDER): ICD-10-CM

## 2018-06-11 DIAGNOSIS — E83.42 HYPOMAGNESEMIA: ICD-10-CM

## 2018-06-11 RX ORDER — ANASTROZOLE 1 MG/1
1 TABLET ORAL DAILY
Qty: 30 TABLET | Refills: 5 | Status: SHIPPED | OUTPATIENT
Start: 2018-06-11 | End: 2018-07-25 | Stop reason: SDUPTHER

## 2018-06-11 RX ORDER — CLONAZEPAM 2 MG/1
2 TABLET ORAL 2 TIMES DAILY PRN
Qty: 60 TABLET | Refills: 2 | Status: SHIPPED | OUTPATIENT
Start: 2018-06-11 | End: 2018-09-27 | Stop reason: SDUPTHER

## 2018-06-11 RX ORDER — BLOOD-GLUCOSE METER
KIT MISCELLANEOUS
Qty: 100 EACH | Refills: 0 | Status: SHIPPED | OUTPATIENT
Start: 2018-06-11 | End: 2019-03-12 | Stop reason: SDUPTHER

## 2018-06-11 RX ORDER — CLONAZEPAM 2 MG/1
TABLET ORAL
Qty: 60 TABLET | Refills: 2 | OUTPATIENT
Start: 2018-06-11

## 2018-06-11 RX ORDER — NAPROXEN SODIUM 220 MG
TABLET ORAL
Qty: 50 EACH | Refills: 0 | Status: SHIPPED | OUTPATIENT
Start: 2018-06-11 | End: 2019-01-01

## 2018-06-11 RX ORDER — LANCETS 28 GAUGE
EACH MISCELLANEOUS
Qty: 100 EACH | Refills: 0 | Status: SHIPPED | OUTPATIENT
Start: 2018-06-11 | End: 2019-01-01

## 2018-07-25 RX ORDER — GABAPENTIN 300 MG/1
CAPSULE ORAL
Qty: 90 CAPSULE | Refills: 2 | Status: SHIPPED | OUTPATIENT
Start: 2018-07-25 | End: 2019-01-01

## 2018-07-25 RX ORDER — ANASTROZOLE 1 MG/1
1 TABLET ORAL DAILY
Qty: 30 TABLET | Refills: 5 | Status: SHIPPED | OUTPATIENT
Start: 2018-07-25 | End: 2019-01-01

## 2018-08-14 DIAGNOSIS — J30.9 CHRONIC ALLERGIC RHINITIS, UNSPECIFIED SEASONALITY, UNSPECIFIED TRIGGER: ICD-10-CM

## 2018-08-14 DIAGNOSIS — K21.9 GASTROESOPHAGEAL REFLUX DISEASE WITHOUT ESOPHAGITIS: ICD-10-CM

## 2018-08-14 RX ORDER — RANITIDINE 150 MG/1
150 TABLET ORAL 2 TIMES DAILY
Qty: 60 TABLET | Refills: 5 | Status: SHIPPED | OUTPATIENT
Start: 2018-08-14 | End: 2019-01-01 | Stop reason: SDUPTHER

## 2018-08-14 RX ORDER — MONTELUKAST SODIUM 10 MG/1
10 TABLET ORAL NIGHTLY
Qty: 30 TABLET | Refills: 5 | Status: SHIPPED | OUTPATIENT
Start: 2018-08-14 | End: 2019-01-01 | Stop reason: SDUPTHER

## 2018-08-24 PROBLEM — S70.01XA CONTUSION, HIP AND THIGH, RIGHT, INITIAL ENCOUNTER: Status: ACTIVE | Noted: 2018-08-24

## 2018-08-24 PROBLEM — S70.11XA CONTUSION, HIP AND THIGH, RIGHT, INITIAL ENCOUNTER: Status: ACTIVE | Noted: 2018-08-24

## 2018-09-10 DIAGNOSIS — F51.01 PRIMARY INSOMNIA: ICD-10-CM

## 2018-09-10 DIAGNOSIS — F32.5 MAJOR DEPRESSIVE DISORDER WITH SINGLE EPISODE, IN FULL REMISSION (HCC): ICD-10-CM

## 2018-09-10 RX ORDER — TRAZODONE HYDROCHLORIDE 100 MG/1
100 TABLET ORAL NIGHTLY
Qty: 90 TABLET | Refills: 0 | Status: SHIPPED | OUTPATIENT
Start: 2018-09-10 | End: 2018-10-12 | Stop reason: SDUPTHER

## 2018-09-27 ENCOUNTER — OFFICE VISIT (OUTPATIENT)
Dept: FAMILY MEDICINE CLINIC | Facility: CLINIC | Age: 43
End: 2018-09-27

## 2018-09-27 VITALS
TEMPERATURE: 97.6 F | BODY MASS INDEX: 33.84 KG/M2 | WEIGHT: 198.2 LBS | OXYGEN SATURATION: 99 % | HEIGHT: 64 IN | DIASTOLIC BLOOD PRESSURE: 78 MMHG | HEART RATE: 71 BPM | SYSTOLIC BLOOD PRESSURE: 128 MMHG

## 2018-09-27 DIAGNOSIS — C50.919 MALIGNANT NEOPLASM OF BREAST METASTATIC TO BRAIN, UNSPECIFIED LATERALITY (HCC): ICD-10-CM

## 2018-09-27 DIAGNOSIS — F33.2 SEVERE EPISODE OF RECURRENT MAJOR DEPRESSIVE DISORDER, WITHOUT PSYCHOTIC FEATURES (HCC): Primary | ICD-10-CM

## 2018-09-27 DIAGNOSIS — J42 CHRONIC BRONCHITIS, UNSPECIFIED CHRONIC BRONCHITIS TYPE (HCC): ICD-10-CM

## 2018-09-27 DIAGNOSIS — E66.9 CLASS 1 OBESITY WITHOUT SERIOUS COMORBIDITY WITH BODY MASS INDEX (BMI) OF 33.0 TO 33.9 IN ADULT, UNSPECIFIED OBESITY TYPE: ICD-10-CM

## 2018-09-27 DIAGNOSIS — R23.2 HOT FLASHES: ICD-10-CM

## 2018-09-27 DIAGNOSIS — F41.1 GAD (GENERALIZED ANXIETY DISORDER): ICD-10-CM

## 2018-09-27 DIAGNOSIS — R09.81 NASAL CONGESTION: ICD-10-CM

## 2018-09-27 DIAGNOSIS — G89.3 CANCER ASSOCIATED PAIN: ICD-10-CM

## 2018-09-27 DIAGNOSIS — C79.31 MALIGNANT NEOPLASM OF BREAST METASTATIC TO BRAIN, UNSPECIFIED LATERALITY (HCC): ICD-10-CM

## 2018-09-27 PROBLEM — R19.7 INTRACTABLE DIARRHEA: Status: RESOLVED | Noted: 2017-07-03 | Resolved: 2018-09-27

## 2018-09-27 PROCEDURE — 99214 OFFICE O/P EST MOD 30 MIN: CPT | Performed by: FAMILY MEDICINE

## 2018-09-27 RX ORDER — CLONAZEPAM 2 MG/1
2 TABLET ORAL 2 TIMES DAILY PRN
Qty: 60 TABLET | Refills: 2 | Status: SHIPPED | OUTPATIENT
Start: 2018-09-27 | End: 2018-09-28 | Stop reason: SDUPTHER

## 2018-09-27 RX ORDER — FLUTICASONE PROPIONATE 50 MCG
2 SPRAY, SUSPENSION (ML) NASAL DAILY
Qty: 16 G | Refills: 0 | Status: SHIPPED | OUTPATIENT
Start: 2018-09-27 | End: 2018-11-19

## 2018-09-27 RX ORDER — BENZONATATE 100 MG/1
100 CAPSULE ORAL 3 TIMES DAILY PRN
Qty: 30 CAPSULE | Refills: 0 | OUTPATIENT
Start: 2018-09-27 | End: 2018-11-19

## 2018-09-27 RX ORDER — OXYCODONE HYDROCHLORIDE 15 MG/1
15 TABLET ORAL EVERY 6 HOURS PRN
Qty: 120 TABLET | Refills: 0 | Status: CANCELLED | OUTPATIENT
Start: 2018-09-27

## 2018-09-27 RX ORDER — OXYCODONE AND ACETAMINOPHEN 10; 325 MG/1; MG/1
1 TABLET ORAL EVERY 6 HOURS PRN
Qty: 120 TABLET | Refills: 0 | Status: SHIPPED | OUTPATIENT
Start: 2018-09-27 | End: 2018-09-28 | Stop reason: SDUPTHER

## 2018-09-27 RX ORDER — FLUOXETINE HYDROCHLORIDE 20 MG/1
20 CAPSULE ORAL DAILY
Qty: 60 CAPSULE | Refills: 0 | Status: SHIPPED | OUTPATIENT
Start: 2018-09-27 | End: 2019-01-01

## 2018-09-27 NOTE — PROGRESS NOTES
Subjective   Chief Complaint   Patient presents with   • Pain     6 months   • Med Refill     prefers to have Percocet     Andra Villareal is a 43 y.o. female.   Pain (6 months) and Med Refill (prefers to have Percocet)    History of Present Illness     Asking to change effexor  Caused headaches  She voluntarily stopped medication  Depressive symptoms rated 10/10  Patient is feeling overwhelmed at home with personal issues  She had to force herself to come to the doctor today    Anxiety - managed with klonopin   Due for medication refills    C/o nasal congestion, productive cough, sinus pressure and pain  Asking for tessalon perles for cough  Asking for nasal spray    Would like to change oxycodone to percocet  Oxycodone is not effective  She feels like the medication only lasts a short period of time    Had a recent fall  She fell on her left hip while walking up the stairs at home  She was seen at urgent care  Still complaining of left hip pain  Was diagnosed with contusion  Pain has been slowly improving  Pain level rated 0/10    The following portions of the patient's history were reviewed and updated as appropriate: allergies, current medications, past family history, past medical history, past social history, past surgical history and problem list.    Review of Systems   Constitutional: Negative for appetite change, chills, fatigue and fever.   HENT: Positive for congestion, sinus pain, sinus pressure and sore throat. Negative for ear pain and rhinorrhea.    Eyes: Negative for pain.   Respiratory: Positive for cough. Negative for shortness of breath.    Cardiovascular: Negative for chest pain and palpitations.   Gastrointestinal: Negative for abdominal pain, constipation, diarrhea and nausea.   Genitourinary: Negative for dysuria.   Musculoskeletal: Positive for arthralgias and back pain. Negative for joint swelling and neck pain.   Skin: Negative for rash.   Neurological: Negative for dizziness and  "headaches.   Psychiatric/Behavioral: Positive for agitation, decreased concentration and dysphoric mood.       Objective   /78   Pulse 71   Temp 97.6 °F (36.4 °C)   Ht 163.8 cm (64.49\")   Wt 89.9 kg (198 lb 3.2 oz)   SpO2 99%   BMI 33.51 kg/m²   Physical Exam   Constitutional: She is oriented to person, place, and time. She appears well-developed and well-nourished.   HENT:   Head: Normocephalic and atraumatic.   Mild under eye edema   Eyes: Pupils are equal, round, and reactive to light.   Neck: Normal range of motion. Neck supple.   Cardiovascular: Normal rate, regular rhythm and normal heart sounds.    Pulmonary/Chest: Breath sounds normal. No respiratory distress. She has no wheezes. She has no rales.   Abdominal: Soft. Bowel sounds are normal.   Musculoskeletal: Normal range of motion.   Neurological: She is alert and oriented to person, place, and time.   Skin: Skin is warm and dry.   Psychiatric: She exhibits a depressed mood.   Nursing note and vitals reviewed.      Assessment/Plan   Problems Addressed this Visit        Cardiovascular and Mediastinum    RESOLVED: Hot flashes       Respiratory    Chronic bronchitis (CMS/HCC)    Relevant Medications    fluticasone (FLONASE) 50 MCG/ACT nasal spray    benzonatate (TESSALON PERLES) 100 MG capsule       Digestive    Class 1 obesity without serious comorbidity with body mass index (BMI) of 33.0 to 33.9 in adult       Nervous and Auditory    Malignant neoplasm of breast metastatic to brain (CMS/HCC)       Other    STEVEN (generalized anxiety disorder)    Relevant Medications    clonazePAM (KlonoPIN) 2 MG tablet    FLUoxetine (PROZAC) 20 MG capsule    Cancer associated pain    Severe episode of recurrent major depressive disorder (CMS/HCC) - Primary    Relevant Medications    FLUoxetine (PROZAC) 20 MG capsule      Other Visit Diagnoses     Nasal congestion            Patient's Body mass index is 33.51 kg/m². BMI is above normal parameters. Recommendations " include: exercise counseling and nutrition counseling.    The patient has read and signed the [facility] Controlled Substance Contract.  I will continue to see patient for regular follow up appointments.  They are well controlled on their medication.  GEOFF is updated every 3 months. The patient is aware of the potential for addiction and dependence.  Changed from oxycodone to percocet  Refilled klonopin    Start prozac and will titrate up in the next 2 weeks    Follow up with oncology as scheduled    Encouraged patient to get her flu vaccination    Start flonase for sinus congestion    Start tessalon PRN cough    Recheck in 3 months or sooner if medication is not effective

## 2018-09-28 DIAGNOSIS — F41.1 GAD (GENERALIZED ANXIETY DISORDER): ICD-10-CM

## 2018-09-28 DIAGNOSIS — G89.3 CANCER ASSOCIATED PAIN: ICD-10-CM

## 2018-09-28 RX ORDER — OXYCODONE AND ACETAMINOPHEN 10; 325 MG/1; MG/1
1 TABLET ORAL EVERY 6 HOURS PRN
Qty: 120 TABLET | Refills: 0 | Status: SHIPPED | OUTPATIENT
Start: 2018-09-28 | End: 2019-01-01

## 2018-09-28 RX ORDER — OXYCODONE HYDROCHLORIDE 15 MG/1
15 TABLET ORAL EVERY 6 HOURS PRN
Qty: 120 TABLET | Refills: 0 | OUTPATIENT
Start: 2018-09-28

## 2018-09-28 RX ORDER — CLONAZEPAM 2 MG/1
2 TABLET ORAL 2 TIMES DAILY PRN
Qty: 60 TABLET | Refills: 2 | Status: SHIPPED | OUTPATIENT
Start: 2018-09-28 | End: 2019-01-01 | Stop reason: SDUPTHER

## 2018-10-04 ENCOUNTER — EPISODE CHANGES (OUTPATIENT)
Dept: CASE MANAGEMENT | Facility: OTHER | Age: 43
End: 2018-10-04

## 2018-10-10 RX ORDER — LORATADINE 10 MG/1
10 TABLET ORAL DAILY
Qty: 30 TABLET | Refills: 2 | Status: SHIPPED | OUTPATIENT
Start: 2018-10-10 | End: 2019-01-01 | Stop reason: SDUPTHER

## 2018-10-12 DIAGNOSIS — E61.1 IRON DEFICIENCY: ICD-10-CM

## 2018-10-12 DIAGNOSIS — F32.5 MAJOR DEPRESSIVE DISORDER WITH SINGLE EPISODE, IN FULL REMISSION (HCC): ICD-10-CM

## 2018-10-12 DIAGNOSIS — F51.01 PRIMARY INSOMNIA: ICD-10-CM

## 2018-10-12 RX ORDER — ASCORBIC ACID 500 MG
500 TABLET ORAL 3 TIMES DAILY
Qty: 90 TABLET | Refills: 0 | Status: SHIPPED | OUTPATIENT
Start: 2018-10-12 | End: 2019-01-01 | Stop reason: SDUPTHER

## 2018-10-12 RX ORDER — FERROUS SULFATE 325(65) MG
325 TABLET ORAL 3 TIMES DAILY
Qty: 90 TABLET | Refills: 5 | Status: SHIPPED | OUTPATIENT
Start: 2018-10-12 | End: 2019-01-01 | Stop reason: SDUPTHER

## 2018-10-12 RX ORDER — TRAZODONE HYDROCHLORIDE 100 MG/1
100 TABLET ORAL NIGHTLY
Qty: 90 TABLET | Refills: 0 | Status: SHIPPED | OUTPATIENT
Start: 2018-10-12 | End: 2019-01-01

## 2018-10-12 RX ORDER — POTASSIUM CHLORIDE 20 MEQ/1
20 TABLET, EXTENDED RELEASE ORAL
Qty: 60 TABLET | Refills: 5 | Status: SHIPPED | OUTPATIENT
Start: 2018-10-12 | End: 2019-01-01 | Stop reason: SDUPTHER

## 2018-10-22 ENCOUNTER — EPISODE CHANGES (OUTPATIENT)
Dept: CASE MANAGEMENT | Facility: OTHER | Age: 43
End: 2018-10-22

## 2018-12-04 DIAGNOSIS — F32.5 MAJOR DEPRESSIVE DISORDER WITH SINGLE EPISODE, IN FULL REMISSION (HCC): ICD-10-CM

## 2018-12-04 DIAGNOSIS — F51.01 PRIMARY INSOMNIA: ICD-10-CM

## 2018-12-05 RX ORDER — TRAZODONE HYDROCHLORIDE 100 MG/1
100 TABLET ORAL NIGHTLY
Qty: 90 TABLET | Refills: 0 | Status: SHIPPED | OUTPATIENT
Start: 2018-12-05 | End: 2019-03-01 | Stop reason: SDUPTHER

## 2019-01-01 ENCOUNTER — HOSPITAL ENCOUNTER (EMERGENCY)
Facility: HOSPITAL | Age: 44
Discharge: HOME | End: 2019-11-29
Attending: EMERGENCY MEDICINE | Admitting: EMERGENCY MEDICINE

## 2019-01-01 ENCOUNTER — HOSPITAL ENCOUNTER (OUTPATIENT)
Dept: PHYSICAL THERAPY | Facility: HOSPITAL | Age: 44
Setting detail: THERAPIES SERIES
Discharge: HOME | End: 2019-09-11

## 2019-01-01 ENCOUNTER — OFFICE VISIT (OUTPATIENT)
Dept: FAMILY MEDICINE CLINIC | Facility: CLINIC | Age: 44
End: 2019-01-01

## 2019-01-01 ENCOUNTER — APPOINTMENT (OUTPATIENT)
Dept: PHYSICAL THERAPY | Facility: HOSPITAL | Age: 44
End: 2019-01-01

## 2019-01-01 ENCOUNTER — TELEPHONE (OUTPATIENT)
Dept: FAMILY MEDICINE CLINIC | Facility: CLINIC | Age: 44
End: 2019-01-01

## 2019-01-01 ENCOUNTER — HOSPITAL ENCOUNTER (OUTPATIENT)
Dept: PHYSICAL THERAPY | Facility: HOSPITAL | Age: 44
Setting detail: THERAPIES SERIES
Discharge: HOME | End: 2019-07-31

## 2019-01-01 ENCOUNTER — HOSPITAL ENCOUNTER (OUTPATIENT)
Dept: PHYSICAL THERAPY | Facility: HOSPITAL | Age: 44
Setting detail: THERAPIES SERIES
Discharge: HOME | End: 2019-07-29

## 2019-01-01 ENCOUNTER — APPOINTMENT (OUTPATIENT)
Dept: GENERAL RADIOLOGY | Facility: HOSPITAL | Age: 44
End: 2019-01-01

## 2019-01-01 ENCOUNTER — TRANSCRIBE ORDERS (OUTPATIENT)
Dept: URGENT CARE | Facility: CLINIC | Age: 44
End: 2019-01-01

## 2019-01-01 ENCOUNTER — LAB (OUTPATIENT)
Dept: LAB | Facility: HOSPITAL | Age: 44
End: 2019-01-01

## 2019-01-01 ENCOUNTER — HOSPITAL ENCOUNTER (OUTPATIENT)
Dept: PHYSICAL THERAPY | Facility: HOSPITAL | Age: 44
Setting detail: THERAPIES SERIES
Discharge: HOME | End: 2019-08-27

## 2019-01-01 ENCOUNTER — HOSPITAL ENCOUNTER (OUTPATIENT)
Dept: PHYSICAL THERAPY | Facility: HOSPITAL | Age: 44
Setting detail: THERAPIES SERIES
Discharge: HOME | End: 2019-07-25

## 2019-01-01 ENCOUNTER — APPOINTMENT (OUTPATIENT)
Dept: LAB | Facility: HOSPITAL | Age: 44
End: 2019-01-01

## 2019-01-01 ENCOUNTER — HOSPITAL ENCOUNTER (OUTPATIENT)
Dept: PHYSICAL THERAPY | Facility: HOSPITAL | Age: 44
Setting detail: THERAPIES SERIES
Discharge: HOME | End: 2019-07-23

## 2019-01-01 ENCOUNTER — OFFICE VISIT (OUTPATIENT)
Dept: ORTHOPEDIC SURGERY | Facility: CLINIC | Age: 44
End: 2019-01-01

## 2019-01-01 ENCOUNTER — APPOINTMENT (OUTPATIENT)
Dept: CT IMAGING | Facility: HOSPITAL | Age: 44
End: 2019-01-01

## 2019-01-01 ENCOUNTER — HOSPITAL ENCOUNTER (OUTPATIENT)
Dept: PHYSICAL THERAPY | Facility: HOSPITAL | Age: 44
Setting detail: THERAPIES SERIES
Discharge: HOME | End: 2019-09-05

## 2019-01-01 ENCOUNTER — HOSPITAL ENCOUNTER (OUTPATIENT)
Dept: PHYSICAL THERAPY | Facility: HOSPITAL | Age: 44
Setting detail: THERAPIES SERIES
Discharge: HOME | End: 2019-08-13

## 2019-01-01 ENCOUNTER — PATIENT OUTREACH (OUTPATIENT)
Dept: CASE MANAGEMENT | Facility: OTHER | Age: 44
End: 2019-01-01

## 2019-01-01 ENCOUNTER — HOSPITAL ENCOUNTER (OUTPATIENT)
Dept: PHYSICAL THERAPY | Facility: HOSPITAL | Age: 44
Setting detail: THERAPIES SERIES
Discharge: HOME | End: 2019-07-18

## 2019-01-01 ENCOUNTER — HOSPITAL ENCOUNTER (OUTPATIENT)
Dept: PHYSICAL THERAPY | Facility: HOSPITAL | Age: 44
Setting detail: THERAPIES SERIES
Discharge: HOME | End: 2019-08-29

## 2019-01-01 ENCOUNTER — HOSPITAL ENCOUNTER (OUTPATIENT)
Dept: PHYSICAL THERAPY | Facility: HOSPITAL | Age: 44
Setting detail: THERAPIES SERIES
Discharge: HOME | End: 2019-08-08

## 2019-01-01 ENCOUNTER — HOSPITAL ENCOUNTER (OUTPATIENT)
Dept: PHYSICAL THERAPY | Facility: HOSPITAL | Age: 44
Setting detail: THERAPIES SERIES
Discharge: HOME | End: 2019-08-22

## 2019-01-01 ENCOUNTER — HOSPITAL ENCOUNTER (OUTPATIENT)
Dept: PHYSICAL THERAPY | Facility: HOSPITAL | Age: 44
Setting detail: THERAPIES SERIES
Discharge: HOME | End: 2019-09-20

## 2019-01-01 ENCOUNTER — HOSPITAL ENCOUNTER (EMERGENCY)
Facility: HOSPITAL | Age: 44
Discharge: HOME | End: 2019-11-09
Attending: EMERGENCY MEDICINE | Admitting: EMERGENCY MEDICINE

## 2019-01-01 ENCOUNTER — HOSPITAL ENCOUNTER (OUTPATIENT)
Dept: PHYSICAL THERAPY | Facility: HOSPITAL | Age: 44
Setting detail: THERAPIES SERIES
Discharge: HOME | End: 2019-07-17

## 2019-01-01 VITALS
WEIGHT: 191.1 LBS | TEMPERATURE: 98.5 F | BODY MASS INDEX: 31.84 KG/M2 | HEART RATE: 77 BPM | RESPIRATION RATE: 20 BRPM | SYSTOLIC BLOOD PRESSURE: 129 MMHG | OXYGEN SATURATION: 96 % | HEIGHT: 65 IN | DIASTOLIC BLOOD PRESSURE: 89 MMHG

## 2019-01-01 VITALS
WEIGHT: 215.8 LBS | RESPIRATION RATE: 18 BRPM | OXYGEN SATURATION: 94 % | BODY MASS INDEX: 36.84 KG/M2 | TEMPERATURE: 98.7 F | HEIGHT: 64 IN | HEART RATE: 79 BPM | SYSTOLIC BLOOD PRESSURE: 136 MMHG | DIASTOLIC BLOOD PRESSURE: 85 MMHG

## 2019-01-01 VITALS — WEIGHT: 217 LBS | BODY MASS INDEX: 36.15 KG/M2 | HEIGHT: 65 IN

## 2019-01-01 VITALS
BODY MASS INDEX: 37.73 KG/M2 | SYSTOLIC BLOOD PRESSURE: 126 MMHG | WEIGHT: 221 LBS | HEIGHT: 64 IN | DIASTOLIC BLOOD PRESSURE: 84 MMHG

## 2019-01-01 VITALS
HEIGHT: 65 IN | WEIGHT: 218.7 LBS | DIASTOLIC BLOOD PRESSURE: 74 MMHG | SYSTOLIC BLOOD PRESSURE: 106 MMHG | BODY MASS INDEX: 36.44 KG/M2

## 2019-01-01 VITALS — BODY MASS INDEX: 35.32 KG/M2 | HEIGHT: 65 IN | WEIGHT: 212 LBS

## 2019-01-01 VITALS
BODY MASS INDEX: 35.49 KG/M2 | HEIGHT: 65 IN | WEIGHT: 213 LBS | DIASTOLIC BLOOD PRESSURE: 72 MMHG | SYSTOLIC BLOOD PRESSURE: 134 MMHG

## 2019-01-01 VITALS
HEIGHT: 65 IN | SYSTOLIC BLOOD PRESSURE: 112 MMHG | BODY MASS INDEX: 35.16 KG/M2 | WEIGHT: 211 LBS | DIASTOLIC BLOOD PRESSURE: 76 MMHG

## 2019-01-01 VITALS — BODY MASS INDEX: 35.65 KG/M2 | HEIGHT: 65 IN | WEIGHT: 214 LBS

## 2019-01-01 VITALS — BODY MASS INDEX: 36.15 KG/M2 | HEIGHT: 65 IN | WEIGHT: 217 LBS

## 2019-01-01 DIAGNOSIS — E61.1 IRON DEFICIENCY: ICD-10-CM

## 2019-01-01 DIAGNOSIS — I82.409 RECURRENT DEEP VEIN THROMBOSIS (DVT) (HCC): ICD-10-CM

## 2019-01-01 DIAGNOSIS — S62.647D CLOSED NONDISPLACED FRACTURE OF PROXIMAL PHALANX OF LEFT LITTLE FINGER WITH ROUTINE HEALING, SUBSEQUENT ENCOUNTER: Primary | ICD-10-CM

## 2019-01-01 DIAGNOSIS — S62.641A CLOSED NONDISPLACED FRACTURE OF PROXIMAL PHALANX OF LEFT INDEX FINGER, INITIAL ENCOUNTER: ICD-10-CM

## 2019-01-01 DIAGNOSIS — C50.919 MALIGNANT NEOPLASM OF BREAST METASTATIC TO BRAIN, UNSPECIFIED LATERALITY (HCC): ICD-10-CM

## 2019-01-01 DIAGNOSIS — L03.116 CELLULITIS OF LEFT LOWER EXTREMITY: ICD-10-CM

## 2019-01-01 DIAGNOSIS — J30.9 CHRONIC ALLERGIC RHINITIS: ICD-10-CM

## 2019-01-01 DIAGNOSIS — F41.1 GAD (GENERALIZED ANXIETY DISORDER): ICD-10-CM

## 2019-01-01 DIAGNOSIS — M79.642 LEFT HAND PAIN: Primary | ICD-10-CM

## 2019-01-01 DIAGNOSIS — M79.645 FINGER PAIN, LEFT: Primary | ICD-10-CM

## 2019-01-01 DIAGNOSIS — M79.645 FINGER PAIN, LEFT: ICD-10-CM

## 2019-01-01 DIAGNOSIS — E11.65 TYPE 2 DIABETES MELLITUS WITH HYPERGLYCEMIA, WITH LONG-TERM CURRENT USE OF INSULIN (HCC): ICD-10-CM

## 2019-01-01 DIAGNOSIS — Z13.220 SCREENING FOR HYPERCHOLESTEROLEMIA: ICD-10-CM

## 2019-01-01 DIAGNOSIS — F51.01 PRIMARY INSOMNIA: Primary | ICD-10-CM

## 2019-01-01 DIAGNOSIS — Z79.4 TYPE 2 DIABETES MELLITUS WITH HYPERGLYCEMIA, WITH LONG-TERM CURRENT USE OF INSULIN (HCC): ICD-10-CM

## 2019-01-01 DIAGNOSIS — E11.65 TYPE 2 DIABETES MELLITUS WITH HYPERGLYCEMIA, WITH LONG-TERM CURRENT USE OF INSULIN (HCC): Primary | ICD-10-CM

## 2019-01-01 DIAGNOSIS — Z79.899 HIGH RISK MEDICATION USE: ICD-10-CM

## 2019-01-01 DIAGNOSIS — T81.30XA WOUND DEHISCENCE: Primary | ICD-10-CM

## 2019-01-01 DIAGNOSIS — F51.01 PRIMARY INSOMNIA: ICD-10-CM

## 2019-01-01 DIAGNOSIS — R53.81 PHYSICAL DECONDITIONING: Primary | ICD-10-CM

## 2019-01-01 DIAGNOSIS — Z12.39 SCREENING FOR BREAST CANCER: ICD-10-CM

## 2019-01-01 DIAGNOSIS — C79.31 MALIGNANT NEOPLASM OF BREAST METASTATIC TO BRAIN, UNSPECIFIED LATERALITY (HCC): Primary | ICD-10-CM

## 2019-01-01 DIAGNOSIS — S81.012D LACERATION OF LEFT KNEE, SUBSEQUENT ENCOUNTER: Primary | ICD-10-CM

## 2019-01-01 DIAGNOSIS — B37.9 CANDIDA ALBICANS INFECTION: Primary | ICD-10-CM

## 2019-01-01 DIAGNOSIS — C79.31 MALIGNANT NEOPLASM OF BREAST METASTATIC TO BRAIN, UNSPECIFIED LATERALITY (HCC): ICD-10-CM

## 2019-01-01 DIAGNOSIS — Z79.899 HIGH RISK MEDICATION USE: Primary | ICD-10-CM

## 2019-01-01 DIAGNOSIS — K21.9 GASTROESOPHAGEAL REFLUX DISEASE WITHOUT ESOPHAGITIS: ICD-10-CM

## 2019-01-01 DIAGNOSIS — R53.81 PHYSICAL DECONDITIONING: ICD-10-CM

## 2019-01-01 DIAGNOSIS — S62.646D CLOSED NONDISPLACED FRACTURE OF PROXIMAL PHALANX OF RIGHT LITTLE FINGER WITH ROUTINE HEALING, SUBSEQUENT ENCOUNTER: ICD-10-CM

## 2019-01-01 DIAGNOSIS — Z79.4 TYPE 2 DIABETES MELLITUS WITH HYPERGLYCEMIA, WITH LONG-TERM CURRENT USE OF INSULIN (HCC): Primary | ICD-10-CM

## 2019-01-01 DIAGNOSIS — C50.919 MALIGNANT NEOPLASM OF BREAST METASTATIC TO BRAIN, UNSPECIFIED LATERALITY (HCC): Primary | ICD-10-CM

## 2019-01-01 DIAGNOSIS — Z13.29 SCREENING FOR HYPOTHYROIDISM: ICD-10-CM

## 2019-01-01 DIAGNOSIS — J20.9 ACUTE BRONCHITIS, UNSPECIFIED ORGANISM: ICD-10-CM

## 2019-01-01 DIAGNOSIS — M79.642 LEFT HAND PAIN: ICD-10-CM

## 2019-01-01 DIAGNOSIS — J42 CHRONIC BRONCHITIS, UNSPECIFIED CHRONIC BRONCHITIS TYPE (HCC): ICD-10-CM

## 2019-01-01 DIAGNOSIS — S81.012A KNEE LACERATION, LEFT, INITIAL ENCOUNTER: Primary | ICD-10-CM

## 2019-01-01 DIAGNOSIS — S62.641A CLOSED NONDISPLACED FRACTURE OF PROXIMAL PHALANX OF LEFT INDEX FINGER, INITIAL ENCOUNTER: Primary | ICD-10-CM

## 2019-01-01 LAB
ALBUMIN SERPL-MCNC: 4 G/DL (ref 3.5–5.2)
ALBUMIN/GLOB SERPL: 1.4 G/DL
ALP SERPL-CCNC: 109 U/L (ref 39–117)
ALT SERPL W P-5'-P-CCNC: 16 U/L (ref 1–33)
AMPHET+METHAMPHET UR QL: NEGATIVE
AMPHET+METHAMPHET UR QL: NEGATIVE
AMPHET+METHAMPHET UR QL: POSITIVE
AMPHETAMINES UR QL: NEGATIVE
AMPHETAMINES UR QL: NEGATIVE
ANION GAP SERPL CALCULATED.3IONS-SCNC: 9.3 MMOL/L
AST SERPL-CCNC: 19 U/L (ref 1–32)
BARBITURATES UR QL SCN: NEGATIVE
BASOPHILS # BLD AUTO: 0.01 10*3/MM3 (ref 0–0.2)
BASOPHILS NFR BLD AUTO: 0.2 % (ref 0–1.5)
BENZODIAZ UR QL SCN: NEGATIVE
BILIRUB SERPL-MCNC: 0.3 MG/DL (ref 0.2–1.2)
BUN BLD-MCNC: 15 MG/DL (ref 6–20)
BUN/CREAT SERPL: 17.6 (ref 7–25)
BUPRENORPHINE SERPL-MCNC: NEGATIVE NG/ML
BUPRENORPHINE SERPL-MCNC: NEGATIVE NG/ML
CALCIUM SPEC-SCNC: 9.3 MG/DL (ref 8.6–10.5)
CANNABINOIDS SERPL QL: NEGATIVE
CHLORIDE SERPL-SCNC: 106 MMOL/L (ref 98–107)
CHOLEST SERPL-MCNC: 202 MG/DL (ref 0–200)
CO2 SERPL-SCNC: 25.7 MMOL/L (ref 22–29)
COCAINE UR QL: NEGATIVE
CREAT BLD-MCNC: 0.85 MG/DL (ref 0.57–1)
DEPRECATED RDW RBC AUTO: 57.2 FL (ref 37–54)
EOSINOPHIL # BLD AUTO: 0.06 10*3/MM3 (ref 0–0.4)
EOSINOPHIL NFR BLD AUTO: 1 % (ref 0.3–6.2)
ERYTHROCYTE [DISTWIDTH] IN BLOOD BY AUTOMATED COUNT: 15.8 % (ref 12.3–15.4)
GFR SERPL CREATININE-BSD FRML MDRD: 89 ML/MIN/1.73
GLOBULIN UR ELPH-MCNC: 2.8 GM/DL
GLUCOSE BLD-MCNC: 134 MG/DL (ref 65–99)
HBA1C MFR BLD: 6.67 % (ref 4.8–5.6)
HCT VFR BLD AUTO: 39.6 % (ref 34–46.6)
HDLC SERPL-MCNC: 64 MG/DL (ref 40–60)
HGB BLD-MCNC: 13.3 G/DL (ref 12–15.9)
IMM GRANULOCYTES # BLD AUTO: 0.04 10*3/MM3 (ref 0–0.05)
IMM GRANULOCYTES NFR BLD AUTO: 0.7 % (ref 0–0.5)
LDLC SERPL CALC-MCNC: 99 MG/DL (ref 0–100)
LDLC/HDLC SERPL: 1.54 {RATIO}
LYMPHOCYTES # BLD AUTO: 0.86 10*3/MM3 (ref 0.7–3.1)
LYMPHOCYTES NFR BLD AUTO: 14.1 % (ref 19.6–45.3)
MCH RBC QN AUTO: 32.8 PG (ref 26.6–33)
MCHC RBC AUTO-ENTMCNC: 33.6 G/DL (ref 31.5–35.7)
MCV RBC AUTO: 97.5 FL (ref 79–97)
METHADONE UR QL SCN: NEGATIVE
MONOCYTES # BLD AUTO: 0.55 10*3/MM3 (ref 0.1–0.9)
MONOCYTES NFR BLD AUTO: 9 % (ref 5–12)
NEUTROPHILS # BLD AUTO: 4.6 10*3/MM3 (ref 1.7–7)
NEUTROPHILS NFR BLD AUTO: 75 % (ref 42.7–76)
NRBC BLD AUTO-RTO: 0 /100 WBC (ref 0–0.2)
OPIATES UR QL: NEGATIVE
OXYCODONE UR QL SCN: NEGATIVE
PCP UR QL SCN: NEGATIVE
PCP UR QL SCN: NEGATIVE
PLATELET # BLD AUTO: 154 10*3/MM3 (ref 140–450)
PMV BLD AUTO: 12.5 FL (ref 6–12)
POTASSIUM BLD-SCNC: 4.7 MMOL/L (ref 3.5–5.2)
PROPOXYPH UR QL: NEGATIVE
PROPOXYPH UR QL: NEGATIVE
PROT SERPL-MCNC: 6.8 G/DL (ref 6–8.5)
RBC # BLD AUTO: 4.06 10*6/MM3 (ref 3.77–5.28)
SODIUM BLD-SCNC: 141 MMOL/L (ref 136–145)
TRICYCLICS UR QL SCN: NEGATIVE
TRICYCLICS UR QL SCN: NEGATIVE
TRIGL SERPL-MCNC: 197 MG/DL (ref 0–150)
TSH SERPL DL<=0.05 MIU/L-ACNC: 1.23 MIU/ML (ref 0.27–4.2)
VLDLC SERPL-MCNC: 39.4 MG/DL (ref 5–40)
WBC NRBC COR # BLD: 6.12 10*3/MM3 (ref 3.4–10.8)

## 2019-01-01 PROCEDURE — 99214 OFFICE O/P EST MOD 30 MIN: CPT | Performed by: NURSE PRACTITIONER

## 2019-01-01 PROCEDURE — 80306 DRUG TEST PRSMV INSTRMNT: CPT | Performed by: NURSE PRACTITIONER

## 2019-01-01 PROCEDURE — 97140 MANUAL THERAPY 1/> REGIONS: CPT

## 2019-01-01 PROCEDURE — 97110 THERAPEUTIC EXERCISES: CPT | Performed by: PHYSICAL THERAPIST

## 2019-01-01 PROCEDURE — 80307 DRUG TEST PRSMV CHEM ANLYZR: CPT | Performed by: NURSE PRACTITIONER

## 2019-01-01 PROCEDURE — 87660 TRICHOMONAS VAGIN DIR PROBE: CPT | Performed by: NURSE PRACTITIONER

## 2019-01-01 PROCEDURE — 99213 OFFICE O/P EST LOW 20 MIN: CPT | Performed by: NURSE PRACTITIONER

## 2019-01-01 PROCEDURE — 97110 THERAPEUTIC EXERCISES: CPT

## 2019-01-01 PROCEDURE — 99024 POSTOP FOLLOW-UP VISIT: CPT | Performed by: NURSE PRACTITIONER

## 2019-01-01 PROCEDURE — 83036 HEMOGLOBIN GLYCOSYLATED A1C: CPT

## 2019-01-01 PROCEDURE — 26720 TREAT FINGER FRACTURE EACH: CPT | Performed by: NURSE PRACTITIONER

## 2019-01-01 PROCEDURE — 80053 COMPREHEN METABOLIC PANEL: CPT

## 2019-01-01 PROCEDURE — 84443 ASSAY THYROID STIM HORMONE: CPT

## 2019-01-01 PROCEDURE — 99282 EMERGENCY DEPT VISIT SF MDM: CPT

## 2019-01-01 PROCEDURE — 87086 URINE CULTURE/COLONY COUNT: CPT | Performed by: NURSE PRACTITIONER

## 2019-01-01 PROCEDURE — 72125 CT NECK SPINE W/O DYE: CPT

## 2019-01-01 PROCEDURE — 99284 EMERGENCY DEPT VISIT MOD MDM: CPT

## 2019-01-01 PROCEDURE — 97161 PT EVAL LOW COMPLEX 20 MIN: CPT | Performed by: PHYSICAL THERAPIST

## 2019-01-01 PROCEDURE — 87480 CANDIDA DNA DIR PROBE: CPT | Performed by: NURSE PRACTITIONER

## 2019-01-01 PROCEDURE — 73564 X-RAY EXAM KNEE 4 OR MORE: CPT

## 2019-01-01 PROCEDURE — 70450 CT HEAD/BRAIN W/O DYE: CPT

## 2019-01-01 PROCEDURE — 87510 GARDNER VAG DNA DIR PROBE: CPT | Performed by: NURSE PRACTITIONER

## 2019-01-01 PROCEDURE — 80061 LIPID PANEL: CPT

## 2019-01-01 PROCEDURE — 85025 COMPLETE CBC W/AUTO DIFF WBC: CPT

## 2019-01-01 RX ORDER — RIVAROXABAN 20 MG/1
TABLET, FILM COATED ORAL
Qty: 30 TABLET | Refills: 0 | OUTPATIENT
Start: 2019-01-01

## 2019-01-01 RX ORDER — ASCORBIC ACID 500 MG
TABLET ORAL
Qty: 30 TABLET | Refills: 0 | OUTPATIENT
Start: 2019-01-01

## 2019-01-01 RX ORDER — ESOMEPRAZOLE MAGNESIUM 40 MG/1
40 CAPSULE, DELAYED RELEASE ORAL
Qty: 30 CAPSULE | Refills: 5 | Status: SHIPPED | OUTPATIENT
Start: 2019-01-01 | End: 2019-01-01 | Stop reason: SINTOL

## 2019-01-01 RX ORDER — FERROUS SULFATE 325(65) MG
TABLET ORAL
Qty: 90 TABLET | Refills: 0 | OUTPATIENT
Start: 2019-01-01

## 2019-01-01 RX ORDER — ASCORBIC ACID 500 MG
500 TABLET ORAL 3 TIMES DAILY
Qty: 90 TABLET | Refills: 2 | Status: SHIPPED | OUTPATIENT
Start: 2019-01-01

## 2019-01-01 RX ORDER — POTASSIUM CHLORIDE 20 MEQ/1
TABLET, EXTENDED RELEASE ORAL
Qty: 60 TABLET | Refills: 0 | Status: SHIPPED | OUTPATIENT
Start: 2019-01-01 | End: 2019-01-01 | Stop reason: SDUPTHER

## 2019-01-01 RX ORDER — POTASSIUM CHLORIDE 20 MEQ/1
TABLET, EXTENDED RELEASE ORAL
Qty: 60 TABLET | Refills: 0 | OUTPATIENT
Start: 2019-01-01

## 2019-01-01 RX ORDER — HYDROCODONE BITARTRATE AND ACETAMINOPHEN 7.5; 325 MG/1; MG/1
1 TABLET ORAL EVERY 8 HOURS PRN
Qty: 40 TABLET | Refills: 0 | Status: SHIPPED | OUTPATIENT
Start: 2019-01-01 | End: 2019-01-01

## 2019-01-01 RX ORDER — DOXYCYCLINE HYCLATE 100 MG/1
100 CAPSULE ORAL 2 TIMES DAILY
Qty: 14 CAPSULE | Refills: 0 | Status: SHIPPED | OUTPATIENT
Start: 2019-01-01 | End: 2019-01-01

## 2019-01-01 RX ORDER — MONTELUKAST SODIUM 10 MG/1
TABLET ORAL
Qty: 30 TABLET | Refills: 0 | OUTPATIENT
Start: 2019-01-01

## 2019-01-01 RX ORDER — FLUCONAZOLE 200 MG/1
TABLET ORAL
Qty: 2 TABLET | Refills: 0 | Status: SHIPPED | OUTPATIENT
Start: 2019-01-01 | End: 2020-01-01 | Stop reason: SDUPTHER

## 2019-01-01 RX ORDER — MAGNESIUM OXIDE 400 MG/1
TABLET ORAL
Qty: 60 TABLET | Refills: 0 | OUTPATIENT
Start: 2019-01-01

## 2019-01-01 RX ORDER — FERROUS SULFATE 325(65) MG
TABLET ORAL
Qty: 30 TABLET | Refills: 0 | Status: SHIPPED | OUTPATIENT
Start: 2019-01-01 | End: 2019-01-01 | Stop reason: SDUPTHER

## 2019-01-01 RX ORDER — RANITIDINE 150 MG/1
TABLET ORAL
Qty: 60 TABLET | Refills: 0 | Status: SHIPPED | OUTPATIENT
Start: 2019-01-01 | End: 2019-01-01 | Stop reason: SINTOL

## 2019-01-01 RX ORDER — RIVAROXABAN 20 MG/1
TABLET, FILM COATED ORAL
Qty: 30 TABLET | Refills: 0 | Status: SHIPPED | OUTPATIENT
Start: 2019-01-01 | End: 2019-01-01 | Stop reason: SDUPTHER

## 2019-01-01 RX ORDER — MAGNESIUM OXIDE 400 MG/1
TABLET ORAL
Qty: 60 TABLET | Refills: 0 | Status: SHIPPED | OUTPATIENT
Start: 2019-01-01

## 2019-01-01 RX ORDER — CLONAZEPAM 2 MG/1
2 TABLET ORAL 2 TIMES DAILY PRN
Qty: 60 TABLET | Refills: 0 | Status: ON HOLD | OUTPATIENT
Start: 2019-01-01 | End: 2020-01-01 | Stop reason: SDUPTHER

## 2019-01-01 RX ORDER — MONTELUKAST SODIUM 10 MG/1
TABLET ORAL
Qty: 30 TABLET | Refills: 0 | Status: SHIPPED | OUTPATIENT
Start: 2019-01-01 | End: 2019-01-01 | Stop reason: SDUPTHER

## 2019-01-01 RX ORDER — MAGNESIUM OXIDE 400 MG/1
TABLET ORAL
Qty: 60 TABLET | Refills: 0 | Status: SHIPPED | OUTPATIENT
Start: 2019-01-01 | End: 2019-01-01 | Stop reason: SDUPTHER

## 2019-01-01 RX ORDER — LORATADINE 10 MG/1
10 TABLET ORAL DAILY
Qty: 90 TABLET | Refills: 2 | Status: SHIPPED | OUTPATIENT
Start: 2019-01-01 | End: 2019-01-01

## 2019-01-01 RX ORDER — RIVAROXABAN 20 MG/1
TABLET, FILM COATED ORAL
Qty: 30 TABLET | Refills: 2 | Status: SHIPPED | OUTPATIENT
Start: 2019-01-01 | End: 2020-01-01

## 2019-01-01 RX ORDER — CLONAZEPAM 2 MG/1
2 TABLET ORAL 2 TIMES DAILY PRN
Qty: 60 TABLET | Refills: 2 | Status: SHIPPED | OUTPATIENT
Start: 2019-01-01 | End: 2019-01-01 | Stop reason: SDUPTHER

## 2019-01-01 RX ORDER — CETIRIZINE HYDROCHLORIDE 10 MG/1
10 TABLET ORAL DAILY
Qty: 90 TABLET | Refills: 3 | Status: SHIPPED | OUTPATIENT
Start: 2019-01-01 | End: 2019-01-01 | Stop reason: SDUPTHER

## 2019-01-01 RX ORDER — MONTELUKAST SODIUM 10 MG/1
TABLET ORAL
Qty: 30 TABLET | Refills: 2 | Status: SHIPPED | OUTPATIENT
Start: 2019-01-01 | End: 2020-01-01

## 2019-01-01 RX ORDER — POTASSIUM CHLORIDE 20 MEQ/1
TABLET, EXTENDED RELEASE ORAL
Qty: 60 TABLET | Refills: 2 | Status: SHIPPED | OUTPATIENT
Start: 2019-01-01 | End: 2020-01-01

## 2019-01-01 RX ORDER — RANITIDINE 150 MG/1
TABLET ORAL
Qty: 60 TABLET | Refills: 0 | Status: SHIPPED | OUTPATIENT
Start: 2019-01-01 | End: 2019-01-01

## 2019-01-01 RX ORDER — ESZOPICLONE 1 MG/1
1 TABLET, FILM COATED ORAL NIGHTLY
Qty: 30 TABLET | Refills: 2 | Status: SHIPPED | OUTPATIENT
Start: 2019-01-01 | End: 2019-01-01

## 2019-01-01 RX ORDER — OMEPRAZOLE 40 MG/1
40 CAPSULE, DELAYED RELEASE ORAL DAILY
Qty: 30 CAPSULE | Refills: 5 | Status: SHIPPED | OUTPATIENT
Start: 2019-01-01

## 2019-01-01 RX ORDER — LORATADINE 10 MG/1
10 TABLET ORAL DAILY
Qty: 30 TABLET | Refills: 0 | OUTPATIENT
Start: 2019-01-01

## 2019-01-01 RX ORDER — CETIRIZINE HYDROCHLORIDE 10 MG/1
TABLET ORAL
Qty: 30 TABLET | Refills: 1 | Status: SHIPPED | OUTPATIENT
Start: 2019-01-01 | End: 2020-01-01

## 2019-01-01 RX ORDER — BLOOD-GLUCOSE METER
KIT MISCELLANEOUS
Refills: 0 | OUTPATIENT
Start: 2019-01-01

## 2019-01-01 RX ORDER — HYDROCODONE BITARTRATE AND ACETAMINOPHEN 7.5; 325 MG/1; MG/1
TABLET ORAL
COMMUNITY
Start: 2019-01-01 | End: 2020-01-01 | Stop reason: HOSPADM

## 2019-01-01 RX ORDER — ASCORBIC ACID 500 MG
TABLET ORAL
Qty: 30 TABLET | Refills: 0 | Status: SHIPPED | OUTPATIENT
Start: 2019-01-01 | End: 2019-01-01 | Stop reason: SDUPTHER

## 2019-01-01 RX ORDER — FERROUS SULFATE 325(65) MG
TABLET ORAL
Qty: 90 TABLET | Refills: 0 | Status: SHIPPED | OUTPATIENT
Start: 2019-01-01

## 2019-03-01 DIAGNOSIS — F51.01 PRIMARY INSOMNIA: ICD-10-CM

## 2019-03-01 DIAGNOSIS — F32.5 MAJOR DEPRESSIVE DISORDER WITH SINGLE EPISODE, IN FULL REMISSION (HCC): ICD-10-CM

## 2019-03-01 RX ORDER — TRAZODONE HYDROCHLORIDE 100 MG/1
100 TABLET ORAL NIGHTLY
Qty: 90 TABLET | Refills: 0 | Status: SHIPPED | OUTPATIENT
Start: 2019-03-01 | End: 2019-01-01

## 2019-03-11 RX ORDER — LORATADINE 10 MG/1
10 TABLET ORAL DAILY
Qty: 30 TABLET | Refills: 0 | OUTPATIENT
Start: 2019-03-11

## 2019-03-13 DIAGNOSIS — E11.65 TYPE 2 DIABETES MELLITUS WITH HYPERGLYCEMIA, WITH LONG-TERM CURRENT USE OF INSULIN (HCC): ICD-10-CM

## 2019-03-13 DIAGNOSIS — Z79.4 TYPE 2 DIABETES MELLITUS WITH HYPERGLYCEMIA, WITH LONG-TERM CURRENT USE OF INSULIN (HCC): ICD-10-CM

## 2019-03-13 RX ORDER — BLOOD-GLUCOSE METER
KIT MISCELLANEOUS
Qty: 100 EACH | Refills: 0 | Status: SHIPPED | OUTPATIENT
Start: 2019-03-13 | End: 2019-03-13 | Stop reason: SDUPTHER

## 2019-03-13 RX ORDER — BLOOD-GLUCOSE METER
KIT MISCELLANEOUS
Qty: 100 EACH | Refills: 0 | Status: SHIPPED | OUTPATIENT
Start: 2019-03-13

## 2019-03-13 NOTE — TELEPHONE ENCOUNTER
Pt last visit was on 09/27/2018 Pat next visit was on 12/19/2019 pt no showed and no other appointments have been made.

## 2019-03-14 DIAGNOSIS — Z79.4 TYPE 2 DIABETES MELLITUS WITH HYPERGLYCEMIA, WITH LONG-TERM CURRENT USE OF INSULIN (HCC): ICD-10-CM

## 2019-03-14 DIAGNOSIS — E11.65 TYPE 2 DIABETES MELLITUS WITH HYPERGLYCEMIA, WITH LONG-TERM CURRENT USE OF INSULIN (HCC): ICD-10-CM

## 2019-03-14 RX ORDER — BLOOD SUGAR DIAGNOSTIC
1 STRIP MISCELLANEOUS 3 TIMES DAILY PRN
Qty: 100 EACH | Refills: 1 | Status: SHIPPED | OUTPATIENT
Start: 2019-03-14 | End: 2019-01-01

## 2019-04-29 PROBLEM — S62.617A CLOSED DISPLACED FRACTURE OF PROXIMAL PHALANX OF LEFT LITTLE FINGER: Status: ACTIVE | Noted: 2019-01-01

## 2019-06-11 PROBLEM — G93.89 BRAIN MASS: Status: ACTIVE | Noted: 2018-01-20

## 2019-06-11 PROBLEM — G93.6 VASOGENIC BRAIN EDEMA (HCC): Status: ACTIVE | Noted: 2017-11-19

## 2019-06-11 PROBLEM — G91.9 HYDROCEPHALUS (HCC): Status: ACTIVE | Noted: 2017-12-04

## 2019-06-11 NOTE — PROGRESS NOTES
"Subjective   Andra Villareal is a 43 y.o. female.  Establish primary care. Has breast cancer with mets to the brain.  \"I had a scan done about three months ago and they found some tumors on my brain.  I had chemo and radiation and her told me that my last scan showed that they had shrunk by over 50 percent.\"  Currently sees Dr. De Anda with [facility] for oncology treatment.  Has had multiple episodes of bronchitis in the past and feels as if \"there may be fluid around my lungs.  I would really like to get that checked out.\"    Cancer   This is a chronic problem. The current episode started more than 1 year ago. The problem occurs constantly. The problem has been waxing and waning. Associated symptoms include fatigue and headaches. Pertinent negatives include no chills, diaphoresis or fever. Nothing aggravates the symptoms. Treatments tried: Mastectomy, chemotherapy and radiation. The treatment provided moderate relief.   Anxiety   Presents for follow-up visit. Symptoms include decreased concentration, depressed mood, excessive worry, irritability, nervous/anxious behavior and restlessness. Patient reports no confusion. Symptoms occur most days. The severity of symptoms is moderate. The quality of sleep is fair. Nighttime awakenings: one to two.         The following portions of the patient's history were reviewed and updated as appropriate:     She  has a past medical history of Abnormal breathing sounds, Abscess of skin, Acute bronchitis, Adult body mass index 30+, Allergic rhinitis, Anasarca, Anxiety, Anxiety, Asthenia, Asthma, Asthmatic bronchitis, Astigmatism, Bleeding external hemorrhoids, Body mass index (BMI) of 34.0-34.9 in adult, Body mass index (BMI) of 35.0-35.9 in adult, Body mass index (BMI) of 36.0-36.9 in adult, Body mass index 40.0-44.9, adult (CMS/[facility]), Brain cancer (CMS/[facility]ly [facility]) (11/19/2017), Cellulitis, Chemotherapy induced nausea and vomiting, Chronic back pain, Chronic cough, Chronic hypoxemic " respiratory failure (CMS/HCC), Cough, Depressive disorder, Diabetes mellitus (CMS/HCC), Dyslipidemia, Edema of lower extremity, Encounter for follow-up examination after completed treatment for malignant neoplasm, Epidermoid cyst of skin, Excessive and frequent menstruation with irregular cycle, Flushing, 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), Gastroesophageal reflux disease, H/O tubal ligation, History of blood clots, echocardiogram, Hyperglycemia, Hypermetropia, Hypertension, Hypokalemia, Impaired glucose tolerance, Infection of skin, Influenza, Irregular periods, Lesion of lumbar spine, Lumbago with sciatica, Malignant neoplasm of female breast (CMS/HCC), Menopausal flushing, Muscle weakness, Muscle weakness (generalized), Nonspecific interstitial pneumonia (CMS/HCC), Pleural effusion, Primary atypical pneumonia, Renal failure, Sinus tachycardia, Tobacco dependence syndrome, Tobacco non-user, Upper respiratory infection, and Wheezing.  She does not have any pertinent problems on file.  She  has a past surgical history that includes Other surgical history (05/05/2016); Tunneled central venous catheter insertion, single lumen; Venous Access Device (Port); Lung Volume Reduction; Mastectomy; Pap Smear; Other surgical history; Other surgical history; Other surgical history; Other surgical history; Thoracentesis; Other surgical history; Hysterectomy; Esophagogastroduodenoscopy (N/A, 1/26/2017); Colonoscopy (N/A, 1/26/2017); Esophagogastroduodenoscopy (N/A, 2/24/2017); Upper gastrointestinal endoscopy (01/26/2017); Upper gastrointestinal endoscopy (02/24/2017); Tubal ligation (2009); Liver biopsy (2015); Breast surgery (Right, 2013); and Ankle Loose Body Excision/Removal (Right, 5/24/2017).  Her family history includes Amblyopia in her other; Cataracts in her maternal grandfather and paternal grandmother; Coronary artery disease in her other; Diabetes in her other; Hypertension in her other.  She   reports that she quit smoking about 7 years ago. She has never used smokeless tobacco. She reports that she does not drink alcohol or use drugs.  Current Outpatient Medications   Medication Sig Dispense Refill   • albuterol sulfate HFA (PROAIR HFA) 108 (90 Base) MCG/ACT inhaler Inhale 2 puffs Every 4 (Four) Hours As Needed for Wheezing. 1 inhaler 0   • Calcium Carb-Cholecalciferol (CALCIUM 1000 + D) 1000-800 MG-UNIT tablet Take 1 tablet by mouth Daily. 90 tablet 2   • capecitabine (XELODA) 500 MG chemo tablet Take 2,000 mg by mouth.     • clonazePAM (KlonoPIN) 2 MG tablet Take 1 tablet by mouth 2 (Two) Times a Day As Needed for Anxiety. 60 tablet 2   • FEROSUL 325 (65 Fe) MG tablet TAKE 1 TABLET BY MOUTH 3 TIMES A DAY 30 tablet 0   • fluticasone (FLONASE) 50 MCG/ACT nasal spray 1 spray into the nostril(s) as directed by provider Daily. 1 bottle 0   • Fluticasone Furoate-Vilanterol (BREO ELLIPTA) 100-25 MCG/INH inhaler Inhale 1 puff Daily. 28 each 0   • FREESTYLE LITE test strip USE TO CHECK BLOOD SUGAR THREE TIMES DAILY 100 each 0   • loratadine (CLARITIN) 10 MG tablet Take 1 tablet by mouth Daily. 90 tablet 2   • magnesium oxide (MAG-OX) 400 MG tablet TAKE 1 TABLET BY MOUTH 2 TIMES A DAY 60 tablet 0   • montelukast (SINGULAIR) 10 MG tablet TAKE 1 TABLET BY MOUTH AT BEDTIME 30 tablet 0   • ondansetron (ZOFRAN) 8 MG tablet Take 1 tablet by mouth Every 8 (Eight) Hours As Needed for Nausea or Vomiting. 30 tablet 2   • potassium chloride (K-DUR,KLOR-CON) 20 MEQ CR tablet TAKE 1 TABLET BY MOUTH 2 TIMES A DAY 60 tablet 0   • promethazine (PHENERGAN) 25 MG tablet Take 1 tablet by mouth Every 6 (Six) Hours As Needed for Nausea or Vomiting. 120 tablet 5   • raNITIdine (ZANTAC) 150 MG tablet TAKE 1 TABLET BY MOUTH 2 TIMES A DAY 60 tablet 0   • rivaroxaban (XARELTO) 20 MG tablet Take 1 tablet by mouth Daily. Needs appt for refills 30 tablet 0   • vitamin C (ASCORBIC ACID) 500 MG tablet Take 1 tablet by mouth 3 (Three) Times a  Day. 90 tablet 2     No current facility-administered medications for this visit.      Facility-Administered Medications Ordered in Other Visits   Medication Dose Route Frequency Provider Last Rate Last Dose   • sodium chloride 0.9 % flush 10 mL  10 mL Intravenous PRN Joselito Waddell MD   10 mL at 01/26/17 1129     Current Outpatient Medications on File Prior to Visit   Medication Sig   • albuterol sulfate HFA (PROAIR HFA) 108 (90 Base) MCG/ACT inhaler Inhale 2 puffs Every 4 (Four) Hours As Needed for Wheezing.   • capecitabine (XELODA) 500 MG chemo tablet Take 2,000 mg by mouth.   • FEROSUL 325 (65 Fe) MG tablet TAKE 1 TABLET BY MOUTH 3 TIMES A DAY   • fluticasone (FLONASE) 50 MCG/ACT nasal spray 1 spray into the nostril(s) as directed by provider Daily.   • Fluticasone Furoate-Vilanterol (BREO ELLIPTA) 100-25 MCG/INH inhaler Inhale 1 puff Daily.   • FREESTYLE LITE test strip USE TO CHECK BLOOD SUGAR THREE TIMES DAILY   • magnesium oxide (MAG-OX) 400 MG tablet TAKE 1 TABLET BY MOUTH 2 TIMES A DAY   • montelukast (SINGULAIR) 10 MG tablet TAKE 1 TABLET BY MOUTH AT BEDTIME   • ondansetron (ZOFRAN) 8 MG tablet Take 1 tablet by mouth Every 8 (Eight) Hours As Needed for Nausea or Vomiting.   • potassium chloride (K-DUR,KLOR-CON) 20 MEQ CR tablet TAKE 1 TABLET BY MOUTH 2 TIMES A DAY   • promethazine (PHENERGAN) 25 MG tablet Take 1 tablet by mouth Every 6 (Six) Hours As Needed for Nausea or Vomiting.   • raNITIdine (ZANTAC) 150 MG tablet TAKE 1 TABLET BY MOUTH 2 TIMES A DAY   • rivaroxaban (XARELTO) 20 MG tablet Take 1 tablet by mouth Daily. Needs appt for refills     Current Facility-Administered Medications on File Prior to Visit   Medication   • sodium chloride 0.9 % flush 10 mL     She is allergic to lisinopril; flagyl [metronidazole]; fentanyl; and latex..    Review of Systems   Constitutional: Positive for fatigue and irritability. Negative for chills, diaphoresis and fever.   HENT: Negative for voice change.    Eyes:  "Negative.    Respiratory: Negative.    Cardiovascular: Negative.    Gastrointestinal: Negative.    Genitourinary: Negative.    Musculoskeletal: Negative.    Skin: Negative.    Neurological: Positive for headaches.   Psychiatric/Behavioral: Positive for decreased concentration. Negative for confusion. The patient is nervous/anxious.        Objective    Visit Vitals  /76   Ht 165.1 cm (65\")   Wt 95.7 kg (211 lb)   BMI 35.11 kg/m²       Physical Exam   Constitutional: She is oriented to person, place, and time. She appears well-developed and well-nourished.   HENT:   Head: Normocephalic and atraumatic.   Right Ear: External ear normal.   Left Ear: External ear normal.   Eyes: EOM are normal. Pupils are equal, round, and reactive to light.   Neck: Normal range of motion. Neck supple.   Cardiovascular: Normal rate, regular rhythm and normal heart sounds.   Pulmonary/Chest: Effort normal and breath sounds normal. No respiratory distress. She has no wheezes. She has no rales.   Right mastectomy    Abdominal: Soft. Bowel sounds are normal.   Musculoskeletal: Normal range of motion.   Neurological: She is alert and oriented to person, place, and time.   Skin: Skin is warm and dry. Capillary refill takes less than 2 seconds.   Psychiatric: She has a normal mood and affect. Her behavior is normal.   Nursing note and vitals reviewed.      Assessment/Plan   Problems Addressed this Visit        Nervous and Auditory    Malignant neoplasm of breast metastatic to brain (CMS/HCC)    Relevant Orders    CBC & Differential    Comprehensive Metabolic Panel       Other    STEVEN (generalized anxiety disorder)    Relevant Medications    clonazePAM (KlonoPIN) 2 MG tablet      Other Visit Diagnoses     High risk medication use    -  Primary    Relevant Orders    Urine Drug Screen - Urine, Clean Catch (Completed)    Acute bronchitis, unspecified organism        Relevant Medications    loratadine (CLARITIN) 10 MG tablet    Other Relevant " Orders    XR Chest 2 View (Completed)    Screening for breast cancer        Relevant Orders    Mammo Screening Digital Tomosynthesis Bilateral With CAD    Screening for hypothyroidism        Relevant Orders    TSH    Screening for hypercholesterolemia        Relevant Orders    Lipid panel        New Medications Ordered This Visit   Medications   • Calcium Carb-Cholecalciferol (CALCIUM 1000 + D) 1000-800 MG-UNIT tablet     Sig: Take 1 tablet by mouth Daily.     Dispense:  90 tablet     Refill:  2   • loratadine (CLARITIN) 10 MG tablet     Sig: Take 1 tablet by mouth Daily.     Dispense:  90 tablet     Refill:  2   • vitamin C (ASCORBIC ACID) 500 MG tablet     Sig: Take 1 tablet by mouth 3 (Three) Times a Day.     Dispense:  90 tablet     Refill:  2     Needs appt for refills please   • clonazePAM (KlonoPIN) 2 MG tablet     Sig: Take 1 tablet by mouth 2 (Two) Times a Day As Needed for Anxiety.     Dispense:  60 tablet     Refill:  2       1.  Malignant neoplasm of breast metastatic to brain:  Complete CBC and chemistry panel as ordered and will notify results when available  Continue on Xeloda as previously prescribed  We will continue oncology follow-up with Dr. De Anda as scheduled    2.  Generalized anxiety disorder:  Continue on Klonopin as previously prescribed and refill prescription provided  Educated on possible side effects of this medication including but not limited to increased risk for drowsiness, dependency    3.  Acute bronchitis, unspecified organism:  Complete chest x-ray is ordered and will notify results when available  Encouraged to continue on Claritin as previously prescribed and refill prescription sent to pharmacy    4.  High risk medication use:GEOFF reviewed by me and will be scanned into medical record  Controlled substance contract signed and dated and will be scanned into medical record  Complete urine drug screen as ordered     5.  Screening for breast cancer:  Radiology will call to  schedule mammogram and will notify results when available    6.  Screening for hypothyroidism:  Complete TSH is ordered and will notify results when available    7.  Screening for hypercholesterolemia:  Complete fasting lipid panel as ordered and will notify results when available  Encouraged to adhere to low-fat diet    8.  Encounter to establish care:  Continue on current medications as previously prescribed   Return in about 4 weeks (around 7/9/2019) for Recheck.        This document has been electronically signed by BREANN Garcia on June 13, 2019 7:12 AM

## 2019-06-12 NOTE — PROGRESS NOTES
Per BREANN Bowles, Ms. Villareal has been called with recent lab results & recommendations.  Continue current medications and follow-up as planned or sooner if any problems.

## 2019-06-12 NOTE — TELEPHONE ENCOUNTER
Per BREANN Bowles, Ms. Villareal has been called with recent lab results & recommendations.  Continue current medications and follow-up as planned or sooner if any problems.      ----- Message from BREANN Garcia sent at 6/12/2019  7:07 AM CDT -----  Can you please call and let her know that her drug screen did show positive for amphetamines but she is taking Zantac which can cause a false positive.  I feel there is no reason to doubt that this is the cause of the positive screen.  Please let her know this may continue to test positive in the future in case she has to be tested anywhere else so she can make them aware.  Continue her current medications

## 2019-06-12 NOTE — TELEPHONE ENCOUNTER
Per BREANN Bowles, Ms. Villareal has been called with recent CXR results & recommendations.  Continue current medications and follow-up as planned or sooner if any problems.        ----- Message from BREANN Garcia sent at 6/12/2019 10:29 AM CDT -----  X-ray normal, please call or send note!

## 2019-06-27 NOTE — TELEPHONE ENCOUNTER
-Per BREANN Shah, Ms. Villareal has been called with recent lab results & recommendations.  Continue current medications and follow-up as planned or sooner if any problems.      ---- Message from BREANN Garcia sent at 6/25/2019  1:58 PM CDT -----  Her thyroid was normal.  Glucose was elevated at 134 but sodium, potassium, kidney function and liver function were within normal limits.  Total cholesterol was slightly elevated at 202 and triglycerides elevated at 197 but both have improved from 2 years ago.  She needs to continue to adhere to a low-fat diet and continue her medications as prescribed.  I would like her to stop by the lab at her convenience for hemoglobin A1c.  This is a long-term check of her sugar control.  She does not need to be fasting.  I apologize but they did not draw this at her previous lab draw because the order had Dr. Martinez's name on it and I did not realize that at the time.  Thank you.

## 2019-06-27 NOTE — PROGRESS NOTES
Per BREANN Shah, Ms. Villareal has been called with recent lab results & recommendations.  Continue current medications and follow-up as planned or sooner if any problems.

## 2019-07-10 NOTE — TELEPHONE ENCOUNTER
Per BREANN Bowles, Ms. Villareal has been called with recent lab results & recommendations.  Continue current medications and follow-up as planned or sooner if any problems.      ----- Message from BREANN Garcia sent at 7/9/2019  7:59 AM CDT -----  Hemoglobin A1c while still slightly elevated remained stable at 6.67.  Continue current medications.

## 2019-07-10 NOTE — TELEPHONE ENCOUNTER
Per BREANN Bowles, Ms. Villareal has been called with recent Bilateral Screening Mammogram results & recommendations.  Continue current medications and follow-up as planned or sooner if any problems.        ----- Message from BREANN Garcia sent at 7/9/2019 10:57 AM CDT -----  Repeat mammogram yearly.  Please call or send letter.

## 2019-07-15 NOTE — PROGRESS NOTES
Andra Villareal is a 43 y.o. female   Primary provider:  Nesha Soliman APRN       Chief Complaint   Patient presents with   • Left Hand - Pain   • Establish Care       HISTORY OF PRESENT ILLNESS:     43-year-old female in the office today for evaluation and follow-up of left hand pain, limited range of motion of the third fourth and fifth digit and follow-up after proximal phalanx fracture which occurred in April 2019 in which she never followed up for further treatment.  She reports that she has swelling and limited range of motion in the third fourth and fifth digit without any new injury.  She also admits to wearing the splint for approximately 6 weeks      Pain   This is a new problem. The current episode started more than 1 month ago. The problem occurs intermittently. Associated symptoms comments: Stabbing, aching, redness, swelling.        CONCURRENT MEDICAL HISTORY:    Past Medical History:   Diagnosis Date   • Abnormal breathing sounds    • Abscess of skin     and / or subcutaneous tissue   • Acute bronchitis     unspecified   • Adult body mass index 30+     obesity-BMI 33   • Allergic rhinitis    • Anasarca    • Anxiety     currently on xanax   • Anxiety    • Asthenia    • Asthma     moderate persistent   • Asthmatic bronchitis    • Astigmatism    • Bleeding external hemorrhoids    • Body mass index (BMI) of 34.0-34.9 in adult    • Body mass index (BMI) of 35.0-35.9 in adult    • Body mass index (BMI) of 36.0-36.9 in adult    • Body mass index 40.0-44.9, adult (CMS/HCC)     SEVERELY OBESE   • Brain cancer (CMS/HCC) 11/19/2017   • Breast cancer (CMS/HCC)    • Cellulitis    • Chemotherapy induced nausea and vomiting    • Chronic back pain    • Chronic cough    • Chronic hypoxemic respiratory failure (CMS/HCC)     on NC at 3 LPM   • Cough    • Depressive disorder    • Diabetes mellitus (CMS/HCC)     no retinopathy   • Drug therapy    • DVT (deep venous thrombosis) (CMS/[facility])    • Dyslipidemia    • Edema of  "lower extremity    • Encounter for follow-up examination after completed treatment for malignant neoplasm    • Epidermoid cyst of skin     excision with secondary intention healing   • Excessive and frequent menstruation with irregular cycle     Vaginal bleeding after 4 years of no bleeding secondary to chemotherapy for breast cancer; currently being treated for breast cancer for the second time, mets to bone and liver while on chemotherapy      • Flushing    • 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)    • Gastroesophageal reflux disease    • H/O tubal ligation    • History of blood clots     clots around mediports x 2   • Hx of echocardiogram     w/ color flow, and w/o color flow   • Hx of radiation therapy    • Hyperglycemia    • Hypermetropia    • Hypertension    • Hypokalemia    • Impaired glucose tolerance     hgbalc 6.2   • Infection of skin     and /or subcutaneous tissue-around the catheter exit site   • Influenza     needs influenza immunization   • Irregular periods    • Lesion of lumbar spine     pain controlled with percocet and lidocaine patches   • Lumbago with sciatica    • Malignant neoplasm of female breast (CMS/HCC)     s/p right mastectomy, mets to bone and liver on chemotherapy      • Menopausal flushing    • Muscle weakness    • Muscle weakness (generalized)    • Nonspecific interstitial pneumonia (CMS/HCC)     symptomatically improved   • Pleural effusion     refractory massive right, most likely malignant effusion   • Primary atypical pneumonia    • Renal failure     acute drug-induced renal failure   • Sinus tachycardia    • Tobacco dependence syndrome    • Tobacco non-user    • Upper respiratory infection    • Wheezing        Allergies   Allergen Reactions   • Lisinopril Shortness Of Breath   • Flagyl [Metronidazole] Other (See Comments)     pancreatitis   • Fentanyl Anxiety     \"goes out of her mind\"   • Latex Rash         Current Outpatient Medications:   •  albuterol sulfate " HFA (PROAIR HFA) 108 (90 Base) MCG/ACT inhaler, Inhale 2 puffs Every 4 (Four) Hours As Needed for Wheezing., Disp: 1 inhaler, Rfl: 0  •  Calcium Carb-Cholecalciferol (CALCIUM 1000 + D) 1000-800 MG-UNIT tablet, Take 1 tablet by mouth Daily., Disp: 90 tablet, Rfl: 2  •  capecitabine (XELODA) 500 MG chemo tablet, Take 2,000 mg by mouth., Disp: , Rfl:   •  clonazePAM (KlonoPIN) 2 MG tablet, Take 1 tablet by mouth 2 (Two) Times a Day As Needed for Anxiety., Disp: 60 tablet, Rfl: 2  •  FEROSUL 325 (65 Fe) MG tablet, TAKE 1 TABLET BY MOUTH 3 TIMES A DAY, Disp: 30 tablet, Rfl: 0  •  fluticasone (FLONASE) 50 MCG/ACT nasal spray, 1 spray into the nostril(s) as directed by provider Daily., Disp: 1 bottle, Rfl: 0  •  Fluticasone Furoate-Vilanterol (BREO ELLIPTA) 100-25 MCG/INH inhaler, Inhale 1 puff Daily., Disp: 28 each, Rfl: 0  •  FREESTYLE LITE test strip, USE TO CHECK BLOOD SUGAR THREE TIMES DAILY, Disp: 100 each, Rfl: 0  •  HYDROcodone-acetaminophen (NORCO) 7.5-325 MG per tablet, TK 1 T PO  Q 8 H PRN P, Disp: , Rfl:   •  loratadine (CLARITIN) 10 MG tablet, Take 1 tablet by mouth Daily., Disp: 90 tablet, Rfl: 2  •  magnesium oxide (MAG-OX) 400 MG tablet, TAKE 1 TABLET BY MOUTH 2 TIMES A DAY, Disp: 60 tablet, Rfl: 0  •  montelukast (SINGULAIR) 10 MG tablet, TAKE 1 TABLET BY MOUTH AT BEDTIME, Disp: 30 tablet, Rfl: 0  •  ondansetron (ZOFRAN) 8 MG tablet, Take 1 tablet by mouth Every 8 (Eight) Hours As Needed for Nausea or Vomiting., Disp: 30 tablet, Rfl: 2  •  potassium chloride (K-DUR,KLOR-CON) 20 MEQ CR tablet, TAKE 1 TABLET BY MOUTH 2 TIMES A DAY, Disp: 60 tablet, Rfl: 0  •  promethazine (PHENERGAN) 25 MG tablet, Take 1 tablet by mouth Every 6 (Six) Hours As Needed for Nausea or Vomiting., Disp: 120 tablet, Rfl: 5  •  raNITIdine (ZANTAC) 150 MG tablet, TAKE 1 TABLET BY MOUTH 2 TIMES A DAY, Disp: 60 tablet, Rfl: 0  •  rivaroxaban (XARELTO) 20 MG tablet, Take 1 tablet by mouth Daily. Needs appt for refills, Disp: 30 tablet,  Rfl: 0  •  vitamin C (ASCORBIC ACID) 500 MG tablet, Take 1 tablet by mouth 3 (Three) Times a Day., Disp: 90 tablet, Rfl: 2  No current facility-administered medications for this visit.     Facility-Administered Medications Ordered in Other Visits:   •  sodium chloride 0.9 % flush 10 mL, 10 mL, Intravenous, PRN, Joselito Waddell MD, 10 mL at 01/26/17 1129    Past Surgical History:   Procedure Laterality Date   • ANKLE LOOSE BODY EXCISION/REMOVAL Right 5/24/2017    Procedure: RIGTH FOOT EXCISION NAVICULAR FRACTURES FRAGMENT  DEBRIDEMENT TALONAVILCULA RJOIN T;  Surgeon: Armani Oliveira DPM;  Location: [facility] OR;  Service:    • BREAST BIOPSY     • BREAST SURGERY Right 2013     reconstruction of right breast, trans flap surgery   • CENTRAL VENOUS CATHETER TUNNELED INSERTION SINGLE LUMEN      Placement of indwelling Pleurx catheter right   • COLONOSCOPY N/A 1/26/2017    Procedure: COLONOSCOPY;  Surgeon: Robert Clifton MD;  Location: [facility] ENDOSCOPY;  Service:    • ENDOSCOPY N/A 1/26/2017    Procedure: ESOPHAGOGASTRODUODENOSCOPY;  Surgeon: Robert Clifton MD;  Location: [facility] ENDOSCOPY;  Service:    • ENDOSCOPY N/A 2/24/2017    Procedure: ESOPHAGOGASTRODUODENOSCOPY;  Surgeon: Robert Clifton MD;  Location: [facility] ENDOSCOPY;  Service:    • HYSTERECTOMY      vaginal   • LIVER BIOPSY  2015   • LUNG VOLUME REDUCTION     • MASTECTOMY      partial   • OTHER SURGICAL HISTORY  05/05/2016    central line insertion , PICC line replacement   • OTHER SURGICAL HISTORY      place breast clip percut   • OTHER SURGICAL HISTORY      pulse oximetry   • OTHER SURGICAL HISTORY      remove cc cath w/ sc port or pump, Removal of right internal jugular MediPort   • OTHER SURGICAL HISTORY      spirometry   • OTHER SURGICAL HISTORY      tubal sterilization   • PAP SMEAR     • THORACENTESIS      aspiration of pleural space   • TUBAL ABDOMINAL LIGATION  2009   • UPPER GASTROINTESTINAL ENDOSCOPY  01/26/2017   • UPPER GASTROINTESTINAL ENDOSCOPY   "2017   • VENOUS ACCESS DEVICE (PORT) INSERTION      Ultrasound guided right internal jugular Mediport placement under fluoroscopy       Family History   Problem Relation Age of Onset   • Coronary artery disease Other    • Diabetes Other    • Hypertension Other    • Amblyopia Other    • Cataracts Maternal Grandfather    • Cataracts Paternal Grandmother         Social History     Socioeconomic History   • Marital status:      Spouse name: Not on file   • Number of children: Not on file   • Years of education: Not on file   • Highest education level: Not on file   Tobacco Use   • Smoking status: Former Smoker     Last attempt to quit:      Years since quittin.5   • Smokeless tobacco: Never Used   Substance and Sexual Activity   • Alcohol use: No   • Drug use: No   • Sexual activity: Defer        Review of Systems   Constitutional: Negative.    HENT: Negative.    Eyes: Negative.    Respiratory: Negative.    Cardiovascular: Negative.    Gastrointestinal: Negative.    Endocrine: Negative.    Genitourinary: Negative.    Musculoskeletal: Negative.    Skin: Negative.    Allergic/Immunologic: Negative.    Neurological: Negative.    Hematological: Negative.    Psychiatric/Behavioral: Negative.        PHYSICAL EXAMINATION:       Ht 165.1 cm (65\")   Wt 97.1 kg (214 lb)   BMI 35.61 kg/m²     Physical Exam   Constitutional: She is oriented to person, place, and time. Vital signs are normal. She appears well-developed and well-nourished. She is cooperative.   HENT:   Head: Normocephalic and atraumatic.   Neck: Trachea normal and phonation normal.   Pulmonary/Chest: Effort normal. No respiratory distress.   Abdominal: Soft. Normal appearance. She exhibits no distension.   Neurological: She is alert and oriented to person, place, and time. GCS eye subscore is 4. GCS verbal subscore is 5. GCS motor subscore is 6.   Skin: Skin is warm, dry and intact. Capillary refill takes less than 2 seconds.   Psychiatric: " She has a normal mood and affect. Her speech is normal and behavior is normal. Judgment and thought content normal. Cognition and memory are normal.   Vitals reviewed.      GAIT:     []  Normal  []  Antalgic    Assistive device: []  None  []  Walker     []  Crutches  [x]  Cane     []  Wheelchair  []  Stretcher    Right Hand Exam     Tenderness   Right hand tenderness location: reports tenderness in the 3,4,5 digits.     Range of Motion   Wrist   Extension: normal   Flexion: normal     Muscle Strength   Wrist extension: 5/5   Wrist flexion: 5/5   : 4/5     Other   Erythema: absent  Scars: absent  Sensation: normal  Pulse: present    Comments:  Flexion limited in the fourth fifth and third digits.      Left Hand Exam   Left hand exam is normal.                      ASSESSMENT:    Diagnoses and all orders for this visit:    Left hand pain  -     Ambulatory Referral to Physical Therapy Evaluate and treat    Closed nondisplaced fracture of proximal phalanx of right little finger with routine healing, subsequent encounter  -     Ambulatory Referral to Physical Therapy Evaluate and treat    Other orders  -     HYDROcodone-acetaminophen (NORCO) 7.5-325 MG per tablet; TK 1 T PO  Q 8 H PRN P          PLAN  Recommend progression of range of motion activity as tolerated.  Reviewed x-rays which reveal healed fifth proximal phalanx fracture.  Recommended a course of physical therapy with a hand therapist and follow-up in 6 weeks for recheck.  No Follow-up on file.    BREANN Murray

## 2019-07-17 NOTE — THERAPY EVALUATION
Outpatient Physical Therapy Ortho Initial Evaluation  [facility]     Patient Name: Andra Villareal  : 1975  MRN: [id number]  Today's Date: 2019      Visit Date: 2019(15)  MD         Recert     Subjective Evaluation    History of Present Illness  Date of onset: 2019  Mechanism of injury: Pt reports she fell at home     Subjective comment: Walk in clinic . Under [facility] care since . Wrist based boxer fracture brace which she used for 6 weeks. Did not Return for Follow up until 7/15 when is was still painful.  Patient Occupation: Disabled/  Quality of life: good    Pain  Current pain ratin  At best pain ratin  At worst pain ratin  Location: across the knuckles  Quality: burning, dull ache and tight (shooting)  Relieving factors: rest and medications  Aggravating factors: movement    Hand dominance: right    Diagnostic Tests  X-ray: abnormal    Treatments  Previous treatment: immobilization  Current treatment: medication  Patient Goals  Patient goals for therapy: decreased edema, decreased pain, increased motion and independence with ADLs/IADLs          Patient Active Problem List   Diagnosis   • Chronic allergic rhinitis   • Degenerative disc disease, thoracic   • Iron deficiency   • STEVEN (generalized anxiety disorder)   • Hypokalemia   • Constipation due to opioid therapy   • Cancer associated pain   • Chronic nausea   • Gastroesophageal reflux disease without esophagitis   • Hyperglycemia, drug-induced   • Chronic bronchitis (CMS/HCC)   • Liver metastases (CMS/HCC)   • Recurrent deep vein thrombosis (DVT) (CMS/HCC)   • Severe episode of recurrent major depressive disorder (CMS/HCC)   • Primary insomnia   • Breast cancer metastasized to bone (CMS/HCC)   • Type 2 diabetes mellitus with hyperglycemia, with long-term current use of insulin (CMS/HCC)   • Thrombocytopenia (CMS/HCC)   • 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)   • Chronic  constipation   • Malignant neoplasm of breast metastatic to brain (CMS/HCC)   • Hx of radiation therapy   • Former smoker, stopped smoking in distant past   • Class 1 obesity without serious comorbidity with body mass index (BMI) of 33.0 to 33.9 in adult   • Contusion, hip and thigh, right, initial encounter   • Closed displaced fracture of proximal phalanx of left little finger   • Brain mass   • Hydrocephalus   • Vasogenic brain edema (CMS/HCC)        Past Medical History:   Diagnosis Date   • Abnormal breathing sounds    • Abscess of skin     and / or subcutaneous tissue   • Acute bronchitis     unspecified   • Adult body mass index 30+     obesity-BMI 33   • Allergic rhinitis    • Anasarca    • Anxiety     currently on xanax   • Anxiety    • Asthenia    • Asthma     moderate persistent   • Asthmatic bronchitis    • Astigmatism    • Bleeding external hemorrhoids    • Body mass index (BMI) of 34.0-34.9 in adult    • Body mass index (BMI) of 35.0-35.9 in adult    • Body mass index (BMI) of 36.0-36.9 in adult    • Body mass index 40.0-44.9, adult (CMS/HCC)     SEVERELY OBESE   • Brain cancer (CMS/HCC) 11/19/2017   • Breast cancer (CMS/HCC)    • Cellulitis    • Chemotherapy induced nausea and vomiting    • Chronic back pain    • Chronic cough    • Chronic hypoxemic respiratory failure (CMS/HCC)     on NC at 3 LPM   • Cough    • Depressive disorder    • Diabetes mellitus (CMS/HCC)     no retinopathy   • Drug therapy    • DVT (deep venous thrombosis) (CMS/HCC)    • Dyslipidemia    • Edema of lower extremity    • Encounter for follow-up examination after completed treatment for malignant neoplasm    • Epidermoid cyst of skin     excision with secondary intention healing   • Excessive and frequent menstruation with irregular cycle     Vaginal bleeding after 4 years of no bleeding secondary to chemotherapy for breast cancer; currently being treated for breast cancer for the second time, mets to bone and liver while on  chemotherapy      • Flushing    • 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)    • Gastroesophageal reflux disease    • H/O tubal ligation    • History of blood clots     clots around mediports x 2   • Hx of echocardiogram     w/ color flow, and w/o color flow   • Hx of radiation therapy    • Hyperglycemia    • Hypermetropia    • Hypertension    • Hypokalemia    • Impaired glucose tolerance     hgbalc 6.2   • Infection of skin     and /or subcutaneous tissue-around the catheter exit site   • Influenza     needs influenza immunization   • Irregular periods    • Lesion of lumbar spine     pain controlled with percocet and lidocaine patches   • Lumbago with sciatica    • Malignant neoplasm of female breast (CMS/HCC)     s/p right mastectomy, mets to bone and liver on chemotherapy      • Menopausal flushing    • Muscle weakness    • Muscle weakness (generalized)    • Nonspecific interstitial pneumonia (CMS/HCC)     symptomatically improved   • Pleural effusion     refractory massive right, most likely malignant effusion   • Primary atypical pneumonia    • Renal failure     acute drug-induced renal failure   • Sinus tachycardia    • Tobacco dependence syndrome    • Tobacco non-user    • Upper respiratory infection    • Wheezing         Past Surgical History:   Procedure Laterality Date   • ANKLE LOOSE BODY EXCISION/REMOVAL Right 5/24/2017    Procedure: RIGTH FOOT EXCISION NAVICULAR FRACTURES FRAGMENT  DEBRIDEMENT TALONAVILCULA RJOIN T;  Surgeon: Armani Oliveira DPM;  Location: [facility] OR;  Service:    • BREAST BIOPSY     • BREAST SURGERY Right 2013     reconstruction of right breast, trans flap surgery   • CENTRAL VENOUS CATHETER TUNNELED INSERTION SINGLE LUMEN      Placement of indwelling Pleurx catheter right   • COLONOSCOPY N/A 1/26/2017    Procedure: COLONOSCOPY;  Surgeon: Robert Clifton MD;  Location: [facility] ENDOSCOPY;  Service:    • ENDOSCOPY N/A 1/26/2017    Procedure: ESOPHAGOGASTRODUODENOSCOPY;   Surgeon: Robert Clifton MD;  Location: [facility] ENDOSCOPY;  Service:    • ENDOSCOPY N/A 2/24/2017    Procedure: ESOPHAGOGASTRODUODENOSCOPY;  Surgeon: Robert Clifton MD;  Location: [facility] ENDOSCOPY;  Service:    • HYSTERECTOMY      vaginal   • LIVER BIOPSY  2015   • LUNG VOLUME REDUCTION     • MASTECTOMY      partial   • OTHER SURGICAL HISTORY  05/05/2016    central line insertion , PICC line replacement   • OTHER SURGICAL HISTORY      place breast clip percut   • OTHER SURGICAL HISTORY      pulse oximetry   • OTHER SURGICAL HISTORY      remove cc cath w/ sc port or pump, Removal of right internal jugular MediPort   • OTHER SURGICAL HISTORY      spirometry   • OTHER SURGICAL HISTORY      tubal sterilization   • PAP SMEAR     • THORACENTESIS      aspiration of pleural space   • TUBAL ABDOMINAL LIGATION  2009   • UPPER GASTROINTESTINAL ENDOSCOPY  01/26/2017   • UPPER GASTROINTESTINAL ENDOSCOPY  02/24/2017   • VENOUS ACCESS DEVICE (PORT) INSERTION      Ultrasound guided right internal jugular Mediport placement under fluoroscopy     Medications (Admitted on 7/17/2019)   Outpatient Medications   albuterol sulfate HFA (PROAIR HFA) 108 (90 Base) MCG/ACT inhaler    Calcium Carb-Cholecalciferol (CALCIUM 1000 + D) 1000-800 MG-UNIT tablet    capecitabine (XELODA) 500 MG chemo tablet    clonazePAM (KlonoPIN) 2 MG tablet    FEROSUL 325 (65 Fe) MG tablet    fluticasone (FLONASE) 50 MCG/ACT nasal spray    Fluticasone Furoate-Vilanterol (BREO ELLIPTA) 100-25 MCG/INH inhaler    FREESTYLE LITE test strip    HYDROcodone-acetaminophen (NORCO) 7.5-325 MG per tablet    loratadine (CLARITIN) 10 MG tablet    magnesium oxide (MAG-OX) 400 MG tablet    montelukast (SINGULAIR) 10 MG tablet    ondansetron (ZOFRAN) 8 MG tablet    potassium chloride (K-DUR,KLOR-CON) 20 MEQ CR tablet    promethazine (PHENERGAN) 25 MG tablet    raNITIdine (ZANTAC) 150 MG tablet    rivaroxaban (XARELTO) 20 MG tablet    vitamin C (ASCORBIC ACID) 500 MG  tablet   Clinic-Administered Medications   sodium chloride 0.9 % flush 10 mL     ALLERGIES         LisinoprilShortness Of Breath   Flagyl [Metronidazole]Other (See Comments)   FentanylAnxiety   LatexRash     Visit Dx:     ICD-10-CM ICD-9-CM   1. Closed nondisplaced fracture of proximal phalanx of left little finger with routine healing, subsequent encounter S62.647D V54.19       Objective    PT Ortho     Row Name 07/18/19 1100       Precautions and Contraindications    Precautions  Cancer Hx with radiation.  -SS    Contraindications  LATEX ALLERGY  -SS    Row Name 07/17/19 1100       Precautions and Contraindications    Precautions  Cancer Hx with radiation.  -DD    Contraindications  LATEX ALLERGY  -DD       Subjective Pain    Pre-Treatment Pain Level  5  -DD    Post-Treatment Pain Level  7  -DD       Posture/Observations    Posture/Observations Comments  Eccymosis on 5th MCP jt, swelling in hand and fingers. Protected use of hand. No bracing  -DD       Special Tests/Palpation    Special Tests/Palpation  -- TTP MCP, PIP joint of fingers 3-5  -DD       General ROM    Right Hand  -- WNL  -DD    GENERAL ROM COMMENTS  wrist flexion 65, ext 50, supination 45, Pronation WNL  -DD       MMT (Manual Muscle Testing)    Rt Upper Ext  --  -DD    Right Hand  --  -DD      User Key  (r) = Recorded By, (t) = Taken By, (c) = Cosigned By    Initials Name Provider Type    DD Katty Clay, PT DPT Physical Therapist    SS Pelon Ford, PT DPT Physical Therapist           Hand Therapy (last 24 hours)      Hand Eval     Row Name 07/18/19 1100             Left Flexion AROM    II- MP AROM  60  -SS      II- PIP AROM  95  -SS      II- DIP AROM  40  -SS      II- ADORNO Left Flexion AROM  195  -SS      III- MP AROM  55  -SS      III- PIP AROM  50  -SS      III- DIP AROM  30  -SS      III- ADORNO Left Flexion AROM  135  -SS      IV- MP AROM  55  -SS      IV- PIP AROM  35  -SS      IV- DIP AROM  20  -SS      IV- ADORNO Left Flexion  AROM  110  -SS      V- MP AROM  45  -SS      V- PIP AROM  30  -SS      V- DIP AROM  25  -SS      V- ADORNO Left Flexion AROM  100  -SS        User Key  (r) = Recorded By, (t) = Taken By, (c) = Cosigned By    Initials Name Provider Type    Pelon Berumen, PT DPT Physical Therapist        Therapy Education  Given: HEP  Program: New  How Provided: Verbal, Written  Provided to: Patient  Level of Understanding: Verbalized, Demonstrated    Assessment/Plan     PT OP Goals     Row Name 07/18/19 1100          Time Calculation    PT Goal Re-Cert Due Date  08/07/19  -SS       User Key  (r) = Recorded By, (t) = Taken By, (c) = Cosigned By    Initials Name Provider Type    Pelon Berumen, PT DPT Physical Therapist          PT Assessment/Plan     Row Name 07/18/19 1200          PT Assessment    Functional Limitations  Limitation in home management;Performance in self-care ADL  -DD     Impairments  Pain;Range of motion;Muscle strength  -DD     Assessment Comments  Pt has sigfnificant lost ROM and strength in the Left hand from fracture and immobilization.  Would benifit from skilled PT for improved function.  -DD     Please refer to paper survey for additional self-reported information  Yes  -DD     Rehab Potential  Good  -DD     Patient/caregiver participated in establishment of treatment plan and goals  Yes  -DD     Patient would benefit from skilled therapy intervention  Yes  -DD        PT Plan    PT Frequency  2x/week  -DD     Predicted Duration of Therapy Intervention (Therapy Eval)  6 weeks  -DD     Planned CPT's?  PT EVAL MOD COMPLELITY: 03809;PT THER PROC EA 15 MIN: 11127;PT MANUAL THERAPY EA 15 MIN: 05939  -DD     Physical Therapy Interventions (Optional Details)  home exercise program;ROM (Range of Motion);strengthening;stretching  -DD     PT Plan Comments  hand and wrist ROM in fluido and strengthening. as tolerated , PROM. Ice  -DD       User Key  (r) = Recorded By, (t) = Taken By, (c) = Cosigned By     Initials Name Provider Type    Katty Pedro, PT DPT Physical Therapist            Exercises     Row Name 07/18/19 1100             Precautions    Existing Precautions/Restrictions  -- Latex allergy, cancer history  -SS         Subjective Comments    Subjective Comments  No improvement in motion as yet. Has been working on HEP.  -SS         Subjective Pain    Able to rate subjective pain?  yes  -SS      Pre-Treatment Pain Level  0  -SS      Post-Treatment Pain Level  0  -SS         Exercise 1    Exercise Name 1  Fluidotherapy with AROM  -SS      Cueing 1  Verbal  -SS      Time 1  15 mins  -SS         Exercise 2    Exercise Name 2  LLPS MP flexion IF-SF  -SS      Cueing 2  Verbal;Tactile  -SS      Time 2  1 min each  -SS         Exercise 3    Exercise Name 3  LLPS Claw IF-SF  -SS      Cueing 3  Verbal;Tactile  -SS      Time 3  1 min each  -SS         Exercise 4    Exercise Name 4  LLPS fist  -SS      Cueing 4  Verbal;Tactile  -SS      Time 4  1 min  -SS         Exercise 5    Exercise Name 5  Active fist   -SS      Cueing 5  Verbal;Demo  -SS      Reps 5  5  -SS      Time 5  5 sec hold  -SS         Exercise 6    Exercise Name 6  Cylinder grasp to facilitation flexion  -SS      Cueing 6  Verbal;Demo  -SS      Sets 6  5  -SS      Reps 6  10 sec hold  -SS      Additional Comments  Ultrasound bottle  -SS         Exercise 7    Exercise Name 7  Block and bend DIPs IF-SF  -SS      Cueing 7  Verbal;Tactile  -SS      Sets 7  1  -SS      Reps 7  10 each  -SS        User Key  (r) = Recorded By, (t) = Taken By, (c) = Cosigned By    Initials Name Provider Type    Pelon Berumen, PT DPT Physical Therapist           Outcome Measure Options: Quick DASH  Quick DASH  Open a tight or new jar.: Unable  Do heavy household chores (e.g., wash walls, wash floors): Unable  Carry a shopping bag or briefcase: Unable  Wash your back: Unable  Use a knife to cut food: Unable  Recreational activities in which you take some  force or impact through your arm, should or hand (e.g. golf, hammering, tennis, etc.): Unable  During the past week, to what extent has your arm, shoulder, or hand problem interfered with your normal social activites with family, friends, neighbors or groups?: Extremely  During the past week, were you limited in your work or other regular daily activities as a result of your arm, shoulder or hand problem?: Unable  Arm, Shoulder, or hand pain: Moderate  Tingling (pins and needles) in your arm, shoulder, or hand: None  During the past week, how much difficulty have you had sleeping because of the pain in your arm, shoulder or hand?: Moderate Difficiculty  Number of Questions Answered: 11  Quick DASH Score: 81.82     Time Calculation:     Start Time: 0845  Stop Time: 0930  Time Calculation (min): 45 min  Total Timed Code Minutes- PT: 10 minute(s)     Therapy Charges for Today     Code Description Service Date Service Provider Modifiers Qty    45909923085 HC PT EVAL LOW COMPLEXITY 2 7/17/2019 Katty Clay PT DPT GP 1    08917193059 HC PT THER PROC EA 15 MIN 7/17/2019 Katty Clay PT MARCUST GP 1          PT G-Codes  Outcome Measure Options: Quick DASH  Quick DASH Score: 81.82         Katty Clay PT DPT, ATC  7/18/2019

## 2019-07-18 NOTE — THERAPY TREATMENT NOTE
Outpatient Physical Therapy Ortho Treatment Note  [facility]     Patient Name: Andra Villareal  : 1975  MRN: [id number]  Today's Date: 2019      Visit Date: 2019  Attendance: 2/ (15)  Subjective Improvement: 0%  Next MD Appt: 19  Recert Date: 19      Visit Dx:    ICD-10-CM ICD-9-CM   1. Closed nondisplaced fracture of proximal phalanx of left little finger with routine healing, subsequent encounter S62.647D V54.19            Past Medical History:   Diagnosis Date   • Abnormal breathing sounds    • Abscess of skin     and / or subcutaneous tissue   • Acute bronchitis     unspecified   • Adult body mass index 30+     obesity-BMI 33   • Allergic rhinitis    • Anasarca    • Anxiety     currently on xanax   • Anxiety    • Asthenia    • Asthma     moderate persistent   • Asthmatic bronchitis    • Astigmatism    • Bleeding external hemorrhoids    • Body mass index (BMI) of 34.0-34.9 in adult    • Body mass index (BMI) of 35.0-35.9 in adult    • Body mass index (BMI) of 36.0-36.9 in adult    • Body mass index 40.0-44.9, adult (CMS/HCC)     SEVERELY OBESE   • Brain cancer (CMS/HCC) 2017   • Breast cancer (CMS/HCC)    • Cellulitis    • Chemotherapy induced nausea and vomiting    • Chronic back pain    • Chronic cough    • Chronic hypoxemic respiratory failure (CMS/HCC)     on NC at 3 LPM   • Cough    • Depressive disorder    • Diabetes mellitus (CMS/HCC)     no retinopathy   • Drug therapy    • DVT (deep venous thrombosis) (CMS/HCC)    • Dyslipidemia    • Edema of lower extremity    • Encounter for follow-up examination after completed treatment for malignant neoplasm    • Epidermoid cyst of skin     excision with secondary intention healing   • Excessive and frequent menstruation with irregular cycle     Vaginal bleeding after 4 years of no bleeding secondary to chemotherapy for breast cancer; currently being treated for breast cancer for the second time, mets to bone and liver  while on chemotherapy      • Flushing    • 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)    • Gastroesophageal reflux disease    • H/O tubal ligation    • History of blood clots     clots around mediports x 2   • Hx of echocardiogram     w/ color flow, and w/o color flow   • Hx of radiation therapy    • Hyperglycemia    • Hypermetropia    • Hypertension    • Hypokalemia    • Impaired glucose tolerance     hgbalc 6.2   • Infection of skin     and /or subcutaneous tissue-around the catheter exit site   • Influenza     needs influenza immunization   • Irregular periods    • Lesion of lumbar spine     pain controlled with percocet and lidocaine patches   • Lumbago with sciatica    • Malignant neoplasm of female breast (CMS/HCC)     s/p right mastectomy, mets to bone and liver on chemotherapy      • Menopausal flushing    • Muscle weakness    • Muscle weakness (generalized)    • Nonspecific interstitial pneumonia (CMS/HCC)     symptomatically improved   • Pleural effusion     refractory massive right, most likely malignant effusion   • Primary atypical pneumonia    • Renal failure     acute drug-induced renal failure   • Sinus tachycardia    • Tobacco dependence syndrome    • Tobacco non-user    • Upper respiratory infection    • Wheezing         Past Surgical History:   Procedure Laterality Date   • ANKLE LOOSE BODY EXCISION/REMOVAL Right 5/24/2017    Procedure: RIGTH FOOT EXCISION NAVICULAR FRACTURES FRAGMENT  DEBRIDEMENT TALONAVILCULA RJOIN T;  Surgeon: Armani Oliveira DPM;  Location: [facility] OR;  Service:    • BREAST BIOPSY     • BREAST SURGERY Right 2013     reconstruction of right breast, trans flap surgery   • CENTRAL VENOUS CATHETER TUNNELED INSERTION SINGLE LUMEN      Placement of indwelling Pleurx catheter right   • COLONOSCOPY N/A 1/26/2017    Procedure: COLONOSCOPY;  Surgeon: Robert Clifton MD;  Location: [facility] ENDOSCOPY;  Service:    • ENDOSCOPY N/A 1/26/2017    Procedure:  ESOPHAGOGASTRODUODENOSCOPY;  Surgeon: Robert Clifton MD;  Location: [facility] ENDOSCOPY;  Service:    • ENDOSCOPY N/A 2/24/2017    Procedure: ESOPHAGOGASTRODUODENOSCOPY;  Surgeon: Robert Clifton MD;  Location: [facility] ENDOSCOPY;  Service:    • HYSTERECTOMY      vaginal   • LIVER BIOPSY  2015   • LUNG VOLUME REDUCTION     • MASTECTOMY      partial   • OTHER SURGICAL HISTORY  05/05/2016    central line insertion , PICC line replacement   • OTHER SURGICAL HISTORY      place breast clip percut   • OTHER SURGICAL HISTORY      pulse oximetry   • OTHER SURGICAL HISTORY      remove cc cath w/ sc port or pump, Removal of right internal jugular MediPort   • OTHER SURGICAL HISTORY      spirometry   • OTHER SURGICAL HISTORY      tubal sterilization   • PAP SMEAR     • THORACENTESIS      aspiration of pleural space   • TUBAL ABDOMINAL LIGATION  2009   • UPPER GASTROINTESTINAL ENDOSCOPY  01/26/2017   • UPPER GASTROINTESTINAL ENDOSCOPY  02/24/2017   • VENOUS ACCESS DEVICE (PORT) INSERTION      Ultrasound guided right internal jugular Mediport placement under fluoroscopy       PT Ortho     Row Name 07/18/19 1100       Precautions and Contraindications    Precautions  Cancer Hx with radiation.  -SS    Contraindications  LATEX ALLERGY  -SS      User Key  (r) = Recorded By, (t) = Taken By, (c) = Cosigned By    Initials Name Provider Type    SS Pelon Ford, PT DPT Physical Therapist           Hand Therapy (last 24 hours)      Hand Eval     Row Name 07/18/19 1100             Left Flexion AROM    II- MP AROM  60  -SS      II- PIP AROM  95  -SS      II- DIP AROM  40  -SS      II- ADORNO Left Flexion AROM  195  -SS      III- MP AROM  55  -SS      III- PIP AROM  50  -SS      III- DIP AROM  30  -SS      III- ADORNO Left Flexion AROM  135  -SS      IV- MP AROM  55  -SS      IV- PIP AROM  35  -SS      IV- DIP AROM  20  -SS      IV- ADORNO Left Flexion AROM  110  -SS      V- MP AROM  45  -SS      V- PIP AROM  30  -SS      V- DIP AROM  25  -SS       V- ADORNO Left Flexion AROM  100  -SS        User Key  (r) = Recorded By, (t) = Taken By, (c) = Cosigned By    Initials Name Provider Type    SS Pelon Ford, PT DPT Physical Therapist            PT Assessment/Plan     Row Name 07/18/19 1300 07/18/19 1200       PT Assessment    Functional Limitations  Limitation in home management;Performance in self-care ADL  -SS  Limitation in home management;Performance in self-care ADL  -DD    Impairments  Pain;Range of motion;Muscle strength  -SS  Pain;Range of motion;Muscle strength  -DD    Assessment Comments  Significant improvement in flexion AROM this date. Patient requires encouragement to push into discomfort. She was instructed to consciously make the MF-SF flex when using her hand.  -SS  Pt has significant lost ROM and strength in the Left hand from fracture and immobilization.  Would benefit from skilled PT for improved function.  -DD    Please refer to paper survey for additional self-reported information  --  Yes  -DD    Rehab Potential  Good  -SS  Good  -DD    Patient/caregiver participated in establishment of treatment plan and goals  Yes  -SS  Yes  -DD    Patient would benefit from skilled therapy intervention  Yes  -SS  Yes  -DD       PT Plan    PT Frequency  2x/week  -SS  2x/week  -DD    Predicted Duration of Therapy Intervention (Therapy Eval)  6 weeks  -SS  6 weeks  -DD    Planned CPT's?  --  PT EVAL MOD COMPLEXITY: 74662;PT THER PROC EA 15 MIN: 96960;PT MANUAL THERAPY EA 15 MIN: 00288  -DD    Physical Therapy Interventions (Optional Details)  --  home exercise program;ROM (Range of Motion);strengthening;stretching  -DD    PT Plan Comments  Fluidotherapy, ROM, block and bend, tendon glides, gentle strengthening  -  hand and wrist ROM in fluido and strengthening. as tolerated , PROM. Ice  -DD      User Key  (r) = Recorded By, (t) = Taken By, (c) = Cosigned By    Initials Name Provider Type    DD Katty Clay, PT DPT Physical Therapist     SS Pelon Ford, PT DPT Physical Therapist            Exercises     Row Name 07/18/19 1100             Precautions    Existing Precautions/Restrictions  -- Latex allergy, cancer history  -SS         Subjective Comments    Subjective Comments  No improvement in motion as yet. Has been working on HEP.  -SS         Subjective Pain    Able to rate subjective pain?  yes  -SS      Pre-Treatment Pain Level  0  -SS      Post-Treatment Pain Level  0  -SS         Exercise 1    Exercise Name 1  Fluidotherapy with AROM  -SS      Cueing 1  Verbal  -SS      Time 1  15 mins  -SS         Exercise 2    Exercise Name 2  LLPS MP flexion IF-SF  -SS      Cueing 2  Verbal;Tactile  -SS      Time 2  1 min each  -SS         Exercise 3    Exercise Name 3  LLPS Claw IF-SF  -SS      Cueing 3  Verbal;Tactile  -SS      Time 3  1 min each  -SS         Exercise 4    Exercise Name 4  LLPS fist  -SS      Cueing 4  Verbal;Tactile  -SS      Time 4  1 min  -SS         Exercise 5    Exercise Name 5  Active fist   -SS      Cueing 5  Verbal;Demo  -SS      Reps 5  5  -SS      Time 5  5 sec hold  -SS         Exercise 6    Exercise Name 6  Cylinder grasp to facilitation flexion  -SS      Cueing 6  Verbal;Demo  -SS      Sets 6  5  -SS      Reps 6  10 sec hold  -SS      Additional Comments  Ultrasound bottle  -SS         Exercise 7    Exercise Name 7  Block and bend DIPs IF-SF  -SS      Cueing 7  Verbal;Tactile  -SS      Sets 7  1  -SS      Reps 7  10 each  -SS        User Key  (r) = Recorded By, (t) = Taken By, (c) = Cosigned By    Initials Name Provider Type    SS Pelon Ford, PT DPT Physical Therapist            PT OP Goals     Row Name 07/18/19 1100          PT Short Term Goals    STG Date to Achieve  08/07/19  -SS     STG 1  Patient will be independent in home exercise program for range of motion  -SS     STG 1 Progress  Ongoing  -SS     STG 2  Patient will be able to make a partial fist  -SS     STG 2 Progress  Ongoing  -SS     STG 3   Patient will be able to oppose thumb and third digit  -     STG 3 Progress  Ongoing  -     STG 4  Patient will have decreased swelling and edema by 15 mL  -     STG 4 Progress  Ongoing  -     STG 5  She will have 60 degrees of supination of the left wrist  -     STG 5 Progress  Ongoing  -     Additional STG's  STG 6;STG 7;STG 8  -     STG 6  Patient will have MCP flexion of third and fourth digits to 65 degrees  -     STG 6 Progress  Ongoing  -     STG 7  Patient will have PIP flexion of 60 degrees or better in third fourth and fifth digits  -     STG 7 Progress  Ongoing  -     STG 8  Patient will have a quick dash score of 50% or less  -     STG 8 Progress  Ongoing  -        Long Term Goals    LTG Date to Achieve  08/28/19  -     LTG 1  Patient will be able to make a soft fist  -     LTG 1 Progress  Ongoing  -     LTG 2  Patient will have  strength of 30 pounds or more  -     LTG 2 Progress  Ongoing  -     LTG 3  Patient will have a quick-score of 30% or less  -     LTG 3 Progress  Ongoing  \[facility]\""        Time Calculation    PT Goal Re-Cert Due Date  08/07/19  \[facility]\""       User Key  (r) = Recorded By, (t) = Taken By, (c) = Cosigned By    Initials Name Provider Type     Pelon Ford, PT DPT Physical Therapist          Therapy Education  Education Details: fist, claw, cylinder grasp, thumb to tip  Given: HEP  Program: Modified  How Provided: Verbal, Written, Demonstration  Provided to: Patient  Level of Understanding: Verbalized, Demonstrated              Time Calculation:   Start Time: 1104  Stop Time: 1155  Time Calculation (min): 51 min  Total Timed Code Minutes- PT: 51 minute(s)  Therapy Charges for Today     Code Description Service Date Service Provider Modifiers Qty    72746773063 HC PT THER PROC EA 15 MIN 7/18/2019 Pelon Ford, PT DPT GP 3                    Pelon Ford, PT, DPT, CHT  7/18/2019

## 2019-07-23 NOTE — THERAPY TREATMENT NOTE
Outpatient Physical Therapy Ortho Treatment Note  [facility]     Patient Name: Andra Villareal  : 1975  MRN: [id number]  Today's Date: 2019      Visit Date: 2019  Attendance: 3/3 (15)  Subjective Improvement: 20%  Next MD Appt: 19  Recert Date: 19      Visit Dx:    ICD-10-CM ICD-9-CM   1. Closed nondisplaced fracture of proximal phalanx of left little finger with routine healing, subsequent encounter S62.647D V54.19            Past Medical History:   Diagnosis Date   • Abnormal breathing sounds    • Abscess of skin     and / or subcutaneous tissue   • Acute bronchitis     unspecified   • Adult body mass index 30+     obesity-BMI 33   • Allergic rhinitis    • Anasarca    • Anxiety     currently on xanax   • Anxiety    • Asthenia    • Asthma     moderate persistent   • Asthmatic bronchitis    • Astigmatism    • Bleeding external hemorrhoids    • Body mass index (BMI) of 34.0-34.9 in adult    • Body mass index (BMI) of 35.0-35.9 in adult    • Body mass index (BMI) of 36.0-36.9 in adult    • Body mass index 40.0-44.9, adult (CMS/HCC)     SEVERELY OBESE   • Brain cancer (CMS/HCC) 2017   • Breast cancer (CMS/HCC)    • Cellulitis    • Chemotherapy induced nausea and vomiting    • Chronic back pain    • Chronic cough    • Chronic hypoxemic respiratory failure (CMS/HCC)     on NC at 3 LPM   • Cough    • Depressive disorder    • Diabetes mellitus (CMS/HCC)     no retinopathy   • Drug therapy    • DVT (deep venous thrombosis) (CMS/HCC)    • Dyslipidemia    • Edema of lower extremity    • Encounter for follow-up examination after completed treatment for malignant neoplasm    • Epidermoid cyst of skin     excision with secondary intention healing   • Excessive and frequent menstruation with irregular cycle     Vaginal bleeding after 4 years of no bleeding secondary to chemotherapy for breast cancer; currently being treated for breast cancer for the second time, mets to bone and liver  while on chemotherapy      • Flushing    • 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)    • Gastroesophageal reflux disease    • H/O tubal ligation    • History of blood clots     clots around mediports x 2   • Hx of echocardiogram     w/ color flow, and w/o color flow   • Hx of radiation therapy    • Hyperglycemia    • Hypermetropia    • Hypertension    • Hypokalemia    • Impaired glucose tolerance     hgbalc 6.2   • Infection of skin     and /or subcutaneous tissue-around the catheter exit site   • Influenza     needs influenza immunization   • Irregular periods    • Lesion of lumbar spine     pain controlled with percocet and lidocaine patches   • Lumbago with sciatica    • Malignant neoplasm of female breast (CMS/HCC)     s/p right mastectomy, mets to bone and liver on chemotherapy      • Menopausal flushing    • Muscle weakness    • Muscle weakness (generalized)    • Nonspecific interstitial pneumonia (CMS/HCC)     symptomatically improved   • Pleural effusion     refractory massive right, most likely malignant effusion   • Primary atypical pneumonia    • Renal failure     acute drug-induced renal failure   • Sinus tachycardia    • Tobacco dependence syndrome    • Tobacco non-user    • Upper respiratory infection    • Wheezing         Past Surgical History:   Procedure Laterality Date   • ANKLE LOOSE BODY EXCISION/REMOVAL Right 5/24/2017    Procedure: RIGTH FOOT EXCISION NAVICULAR FRACTURES FRAGMENT  DEBRIDEMENT TALONAVILCULA RJOIN T;  Surgeon: Armani Oliveira DPM;  Location: [facility] OR;  Service:    • BREAST BIOPSY     • BREAST SURGERY Right 2013     reconstruction of right breast, trans flap surgery   • CENTRAL VENOUS CATHETER TUNNELED INSERTION SINGLE LUMEN      Placement of indwelling Pleurx catheter right   • COLONOSCOPY N/A 1/26/2017    Procedure: COLONOSCOPY;  Surgeon: Robert Clifton MD;  Location: [facility] ENDOSCOPY;  Service:    • ENDOSCOPY N/A 1/26/2017    Procedure:  ESOPHAGOGASTRODUODENOSCOPY;  Surgeon: Robert Clifton MD;  Location: [facility] ENDOSCOPY;  Service:    • ENDOSCOPY N/A 2/24/2017    Procedure: ESOPHAGOGASTRODUODENOSCOPY;  Surgeon: Robert Clifton MD;  Location: [facility] ENDOSCOPY;  Service:    • HYSTERECTOMY      vaginal   • LIVER BIOPSY  2015   • LUNG VOLUME REDUCTION     • MASTECTOMY      partial   • OTHER SURGICAL HISTORY  05/05/2016    central line insertion , PICC line replacement   • OTHER SURGICAL HISTORY      place breast clip percut   • OTHER SURGICAL HISTORY      pulse oximetry   • OTHER SURGICAL HISTORY      remove cc cath w/ sc port or pump, Removal of right internal jugular MediPort   • OTHER SURGICAL HISTORY      spirometry   • OTHER SURGICAL HISTORY      tubal sterilization   • PAP SMEAR     • THORACENTESIS      aspiration of pleural space   • TUBAL ABDOMINAL LIGATION  2009   • UPPER GASTROINTESTINAL ENDOSCOPY  01/26/2017   • UPPER GASTROINTESTINAL ENDOSCOPY  02/24/2017   • VENOUS ACCESS DEVICE (PORT) INSERTION      Ultrasound guided right internal jugular Mediport placement under fluoroscopy       PT Ortho     Row Name 07/23/19 0800       Precautions and Contraindications    Precautions  Cancer Hx with radiation.  -SS    Contraindications  LATEX ALLERGY  -SS       Posture/Observations    Posture/Observations Comments  Guarding L hand. Bypasses MF-SF.  -      User Key  (r) = Recorded By, (t) = Taken By, (c) = Cosigned By    Initials Name Provider Type    SS Pelon Ford, PT DPT Physical Therapist           Hand Therapy (last 24 hours)      Hand Eval     Row Name 07/23/19 0800             Left Flexion AROM    II- MP AROM  55 55 post-treatment  -SS      II- PIP AROM  85 95 post-treatment  -SS      II- DIP AROM  60 75 post-treatment  -SS      II- ADORNO Left Flexion AROM  200  -SS      III- MP AROM  55 60 post-treatment  -SS      III- PIP AROM  50 60 post-treatment  -SS      III- DIP AROM  35 40 post-treatment  -SS      III- ADORNO Left Flexion AROM   "140  -SS      IV- MP AROM  60 70 post-treatment  -SS      IV- PIP AROM  50 50 post-treatment  -SS      IV- DIP AROM  20 30 post-treatment  -SS      IV- ADORNO Left Flexion AROM  130  -SS      V- MP AROM  35 45 post-treatment  -SS      V- PIP AROM  40 45 post-treatment  -SS      V- DIP AROM  40 40 post-treatment  -SS      V- ADORNO Left Flexion AROM  115  -SS        User Key  (r) = Recorded By, (t) = Taken By, (c) = Cosigned By    Initials Name Provider Type    Pelon Berumen, PT DPT Physical Therapist                    PT Assessment/Plan     Row Name 07/23/19 0800          PT Assessment    Functional Limitations  Limitation in home management;Performance in self-care ADL  -     Impairments  Pain;Range of motion;Muscle strength  -     Assessment Comments  Improved AROM for the most part this date. Patient was reminded to make a conscious effort to utilize all fingers when grasping/holding things.   -     Rehab Potential  Good  -     Patient/caregiver participated in establishment of treatment plan and goals  Yes  -     Patient would benefit from skilled therapy intervention  Yes  -SS        PT Plan    PT Frequency  2x/week  -     Predicted Duration of Therapy Intervention (Therapy Eval)  6 weeks  -     PT Plan Comments  Continue POC. Continue to work to promote spontaneous use of the L MF-SF.  -       User Key  (r) = Recorded By, (t) = Taken By, (c) = Cosigned By    Initials Name Provider Type    Pelon Berumen, PT DPT Physical Therapist            Exercises     Row Name 07/23/19 0800             Precautions    Existing Precautions/Restrictions  -- Latex allergy, cancer history  -         Subjective Comments    Subjective Comments  Jammed finger into something yesterday. Increased her pain. States that she is trying to bend her fingers. \"It's still very painful moving it.\" Pain in 3rd webspace that travels into fingers. 20% subjective improvement.   -         Subjective Pain    " Able to rate subjective pain?  yes  -SS      Pre-Treatment Pain Level  3  -SS      Post-Treatment Pain Level  0  -SS         Exercise 1    Exercise Name 1  Fluidotherapy with AROM  -SS      Cueing 1  Verbal  -SS      Time 1  15 mins  -SS         Exercise 2    Exercise Name 2  Mirror box - visualization and movement of uninvolved hand  -SS      Cueing 2  Verbal  -SS      Time 2  5 mins  -SS         Exercise 3    Exercise Name 3  AROM fist B with thumbs held in radial abduction  -SS      Cueing 3  Verbal;Tactile;Demo  -SS      Sets 3  1  -SS      Reps 3  15  -SS         Exercise 4    Exercise Name 4  Digital abd/add  -SS      Cueing 4  Verbal;Demo  -SS      Sets 4  1  -SS      Reps 4  15  -SS         Exercise 5    Exercise Name 5  Webspace stretch -- 2nd, 3rd, 4th  -SS      Cueing 5  Verbal;Demo  -SS         Exercise 6    Exercise Name 6  Ball grasp to facilitate composite flexion  -SS      Cueing 6  Verbal;Tactile;Demo  -SS      Reps 6  5  -SS      Time 6  5 sec hold  -SS         Exercise 7    Exercise Name 7  edema glove  -        User Key  (r) = Recorded By, (t) = Taken By, (c) = Cosigned By    Initials Name Provider Type    SS Pelon Ford, PT DPT Physical Therapist            PT OP Goals     Row Name 07/23/19 0800          PT Short Term Goals    STG Date to Achieve  08/07/19  -SS     STG 1  Patient will be independent in home exercise program for range of motion  -     STG 1 Progress  Ongoing  -SS     STG 2  Patient will be able to make a partial fist  -     STG 2 Progress  Ongoing  -SS     STG 3  Patient will be able to oppose thumb and third digit  -     STG 3 Progress  Ongoing  -SS     STG 4  Patient will have decreased swelling and edema by 15 mL  -     STG 4 Progress  Ongoing  -SS     STG 5  She will have 60 degrees of supination of the left wrist  -     STG 5 Progress  Ongoing  -SS     STG 6  Patient will have MCP flexion of third and fourth digits to 65 degrees  -     STG 6 Progress   Ongoing  -SS     STG 7  Patient will have PIP flexion of 60 degrees or better in third fourth and fifth digits  -     STG 7 Progress  Ongoing  -     STG 8  Patient will have a quick dash score of 50% or less  -[facility] 8 Progress  Ongoing  -        Long Term Goals    LTG Date to Achieve  08/28/19  -     LTG 1  Patient will be able to make a soft fist  -     LTG 1 Progress  Ongoing  -     LTG 2  Patient will have  strength of 30 pounds or more  -     LTG 2 Progress  Ongoing  -     LTG 3  Patient will have a quick-score of 30% or less  -     LTG 3 Progress  Ongoing  -        Time Calculation    PT Goal Re-Cert Due Date  08/07/19  -       User Key  (r) = Recorded By, (t) = Taken By, (c) = Cosigned By    Initials Name Provider Type    Pelon Berumen, PT DPT Physical Therapist          Therapy Education  Education Details: conscious use of MF-SF  Given: Symptoms/condition management  How Provided: Verbal  Provided to: Patient  Level of Understanding: Verbalized              Time Calculation:   Start Time: 0811  Stop Time: 0855  Time Calculation (min): 44 min  Total Timed Code Minutes- PT: 44 minute(s)  Therapy Charges for Today     Code Description Service Date Service Provider Modifiers Qty    88354541298 HC PT THER PROC EA 15 MIN 7/23/2019 Pelon Ford, PT DPT GP 3                    Pelon Ford, PT, DPT, CHT  7/23/2019

## 2019-07-25 NOTE — THERAPY TREATMENT NOTE
Outpatient Physical Therapy Ortho Treatment Note  [facility]     Patient Name: Andra Villareal  : 1975  MRN: [id number]  Today's Date: 2019      Visit Date: 2019  Attendance:  (15)  Subjective Improvement: 20%  Next MD Appt: 19  Recert Date: 19      Visit Dx:    ICD-10-CM ICD-9-CM   1. Closed nondisplaced fracture of proximal phalanx of left little finger with routine healing, subsequent encounter S62.647D V54.19       Patient Active Problem List   Diagnosis   • Chronic allergic rhinitis   • Degenerative disc disease, thoracic   • Iron deficiency   • STEVEN (generalized anxiety disorder)   • Hypokalemia   • Constipation due to opioid therapy   • Cancer associated pain   • Chronic nausea   • Gastroesophageal reflux disease without esophagitis   • Hyperglycemia, drug-induced   • Chronic bronchitis (CMS/HCC)   • Liver metastases (CMS/HCC)   • Recurrent deep vein thrombosis (DVT) (CMS/HCC)   • Severe episode of recurrent major depressive disorder (CMS/HCC)   • Primary insomnia   • Breast cancer metastasized to bone (CMS/HCC)   • Type 2 diabetes mellitus with hyperglycemia, with long-term current use of insulin (CMS/HCC)   • Thrombocytopenia (CMS/HCC)   • 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)   • Chronic constipation   • Malignant neoplasm of breast metastatic to brain (CMS/HCC)   • Hx of radiation therapy   • Former smoker, stopped smoking in distant past   • Class 1 obesity without serious comorbidity with body mass index (BMI) of 33.0 to 33.9 in adult   • Contusion, hip and thigh, right, initial encounter   • Closed displaced fracture of proximal phalanx of left little finger   • Brain mass   • Hydrocephalus   • Vasogenic brain edema (CMS/HCC)        Past Medical History:   Diagnosis Date   • Abnormal breathing sounds    • Abscess of skin     and / or subcutaneous tissue   • Acute bronchitis     unspecified   • Adult body mass index 30+     obesity-BMI 33   •  Allergic rhinitis    • Anasarca    • Anxiety     currently on xanax   • Anxiety    • Asthenia    • Asthma     moderate persistent   • Asthmatic bronchitis    • Astigmatism    • Bleeding external hemorrhoids    • Body mass index (BMI) of 34.0-34.9 in adult    • Body mass index (BMI) of 35.0-35.9 in adult    • Body mass index (BMI) of 36.0-36.9 in adult    • Body mass index 40.0-44.9, adult (CMS/HCC)     SEVERELY OBESE   • Brain cancer (CMS/HCC) 11/19/2017   • Breast cancer (CMS/HCC)    • Cellulitis    • Chemotherapy induced nausea and vomiting    • Chronic back pain    • Chronic cough    • Chronic hypoxemic respiratory failure (CMS/HCC)     on NC at 3 LPM   • Cough    • Depressive disorder    • Diabetes mellitus (CMS/HCC)     no retinopathy   • Drug therapy    • DVT (deep venous thrombosis) (CMS/HCC)    • Dyslipidemia    • Edema of lower extremity    • Encounter for follow-up examination after completed treatment for malignant neoplasm    • Epidermoid cyst of skin     excision with secondary intention healing   • Excessive and frequent menstruation with irregular cycle     Vaginal bleeding after 4 years of no bleeding secondary to chemotherapy for breast cancer; currently being treated for breast cancer for the second time, mets to bone and liver while on chemotherapy      • Flushing    • 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)    • Gastroesophageal reflux disease    • H/O tubal ligation    • History of blood clots     clots around mediports x 2   • Hx of echocardiogram     w/ color flow, and w/o color flow   • Hx of radiation therapy    • Hyperglycemia    • Hypermetropia    • Hypertension    • Hypokalemia    • Impaired glucose tolerance     hgbalc 6.2   • Infection of skin     and /or subcutaneous tissue-around the catheter exit site   • Influenza     needs influenza immunization   • Irregular periods    • Lesion of lumbar spine     pain controlled with percocet and lidocaine patches   • Lumbago with  sciatica    • Malignant neoplasm of female breast (CMS/HCC)     s/p right mastectomy, mets to bone and liver on chemotherapy      • Menopausal flushing    • Muscle weakness    • Muscle weakness (generalized)    • Nonspecific interstitial pneumonia (CMS/HCC)     symptomatically improved   • Pleural effusion     refractory massive right, most likely malignant effusion   • Primary atypical pneumonia    • Renal failure     acute drug-induced renal failure   • Sinus tachycardia    • Tobacco dependence syndrome    • Tobacco non-user    • Upper respiratory infection    • Wheezing         Past Surgical History:   Procedure Laterality Date   • ANKLE LOOSE BODY EXCISION/REMOVAL Right 5/24/2017    Procedure: RIGTH FOOT EXCISION NAVICULAR FRACTURES FRAGMENT  DEBRIDEMENT TALONAVILCULA RJOIN T;  Surgeon: Armani Oilveira DPM;  Location: [facility] OR;  Service:    • BREAST BIOPSY     • BREAST SURGERY Right 2013     reconstruction of right breast, trans flap surgery   • CENTRAL VENOUS CATHETER TUNNELED INSERTION SINGLE LUMEN      Placement of indwelling Pleurx catheter right   • COLONOSCOPY N/A 1/26/2017    Procedure: COLONOSCOPY;  Surgeon: Robert Clifton MD;  Location: [facility] ENDOSCOPY;  Service:    • ENDOSCOPY N/A 1/26/2017    Procedure: ESOPHAGOGASTRODUODENOSCOPY;  Surgeon: Robert Clifton MD;  Location: [facility] ENDOSCOPY;  Service:    • ENDOSCOPY N/A 2/24/2017    Procedure: ESOPHAGOGASTRODUODENOSCOPY;  Surgeon: Robert Clifton MD;  Location: [facility] ENDOSCOPY;  Service:    • HYSTERECTOMY      vaginal   • LIVER BIOPSY  2015   • LUNG VOLUME REDUCTION     • MASTECTOMY      partial   • OTHER SURGICAL HISTORY  05/05/2016    central line insertion , PICC line replacement   • OTHER SURGICAL HISTORY      place breast clip percut   • OTHER SURGICAL HISTORY      pulse oximetry   • OTHER SURGICAL HISTORY      remove cc cath w/ sc port or pump, Removal of right internal jugular MediPort   • OTHER SURGICAL HISTORY      spirometry   • OTHER  SURGICAL HISTORY      tubal sterilization   • PAP SMEAR     • THORACENTESIS      aspiration of pleural space   • TUBAL ABDOMINAL LIGATION  2009   • UPPER GASTROINTESTINAL ENDOSCOPY  01/26/2017   • UPPER GASTROINTESTINAL ENDOSCOPY  02/24/2017   • VENOUS ACCESS DEVICE (PORT) INSERTION      Ultrasound guided right internal jugular Mediport placement under fluoroscopy       PT Ortho     Row Name 07/23/19 0800       Precautions and Contraindications    Precautions  Cancer Hx with radiation.  -    Contraindications  LATEX ALLERGY  -       Posture/Observations    Posture/Observations Comments  Guarding L hand. Bypasses MF-SF.  -      User Key  (r) = Recorded By, (t) = Taken By, (c) = Cosigned By    Initials Name Provider Type     Pelon Ford, PT DPT Physical Therapist                      PT Assessment/Plan     Row Name 07/25/19 1300          PT Assessment    Assessment Comments  Fair xena of today's visit. 3rd, 4th, 5th digit flexion still restricted. Anticipate gradual progression towards goals.   -        PT Plan    PT Frequency  2x/week  -     Predicted Duration of Therapy Intervention (Therapy Eval)  6 weeks  -     PT Plan Comments  Cont PT per POC.  -       User Key  (r) = Recorded By, (t) = Taken By, (c) = Cosigned By    Initials Name Provider Type    Chay Mcdonald PTA Physical Therapy Assistant            Exercises     Row Name 07/25/19 1300             Precautions    Existing Precautions/Restrictions  -- Latex allergy, cancer history  -         Subjective Comments    Subjective Comments  Pt reports intermitt hand pain. She is doinf HEP. Continued difficulty using L hand.   -         Subjective Pain    Able to rate subjective pain?  yes  -      Pre-Treatment Pain Level  3  -      Post-Treatment Pain Level  0  -         Exercise 1    Exercise Name 1  Fluidotherapy with AROM  -JW      Time 1  15 mins  -         Exercise 2    Exercise Name 2  Wrist flex/ext S's  -JW       "Sets 2  2  -JW      Time 2  30\"  -JW         Exercise 3    Exercise Name 3  LLPS finger flex index-small finger  -JW      Sets 3  2  -JW      Time 3  30\"     -JW         Exercise 4    Exercise Name 4  AROM claw  -JW      Reps 4  20  -JW         Exercise 5    Exercise Name 5  AROM fist  -JW      Reps 5  20  -JW         Exercise 6    Exercise Name 6  Towel roll   -JW      Reps 6  20  -JW         Exercise 7    Exercise Name 7  Webspace S  -JW         Exercise 8    Exercise Name 8  Ball grasp  -JW      Reps 8  15  -JW         Exercise 9    Exercise Name 9  Beans/Rice grasp  -JW      Time 9  3'  -JW        User Key  (r) = Recorded By, (t) = Taken By, (c) = Cosigned By    Initials Name Provider Type    JW Chay Bourne, PTA Physical Therapy Assistant                       PT OP Goals     Row Name 07/25/19 1407 07/25/19 1300       PT Short Term Goals    STG Date to Achieve  --  08/07/19  -    STG 1  --  Patient will be independent in home exercise program for range of motion  -    STG 1 Progress  --  Ongoing  -JW    STG 2  --  Patient will be able to make a partial fist  -    STG 2 Progress  --  Ongoing  -JW    STG 3  --  Patient will be able to oppose thumb and third digit  -    STG 3 Progress  --  Ongoing  -    STG 4  --  Patient will have decreased swelling and edema by 15 mL  -    STG 4 Progress  --  Ongoing  -JW    STG 5  --  She will have 60 degrees of supination of the left wrist  -    STG 5 Progress  --  Ongoing  -JW    STG 6  --  Patient will have MCP flexion of third and fourth digits to 65 degrees  -    STG 6 Progress  --  Ongoing  -JW    STG 7  --  Patient will have PIP flexion of 60 degrees or better in third fourth and fifth digits  -    STG 7 Progress  --  Ongoing  -JW    STG 8  --  Patient will have a quick dash score of 50% or less  -    STG 8 Progress  --  Ongoing  -JW       Long Term Goals    LTG Date to Achieve  --  08/28/19  -    LTG 1  --  Patient will be able to make a " soft fist  -    LTG 1 Progress  --  Ongoing  -    LTG 2  --  Patient will have  strength of 30 pounds or more  -    LTG 2 Progress  --  Ongoing  -    LTG 3  --  Patient will have a quick-score of 30% or less  -    LT 3 Progress  --  Ongoing  -       Time Calculation    PT Goal Re-Cert Due Date  08/07/19  -  --      User Key  (r) = Recorded By, (t) = Taken By, (c) = Cosigned By    Initials Name Provider Type    Chay Mcdonald PTA Physical Therapy Assistant                         Time Calculation:   Start Time: 1305  Stop Time: 1352  Time Calculation (min): 47 min  Total Timed Code Minutes- PT: 47 minute(s)  Therapy Charges for Today     Code Description Service Date Service Provider Modifiers Qty    15374071260  PT THER PROC EA 15 MIN 7/25/2019 Chay Bourne PTA GP 3                    Chay Bourne PTA  7/25/2019

## 2019-07-29 NOTE — THERAPY TREATMENT NOTE
Outpatient Physical Therapy Ortho Treatment Note  [facility]     Patient Name: Andra Villareal  : 1975  MRN: [id number]  Today's Date: 2019      Visit Date: 2019  Pt has attended 5/5 visits  (15)  Subjective improvement 20 percent  MD ??  Recert 19  Visit Dx:    ICD-10-CM ICD-9-CM   1. Closed nondisplaced fracture of proximal phalanx of left little finger with routine healing, subsequent encounter S62.647D V54.19       Patient Active Problem List   Diagnosis   • Chronic allergic rhinitis   • Degenerative disc disease, thoracic   • Iron deficiency   • STEVEN (generalized anxiety disorder)   • Hypokalemia   • Constipation due to opioid therapy   • Cancer associated pain   • Chronic nausea   • Gastroesophageal reflux disease without esophagitis   • Hyperglycemia, drug-induced   • Chronic bronchitis (CMS/HCC)   • Liver metastases (CMS/HCC)   • Recurrent deep vein thrombosis (DVT) (CMS/HCC)   • Severe episode of recurrent major depressive disorder (CMS/HCC)   • Primary insomnia   • Breast cancer metastasized to bone (CMS/HCC)   • Type 2 diabetes mellitus with hyperglycemia, with long-term current use of insulin (CMS/HCC)   • Thrombocytopenia (CMS/HCC)   • 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)   • Chronic constipation   • Malignant neoplasm of breast metastatic to brain (CMS/HCC)   • Hx of radiation therapy   • Former smoker, stopped smoking in distant past   • Class 1 obesity without serious comorbidity with body mass index (BMI) of 33.0 to 33.9 in adult   • Contusion, hip and thigh, right, initial encounter   • Closed displaced fracture of proximal phalanx of left little finger   • Brain mass   • Hydrocephalus   • Vasogenic brain edema (CMS/HCC)        Past Medical History:   Diagnosis Date   • Abnormal breathing sounds    • Abscess of skin     and / or subcutaneous tissue   • Acute bronchitis     unspecified   • Adult body mass index 30+     obesity-BMI 33   • Allergic  rhinitis    • Anasarca    • Anxiety     currently on xanax   • Anxiety    • Asthenia    • Asthma     moderate persistent   • Asthmatic bronchitis    • Astigmatism    • Bleeding external hemorrhoids    • Body mass index (BMI) of 34.0-34.9 in adult    • Body mass index (BMI) of 35.0-35.9 in adult    • Body mass index (BMI) of 36.0-36.9 in adult    • Body mass index 40.0-44.9, adult (CMS/HCC)     SEVERELY OBESE   • Brain cancer (CMS/HCC) 11/19/2017   • Breast cancer (CMS/HCC)    • Cellulitis    • Chemotherapy induced nausea and vomiting    • Chronic back pain    • Chronic cough    • Chronic hypoxemic respiratory failure (CMS/HCC)     on NC at 3 LPM   • Cough    • Depressive disorder    • Diabetes mellitus (CMS/HCC)     no retinopathy   • Drug therapy    • DVT (deep venous thrombosis) (CMS/HCC)    • Dyslipidemia    • Edema of lower extremity    • Encounter for follow-up examination after completed treatment for malignant neoplasm    • Epidermoid cyst of skin     excision with secondary intention healing   • Excessive and frequent menstruation with irregular cycle     Vaginal bleeding after 4 years of no bleeding secondary to chemotherapy for breast cancer; currently being treated for breast cancer for the second time, mets to bone and liver while on chemotherapy      • Flushing    • 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)    • Gastroesophageal reflux disease    • H/O tubal ligation    • History of blood clots     clots around mediports x 2   • Hx of echocardiogram     w/ color flow, and w/o color flow   • Hx of radiation therapy    • Hyperglycemia    • Hypermetropia    • Hypertension    • Hypokalemia    • Impaired glucose tolerance     hgbalc 6.2   • Infection of skin     and /or subcutaneous tissue-around the catheter exit site   • Influenza     needs influenza immunization   • Irregular periods    • Lesion of lumbar spine     pain controlled with percocet and lidocaine patches   • Lumbago with sciatica     • Malignant neoplasm of female breast (CMS/HCC)     s/p right mastectomy, mets to bone and liver on chemotherapy      • Menopausal flushing    • Muscle weakness    • Muscle weakness (generalized)    • Nonspecific interstitial pneumonia (CMS/HCC)     symptomatically improved   • Pleural effusion     refractory massive right, most likely malignant effusion   • Primary atypical pneumonia    • Renal failure     acute drug-induced renal failure   • Sinus tachycardia    • Tobacco dependence syndrome    • Tobacco non-user    • Upper respiratory infection    • Wheezing         Past Surgical History:   Procedure Laterality Date   • ANKLE LOOSE BODY EXCISION/REMOVAL Right 5/24/2017    Procedure: RIGTH FOOT EXCISION NAVICULAR FRACTURES FRAGMENT  DEBRIDEMENT TALONAVILCULA RJOIN T;  Surgeon: Armani Oliveira DPM;  Location: [facility] OR;  Service:    • BREAST BIOPSY     • BREAST SURGERY Right 2013     reconstruction of right breast, trans flap surgery   • CENTRAL VENOUS CATHETER TUNNELED INSERTION SINGLE LUMEN      Placement of indwelling Pleurx catheter right   • COLONOSCOPY N/A 1/26/2017    Procedure: COLONOSCOPY;  Surgeon: Robert Clifton MD;  Location: [facility] ENDOSCOPY;  Service:    • ENDOSCOPY N/A 1/26/2017    Procedure: ESOPHAGOGASTRODUODENOSCOPY;  Surgeon: Robert Clifton MD;  Location: [facility] ENDOSCOPY;  Service:    • ENDOSCOPY N/A 2/24/2017    Procedure: ESOPHAGOGASTRODUODENOSCOPY;  Surgeon: Robert Clifton MD;  Location: [facility] ENDOSCOPY;  Service:    • HYSTERECTOMY      vaginal   • LIVER BIOPSY  2015   • LUNG VOLUME REDUCTION     • MASTECTOMY      partial   • OTHER SURGICAL HISTORY  05/05/2016    central line insertion , PICC line replacement   • OTHER SURGICAL HISTORY      place breast clip percut   • OTHER SURGICAL HISTORY      pulse oximetry   • OTHER SURGICAL HISTORY      remove cc cath w/ sc port or pump, Removal of right internal jugular MediPort   • OTHER SURGICAL HISTORY      spirometry   • OTHER SURGICAL  HISTORY      tubal sterilization   • PAP SMEAR     • THORACENTESIS      aspiration of pleural space   • TUBAL ABDOMINAL LIGATION  2009   • UPPER GASTROINTESTINAL ENDOSCOPY  01/26/2017   • UPPER GASTROINTESTINAL ENDOSCOPY  02/24/2017   • VENOUS ACCESS DEVICE (PORT) INSERTION      Ultrasound guided right internal jugular Mediport placement under fluoroscopy                       PT Assessment/Plan     Row Name 07/29/19 1155          PT Assessment    Assessment Comments  Pt was very unsteady on her feet this am had not eaten breakfast and needed assist with ambulation to hand room she bumped doorway bot entering and exiting hand room and did not have good recall of HEP and could not verbalize when MD appt was and therefore was assisted to vehicle for her safety         -SP        PT Plan    PT Frequency  2x/week  -SP     Predicted Duration of Therapy Intervention (Therapy Eval)  6 weeks  -SP     PT Plan Comments  Continue with ROM and progress  strength  -SP       User Key  (r) = Recorded By, (t) = Taken By, (c) = Cosigned By    Initials Name Provider Type    Davida Henry, NBAIL Physical Therapy Assistant            Exercises     Row Name 07/29/19 1100 07/29/19 0800          Subjective Comments    Subjective Comments  --  Pt arrives 25 min late for appt  notes she is still stiff  -SP        Subjective Pain    Able to rate subjective pain?  --  yes  -SP     Pre-Treatment Pain Level  --  0  -SP     Post-Treatment Pain Level  0  -SP  --     Subjective Pain Comment  --  wearing glove  -SP        Exercise 1    Exercise Name 1  --  fluidotherapy  -SP     Time 1  --  10 min   -SP     Additional Comments  --  pt said it was hot so cut treatment time  -SP        Exercise 2    Exercise Name 2  --  wrist flexion/ext S  -SP     Reps 2  --  3  -SP     Time 2  --  30 sec  -SP        Exercise 3    Exercise Name 3  --  llps little finger  -SP     Reps 3  --  3  -SP     Time 3  --  30 sec  -SP        Exercise 4    Exercise  Name 4  --  claw AROM/fist AROM  -SP     Reps 4  --  20  -SP        Exercise 5    Exercise Name 5  --  putty  -SP     Time 5  --  2 min  -SP        Exercise 6    Exercise Name 6  --  putty pinch digits 345  -SP        Exercise 7    Exercise Name 7  pt was assisted to vehicle in parking lot as she had come alone  -SP  --       User Key  (r) = Recorded By, (t) = Taken By, (c) = Cosigned By    Initials Name Provider Type    Davida Henry, PTA Physical Therapy Assistant                       PT OP Goals     Row Name 07/29/19 1302 07/29/19 0800       PT Short Term Goals    STG Date to Achieve  --  08/07/19  -SP    STG 1  --  Patient will be independent in home exercise program for range of motion  -SP    STG 1 Progress  --  Ongoing  -SP    STG 2  --  Patient will be able to make a partial fist  -SP    STG 2 Progress  --  Ongoing  -SP    STG 3  --  Patient will be able to oppose thumb and third digit  -SP    STG 3 Progress  --  Ongoing  -SP    STG 4  --  Patient will have decreased swelling and edema by 15 mL  -SP    STG 4 Progress  --  Ongoing  -SP    STG 5  --  She will have 60 degrees of supination of the left wrist  -SP    STG 5 Progress  --  Ongoing  -SP    STG 6  --  Patient will have MCP flexion of third and fourth digits to 65 degrees  -SP    STG 6 Progress  --  Ongoing  -SP    STG 7  --  Patient will have PIP flexion of 60 degrees or better in third fourth and fifth digits  -SP    STG 7 Progress  --  Ongoing  -SP    STG 8  --  Patient will have a quick dash score of 50% or less  -SP    STG 8 Progress  --  Ongoing  -SP       Long Term Goals    LTG Date to Achieve  --  08/28/19  -SP    LTG 1  --  Patient will be able to make a soft fist  -SP    LTG 1 Progress  --  Ongoing  -SP    LTG 2  --  Patient will have  strength of 30 pounds or more  -SP    LTG 2 Progress  --  Ongoing  -SP    LTG 3  --  Patient will have a quick-score of 30% or less  -    LTG 3 Progress  --  Ongoing  -SP       Time Calculation     PT Goal Re-Cert Due Date  08/07/19  -SP  08/07/19  -JASMYNE      User Key  (r) = Recorded By, (t) = Taken By, (c) = Cosigned By    Initials Name Provider Type    Davida Henry PTA Physical Therapy Assistant                         Time Calculation:   Start Time: 0825  Stop Time: 0853  Time Calculation (min): 28 min  Total Timed Code Minutes- PT: 28 minute(s)  Therapy Charges for Today     Code Description Service Date Service Provider Modifiers Qty    [id number] HC PT THER PROC EA 15 MIN 7/29/2019 Davida Bello PTA GP 2                    Davida Bello PTA  7/29/2019

## 2019-08-06 NOTE — PROGRESS NOTES
Subjective   Andra Villareal is a 43 y.o. female.  One month follow-up for anxiety and GERD.  Having increasing difficulty sleeping at night.  Has been on Valium in the past for this.  Cannot take Ambien due to side effects.  Has tried melatonin, Vistaril and multiple other sleep aids with no relief.    Cancer   This is a chronic problem. The current episode started more than 1 year ago. The problem occurs constantly. The problem has been waxing and waning. Associated symptoms include fatigue and weakness. Pertinent negatives include no abdominal pain, chills, diaphoresis or fever. Nothing aggravates the symptoms. Treatments tried: Mastectomy, chemotherapy and radiation. The treatment provided moderate relief.   Anxiety   Presents for follow-up visit. Symptoms include decreased concentration, depressed mood, dizziness, excessive worry, insomnia, irritability, nervous/anxious behavior and restlessness. Patient reports no confusion. Symptoms occur most days. The severity of symptoms is moderate. The quality of sleep is fair. Nighttime awakenings: one to two.       Insomnia   This is a chronic problem. The current episode started more than 1 year ago. The problem occurs constantly. The problem has been gradually worsening. Associated symptoms include fatigue and weakness. Pertinent negatives include no abdominal pain, chills, diaphoresis or fever. Nothing aggravates the symptoms. She has tried relaxation and rest (Ambien, Tylenol PM, etc. you know, Vistaril, Valium and melatonin) for the symptoms. The treatment provided mild relief.   Heartburn   She complains of belching, early satiety, heartburn and water brash. She reports no abdominal pain. This is a chronic problem. The current episode started more than 1 year ago. The problem occurs occasionally. The problem has been waxing and waning. The heartburn is of moderate intensity. The symptoms are aggravated by certain foods, stress and lying down. Associated  symptoms include fatigue. Risk factors include lack of exercise, obesity and caffeine use. She has tried a PPI for the symptoms. The treatment provided significant relief.        The following portions of the patient's history were reviewed and updated as appropriate:     Current Outpatient Medications   Medication Sig Dispense Refill   • albuterol sulfate HFA (PROAIR HFA) 108 (90 Base) MCG/ACT inhaler Inhale 2 puffs Every 4 (Four) Hours As Needed for Wheezing. 1 inhaler 0   • Calcium Carb-Cholecalciferol (CALCIUM 1000 + D) 1000-800 MG-UNIT tablet Take 1 tablet by mouth Daily. 90 tablet 2   • clonazePAM (KlonoPIN) 2 MG tablet Take 1 tablet by mouth 2 (Two) Times a Day As Needed for Anxiety. 60 tablet 2   • fluticasone (FLONASE) 50 MCG/ACT nasal spray 1 spray into the nostril(s) as directed by provider Daily. 1 bottle 0   • Fluticasone Furoate-Vilanterol (BREO ELLIPTA) 100-25 MCG/INH inhaler Inhale 1 puff Daily. 28 each 0   • FREESTYLE LITE test strip USE TO CHECK BLOOD SUGAR THREE TIMES DAILY 100 each 0   • HYDROcodone-acetaminophen (NORCO) 7.5-325 MG per tablet TK 1 T PO  Q 8 H PRN P     • ondansetron (ZOFRAN) 8 MG tablet Take 1 tablet by mouth Every 8 (Eight) Hours As Needed for Nausea or Vomiting. 30 tablet 2   • promethazine (PHENERGAN) 25 MG tablet Take 1 tablet by mouth Every 6 (Six) Hours As Needed for Nausea or Vomiting. 120 tablet 5   • vitamin C (ASCORBIC ACID) 500 MG tablet Take 1 tablet by mouth 3 (Three) Times a Day. 90 tablet 2   • cetirizine (zyrTEC) 10 MG tablet Take 1 tablet by mouth Daily. 90 tablet 3   • esomeprazole (nexIUM) 40 MG capsule Take 1 capsule by mouth Every Morning Before Breakfast. 30 capsule 5   • eszopiclone (LUNESTA) 1 MG tablet Take 1 tablet by mouth Every Night. Take immediately before bedtime 30 tablet 2   • FEROSUL 325 (65 Fe) MG tablet TAKE 1 TABLET BY MOUTH 3 TIMES A DAY 90 tablet 0   • magnesium oxide (MAG-OX) 400 MG tablet TAKE 1 TABLET BY MOUTH 2 TIMES A DAY 60 tablet 0    • montelukast (SINGULAIR) 10 MG tablet TAKE 1 TABLET BY MOUTH AT BEDTIME 30 tablet 0   • potassium chloride (K-DUR,KLOR-CON) 20 MEQ CR tablet TAKE 1 TABLET BY MOUTH 2 TIMES A DAY 60 tablet 0   • raNITIdine (ZANTAC) 150 MG tablet TAKE 1 TABLET BY MOUTH 2 TIMES A DAY 60 tablet 0   • XARELTO 20 MG tablet TAKE 1 TABLET BY MOUTH DAILY 30 tablet 0     No current facility-administered medications for this visit.      Facility-Administered Medications Ordered in Other Visits   Medication Dose Route Frequency Provider Last Rate Last Dose   • sodium chloride 0.9 % flush 10 mL  10 mL Intravenous PRN Joselito Waddell MD   10 mL at 01/26/17 1129     Current Outpatient Medications on File Prior to Visit   Medication Sig   • albuterol sulfate HFA (PROAIR HFA) 108 (90 Base) MCG/ACT inhaler Inhale 2 puffs Every 4 (Four) Hours As Needed for Wheezing.   • Calcium Carb-Cholecalciferol (CALCIUM 1000 + D) 1000-800 MG-UNIT tablet Take 1 tablet by mouth Daily.   • fluticasone (FLONASE) 50 MCG/ACT nasal spray 1 spray into the nostril(s) as directed by provider Daily.   • Fluticasone Furoate-Vilanterol (BREO ELLIPTA) 100-25 MCG/INH inhaler Inhale 1 puff Daily.   • FREESTYLE LITE test strip USE TO CHECK BLOOD SUGAR THREE TIMES DAILY   • HYDROcodone-acetaminophen (NORCO) 7.5-325 MG per tablet TK 1 T PO  Q 8 H PRN P   • ondansetron (ZOFRAN) 8 MG tablet Take 1 tablet by mouth Every 8 (Eight) Hours As Needed for Nausea or Vomiting.   • promethazine (PHENERGAN) 25 MG tablet Take 1 tablet by mouth Every 6 (Six) Hours As Needed for Nausea or Vomiting.   • vitamin C (ASCORBIC ACID) 500 MG tablet Take 1 tablet by mouth 3 (Three) Times a Day.     Current Facility-Administered Medications on File Prior to Visit   Medication   • sodium chloride 0.9 % flush 10 mL     She is allergic to lisinopril; flagyl [metronidazole]; fentanyl; and latex..    Review of Systems   Constitutional: Positive for fatigue and irritability. Negative for chills, diaphoresis and  "fever.   HENT: Negative.    Eyes: Negative.    Respiratory: Negative.    Cardiovascular: Negative.    Gastrointestinal: Positive for heartburn. Negative for abdominal distention and abdominal pain.   Genitourinary: Negative.    Musculoskeletal: Positive for gait problem.   Skin: Negative.    Neurological: Positive for dizziness and weakness.   Psychiatric/Behavioral: Positive for decreased concentration and sleep disturbance. Negative for confusion. The patient is nervous/anxious and has insomnia.        Objective    Visit Vitals  /74   Ht 165.1 cm (65\")   Wt 99.2 kg (218 lb 11.2 oz)   BMI 36.39 kg/m²       Physical Exam   Constitutional: She is oriented to person, place, and time. She appears well-developed and well-nourished.   Ambulation assisted with cane    HENT:   Head: Normocephalic.   Right Ear: External ear normal.   Left Ear: External ear normal.   Mouth/Throat: Oropharynx is clear and moist.   Eyes: EOM are normal. Pupils are equal, round, and reactive to light.   Neck: Normal range of motion. Neck supple.   Cardiovascular: Normal rate, regular rhythm and normal heart sounds.   Pulmonary/Chest: Effort normal and breath sounds normal.   Abdominal: Soft. Bowel sounds are normal.   Musculoskeletal: Normal range of motion.   Neurological: She is alert and oriented to person, place, and time.   Skin: Skin is warm. Capillary refill takes less than 2 seconds.   Psychiatric: She has a normal mood and affect. Her behavior is normal.   Nursing note and vitals reviewed.      Assessment/Plan   Problems Addressed this Visit        Digestive    Gastroesophageal reflux disease without esophagitis    Relevant Medications    esomeprazole (nexIUM) 40 MG capsule       Nervous and Auditory    Malignant neoplasm of breast metastatic to brain (CMS/HCC) - Primary       Other    STEVEN (generalized anxiety disorder)    Relevant Medications    eszopiclone (LUNESTA) 1 MG tablet    clonazePAM (KlonoPIN) 2 MG tablet    Primary " insomnia    Relevant Medications    eszopiclone (LUNESTA) 1 MG tablet      Other Visit Diagnoses     High risk medication use        Relevant Orders    Urine Drug Screen - Urine, Clean Catch (Completed)        New Medications Ordered This Visit   Medications   • cetirizine (zyrTEC) 10 MG tablet     Sig: Take 1 tablet by mouth Daily.     Dispense:  90 tablet     Refill:  3   • esomeprazole (nexIUM) 40 MG capsule     Sig: Take 1 capsule by mouth Every Morning Before Breakfast.     Dispense:  30 capsule     Refill:  5   • eszopiclone (LUNESTA) 1 MG tablet     Sig: Take 1 tablet by mouth Every Night. Take immediately before bedtime     Dispense:  30 tablet     Refill:  2   • clonazePAM (KlonoPIN) 2 MG tablet     Sig: Take 1 tablet by mouth 2 (Two) Times a Day As Needed for Anxiety.     Dispense:  60 tablet     Refill:  2       1.  Malignant neoplasm of breast metastatic to brain:  Continue on Xeloda as previously prescribed  Will continue oncology follow-up with Dr. De Anda as scheduled     2.  Generalized anxiety disorder:  Continue on Klonopin as previously prescribed and refill prescription provided  Educated on possible side effects of this medication including but not limited to increased risk for drowsiness, dependency  Educated not to take this medication prior to working or driving due to sedating nature     3.    Primary insomnia:  Begin Lunesta as prescribed to be taken 1 p.o. nightly  Educated on possible side effects of this medication including but not limited to increased risk for exacerbation of depression, unpleasant taste and suicidal ideation  Instructed to discontinue medication immediately should suicidal ideations occur and contact this office  Encouraged to establish regular bedtime routine such as a regular bedtime as well as turning off all lights, television, phone and electronic devices prior to bedtime    4.  Gastroesophageal reflux disease:  Continue on Nexium as previously  prescribed  Encouraged to adhere to diet low in fried, fatty, spicy and greasy substances in order to reduce gastrointestinal discomfort  May elevate head of bed 15 degrees to reduce nighttime flareups     5.  High risk medication use:GEOFF reviewed by me and will be scanned into medical record  Controlled substance contract signed and dated and will be scanned into medical record  Complete urine drug screen as ordered    Continue on current medications as previously prescribed   Return in about 4 weeks (around 9/3/2019) for Recheck.        This document has been electronically signed by BREANN Garcia on August 12, 2019 7:48 AM

## 2019-08-09 NOTE — THERAPY PROGRESS REPORT/RE-CERT
Outpatient Physical Therapy Hand Progress Note   [facility]     Patient Name: Andra Villareal  : 1975  MRN: [id number]  Today's Date: 2019         Visit Date: 2019  Attendance:  (15/yr on secondary)  Subjective Improvement: 25%  Next MD Appt: 19  Recert Date: 19    Changes in Medications: none noted  Changes in MD Orders: none  Number of Work Days Lost: n/a       Past Medical History:   Diagnosis Date   • Abnormal breathing sounds    • Abscess of skin     and / or subcutaneous tissue   • Acute bronchitis     unspecified   • Adult body mass index 30+     obesity-BMI 33   • Allergic rhinitis    • Anasarca    • Anxiety     currently on xanax   • Anxiety    • Asthenia    • Asthma     moderate persistent   • Asthmatic bronchitis    • Astigmatism    • Bleeding external hemorrhoids    • Body mass index (BMI) of 34.0-34.9 in adult    • Body mass index (BMI) of 35.0-35.9 in adult    • Body mass index (BMI) of 36.0-36.9 in adult    • Body mass index 40.0-44.9, adult (CMS/HCC)     SEVERELY OBESE   • Brain cancer (CMS/HCC) 2017   • Breast cancer (CMS/HCC)    • Cellulitis    • Chemotherapy induced nausea and vomiting    • Chronic back pain    • Chronic cough    • Chronic hypoxemic respiratory failure (CMS/HCC)     on NC at 3 LPM   • Cough    • Depressive disorder    • Diabetes mellitus (CMS/HCC)     no retinopathy   • Drug therapy    • DVT (deep venous thrombosis) (CMS/HCC)    • Dyslipidemia    • Edema of lower extremity    • Encounter for follow-up examination after completed treatment for malignant neoplasm    • Epidermoid cyst of skin     excision with secondary intention healing   • Excessive and frequent menstruation with irregular cycle     Vaginal bleeding after 4 years of no bleeding secondary to chemotherapy for breast cancer; currently being treated for breast cancer for the second time, mets to bone and liver while on chemotherapy      • Flushing    • Fracture of  thoracic spine with cord lesion (CMS/HCC)    • Gastroesophageal reflux disease    • H/O tubal ligation    • History of blood clots     clots around mediports x 2   • Hx of echocardiogram     w/ color flow, and w/o color flow   • Hx of radiation therapy    • Hyperglycemia    • Hypermetropia    • Hypertension    • Hypokalemia    • Impaired glucose tolerance     hgbalc 6.2   • Infection of skin     and /or subcutaneous tissue-around the catheter exit site   • Influenza     needs influenza immunization   • Irregular periods    • Lesion of lumbar spine     pain controlled with percocet and lidocaine patches   • Lumbago with sciatica    • Malignant neoplasm of female breast (CMS/HCC)     s/p right mastectomy, mets to bone and liver on chemotherapy      • Menopausal flushing    • Muscle weakness    • Muscle weakness (generalized)    • Nonspecific interstitial pneumonia (CMS/HCC)     symptomatically improved   • Pleural effusion     refractory massive right, most likely malignant effusion   • Primary atypical pneumonia    • Renal failure     acute drug-induced renal failure   • Sinus tachycardia    • Tobacco dependence syndrome    • Tobacco non-user    • Upper respiratory infection    • Wheezing         Past Surgical History:   Procedure Laterality Date   • ANKLE LOOSE BODY EXCISION/REMOVAL Right 5/24/2017    Procedure: RIGTH FOOT EXCISION NAVICULAR FRACTURES FRAGMENT  DEBRIDEMENT TALONAVILCULA RJOIN T;  Surgeon: Armani Oliviera DPM;  Location: [facility] OR;  Service:    • BREAST BIOPSY     • BREAST SURGERY Right 2013     reconstruction of right breast, trans flap surgery   • CENTRAL VENOUS CATHETER TUNNELED INSERTION SINGLE LUMEN      Placement of indwelling Pleurx catheter right   • COLONOSCOPY N/A 1/26/2017    Procedure: COLONOSCOPY;  Surgeon: Robert Clifton MD;  Location: [facility] ENDOSCOPY;  Service:    • ENDOSCOPY N/A 1/26/2017    Procedure: ESOPHAGOGASTRODUODENOSCOPY;  Surgeon: Robert Clifton MD;  Location: [facility]  ENDOSCOPY;  Service:    • ENDOSCOPY N/A 2/24/2017    Procedure: ESOPHAGOGASTRODUODENOSCOPY;  Surgeon: Robert Clifton MD;  Location: [facility] ENDOSCOPY;  Service:    • HYSTERECTOMY      vaginal   • LIVER BIOPSY  2015   • LUNG VOLUME REDUCTION     • MASTECTOMY      partial   • OTHER SURGICAL HISTORY  05/05/2016    central line insertion , PICC line replacement   • OTHER SURGICAL HISTORY      place breast clip percut   • OTHER SURGICAL HISTORY      pulse oximetry   • OTHER SURGICAL HISTORY      remove cc cath w/ sc port or pump, Removal of right internal jugular MediPort   • OTHER SURGICAL HISTORY      spirometry   • OTHER SURGICAL HISTORY      tubal sterilization   • PAP SMEAR     • THORACENTESIS      aspiration of pleural space   • TUBAL ABDOMINAL LIGATION  2009   • UPPER GASTROINTESTINAL ENDOSCOPY  01/26/2017   • UPPER GASTROINTESTINAL ENDOSCOPY  02/24/2017   • VENOUS ACCESS DEVICE (PORT) INSERTION      Ultrasound guided right internal jugular Mediport placement under fluoroscopy         Visit Dx:    ICD-10-CM ICD-9-CM   1. Closed nondisplaced fracture of proximal phalanx of left little finger with routine healing, subsequent encounter S62.647D V54.19              Hand Therapy (last 24 hours)      Hand Eval     Row Name 08/08/19 1000             Left Flexion AROM    II- MP AROM  60  -SS      II- PIP AROM  95  -SS      II- DIP AROM  60  -SS      II- ADORNO Left Flexion AROM  215  -SS      III- MP AROM  60  -SS      III- PIP AROM  45  -SS      III- DIP AROM  25  -SS      III- ADORNO Left Flexion AROM  130  -SS      IV- MP AROM  50  -SS      IV- PIP AROM  35  -SS      IV- DIP AROM  10  -SS      IV- ADORNO Left Flexion AROM  95  -SS      V- MP AROM  20  -SS      V- PIP AROM  25  -SS      V- DIP AROM  25  -SS      V- ADORNO Left Flexion AROM  70  -SS        User Key  (r) = Recorded By, (t) = Taken By, (c) = Cosigned By    Initials Name Provider Type    Pelon Berumen, PT DPT Physical Therapist        PT Ortho     Row Name  08/08/19 1000       Subjective Comments    Subjective Comments  MF may be moving a bit better. RF and SF don't seem to be much better. Still not able to  with L hand. 25% subjective improvement.  -SS       Precautions and Contraindications    Precautions  Cancer Hx with radiation.  -SS    Contraindications  LATEX ALLERGY  -SS       Subjective Pain    Able to rate subjective pain?  yes  -SS    Pre-Treatment Pain Level  1  -SS    Post-Treatment Pain Level  1  -SS       Posture/Observations    Posture/Observations Comments  When testing L  strength, MF-SF PIP extend instead of flexing.   -SS        Strength Right    # Reps  1  -SS    Right Rung  2  -SS    Right  Test 1  55  -SS     Strength Average Right  55  -SS        Strength Left    # Reps  1  -SS    Left Rung  2  -SS    Left  Test 1  10 bypasses MF-SF  -SS     Strength Average Left  10  -SS       Hand  Strength     Strength Affected Side  Bilateral  -SS      User Key  (r) = Recorded By, (t) = Taken By, (c) = Cosigned By    Initials Name Provider Type    Pelon Berumen, PT DPT Physical Therapist          Therapy Education  Education Details: exercise orthosis for encouraging finger flexion  How Provided: Verbal  Provided to: Patient  Level of Understanding: Verbalized, Demonstrated    PT OP Goals     Row Name 08/08/19 1000          PT Short Term Goals    STG Date to Achieve  08/29/19  -     STG 1  Patient will be independent in home exercise program for range of motion  -     STG 1 Progress  Ongoing  -     STG 2  Patient will be able to make a partial fist  -     STG 2 Progress  Not Met  -SS     STG 3  Patient will be able to oppose thumb and third digit  -     STG 3 Progress  Met  -SS     STG 4  Patient will have decreased swelling and edema by 15 mL  -     STG 4 Progress  Not Met  -SS     STG 4 Progress Comments  not assessed  -     STG 5  She will have 60 degrees of supination of the left wrist   -     STG 5 Progress  Ongoing  -     STG 5 Progress Comments  not assessed  -     STG 6  Patient will have MCP flexion of third and fourth digits to 65 degrees  -     STG 6 Progress  Ongoing;Not Met  -SS     STG 7  Patient will have PIP flexion of 60 degrees or better in third fourth and fifth digits  -     STG 7 Progress  Ongoing;Not Met  -SS     STG 8  Patient will have a quick dash score of 50% or less  -     STG 8 Progress  Ongoing;Not Met  -        Long Term Goals    LTG Date to Achieve  09/19/19  -     LTG 1  Patient will be able to make a soft fist  -     LTG 1 Progress  Ongoing  -SS     LTG 2  Patient will have  strength of 30 pounds or more  -     LTG 2 Progress  Ongoing  -     LTG 3  Patient will have a quick-score of 30% or less  -     LTG 3 Progress  Ongoing  -        Time Calculation    PT Goal Re-Cert Due Date  08/29/19  -       User Key  (r) = Recorded By, (t) = Taken By, (c) = Cosigned By    Initials Name Provider Type     Pelon Ford, PT DPT Physical Therapist          PT Assessment/Plan     Row Name 08/08/19 1000          PT Assessment    Functional Limitations  Limitation in home management;Performance in self-care ADL  -     Impairments  Pain;Range of motion;Muscle strength  -     Assessment Comments  Limited motion persists in RF and SF. Decreased motion. Was placed in exercise orthosis to promote flexion motion. Motion limitations in part due to bypassing and disuse of the hand. She tends to avoid using the affected half of her hand if not consciously reminded/cued to do so.  -     Rehab Potential  Good  -SS     Patient/caregiver participated in establishment of treatment plan and goals  Yes  -SS     Patient would benefit from skilled therapy intervention  Yes  -SS        PT Plan    PT Frequency  2x/week  -SS     Predicted Duration of Therapy Intervention (Therapy Eval)  4-6 weeks  -     PT Plan Comments  Fluidotherapy, ROM, tendon gliding,  edema control,  strengthening  -       User Key  (r) = Recorded By, (t) = Taken By, (c) = Cosigned By    Initials Name Provider Type     Pelon Ford, PT DPT Physical Therapist            Exercises     Row Name 08/08/19 1000             Precautions    Existing Precautions/Restrictions  -- Latex allergy, cancer history  -         Subjective Comments    Subjective Comments  MF may be moving a bit better. RF and SF don't seem to be much better. Still not able to  with L hand. 25% subjective improvement.  -SS         Subjective Pain    Able to rate subjective pain?  yes  -SS      Pre-Treatment Pain Level  1  -SS      Post-Treatment Pain Level  1  -SS         Exercise 1    Exercise Name 1  Fluidotherapy with AROM  -SS      Cueing 1  Verbal  -SS      Time 1  15 mins  -SS         Exercise 2    Exercise Name 2  recheck  -SS         Exercise 3    Exercise Name 3  Grasp 5-cm diameter tape roll to facilitate finger flexion  -SS      Cueing 3  Verbal;Tactile;Demo  -SS      Sets 3  1  -SS      Reps 3  10  -SS      Time 3  10 sec hold  -SS         Exercise 4    Exercise Name 4  fabrication of exercise orthosis  -SS         Exercise 5    Exercise Name 5  digital flexion in exercise orthosis  -SS      Cueing 5  Verbal  -SS      Reps 5  10  -SS        User Key  (r) = Recorded By, (t) = Taken By, (c) = Cosigned By    Initials Name Provider Type    Pelon Berumen, PT DPT Physical Therapist          Outcome Measure Options: Quick DASH  Quick DASH  Open a tight or new jar.: Severe Difficulty  Do heavy household chores (e.g., wash walls, wash floors): Unable  Carry a shopping bag or briefcase: Moderate Difficulty  Wash your back: Unable  Use a knife to cut food: Moderate Difficulty  Recreational activities in which you take some force or impact through your arm, should or hand (e.g. golf, hammering, tennis, etc.): Unable  During the past week, to what extent has your arm, shoulder, or hand problem  interfered with your normal social activities with family, friends, neighbors or groups?: Slightly  During the past week, were you limited in your work or other regular daily activities as a result of your arm, shoulder or hand problem?: Slightly Limited  Arm, Shoulder, or hand pain: Moderate  Tingling (pins and needles) in your arm, shoulder, or hand: None  During the past week, how much difficulty have you had sleeping because of the pain in your arm, shoulder or hand?: Mild Difficulty  Number of Questions Answered: 11  Quick DASH Score: 54.55         Time Calculation:   Start Time: 1022  Stop Time: 1106  Time Calculation (min): 44 min  Total Timed Code Minutes- PT: 44 minute(s)     Therapy Charges for Today     Code Description Service Date Service Provider Modifiers Qty    05371753509 HC PT THER PROC EA 15 MIN 8/8/2019 Pelon Ford, PT DPT GP 3                   Pelon Ford, PT, DPT, CHT  8/8/2019

## 2019-08-13 NOTE — THERAPY TREATMENT NOTE
Outpatient Physical Therapy Hand Treatment Note   [facility]     Patient Name: Andra Villareal  : 1975  MRN: [id number]  Today's Date: 2019         Visit Date: 2019  Attendance:  (15/yr on secondary)  Subjective Improvement: 50%  Next MD Appt: 19  Recert Date: 19      Visit Dx:    ICD-10-CM ICD-9-CM   1. Closed nondisplaced fracture of proximal phalanx of left little finger with routine healing, subsequent encounter S62.647D V54.19               Hand Therapy (last 24 hours)      Hand Eval     Row Name 19 1100             Left Flexion AROM    II- MP AROM  65  -SS      II- PIP AROM  90  -SS      II- DIP AROM  50  -SS      II- ADORNO Left Flexion AROM  205  -SS      III- MP AROM  60  -SS      III- PIP AROM  75  -SS      III- DIP AROM  50  -SS      III- ADORNO Left Flexion AROM  185  -SS      IV- MP AROM  50  -SS      IV- PIP AROM  65  -SS      IV- DIP AROM  40  -SS      IV- ADORNO Left Flexion AROM  155  -SS      V- MP AROM  40  -SS      V- PIP AROM  45  -SS      V- DIP AROM  35  -SS      V- ADORNO Left Flexion AROM  120  -SS          Strength Left    # Reps  1  -SS      Left Rung  2  -SS      Left  Test 1  15 bypassing RF & SF  -SS        User Key  (r) = Recorded By, (t) = Taken By, (c) = Cosigned By    Initials Name Provider Type     Pelon Ford, PT DPT Physical Therapist          PT Assessment/Plan     Row Name 19 1100          PT Assessment    Functional Limitations  Limitation in home management;Performance in self-care ADL  -     Impairments  Pain;Range of motion;Muscle strength  -     Assessment Comments  Improved digital flexion and L  strength this date. Continues to have restricted motion and dyskinesia.  -SS     Rehab Potential  Good  -SS     Patient/caregiver participated in establishment of treatment plan and goals  Yes  -SS     Patient would benefit from skilled therapy intervention  Yes  -SS        PT Plan    PT Frequency  2x/week   -SS     Predicted Duration of Therapy Intervention (Therapy Eval)  4-6 weeks  -SS     PT Plan Comments  Continue POC. Adjust orthosis as flexion increases. Beans and rice next.  -SS       User Key  (r) = Recorded By, (t) = Taken By, (c) = Cosigned By    Initials Name Provider Type    SS Pelon Ford, PT DPT Physical Therapist            Exercises     Row Name 08/13/19 1100             Precautions    Existing Precautions/Restrictions  -- Latex allergy, cancer history  -SS         Subjective Comments    Subjective Comments  Exercise orthosis limits her use of the hand, which is expected. She forgot that she was wearing it and immersed it in dishwater. She is unsure if she caused damage to the orthosis strapping or not. 50% subjective improvement.   -SS         Subjective Pain    Able to rate subjective pain?  yes  -SS      Pre-Treatment Pain Level  0  -SS      Post-Treatment Pain Level  0  -SS         Exercise 1    Exercise Name 1  Fluidotherapy with AROM  -SS      Cueing 1  Verbal  -SS      Time 1  15 mins  -SS         Exercise 2    Exercise Name 2  Active end-range digital flexion with   -SS      Cueing 2  Verbal;Tactile  -SS      Reps 2  30  -SS         Exercise 3    Exercise Name 3  SF active flexion with RF isolated  -SS      Cueing 3  Verbal;Tactile  -SS      Sets 3  1  -SS      Reps 3  15  -SS      Time 3  5 sec hold  -SS         Exercise 4    Exercise Name 4  Block and bend SF DIP flexion  -SS      Cueing 4  Verbal;Tactile;Demo  -SS      Sets 4  1  -SS      Reps 4  15  -SS      Time 4  3 sec hold  -SS         Exercise 5    Exercise Name 5  Composite flexion B  -SS      Cueing 5  Verbal;Demo  -SS      Reps 5  15  -SS         Exercise 6    Exercise Name 6  Block and bend MF, RF, SF PIP flexion  -SS      Cueing 6  Verbal;Tactile;Demo  -SS      Sets 6  1  -SS      Reps 6  15  -SS      Time 6  5 sec hold  -SS         Exercise 7    Exercise Name 7  Mirror therapy - visualization   -SS      Cueing 7  Verbal   -SS      Time 7  2 mins  -SS         Exercise 8    Exercise Name 8  Mirror therapy - AROM R hand  -SS      Cueing 8  Verbal  -SS      Time 8  1 min  -SS         Exercise 9    Exercise Name 9  Mirror therapy - AROM B hands  -SS      Cueing 9  Verbal  -SS      Time 9  1 min  -SS        User Key  (r) = Recorded By, (t) = Taken By, (c) = Cosigned By    Initials Name Provider Type    SS Pelon Ford, PT DPT Physical Therapist              PT OP Goals     Row Name 08/13/19 1100          PT Short Term Goals    STG Date to Achieve  08/29/19  -SS     STG 1  Patient will be independent in home exercise program for range of motion  -SS     STG 1 Progress  Ongoing  -SS     STG 2  Patient will be able to make a partial fist  -SS     STG 2 Progress  Not Met  -SS     STG 3  Patient will be able to oppose thumb and third digit  -SS     STG 3 Progress  Met  -SS     STG 4  Patient will have decreased swelling and edema by 15 mL  -     STG 4 Progress  Not Met  -SS     STG 5  She will have 60 degrees of supination of the left wrist  -     STG 5 Progress  Ongoing  -SS     STG 6  Patient will have MCP flexion of third and fourth digits to 65 degrees  -     STG 6 Progress  Ongoing;Not Met  -SS     STG 7  Patient will have PIP flexion of 60 degrees or better in third fourth and fifth digits  -     STG 7 Progress  Ongoing;Not Met  -SS     STG 8  Patient will have a quick dash score of 50% or less  -     STG 8 Progress  Ongoing;Not Met  -SS        Long Term Goals    LTG Date to Achieve  09/19/19  -SS     LTG 1  Patient will be able to make a soft fist  -     LTG 1 Progress  Ongoing  -SS     LTG 2  Patient will have  strength of 30 pounds or more  -     LTG 2 Progress  Ongoing  -SS     LTG 3  Patient will have a quick-score of 30% or less  -     LTG 3 Progress  Ongoing  -        Time Calculation    PT Goal Re-Cert Due Date  08/29/19  -       User Key  (r) = Recorded By, (t) = Taken By, (c) = Cosigned By     Initials Name Provider Type    SS Pelon Ford, PT DPT Physical Therapist          Therapy Education  Given: HEP  Program: Reinforced  How Provided: Verbal  Provided to: Patient  Level of Understanding: Verbalized              Time Calculation:   Start Time: 1110  Stop Time: 1158  Time Calculation (min): 48 min  Total Timed Code Minutes- PT: 48 minute(s)     Therapy Charges for Today     Code Description Service Date Service Provider Modifiers Qty    42911636633 HC PT THER PROC EA 15 MIN 8/13/2019 Pelon Ford, PT DPT GP 3                   Pelon Ford, PT, DPT, CHT  8/13/2019

## 2019-08-22 NOTE — THERAPY TREATMENT NOTE
Outpatient Physical Therapy Ortho Treatment Note  [facility]     Patient Name: Andra Villareal  : 1975  MRN: [id number]  Today's Date: 2019      Visit Date: 2019  Attendance: 9/10 (15/yr on secondary)  Subjective Improvement: 50%  Next MD Appt: 19  Recert Date: 19  Visit Dx:    ICD-10-CM ICD-9-CM   1. Closed nondisplaced fracture of proximal phalanx of left little finger with routine healing, subsequent encounter S62.647D V54.19       Patient Active Problem List   Diagnosis   • Chronic allergic rhinitis   • Degenerative disc disease, thoracic   • Iron deficiency   • STEVEN (generalized anxiety disorder)   • Hypokalemia   • Constipation due to opioid therapy   • Cancer associated pain   • Chronic nausea   • Gastroesophageal reflux disease without esophagitis   • Hyperglycemia, drug-induced   • Chronic bronchitis (CMS/HCC)   • Liver metastases (CMS/HCC)   • Recurrent deep vein thrombosis (DVT) (CMS/HCC)   • Severe episode of recurrent major depressive disorder (CMS/HCC)   • Primary insomnia   • Breast cancer metastasized to bone (CMS/HCC)   • Type 2 diabetes mellitus with hyperglycemia, with long-term current use of insulin (CMS/HCC)   • Thrombocytopenia (CMS/HCC)   • 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)   • Chronic constipation   • Malignant neoplasm of breast metastatic to brain (CMS/HCC)   • Hx of radiation therapy   • Former smoker, stopped smoking in distant past   • Class 1 obesity without serious comorbidity with body mass index (BMI) of 33.0 to 33.9 in adult   • Contusion, hip and thigh, right, initial encounter   • Closed displaced fracture of proximal phalanx of left little finger   • Brain mass   • Hydrocephalus   • Vasogenic brain edema (CMS/HCC)        Past Medical History:   Diagnosis Date   • Abnormal breathing sounds    • Abscess of skin     and / or subcutaneous tissue   • Acute bronchitis     unspecified   • Adult body mass index 30+     obesity-BMI  33   • Allergic rhinitis    • Anasarca    • Anxiety     currently on xanax   • Anxiety    • Asthenia    • Asthma     moderate persistent   • Asthmatic bronchitis    • Astigmatism    • Bleeding external hemorrhoids    • Body mass index (BMI) of 34.0-34.9 in adult    • Body mass index (BMI) of 35.0-35.9 in adult    • Body mass index (BMI) of 36.0-36.9 in adult    • Body mass index 40.0-44.9, adult (CMS/HCC)     SEVERELY OBESE   • Brain cancer (CMS/HCC) 11/19/2017   • Breast cancer (CMS/HCC)    • Cellulitis    • Chemotherapy induced nausea and vomiting    • Chronic back pain    • Chronic cough    • Chronic hypoxemic respiratory failure (CMS/HCC)     on NC at 3 LPM   • Cough    • Depressive disorder    • Diabetes mellitus (CMS/HCC)     no retinopathy   • Drug therapy    • DVT (deep venous thrombosis) (CMS/HCC)    • Dyslipidemia    • Edema of lower extremity    • Encounter for follow-up examination after completed treatment for malignant neoplasm    • Epidermoid cyst of skin     excision with secondary intention healing   • Excessive and frequent menstruation with irregular cycle     Vaginal bleeding after 4 years of no bleeding secondary to chemotherapy for breast cancer; currently being treated for breast cancer for the second time, mets to bone and liver while on chemotherapy      • Flushing    • 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)    • Gastroesophageal reflux disease    • H/O tubal ligation    • History of blood clots     clots around mediports x 2   • Hx of echocardiogram     w/ color flow, and w/o color flow   • Hx of radiation therapy    • Hyperglycemia    • Hypermetropia    • Hypertension    • Hypokalemia    • Impaired glucose tolerance     hgbalc 6.2   • Infection of skin     and /or subcutaneous tissue-around the catheter exit site   • Influenza     needs influenza immunization   • Irregular periods    • Lesion of lumbar spine     pain controlled with percocet and lidocaine patches   • Lumbago  with sciatica    • Malignant neoplasm of female breast (CMS/HCC)     s/p right mastectomy, mets to bone and liver on chemotherapy      • Menopausal flushing    • Muscle weakness    • Muscle weakness (generalized)    • Nonspecific interstitial pneumonia (CMS/HCC)     symptomatically improved   • Pleural effusion     refractory massive right, most likely malignant effusion   • Primary atypical pneumonia    • Renal failure     acute drug-induced renal failure   • Sinus tachycardia    • Tobacco dependence syndrome    • Tobacco non-user    • Upper respiratory infection    • Wheezing         Past Surgical History:   Procedure Laterality Date   • ANKLE LOOSE BODY EXCISION/REMOVAL Right 5/24/2017    Procedure: RIGTH FOOT EXCISION NAVICULAR FRACTURES FRAGMENT  DEBRIDEMENT TALONAVILCULA RJOIN T;  Surgeon: Armani Oliveira DPM;  Location: [facility] OR;  Service:    • BREAST BIOPSY     • BREAST SURGERY Right 2013     reconstruction of right breast, trans flap surgery   • CENTRAL VENOUS CATHETER TUNNELED INSERTION SINGLE LUMEN      Placement of indwelling Pleurx catheter right   • COLONOSCOPY N/A 1/26/2017    Procedure: COLONOSCOPY;  Surgeon: Robert Clifton MD;  Location: [facility] ENDOSCOPY;  Service:    • ENDOSCOPY N/A 1/26/2017    Procedure: ESOPHAGOGASTRODUODENOSCOPY;  Surgeon: Robert Clifton MD;  Location: [facility] ENDOSCOPY;  Service:    • ENDOSCOPY N/A 2/24/2017    Procedure: ESOPHAGOGASTRODUODENOSCOPY;  Surgeon: Robert Clifton MD;  Location: [facility] ENDOSCOPY;  Service:    • HYSTERECTOMY      vaginal   • LIVER BIOPSY  2015   • LUNG VOLUME REDUCTION     • MASTECTOMY      partial   • OTHER SURGICAL HISTORY  05/05/2016    central line insertion , PICC line replacement   • OTHER SURGICAL HISTORY      place breast clip percut   • OTHER SURGICAL HISTORY      pulse oximetry   • OTHER SURGICAL HISTORY      remove cc cath w/ sc port or pump, Removal of right internal jugular MediPort   • OTHER SURGICAL HISTORY      spirometry   •  OTHER SURGICAL HISTORY      tubal sterilization   • PAP SMEAR     • THORACENTESIS      aspiration of pleural space   • TUBAL ABDOMINAL LIGATION  2009   • UPPER GASTROINTESTINAL ENDOSCOPY  01/26/2017   • UPPER GASTROINTESTINAL ENDOSCOPY  02/24/2017   • VENOUS ACCESS DEVICE (PORT) INSERTION      Ultrasound guided right internal jugular Mediport placement under fluoroscopy       PT Ortho     Row Name 08/22/19 1021       Precautions and Contraindications    Precautions  --  -BS    Contraindications  --  -BS    Row Name 08/22/19 1000       Precautions and Contraindications    Precautions  Cancer Hx with radiation.  -BS    Contraindications  LATEX ALLERGY  -BS      User Key  (r) = Recorded By, (t) = Taken By, (c) = Cosigned By    Initials Name Provider Type    BS Eduardo Younger, PT Physical Therapist                      PT Assessment/Plan     Row Name 08/22/19 1021          PT Assessment    Assessment Comments  good tolerance to mirror visualization, isolating digits for ROM purposes.   -BS        PT Plan    PT Frequency  2x/week  -BS     Predicted Duration of Therapy Intervention (Therapy Eval)  4-6 weeks  -BS     PT Plan Comments  advance composite fist as able.   -BS       User Key  (r) = Recorded By, (t) = Taken By, (c) = Cosigned By    Initials Name Provider Type    Eduardo Fleming, PT Physical Therapist            Exercises     Row Name 08/22/19 1021 08/22/19 1000          Subjective Comments    Subjective Comments  --  -BS  pt with stiffness mosty noted with hand  -BS        Subjective Pain    Able to rate subjective pain?  --  -BS  yes  -BS     Pre-Treatment Pain Level  --  -BS  0  -BS     Post-Treatment Pain Level  --  -BS  0  -BS        Exercise 1    Exercise Name 1  --  Fluidotherapy with AROM  -BS     Time 1  --  15 mins  -BS        Exercise 2    Exercise Name 2  --  rice and bean grab in bucket  -BS     Time 2  --  4'  -BS        Exercise 3    Exercise Name 3  --  SF active flexion with RF isolated  -BS      Sets 3  --  1  -BS     Reps 3  --  15  -BS     Time 3  --  5 sec hold  -BS        Exercise 4    Exercise Name 4  --  Block and bend SF DIP flexion  -BS     Sets 4  --  1  -BS     Reps 4  --  15  -BS     Time 4  --  3 sec hold  -BS        Exercise 5    Exercise Name 5  --  Composite flexion B  -BS     Reps 5  --  10  -BS        Exercise 6    Exercise Name 6  --  Block and bend MF, RF, SF PIP flexion  -BS     Sets 6  --  1  -BS     Reps 6  --  15  -BS        Exercise 7    Exercise Name 7  --  Mirror therapy - visualization   -BS     Time 7  --  2 mins  -BS        Exercise 8    Exercise Name 8  --  Mirror therapy - AROM R hand  -BS     Time 8  --  1 min  -BS        Exercise 9    Exercise Name 9  --  Mirror therapy - AROM B hands  -BS     Time 9  --  1 min  -BS       User Key  (r) = Recorded By, (t) = Taken By, (c) = Cosigned By    Initials Name Provider Type    BS Eduardo Younger, PT Physical Therapist                       PT OP Goals     Row Name 08/22/19 1100 08/22/19 1021       PT Short Term Goals    STG Date to Achieve  08/29/19  -BS  --    STG 1  Patient will be independent in home exercise program for range of motion  -BS  --    STG 1 Progress  Ongoing  -BS  --    STG 2  Patient will be able to make a partial fist  -BS  --    STG 2 Progress  Not Met  -BS  --    STG 3  Patient will be able to oppose thumb and third digit  -BS  --    STG 3 Progress  Met  -BS  --    STG 4  Patient will have decreased swelling and edema by 15 mL  -BS  --    STG 4 Progress  Not Met  -BS  --    STG 5  She will have 60 degrees of supination of the left wrist  -BS  --    STG 5 Progress  Ongoing  -BS  --    STG 6  Patient will have MCP flexion of third and fourth digits to 65 degrees  -BS  --    STG 6 Progress  Ongoing;Not Met  -BS  --    STG 7  Patient will have PIP flexion of 60 degrees or better in third fourth and fifth digits  -BS  --    STG 7 Progress  Ongoing;Not Met  -BS  --    STG 8  Patient will have a quick dash score of 50%  or less  -BS  --    STG 8 Progress  Ongoing;Not Met  -BS  --       Long Term Goals    LTG Date to Achieve  09/19/19  -BS  --    LTG 1  Patient will be able to make a soft fist  -BS  --    LTG 1 Progress  Ongoing  -BS  --    LTG 2  Patient will have  strength of 30 pounds or more  -BS  --    LTG 2 Progress  Ongoing  -BS  --    LTG 3  Patient will have a quick-score of 30% or less  -BS  --    LTG 3 Progress  Ongoing  -BS  --       Time Calculation    PT Goal Re-Cert Due Date  08/29/19  -BS  --  -BS      User Key  (r) = Recorded By, (t) = Taken By, (c) = Cosigned By    Initials Name Provider Type    Eduardo Fleming, PT Physical Therapist          Therapy Education  Education Details: HEP: beans and rice grab  Given: HEP  Program: New  How Provided: Verbal  Provided to: Caregiver, Patient              Time Calculation:   Start Time: 1021  Stop Time: 1102  Time Calculation (min): 41 min  Total Timed Code Minutes- PT: 41 minute(s)  Therapy Charges for Today     Code Description Service Date Service Provider Modifiers Qty    [id number] HC PT THER PROC EA 15 MIN 8/22/2019 Eduardo Younger, PT GP 3                    Eduardo Younger PT  8/22/2019

## 2019-08-26 PROBLEM — S62.641A CLOSED NONDISPLACED FRACTURE OF PROXIMAL PHALANX OF LEFT INDEX FINGER: Status: ACTIVE | Noted: 2019-01-01

## 2019-08-26 PROBLEM — M79.642 LEFT HAND PAIN: Status: ACTIVE | Noted: 2019-01-01

## 2019-08-26 NOTE — PROGRESS NOTES
"Andra Villareal is a 43 y.o. female returns for     Chief Complaint   Patient presents with   • Left Hand - Follow-up, Pain       HISTORY OF PRESENT ILLNESS:    43-year-old female in the office today after approximately 6 weeks of physical therapy she does report some improvement in her motion however continues to have pain with attempted range of motion exercises of the left hand.  Patient was originally injured in April 2019 with fracture of the phalanx and did not return for reevaluation of the fractures until July 2019.  She denies any new traumatic event during that time.  But does report some numbness and tingling and sensational changes in the third fourth fifth digit.       CONCURRENT MEDICAL HISTORY:    The following portions of the patient's history were reviewed and updated as appropriate: allergies, current medications, past family history, past medical history, past social history, past surgical history and problem list.     ROS  No fevers or chills.  No chest pain or shortness of air.  No GI or  disturbances.    PHYSICAL EXAMINATION:       Ht 165.1 cm (65\")   Wt 98.4 kg (217 lb)   BMI 36.11 kg/m²     Physical Exam   Constitutional: She is oriented to person, place, and time. Vital signs are normal. She appears well-developed and well-nourished. She is cooperative.   HENT:   Head: Normocephalic and atraumatic.   Neck: Trachea normal and phonation normal.   Pulmonary/Chest: Effort normal. No respiratory distress.   Abdominal: Soft. Normal appearance. She exhibits no distension.   Neurological: She is alert and oriented to person, place, and time. GCS eye subscore is 4. GCS verbal subscore is 5. GCS motor subscore is 6.   Skin: Skin is warm, dry and intact.   Psychiatric: She has a normal mood and affect. Her speech is normal and behavior is normal. Judgment and thought content normal. Cognition and memory are normal.   Vitals reviewed.      GAIT:     []  Normal  [x]  Antalgic    Assistive " device: []  None  []  Walker     []  Crutches  [x]  Cane     []  Wheelchair  []  Stretcher    Right Hand Exam     Tenderness   Right hand tenderness location: reports tenderness in the 3,4,5 digits.     Range of Motion   Wrist   Extension: normal   Flexion: normal     Muscle Strength   Wrist extension: 5/5   Wrist flexion: 5/5   : 4/5     Other   Erythema: absent  Scars: absent  Sensation: normal  Pulse: present    Comments:  Flexion limited in the fourth fifth and third digits.      Left Hand Exam   Left hand exam is normal.              No results found.          ASSESSMENT:    Diagnoses and all orders for this visit:    Left hand pain  -     EMG & Nerve Conduction Test; Future    Closed nondisplaced fracture of proximal phalanx of left index finger, initial encounter  -     EMG & Nerve Conduction Test; Future          PLAN  Plan will be to continue progression of range of motion exercises with a hand therapist and I will recommend an EMG for further evaluation of the paresthesia of the digits.  In the meantime patient was instructed to continue home exercises along with therapy and to follow-up in 6 weeks or as soon as she has the EMG done for further evaluation of her symptoms.  No Follow-up on file.    Donnie Hale, BREANN

## 2019-08-28 NOTE — THERAPY PROGRESS REPORT/RE-CERT
Outpatient Physical Therapy Ortho Progress Note  [facility]     Patient Name: Andra Villareal  : 1975  MRN: [id number]  Today's Date: 2019      Visit Date: 2019  Attendance: 10/13 (15/yr on secondary)  Subjective Improvement: 50%  Next MD Appt: after nerve study  Recert Date: 19    Changes in Medications: none noted  Changes in MD Orders: none noted  Number of Work Days Lost: n/a       Past Medical History:   Diagnosis Date   • Abnormal breathing sounds    • Abscess of skin     and / or subcutaneous tissue   • Acute bronchitis     unspecified   • Adult body mass index 30+     obesity-BMI 33   • Allergic rhinitis    • Anasarca    • Anxiety     currently on xanax   • Anxiety    • Asthenia    • Asthma     moderate persistent   • Asthmatic bronchitis    • Astigmatism    • Bleeding external hemorrhoids    • Body mass index (BMI) of 34.0-34.9 in adult    • Body mass index (BMI) of 35.0-35.9 in adult    • Body mass index (BMI) of 36.0-36.9 in adult    • Body mass index 40.0-44.9, adult (CMS/HCC)     SEVERELY OBESE   • Brain cancer (CMS/HCC) 2017   • Breast cancer (CMS/HCC)    • Cellulitis    • Chemotherapy induced nausea and vomiting    • Chronic back pain    • Chronic cough    • Chronic hypoxemic respiratory failure (CMS/HCC)     on NC at 3 LPM   • Cough    • Depressive disorder    • Diabetes mellitus (CMS/HCC)     no retinopathy   • Drug therapy    • DVT (deep venous thrombosis) (CMS/HCC)    • Dyslipidemia    • Edema of lower extremity    • Encounter for follow-up examination after completed treatment for malignant neoplasm    • Epidermoid cyst of skin     excision with secondary intention healing   • Excessive and frequent menstruation with irregular cycle     Vaginal bleeding after 4 years of no bleeding secondary to chemotherapy for breast cancer; currently being treated for breast cancer for the second time, mets to bone and liver while on chemotherapy      • Flushing    •  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)    • Gastroesophageal reflux disease    • H/O tubal ligation    • History of blood clots     clots around mediports x 2   • Hx of echocardiogram     w/ color flow, and w/o color flow   • Hx of radiation therapy    • Hyperglycemia    • Hypermetropia    • Hypertension    • Hypokalemia    • Impaired glucose tolerance     hgbalc 6.2   • Infection of skin     and /or subcutaneous tissue-around the catheter exit site   • Influenza     needs influenza immunization   • Irregular periods    • Lesion of lumbar spine     pain controlled with percocet and lidocaine patches   • Lumbago with sciatica    • Malignant neoplasm of female breast (CMS/HCC)     s/p right mastectomy, mets to bone and liver on chemotherapy      • Menopausal flushing    • Muscle weakness    • Muscle weakness (generalized)    • Nonspecific interstitial pneumonia (CMS/HCC)     symptomatically improved   • Pleural effusion     refractory massive right, most likely malignant effusion   • Primary atypical pneumonia    • Renal failure     acute drug-induced renal failure   • Sinus tachycardia    • Tobacco dependence syndrome    • Tobacco non-user    • Upper respiratory infection    • Wheezing         Past Surgical History:   Procedure Laterality Date   • ANKLE LOOSE BODY EXCISION/REMOVAL Right 5/24/2017    Procedure: RIGTH FOOT EXCISION NAVICULAR FRACTURES FRAGMENT  DEBRIDEMENT TALONAVILCULA RJOIN T;  Surgeon: Armani Oliveira DPM;  Location: [facility] OR;  Service:    • BREAST BIOPSY     • BREAST SURGERY Right 2013     reconstruction of right breast, trans flap surgery   • CENTRAL VENOUS CATHETER TUNNELED INSERTION SINGLE LUMEN      Placement of indwelling Pleurx catheter right   • COLONOSCOPY N/A 1/26/2017    Procedure: COLONOSCOPY;  Surgeon: Robert Clifton MD;  Location: [facility] ENDOSCOPY;  Service:    • ENDOSCOPY N/A 1/26/2017    Procedure: ESOPHAGOGASTRODUODENOSCOPY;  Surgeon: Robert Clifton MD;   Location: [facility] ENDOSCOPY;  Service:    • ENDOSCOPY N/A 2/24/2017    Procedure: ESOPHAGOGASTRODUODENOSCOPY;  Surgeon: Robert Clifton MD;  Location: [facility] ENDOSCOPY;  Service:    • HYSTERECTOMY      vaginal   • LIVER BIOPSY  2015   • LUNG VOLUME REDUCTION     • MASTECTOMY      partial   • OTHER SURGICAL HISTORY  05/05/2016    central line insertion , PICC line replacement   • OTHER SURGICAL HISTORY      place breast clip percut   • OTHER SURGICAL HISTORY      pulse oximetry   • OTHER SURGICAL HISTORY      remove cc cath w/ sc port or pump, Removal of right internal jugular MediPort   • OTHER SURGICAL HISTORY      spirometry   • OTHER SURGICAL HISTORY      tubal sterilization   • PAP SMEAR     • THORACENTESIS      aspiration of pleural space   • TUBAL ABDOMINAL LIGATION  2009   • UPPER GASTROINTESTINAL ENDOSCOPY  01/26/2017   • UPPER GASTROINTESTINAL ENDOSCOPY  02/24/2017   • VENOUS ACCESS DEVICE (PORT) INSERTION      Ultrasound guided right internal jugular Mediport placement under fluoroscopy       Visit Dx:     ICD-10-CM ICD-9-CM   1. Closed nondisplaced fracture of proximal phalanx of left little finger with routine healing, subsequent encounter S62.647D V54.19             PT Ortho     Row Name 08/27/19 1000       Subjective Comments    Subjective Comments  Continued stiffness in the MF, RF, and SF. Reports pain when she tries to flex her fingers. Trouble with grasping. Saw NP at Orthopedics yesterday. Has been referred for nerve study.   -SS       Precautions and Contraindications    Precautions  Cancer Hx with radiation.  -SS    Contraindications  LATEX ALLERGY  -SS       Subjective Pain    Able to rate subjective pain?  yes  -SS    Pre-Treatment Pain Level  0  -SS    Post-Treatment Pain Level  -- 2-3/10  -SS       Posture/Observations    Posture/Observations Comments  Walks with cane in right hand. When walking, the L UE is somewhat extended and her MF, RF, and SF are maintained in extension.   -SS      User  Key  (r) = Recorded By, (t) = Taken By, (c) = Cosigned By    Initials Name Provider Type    Pelon Berumen, PT DPT Physical Therapist           Hand Therapy (last 24 hours)      Hand Paul     Row Name 08/27/19 1000             Left Flexion AROM    II- MP AROM  50 60 deg when isolated, 50 as part of composite fist; 65 post-treatment  -SS      II- PIP AROM  90 95 post-treatment  -SS      II- DIP AROM  55 65 post-treatment  -SS      II- ADORNO Left Flexion AROM  195  -SS      III- MP AROM  50 60 post-treatment  -SS      III- PIP AROM  45 60 post-treatment  -SS      III- DIP AROM  30 35 post-treatment  -SS      III- ADORNO Left Flexion AROM  125  -SS      IV- MP AROM  60 70 post-treatment  -SS      IV- PIP AROM  50 60 post-treatment  -SS      IV- DIP AROM  30 35 post-treatment  -SS      IV- ADORNO Left Flexion AROM  140  -SS      V- MP AROM  40 40 post-treatment  -SS      V- PIP AROM  40 50 post-treatment  -SS      V- DIP AROM  30 50 post-treatment  -SS      V- ADORNO Left Flexion AROM  110  -SS         Left Flexion PROM    II- MP PROM  70  -SS      II- PIP PROM  100  -SS      II- DIP PROM  65  -SS      III- MP PROM  60  -SS      III- PIP PROM  65  -SS      III- DIP PROM  45  -SS      IV- MP PROM  60  -SS      IV- PIP PROM  60  -SS      IV- DIP PROM  30  -SS      V- MP PROM  30  -SS      V- PIP PROM  55  -SS      V- DIP PROM  35  -SS         Hand  Strength     Strength Affected Side  Bilateral  -SS          Strength Right    # Reps  1  -SS      Right Rung  2  -SS      Right  Test 1  70  -SS       Strength Average Right  70  -SS          Strength Left    # Reps  1  -SS      Left Rung  2  -SS      Left  Test 1  5 bypasses MF-SF  -SS       Strength Average Left  5  -SS        User Key  (r) = Recorded By, (t) = Taken By, (c) = Cosigned By    Initials Name Provider Type    Pelon Berumen, PT DPT Physical Therapist          Therapy Education  Education Details: LLPS   Given:  HEP  Program: Progressed  How Provided: Verbal  Provided to: Patient  Level of Understanding: Verbalized, Demonstrated     PT OP Goals     Row Name 08/27/19 1000          PT Short Term Goals    STG Date to Achieve  09/17/19  -     STG 1  Patient will be independent in home exercise program for range of motion  -     STG 1 Progress  Ongoing  -SS     STG 2  Patient will be able to make a partial fist  -     STG 2 Progress  Not Met;Ongoing  -SS     STG 3  Patient will be able to oppose thumb and third digit  -     STG 3 Progress  Met  -     STG 4  Patient will have decreased swelling and edema by 15 mL  -     STG 4 Progress  Not Met  -SS     STG 5  She will have 60 degrees of supination of the left wrist  -     STG 5 Progress  Ongoing;Not Met  -     STG 5 Progress Comments  not assessed this date  -     STG 6  Patient will have MCP flexion of third and fourth digits to 65 degrees  -     STG 6 Progress  Ongoing;Not Met  -SS     STG 7  Patient will have PIP flexion of 60 degrees or better in third fourth and fifth digits  -     STG 7 Progress  Ongoing;Not Met  -     STG 8  Patient will have a quick dash score of 50% or less  -     STG 8 Progress  Met  -        Long Term Goals    LTG Date to Achieve  09/19/19 further to be determined  -     LTG 1  Patient will be able to make a soft fist  -     LTG 1 Progress  Ongoing  -     LTG 2  Patient will have  strength of 30 pounds or more  -     LTG 2 Progress  Ongoing  -     LTG 3  Patient will have a quick-score of 30% or less  -     LTG 3 Progress  Ongoing  -        Time Calculation    PT Goal Re-Cert Due Date  09/17/19  -       User Key  (r) = Recorded By, (t) = Taken By, (c) = Cosigned By    Initials Name Provider Type    Pelon Berumen, PT DPT Physical Therapist          PT Assessment/Plan     Row Name 08/27/19 1000          PT Assessment    Functional Limitations  Limitation in home management;Performance in  self-care ADL  -SS     Impairments  Pain;Range of motion;Muscle strength  -SS     Assessment Comments  Patient's RF & SF are improved compared to 3 weeks ago and all are improved compared to evaluation, but motion is worse compared to 2 weeks ago. Patient continues to have guarding/dissociation of the hand. She notes pain with LLPS but motion is improved after.  -SS     Rehab Potential  Good barrier: continued dissociation of hand  -SS     Patient/caregiver participated in establishment of treatment plan and goals  Yes  -SS     Patient would benefit from skilled therapy intervention  Yes  -SS        PT Plan    PT Frequency  2x/week  -SS     Predicted Duration of Therapy Intervention (Therapy Eval)  3-4 weeks with further to be determined  -SS     PT Plan Comments  Fluidotherapy, passive stretching, PROM, AROM,  strengthening  -SS       User Key  (r) = Recorded By, (t) = Taken By, (c) = Cosigned By    Initials Name Provider Type    Pelon Berumen, PT DPT Physical Therapist            Exercises     Row Name 08/27/19 1000             Subjective Comments    Subjective Comments  Continued stiffness in the MF, RF, and SF. Reports pain when she tries to flex her fingers. Trouble with grasping. Saw NP at Orthopedics yesterday. Has been referred for nerve study.   -SS         Subjective Pain    Able to rate subjective pain?  yes  -SS      Pre-Treatment Pain Level  0  -SS      Post-Treatment Pain Level  -- 2-3/10  -SS         Exercise 1    Exercise Name 1  Fluidotherapy with AROM  -SS      Cueing 1  Verbal  -SS      Time 1  15 mins  -SS         Exercise 2    Exercise Name 2  recheck  -SS         Exercise 3    Exercise Name 3  LLPS composite flexion  -SS      Cueing 3  Tactile  -SS      Time 3  3 mins  -SS        User Key  (r) = Recorded By, (t) = Taken By, (c) = Cosigned By    Initials Name Provider Type    Pelon Berumen, PT DPT Physical Therapist           Outcome Measure Options: Quick DASH  Quick  DASH  Open a tight or new jar.: Severe Difficulty  Do heavy household chores (e.g., wash walls, wash floors): Moderate Difficulty  Carry a shopping bag or briefcase: Moderate Difficulty  Wash your back: No Difficulty  Use a knife to cut food: Moderate Difficulty  During the past week, to what extent has your arm, shoulder, or hand problem interfered with your normal social activities with family, friends, neighbors or groups?: Moderately  During the past week, were you limited in your work or other regular daily activities as a result of your arm, shoulder or hand problem?: Slightly Limited  Arm, Shoulder, or hand pain: Mild  Tingling (pins and needles) in your arm, shoulder, or hand: Mild  During the past week, how much difficulty have you had sleeping because of the pain in your arm, shoulder or hand?: No difficulty  Number of Questions Answered: 10  Quick DASH Score: 35         Time Calculation:     Start Time: 1023  Stop Time: 1112  Time Calculation (min): 49 min  Total Timed Code Minutes- PT: 49 minute(s)     Therapy Charges for Today     Code Description Service Date Service Provider Modifiers Qty    94948683518 HC PT THER PROC EA 15 MIN 8/27/2019 Pelon Ford, PT DPT GP 3                   Pelon Ford, PT, DPT, CHT  8/27/2019

## 2019-08-29 NOTE — THERAPY TREATMENT NOTE
Outpatient Physical Therapy Ortho Treatment Note  [facility]     Patient Name: Andra Villareal  : 1975  MRN: [id number]  Today's Date: 2019      Visit Date: 2019  Attendance:  (15/yr on secondary)  Subjective Improvement: 50%  Next MD Appt: after nerve study  Recert Date: 19     Visit Dx:    ICD-10-CM ICD-9-CM   1. Closed nondisplaced fracture of proximal phalanx of left little finger with routine healing, subsequent encounter S62.647D V54.19            Past Medical History:   Diagnosis Date   • Abnormal breathing sounds    • Abscess of skin     and / or subcutaneous tissue   • Acute bronchitis     unspecified   • Adult body mass index 30+     obesity-BMI 33   • Allergic rhinitis    • Anasarca    • Anxiety     currently on xanax   • Anxiety    • Asthenia    • Asthma     moderate persistent   • Asthmatic bronchitis    • Astigmatism    • Bleeding external hemorrhoids    • Body mass index (BMI) of 34.0-34.9 in adult    • Body mass index (BMI) of 35.0-35.9 in adult    • Body mass index (BMI) of 36.0-36.9 in adult    • Body mass index 40.0-44.9, adult (CMS/HCC)     SEVERELY OBESE   • Brain cancer (CMS/HCC) 2017   • Breast cancer (CMS/HCC)    • Cellulitis    • Chemotherapy induced nausea and vomiting    • Chronic back pain    • Chronic cough    • Chronic hypoxemic respiratory failure (CMS/HCC)     on NC at 3 LPM   • Cough    • Depressive disorder    • Diabetes mellitus (CMS/HCC)     no retinopathy   • Drug therapy    • DVT (deep venous thrombosis) (CMS/HCC)    • Dyslipidemia    • Edema of lower extremity    • Encounter for follow-up examination after completed treatment for malignant neoplasm    • Epidermoid cyst of skin     excision with secondary intention healing   • Excessive and frequent menstruation with irregular cycle     Vaginal bleeding after 4 years of no bleeding secondary to chemotherapy for breast cancer; currently being treated for breast cancer for the second  time, mets to bone and liver while on chemotherapy      • Flushing    • 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)    • Gastroesophageal reflux disease    • H/O tubal ligation    • History of blood clots     clots around mediports x 2   • Hx of echocardiogram     w/ color flow, and w/o color flow   • Hx of radiation therapy    • Hyperglycemia    • Hypermetropia    • Hypertension    • Hypokalemia    • Impaired glucose tolerance     hgbalc 6.2   • Infection of skin     and /or subcutaneous tissue-around the catheter exit site   • Influenza     needs influenza immunization   • Irregular periods    • Lesion of lumbar spine     pain controlled with percocet and lidocaine patches   • Lumbago with sciatica    • Malignant neoplasm of female breast (CMS/HCC)     s/p right mastectomy, mets to bone and liver on chemotherapy      • Menopausal flushing    • Muscle weakness    • Muscle weakness (generalized)    • Nonspecific interstitial pneumonia (CMS/HCC)     symptomatically improved   • Pleural effusion     refractory massive right, most likely malignant effusion   • Primary atypical pneumonia    • Renal failure     acute drug-induced renal failure   • Sinus tachycardia    • Tobacco dependence syndrome    • Tobacco non-user    • Upper respiratory infection    • Wheezing         Past Surgical History:   Procedure Laterality Date   • ANKLE LOOSE BODY EXCISION/REMOVAL Right 5/24/2017    Procedure: RIGTH FOOT EXCISION NAVICULAR FRACTURES FRAGMENT  DEBRIDEMENT TALONAVILCULA RJOIN T;  Surgeon: Armani Oliveira DPM;  Location: [facility] OR;  Service:    • BREAST BIOPSY     • BREAST SURGERY Right 2013     reconstruction of right breast, trans flap surgery   • CENTRAL VENOUS CATHETER TUNNELED INSERTION SINGLE LUMEN      Placement of indwelling Pleurx catheter right   • COLONOSCOPY N/A 1/26/2017    Procedure: COLONOSCOPY;  Surgeon: Robert Clifton MD;  Location: [facility] ENDOSCOPY;  Service:    • ENDOSCOPY N/A 1/26/2017     Procedure: ESOPHAGOGASTRODUODENOSCOPY;  Surgeon: Robert Clifton MD;  Location: [facility] ENDOSCOPY;  Service:    • ENDOSCOPY N/A 2/24/2017    Procedure: ESOPHAGOGASTRODUODENOSCOPY;  Surgeon: Robert Clifton MD;  Location: [facility] ENDOSCOPY;  Service:    • HYSTERECTOMY      vaginal   • LIVER BIOPSY  2015   • LUNG VOLUME REDUCTION     • MASTECTOMY      partial   • OTHER SURGICAL HISTORY  05/05/2016    central line insertion , PICC line replacement   • OTHER SURGICAL HISTORY      place breast clip percut   • OTHER SURGICAL HISTORY      pulse oximetry   • OTHER SURGICAL HISTORY      remove cc cath w/ sc port or pump, Removal of right internal jugular MediPort   • OTHER SURGICAL HISTORY      spirometry   • OTHER SURGICAL HISTORY      tubal sterilization   • PAP SMEAR     • THORACENTESIS      aspiration of pleural space   • TUBAL ABDOMINAL LIGATION  2009   • UPPER GASTROINTESTINAL ENDOSCOPY  01/26/2017   • UPPER GASTROINTESTINAL ENDOSCOPY  02/24/2017   • VENOUS ACCESS DEVICE (PORT) INSERTION      Ultrasound guided right internal jugular Mediport placement under fluoroscopy         PT Assessment/Plan     Row Name 08/29/19 1000          PT Assessment    Functional Limitations  Limitation in home management;Performance in self-care ADL  -SS     Impairments  Pain;Range of motion;Muscle strength  -SS     Assessment Comments  Flexion glove made for MF, RF, and SF. Instructed pt to wear glove 15-20 minutes 3x/day.  -SS     Rehab Potential  Good barrier: continued dissociation of hand  -SS     Patient/caregiver participated in establishment of treatment plan and goals  Yes  -SS     Patient would benefit from skilled therapy intervention  Yes  -SS        PT Plan    PT Frequency  2x/week  -SS     Predicted Duration of Therapy Intervention (Therapy Eval)  3-4 weeks with further TBA  -SS     PT Plan Comments  Continue POC. Focus on stretching into flexion and working flexion motion.  -SS       User Key  (r) = Recorded By, (t) = Taken  By, (c) = Cosigned By    Initials Name Provider Type    Pelon Berumen, PT DPT Physical Therapist            Exercises     Row Name 08/29/19 1000             Precautions    Existing Precautions/Restrictions  fall Latex allergy, cancer history  -SS         Subjective Comments    Subjective Comments  Balance off today. No pain in her hand. Fell at home a couple of days ago. Back is sore today.   -SS         Subjective Pain    Able to rate subjective pain?  yes  -SS      Pre-Treatment Pain Level  0  -SS      Post-Treatment Pain Level  2  -SS         Exercise 1    Exercise Name 1  Fluidotherapy with AROM  -SS      Cueing 1  Verbal  -SS      Time 1  15 mins  -SS         Exercise 2    Exercise Name 2  LLPS composite flexion  -SS      Cueing 2  Tactile  -SS      Time 2  2 mins  -SS         Exercise 3    Exercise Name 3  Active flexion with extension blocked  -SS      Cueing 3  Verbal;Tactile  -SS      Sets 3  1  -SS      Reps 3  20  -SS         Exercise 4    Exercise Name 4  LLPS composite flexion  -SS      Cueing 4  Tactile;Verbal  -SS      Time 4  2 mins  -SS         Exercise 5    Exercise Name 5  Active flexion with extension blocked  -SS      Cueing 5  Verbal;Tactile  -SS      Sets 5  1  -SS      Reps 5  15  -SS         Exercise 6    Exercise Name 6  Flexion glove  -SS        User Key  (r) = Recorded By, (t) = Taken By, (c) = Cosigned By    Initials Name Provider Type    SS Pelon Ford, PT DPT Physical Therapist            PT OP Goals     Row Name 08/29/19 1000          PT Short Term Goals    STG Date to Achieve  09/17/19  -SS     STG 1  Patient will be independent in home exercise program for range of motion  -SS     STG 1 Progress  Ongoing  -SS     STG 2  Patient will be able to make a partial fist  -SS     STG 2 Progress  Not Met;Ongoing  -SS     STG 3  Patient will be able to oppose thumb and third digit  -SS     STG 3 Progress  Met  -SS     STG 4  Patient will have decreased swelling and  edema by 15 mL  -     STG 4 Progress  Not Met  -SS     STG 5  She will have 60 degrees of supination of the left wrist  -     STG 5 Progress  Ongoing;Not Met  -SS     STG 6  Patient will have MCP flexion of third and fourth digits to 65 degrees  -     STG 6 Progress  Ongoing;Not Met  -SS     STG 7  Patient will have PIP flexion of 60 degrees or better in third fourth and fifth digits  -     STG 7 Progress  Ongoing;Not Met  -SS     STG 8  Patient will have a quick dash score of 50% or less  -     STG 8 Progress  Met  -        Long Term Goals    LTG Date to Achieve  09/19/19 further to be determined  -     LTG 1  Patient will be able to make a soft fist  -     LTG 1 Progress  Ongoing  -     LTG 2  Patient will have  strength of 30 pounds or more  -     LTG 2 Progress  Ongoing  -     LTG 3  Patient will have a quick-score of 30% or less  -     LTG 3 Progress  Ongoing  -        Time Calculation    PT Goal Re-Cert Due Date  09/17/19  -       User Key  (r) = Recorded By, (t) = Taken By, (c) = Cosigned By    Initials Name Provider Type     Pelon Ford, PT DPT Physical Therapist          Therapy Education  Education Details: flexion glove wear  Given: HEP  How Provided: Verbal  Provided to: Patient  Level of Understanding: Verbalized              Time Calculation:   Start Time: 1019  Stop Time: 1100  Time Calculation (min): 41 min  Total Timed Code Minutes- PT: 41 minute(s)  Therapy Charges for Today     Code Description Service Date Service Provider Modifiers Qty    73180407921 HC PT THER PROC EA 15 MIN 8/29/2019 Pelon Ford, PT DPT GP 3                    Pelon Ford, PT, DPT, CHT  8/29/2019

## 2019-09-05 NOTE — THERAPY TREATMENT NOTE
Outpatient Physical Therapy Ortho Treatment Note  [facility]     Patient Name: Andra Villareal  : 1975  MRN: [id number]  Today's Date: 2019      Visit Date: 2019  Attendance: 12/15 (15/yr on secondary)  Subjective Improvement: 50%  Next MD Appt: 19  Recert Date: 19  Visit Dx:    ICD-10-CM ICD-9-CM   1. Closed nondisplaced fracture of proximal phalanx of left little finger with routine healing, subsequent encounter S62.647D V54.19            Past Medical History:   Diagnosis Date   • Abnormal breathing sounds    • Abscess of skin     and / or subcutaneous tissue   • Acute bronchitis     unspecified   • Adult body mass index 30+     obesity-BMI 33   • Allergic rhinitis    • Anasarca    • Anxiety     currently on xanax   • Anxiety    • Asthenia    • Asthma     moderate persistent   • Asthmatic bronchitis    • Astigmatism    • Bleeding external hemorrhoids    • Body mass index (BMI) of 34.0-34.9 in adult    • Body mass index (BMI) of 35.0-35.9 in adult    • Body mass index (BMI) of 36.0-36.9 in adult    • Body mass index 40.0-44.9, adult (CMS/HCC)     SEVERELY OBESE   • Brain cancer (CMS/HCC) 2017   • Breast cancer (CMS/HCC)    • Cellulitis    • Chemotherapy induced nausea and vomiting    • Chronic back pain    • Chronic cough    • Chronic hypoxemic respiratory failure (CMS/HCC)     on NC at 3 LPM   • Cough    • Depressive disorder    • Diabetes mellitus (CMS/HCC)     no retinopathy   • Drug therapy    • DVT (deep venous thrombosis) (CMS/HCC)    • Dyslipidemia    • Edema of lower extremity    • Encounter for follow-up examination after completed treatment for malignant neoplasm    • Epidermoid cyst of skin     excision with secondary intention healing   • Excessive and frequent menstruation with irregular cycle     Vaginal bleeding after 4 years of no bleeding secondary to chemotherapy for breast cancer; currently being treated for breast cancer for the second time, mets to  bone and liver while on chemotherapy      • Flushing    • 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)    • Gastroesophageal reflux disease    • H/O tubal ligation    • History of blood clots     clots around mediports x 2   • Hx of echocardiogram     w/ color flow, and w/o color flow   • Hx of radiation therapy    • Hyperglycemia    • Hypermetropia    • Hypertension    • Hypokalemia    • Impaired glucose tolerance     hgbalc 6.2   • Infection of skin     and /or subcutaneous tissue-around the catheter exit site   • Influenza     needs influenza immunization   • Irregular periods    • Lesion of lumbar spine     pain controlled with percocet and lidocaine patches   • Lumbago with sciatica    • Malignant neoplasm of female breast (CMS/HCC)     s/p right mastectomy, mets to bone and liver on chemotherapy      • Menopausal flushing    • Muscle weakness    • Muscle weakness (generalized)    • Nonspecific interstitial pneumonia (CMS/HCC)     symptomatically improved   • Pleural effusion     refractory massive right, most likely malignant effusion   • Primary atypical pneumonia    • Renal failure     acute drug-induced renal failure   • Sinus tachycardia    • Tobacco dependence syndrome    • Tobacco non-user    • Upper respiratory infection    • Wheezing         Past Surgical History:   Procedure Laterality Date   • ANKLE LOOSE BODY EXCISION/REMOVAL Right 5/24/2017    Procedure: RIGTH FOOT EXCISION NAVICULAR FRACTURES FRAGMENT  DEBRIDEMENT TALONAVILCULA RJOIN T;  Surgeon: Armani Oliveira DPM;  Location: [facility] OR;  Service:    • BREAST BIOPSY     • BREAST SURGERY Right 2013     reconstruction of right breast, trans flap surgery   • CENTRAL VENOUS CATHETER TUNNELED INSERTION SINGLE LUMEN      Placement of indwelling Pleurx catheter right   • COLONOSCOPY N/A 1/26/2017    Procedure: COLONOSCOPY;  Surgeon: Robert Clifton MD;  Location: [facility] ENDOSCOPY;  Service:    • ENDOSCOPY N/A 1/26/2017    Procedure:  ESOPHAGOGASTRODUODENOSCOPY;  Surgeon: Robert Clifton MD;  Location: [facility] ENDOSCOPY;  Service:    • ENDOSCOPY N/A 2/24/2017    Procedure: ESOPHAGOGASTRODUODENOSCOPY;  Surgeon: Robert Clifton MD;  Location: [facility] ENDOSCOPY;  Service:    • HYSTERECTOMY      vaginal   • LIVER BIOPSY  2015   • LUNG VOLUME REDUCTION     • MASTECTOMY      partial   • OTHER SURGICAL HISTORY  05/05/2016    central line insertion , PICC line replacement   • OTHER SURGICAL HISTORY      place breast clip percut   • OTHER SURGICAL HISTORY      pulse oximetry   • OTHER SURGICAL HISTORY      remove cc cath w/ sc port or pump, Removal of right internal jugular MediPort   • OTHER SURGICAL HISTORY      spirometry   • OTHER SURGICAL HISTORY      tubal sterilization   • PAP SMEAR     • THORACENTESIS      aspiration of pleural space   • TUBAL ABDOMINAL LIGATION  2009   • UPPER GASTROINTESTINAL ENDOSCOPY  01/26/2017   • UPPER GASTROINTESTINAL ENDOSCOPY  02/24/2017   • VENOUS ACCESS DEVICE (PORT) INSERTION      Ultrasound guided right internal jugular Mediport placement under fluoroscopy       PT Ortho     Row Name 09/05/19 1100       Precautions and Contraindications    Precautions  Cancer Hx with radiation.  -SS    Contraindications  LATEX ALLERGY  -SS       Posture/Observations    Posture/Observations Comments  Ambulates with cane. Continues to exhibit decreased PIP and DIP flexion of MF, RF, & SF.  -SS      User Key  (r) = Recorded By, (t) = Taken By, (c) = Cosigned By    Initials Name Provider Type    Pelon Berumen, PT DPT Physical Therapist           Hand Therapy (last 24 hours)      Hand Eval     Row Name 09/05/19 1100             Left Flexion AROM    III- MP AROM  65  -SS      III- PIP AROM  60 75 deg after therex  -SS      III- DIP AROM  45 50 deg after therex  -SS      III- ADORNO Left Flexion AROM  170  -SS      IV- MP AROM  65  -SS      IV- PIP AROM  60 70 deg after therex  -SS      IV- DIP AROM  40 45 deg after therex  -SS       IV- ADORNO Left Flexion AROM  165  -SS      V- MP AROM  50  -SS      V- PIP AROM  50 50 deg after therex  -SS      V- DIP AROM  40 40 deg after therex  -SS      V- ADORNO Left Flexion AROM  140  -SS        User Key  (r) = Recorded By, (t) = Taken By, (c) = Cosigned By    Initials Name Provider Type     Pelon Ford, PT DPT Physical Therapist                    PT Assessment/Plan     Row Name 09/05/19 1100          PT Assessment    Functional Limitations  Limitation in home management;Performance in self-care ADL  -     Impairments  Pain;Range of motion;Muscle strength  -     Assessment Comments  Improved motion this visit compared to 8/27/19. Patient was given a block of foam to use for  strengthening at home.   -     Rehab Potential  Good barrier: continued dissociation of hand  -     Patient/caregiver participated in establishment of treatment plan and goals  Yes  -SS     Patient would benefit from skilled therapy intervention  Yes  -SS        PT Plan    PT Frequency  2x/week  -     Predicted Duration of Therapy Intervention (Therapy Eval)  3-4 weeks with further to be determined  -     PT Plan Comments  Continue POC. Check  strength next.   -       User Key  (r) = Recorded By, (t) = Taken By, (c) = Cosigned By    Initials Name Provider Type     Pelon Ford, PT DPT Physical Therapist            OP Exercises     Row Name 09/05/19 1100             Precautions    Existing Precautions/Restrictions  fall Latex allergy, cancer history  -         Subjective Comments    Subjective Comments  Patient reports that her nerve study last week shows that she has carpal tunnel syndrome. She states that she is not interested in surgery at this time. Finger is still stiff. Wearing flexion glove. Flexion glove hurts some.  -         Subjective Pain    Able to rate subjective pain?  yes  -SS      Pre-Treatment Pain Level  0  -SS      Post-Treatment Pain Level  0  -         Exercise 1     Exercise Name 1  Fluidotherapy with AROM  -SS      Cueing 1  Verbal  -SS      Time 1  15 mins  -SS         Exercise 2    Exercise Name 2  Active flexion with extension blocked  -SS      Cueing 2  Verbal;Tactile  -SS      Sets 2  3  -SS      Reps 2  20  -SS      Time 2  3 sec hold  -SS         Exercise 3    Exercise Name 3  Foam squeeze  -SS      Cueing 3  Verbal;Demo  -SS      Sets 3  1  -SS      Reps 3  30  -SS         Exercise 4    Exercise Name 4  Vertical can lift from top  -SS      Cueing 4  Verbal;Demo  -SS      Reps 4  x10 each  -SS      Additional Comments  empty can, 1# can  -SS        User Key  (r) = Recorded By, (t) = Taken By, (c) = Cosigned By    Initials Name Provider Type    SS Pelon Ford, PT DPT Physical Therapist            PT OP Goals     Row Name 09/05/19 1100          PT Short Term Goals    STG Date to Achieve  09/17/19  -     STG 1  Patient will be independent in home exercise program for range of motion  -     STG 1 Progress  Ongoing  -SS     STG 2  Patient will be able to make a partial fist  -     STG 2 Progress  Not Met;Ongoing  -SS     STG 3  Patient will be able to oppose thumb and third digit  -     STG 3 Progress  Met  -     STG 4  Patient will have decreased swelling and edema by 15 mL  -     STG 4 Progress  Not Met  -SS     STG 5  She will have 60 degrees of supination of the left wrist  -     STG 5 Progress  Ongoing;Not Met  -SS     STG 6  Patient will have MCP flexion of third and fourth digits to 65 degrees  -     STG 6 Progress  Ongoing;Not Met  -SS     STG 7  Patient will have PIP flexion of 60 degrees or better in third fourth and fifth digits  -     STG 7 Progress  Ongoing;Not Met  -SS     STG 8  Patient will have a quick dash score of 50% or less  -     STG 8 Progress  Met  -        Long Term Goals    LTG Date to Achieve  09/19/19 further to be determined  -     LTG 1  Patient will be able to make a soft fist  -     LTG 1 Progress  Ongoing   -     LTG 2  Patient will have  strength of 30 pounds or more  -     LTG 2 Progress  Ongoing  -     LTG 3  Patient will have a quick-score of 30% or less  -     LTG 3 Progress  Ongoing  -        Time Calculation    PT Goal Re-Cert Due Date  09/17/19  -       User Key  (r) = Recorded By, (t) = Taken By, (c) = Cosigned By    Initials Name Provider Type     Pelon Ford, PT DPT Physical Therapist          Therapy Education  Education Details: foam block squeeze  Given: HEP  How Provided: Verbal, Demonstration  Provided to: Patient  Level of Understanding: Verbalized, Demonstrated              Time Calculation:   Start Time: 1103  Stop Time: 1142  Time Calculation (min): 39 min  Total Timed Code Minutes- PT: 39 minute(s)  Therapy Charges for Today     Code Description Service Date Service Provider Modifiers Qty    30173869993 HC PT THER PROC EA 15 MIN 9/5/2019 Pelon Ford, PT DPT GP 3                    Pelon Ford, PT, DPT, CHT  9/5/2019

## 2019-09-11 NOTE — THERAPY TREATMENT NOTE
"    Outpatient Physical Therapy Hand Treatment Note   [facility]     Patient Name: Andra Villareal  : 1975  MRN: [id number]  Today's Date: 2019         Visit Date: 2019  Attendance: 13 (15/yr on secondary)  Subjective Improvement: \"Maybe 35\"%  Next MD Appt: 19  Recert Date: 19    Visit Dx:    ICD-10-CM ICD-9-CM   1. Closed nondisplaced fracture of proximal phalanx of left little finger with routine healing, subsequent encounter S62.647D V54.19               Hand Therapy (last 24 hours)      Hand Eval     Row Name 19 1500             Left Flexion AROM    III- MP AROM  65  -SS      III- PIP AROM  65  -SS      III- DIP AROM  50  -SS      III- ADORNO Left Flexion AROM  180  -SS      IV- MP AROM  60  -SS      IV- PIP AROM  60  -SS      IV- DIP AROM  40  -SS      IV- ADORNO Left Flexion AROM  160  -SS      V- MP AROM  60  -SS      V- PIP AROM  50  -SS      V- DIP AROM  40  -SS      V- ADORNO Left Flexion AROM  150  -SS          Strength Left    # Reps  1  -SS      Left Rung  2  -SS      Left  Test 1  15  -SS       Strength Average Left  15  -SS        User Key  (r) = Recorded By, (t) = Taken By, (c) = Cosigned By    Initials Name Provider Type    Pelon Berumen, PT DPT Physical Therapist        PT Ortho     Row Name 19 1500       Precautions and Contraindications    Precautions  Cancer Hx with radiation.  -SS    Contraindications  LATEX ALLERGY  -SS       Subjective Pain    Post-Treatment Pain Level  0  -SS       Posture/Observations    Posture/Observations Comments  Ambulates with cane. Continues to exhibit decreased PIP and DIP flexion of MF, RF, & SF.  -SS      User Key  (r) = Recorded By, (t) = Taken By, (c) = Cosigned By    Initials Name Provider Type    Pelon Berumen PT DPT Physical Therapist                  PT Assessment/Plan     Row Name 19 1500          PT Assessment    Functional Limitations  Limitation in home " "management;Performance in self-care ADL  -SS     Impairments  Pain;Range of motion;Muscle strength  -     Assessment Comments  Increased L  strength and MF & SF active flexion. Sore with therex.   -SS     Rehab Potential  Good barrier: continued dissociation of hand  -SS     Patient/caregiver participated in establishment of treatment plan and goals  Yes  -SS     Patient would benefit from skilled therapy intervention  Yes  -SS        PT Plan    PT Frequency  2x/week  -SS     Predicted Duration of Therapy Intervention (Therapy Eval)  3-4 weeks with further to be determined  -SS     PT Plan Comments  Continue POC. Progress as tolerated.  -SS       User Key  (r) = Recorded By, (t) = Taken By, (c) = Cosigned By    Initials Name Provider Type    SS Pelon Ford, PT DPT Physical Therapist            OP Exercises     Row Name 09/11/19 1500             Precautions    Existing Precautions/Restrictions  fall Latex allergy, cancer history  -SS         Subjective Comments    Subjective Comments  Pain when takes flexion glove off. Typically wearing flexion glove 2-3 hours at a time. Can't really tell if fingers are moving better or not. \"Maybe 35\"% subjective improvement.  -SS         Subjective Pain    Able to rate subjective pain?  yes  -SS      Pre-Treatment Pain Level  0  -SS      Post-Treatment Pain Level  0  -SS         Exercise 1    Exercise Name 1  Fluidotherapy with AROM  -SS      Cueing 1  Verbal  -SS      Time 1  15 mins  -SS         Exercise 2    Exercise Name 2  Composite flexion stretch  -SS      Cueing 2  Verbal;Tactile  -SS      Sets 2  3  -SS      Time 2  20 sec hold  -SS         Exercise 3    Exercise Name 3  Active flexion with extension blocked  -SS      Cueing 3  Verbal;Tactile  -SS      Time 3  3 mins  -SS         Exercise 4    Exercise Name 4  LLPS individual composite flexion - MF, RF, SF  -SS      Cueing 4  Verbal;Tactile  -SS      Time 4  1 min each  -SS        User Key  (r) = Recorded " By, (t) = Taken By, (c) = Cosigned By    Initials Name Provider Type    Pelon Berumen, PT DPT Physical Therapist              PT OP Goals     Row Name 09/11/19 1500          PT Short Term Goals    STG Date to Achieve  09/17/19  -SS     STG 1  Patient will be independent in home exercise program for range of motion  -SS     STG 1 Progress  Ongoing  -SS     STG 2  Patient will be able to make a partial fist  -SS     STG 2 Progress  Not Met;Ongoing  -SS     STG 3  Patient will be able to oppose thumb and third digit  -SS     STG 3 Progress  Met  -SS     STG 4  Patient will have decreased swelling and edema by 15 mL  -SS     STG 4 Progress  Not Met  -SS     STG 5  She will have 60 degrees of supination of the left wrist  -SS     STG 5 Progress  Ongoing;Not Met  -SS     STG 6  Patient will have MCP flexion of third and fourth digits to 65 degrees  -SS     STG 6 Progress  Ongoing;Not Met  -SS     STG 7  Patient will have PIP flexion of 60 degrees or better in third fourth and fifth digits  -SS     STG 7 Progress  Ongoing;Not Met  -SS     STG 8  Patient will have a quick dash score of 50% or less  -     STG 8 Progress  Met  -SS        Long Term Goals    LTG Date to Achieve  09/19/19 further to be determined  -     LTG 1  Patient will be able to make a soft fist  -     LTG 1 Progress  Ongoing  -     LTG 2  Patient will have  strength of 30 pounds or more  -     LTG 2 Progress  Ongoing  -SS     LTG 3  Patient will have a quick-score of 30% or less  -     LTG 3 Progress  Ongoing  -        Time Calculation    PT Goal Re-Cert Due Date  09/17/19  -       User Key  (r) = Recorded By, (t) = Taken By, (c) = Cosigned By    Initials Name Provider Type    Pelon Berumen, PT DPT Physical Therapist          Therapy Education  Given: HEP  Program: Reinforced  How Provided: Verbal  Provided to: Patient  Level of Understanding: Verbalized              Time Calculation:   Start Time: 1518  Stop  "Time: 1554  Time Calculation (min): 36 min  Total Timed Code Minutes- PT: 36 minute(s)     Therapy Charges for Today     Code Description Service Date Service Provider Modifiers Qty    29315893280 HC PT THER PROC EA 15 MIN 9/11/2019 Pelon Ford, PT DPT GP 2             Addendum to correct \"Subjective Improvement.\"      Pelon Ford, PT, DPT, CHT  9/11/2019     "

## 2019-09-18 NOTE — PROGRESS NOTES
"Andra Villareal is a 44 y.o. female returns for     Chief Complaint   Patient presents with   • Left Hand - Follow-up, Pain   • Results     EMG       HISTORY OF PRESENT ILLNESS:    44-year-old female in the office today for follow-up evaluation of stiffness in the left hand post finger fracture several months ago.  She reports some improvement in her symptoms since the previous evaluation she is obtained her EMG as directed.     CONCURRENT MEDICAL HISTORY:    The following portions of the patient's history were reviewed and updated as appropriate: allergies, current medications, past family history, past medical history, past social history, past surgical history and problem list.     ROS  No fevers or chills.  No chest pain or shortness of air.  No GI or  disturbances.    PHYSICAL EXAMINATION:       Ht 165.1 cm (65\")   Wt 98.4 kg (217 lb)   BMI 36.11 kg/m²     Physical Exam   Constitutional: She is oriented to person, place, and time. Vital signs are normal. She appears well-developed and well-nourished. She is cooperative.   HENT:   Head: Normocephalic and atraumatic.   Neck: Trachea normal and phonation normal.   Pulmonary/Chest: Effort normal. No respiratory distress.   Abdominal: Soft. Normal appearance. She exhibits no distension.   Neurological: She is alert and oriented to person, place, and time. GCS eye subscore is 4. GCS verbal subscore is 5. GCS motor subscore is 6.   Skin: Skin is warm, dry and intact.   Psychiatric: She has a normal mood and affect. Her speech is normal and behavior is normal. Judgment and thought content normal. Cognition and memory are normal.   Vitals reviewed.      GAIT:     []  Normal  [x]  Antalgic    Assistive device: []  None  []  Walker     []  Crutches  [x]  Cane     []  Wheelchair  []  Stretcher    Right Hand Exam     Tenderness   Right hand tenderness location: reports tenderness in the 3,4,5 digits.     Range of Motion   Wrist   Extension: normal   Flexion: normal "     Muscle Strength   Wrist extension: 5/5   Wrist flexion: 5/5   : 4/5     Other   Erythema: absent  Scars: absent  Sensation: normal  Pulse: present    Comments:  Flexion limited in the fourth fifth and third digits.      Left Hand Exam   Left hand exam is normal.                        ASSESSMENT:    Diagnoses and all orders for this visit:    Left hand pain          PLAN  I reviewed the past several  PT noticed which reveal progression of range of motion and subjective improvement over the past 2 months.  I also reviewed her EMG results which reveals carpal tunnel syndrome however she does not have any pain or numbness and tingling symptoms.  I will recommend continued physical therapy and progression of range of motion activity as tolerated.  Follow-up and 6 to 8 weeks for recheck  No Follow-up on file.    Donnie Hale, APRN

## 2019-09-20 NOTE — THERAPY PROGRESS REPORT/RE-CERT
Outpatient Physical Therapy Ortho Progress Note  [facility]     Patient Name: Andra Villareal  : 1975  MRN: [id number]  Today's Date: 2019      Visit Date: 2019  Attendance:  (15/yr on secondary)  Subjective Improvement: 70%  Next MD Appt: none with Ortho  Recert Date: 10/11/19    Changes in Medications: none noted  Changes in MD Orders: continue P.T.  Number of Work Days Lost: n/a     Past Medical History:   Diagnosis Date   • Abnormal breathing sounds    • Abscess of skin     and / or subcutaneous tissue   • Acute bronchitis     unspecified   • Adult body mass index 30+     obesity-BMI 33   • Allergic rhinitis    • Anasarca    • Anxiety     currently on xanax   • Anxiety    • Asthenia    • Asthma     moderate persistent   • Asthmatic bronchitis    • Astigmatism    • Bleeding external hemorrhoids    • Body mass index (BMI) of 34.0-34.9 in adult    • Body mass index (BMI) of 35.0-35.9 in adult    • Body mass index (BMI) of 36.0-36.9 in adult    • Body mass index 40.0-44.9, adult (CMS/HCC)     SEVERELY OBESE   • Brain cancer (CMS/HCC) 2017   • Breast cancer (CMS/HCC)    • Cellulitis    • Chemotherapy induced nausea and vomiting    • Chronic back pain    • Chronic cough    • Chronic hypoxemic respiratory failure (CMS/HCC)     on NC at 3 LPM   • Cough    • Depressive disorder    • Diabetes mellitus (CMS/HCC)     no retinopathy   • Drug therapy    • DVT (deep venous thrombosis) (CMS/HCC)    • Dyslipidemia    • Edema of lower extremity    • Encounter for follow-up examination after completed treatment for malignant neoplasm    • Epidermoid cyst of skin     excision with secondary intention healing   • Excessive and frequent menstruation with irregular cycle     Vaginal bleeding after 4 years of no bleeding secondary to chemotherapy for breast cancer; currently being treated for breast cancer for the second time, mets to bone and liver while on chemotherapy      • Flushing    •  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)    • Gastroesophageal reflux disease    • H/O tubal ligation    • History of blood clots     clots around mediports x 2   • Hx of echocardiogram     w/ color flow, and w/o color flow   • Hx of radiation therapy    • Hyperglycemia    • Hypermetropia    • Hypertension    • Hypokalemia    • Impaired glucose tolerance     hgbalc 6.2   • Infection of skin     and /or subcutaneous tissue-around the catheter exit site   • Influenza     needs influenza immunization   • Irregular periods    • Lesion of lumbar spine     pain controlled with percocet and lidocaine patches   • Lumbago with sciatica    • Malignant neoplasm of female breast (CMS/HCC)     s/p right mastectomy, mets to bone and liver on chemotherapy      • Menopausal flushing    • Muscle weakness    • Muscle weakness (generalized)    • Nonspecific interstitial pneumonia (CMS/HCC)     symptomatically improved   • Pleural effusion     refractory massive right, most likely malignant effusion   • Primary atypical pneumonia    • Renal failure     acute drug-induced renal failure   • Sinus tachycardia    • Tobacco dependence syndrome    • Tobacco non-user    • Upper respiratory infection    • Wheezing         Past Surgical History:   Procedure Laterality Date   • ANKLE LOOSE BODY EXCISION/REMOVAL Right 5/24/2017    Procedure: RIGTH FOOT EXCISION NAVICULAR FRACTURES FRAGMENT  DEBRIDEMENT TALONAVILCULA RJOIN T;  Surgeon: Armani Oliveira DPM;  Location: [facility] OR;  Service:    • BREAST BIOPSY     • BREAST SURGERY Right 2013     reconstruction of right breast, trans flap surgery   • CENTRAL VENOUS CATHETER TUNNELED INSERTION SINGLE LUMEN      Placement of indwelling Pleurx catheter right   • COLONOSCOPY N/A 1/26/2017    Procedure: COLONOSCOPY;  Surgeon: Robert Clifton MD;  Location: [facility] ENDOSCOPY;  Service:    • ENDOSCOPY N/A 1/26/2017    Procedure: ESOPHAGOGASTRODUODENOSCOPY;  Surgeon: Robert Clifton MD;   Location: [facility] ENDOSCOPY;  Service:    • ENDOSCOPY N/A 2/24/2017    Procedure: ESOPHAGOGASTRODUODENOSCOPY;  Surgeon: Robert Clifton MD;  Location: [facility] ENDOSCOPY;  Service:    • HYSTERECTOMY      vaginal   • LIVER BIOPSY  2015   • LUNG VOLUME REDUCTION     • MASTECTOMY      partial   • OTHER SURGICAL HISTORY  05/05/2016    central line insertion , PICC line replacement   • OTHER SURGICAL HISTORY      place breast clip percut   • OTHER SURGICAL HISTORY      pulse oximetry   • OTHER SURGICAL HISTORY      remove cc cath w/ sc port or pump, Removal of right internal jugular MediPort   • OTHER SURGICAL HISTORY      spirometry   • OTHER SURGICAL HISTORY      tubal sterilization   • PAP SMEAR     • THORACENTESIS      aspiration of pleural space   • TUBAL ABDOMINAL LIGATION  2009   • UPPER GASTROINTESTINAL ENDOSCOPY  01/26/2017   • UPPER GASTROINTESTINAL ENDOSCOPY  02/24/2017   • VENOUS ACCESS DEVICE (PORT) INSERTION      Ultrasound guided right internal jugular Mediport placement under fluoroscopy       Visit Dx:     ICD-10-CM ICD-9-CM   1. Closed nondisplaced fracture of proximal phalanx of left little finger with routine healing, subsequent encounter S62.647D V54.19             PT Ortho     Row Name 09/20/19 1300       Subjective Comments    Subjective Comments  Pain if pushes down on the RF and SF. Thinks that her motion may be a bit better. Swelling across the MP joints. Pain at times in volar hand. 70% subjective improvement.  -SS       Precautions and Contraindications    Precautions  Cancer Hx with radiation.  -SS    Contraindications  LATEX ALLERGY  -       Subjective Pain    Able to rate subjective pain?  yes  -SS    Pre-Treatment Pain Level  0  -SS    Post-Treatment Pain Level  -- 4-5/10  -SS       Posture/Observations    Posture/Observations Comments  Ambulates with cane. Continues to exhibit decreased PIP and DIP flexion of MF, RF, & SF.  -        Strength Left    # Reps  1  -SS    Left Rung  2   -SS    Left  Test 1  20  -SS     Strength Average Left  20  -SS      User Key  (r) = Recorded By, (t) = Taken By, (c) = Cosigned By    Initials Name Provider Type    Pelon Berumen, PT DPT Physical Therapist           Hand Therapy (last 24 hours)      Hand Eval     Row Name 09/20/19 1300             Left Flexion AROM    II- MP AROM  60  -SS      II- PIP AROM  90  -SS      II- DIP AROM  60  -SS      II- ADORNO Left Flexion AROM  210  -SS      III- MP AROM  65  -SS      III- PIP AROM  70  -SS      III- DIP AROM  50  -SS      III- ADORNO Left Flexion AROM  185  -SS      IV- MP AROM  60  -SS      IV- PIP AROM  70  -SS      IV- DIP AROM  35  -SS      IV- ADORNO Left Flexion AROM  165  -SS      V- MP AROM  50  -SS      V- PIP AROM  50  -SS      V- DIP AROM  45  -SS      V- ADORNO Left Flexion AROM  145  -SS        User Key  (r) = Recorded By, (t) = Taken By, (c) = Cosigned By    Initials Name Provider Type    Pelon Berumen, PT DPT Physical Therapist          Therapy Education  Given: HEP  Program: Reinforced  How Provided: Verbal  Provided to: Patient  Level of Understanding: Verbalized     PT OP Goals     Row Name 09/20/19 1300          PT Short Term Goals    STG Date to Achieve  -- further STGs deferred  -     STG 1  Patient will be independent in home exercise program for range of motion  -     STG 1 Progress  Met  -SS     STG 2  Patient will be able to make a partial fist  -     STG 2 Progress  Met  -SS     STG 3  Patient will be able to oppose thumb and third digit  -     STG 3 Progress  Met  -SS     STG 4  Patient will have decreased swelling and edema by 15 mL  -     STG 4 Progress  Not Met  -SS     STG 5  She will have 60 degrees of supination of the left wrist  -SS     STG 5 Progress  Ongoing;Not Met  -SS     STG 6  Patient will have MCP flexion of third and fourth digits to 65 degrees  -     STG 6 Progress  Partially Met met for MF  -SS     STG 7  Patient will have PIP flexion of  60 degrees or better in third fourth and fifth digits  -     STG 7 Progress  Partially Met met for MF & RF  -     STG 8  Patient will have a quick dash score of 50% or less  -     STG 8 Progress  Met  -        Long Term Goals    LTG Date to Achieve  10/11/19  -     LTG 1  Patient will be able to make a soft fist  -     LTG 1 Progress  Ongoing  -SS     LTG 2  Patient will have  strength of 30 pounds or more  -     LTG 2 Progress  Ongoing  -     LTG 3  Patient will have a quick-score of 30% or less  -     LTG 3 Progress  Ongoing  -        Time Calculation    PT Goal Re-Cert Due Date  10/11/19  -       User Key  (r) = Recorded By, (t) = Taken By, (c) = Cosigned By    Initials Name Provider Type    Pelon Berumen, PT DPT Physical Therapist          PT Assessment/Plan     Row Name 09/20/19 1300          PT Assessment    Functional Limitations  Limitation in home management;Performance in self-care ADL  -     Impairments  Pain;Range of motion;Muscle strength  -     Assessment Comments  Improved L  strength. Improved digital AROM although still very limited. Pain after releasing hand from LLPS flexion.  -     Rehab Potential  Fair barrier: prolonged stiffness/dissociation of the hand  -     Patient/caregiver participated in establishment of treatment plan and goals  Yes  -     Patient would benefit from skilled therapy intervention  Yes  -        PT Plan    PT Frequency  2x/week  -     Predicted Duration of Therapy Intervention (Therapy Eval)  3 weeks  -     PT Plan Comments  Fluidotherapy, A/AA/PROM, strengthening. 9-hole pegs next.  -       User Key  (r) = Recorded By, (t) = Taken By, (c) = Cosigned By    Initials Name Provider Type    Pelon Berumen, PT DPT Physical Therapist            OP Exercises     Row Name 09/20/19 1300             Precautions    Existing Precautions/Restrictions  fall Latex allergy, cancer history  -         Subjective  Comments    Subjective Comments  Pain if pushes down on the RF and SF. Thinks that her motion may be a bit better. Swelling across the MP joints. Pain at times in volar hand. 70% subjective improvement.  -SS         Subjective Pain    Able to rate subjective pain?  yes  -SS      Pre-Treatment Pain Level  0  -SS      Post-Treatment Pain Level  -- 4-5/10  -SS         Exercise 1    Exercise Name 1  Fluidotherapy with AROM  -SS      Cueing 1  Verbal  -SS      Time 1  15 mins  -SS         Exercise 2    Exercise Name 2  LLPS composite flexion  -SS      Cueing 2  Tactile  -SS      Time 2  1 min  -SS         Exercise 3    Exercise Name 3  Active flexion with extension blocked  -SS      Cueing 3  Verbal;Tactile  -SS      Reps 3  20  -SS      Time 3  5 sec hold  -SS        User Key  (r) = Recorded By, (t) = Taken By, (c) = Cosigned By    Initials Name Provider Type    Pelon Berumen, ANYI DPT Physical Therapist           Outcome Measure Options: Quick DASH  Quick DASH  Open a tight or new jar.: Moderate Difficulty  Do heavy household chores (e.g., wash walls, wash floors): Unable  Carry a shopping bag or briefcase: Moderate Difficulty  Wash your back: No Difficulty  Use a knife to cut food: Unable  During the past week, to what extent has your arm, shoulder, or hand problem interfered with your normal social activities with family, friends, neighbors or groups?: Slightly  During the past week, were you limited in your work or other regular daily activities as a result of your arm, shoulder or hand problem?: Moderately Limited  Arm, Shoulder, or hand pain: Moderate  Tingling (pins and needles) in your arm, shoulder, or hand: None  During the past week, how much difficulty have you had sleeping because of the pain in your arm, shoulder or hand?: Mild Difficulty  Number of Questions Answered: 10  Quick DASH Score: 45         Time Calculation:     Start Time: 1307  Stop Time: 1347  Time Calculation (min): 40 min      Therapy Charges for Today     Code Description Service Date Service Provider Modifiers Qty    55511483970 HC PT THER PROC EA 15 MIN 9/20/2019 Pelon Ford, PT DPT GP 3                   Pelon Ford, PT, DPT, CHT  9/20/2019

## 2019-10-24 NOTE — PROGRESS NOTES
"Subjective   Andra Villareal is a 44 y.o. female.  One month follow-up.  Placed on Nexium at previous visit and \"I can't take it .  It just bloats me and makes me feel ugh.  I also can't take that sleep medicine you gave me.  I did not feel safe taking that medication.\"    Anxiety   Presents for follow-up visit. Symptoms include decreased concentration, depressed mood, dizziness, excessive worry, insomnia, nervous/anxious behavior and restlessness. Patient reports no confusion. Symptoms occur most days. The severity of symptoms is moderate. The quality of sleep is fair. Nighttime awakenings: one to two.       Insomnia   This is a chronic problem. The current episode started more than 1 year ago. The problem occurs constantly. The problem has been gradually worsening. Nothing aggravates the symptoms. She has tried relaxation and rest (Ambien, Tylenol PM, etc. you know, Vistaril, Valium and melatonin) for the symptoms. The treatment provided mild relief.   Heartburn   She complains of belching, early satiety and water brash. This is a chronic problem. The current episode started more than 1 year ago. The problem occurs occasionally. The problem has been waxing and waning. The heartburn is of moderate intensity. The symptoms are aggravated by certain foods, stress and lying down. Risk factors include lack of exercise, obesity and caffeine use. She has tried a PPI for the symptoms. The treatment provided significant relief.        The following portions of the patient's history were reviewed and updated as appropriate:     Current Outpatient Medications   Medication Sig Dispense Refill   • albuterol sulfate HFA (PROAIR HFA) 108 (90 Base) MCG/ACT inhaler Inhale 2 puffs Every 4 (Four) Hours As Needed for Wheezing. 1 inhaler 0   • BREO ELLIPTA 100-25 MCG/INH inhaler INHALE 1 PUFF BY MOUTH DAILY 60 each 2   • Calcium Carb-Cholecalciferol (CALCIUM 1000 + D) 1000-800 MG-UNIT tablet Take 1 tablet by mouth Daily. 90 tablet 2 "   • cetirizine (zyrTEC) 10 MG tablet Take 1 tablet by mouth Daily. 90 tablet 3   • clonazePAM (KlonoPIN) 2 MG tablet Take 1 tablet by mouth 2 (Two) Times a Day As Needed for Anxiety. 60 tablet 0   • eszopiclone (LUNESTA) 1 MG tablet Take 1 tablet by mouth Every Night. Take immediately before bedtime 30 tablet 2   • FEROSUL 325 (65 Fe) MG tablet TAKE 1 TABLET BY MOUTH 3 TIMES A DAY 90 tablet 0   • fluticasone (FLONASE) 50 MCG/ACT nasal spray 1 spray into the nostril(s) as directed by provider Daily. 1 bottle 0   • FREESTYLE LITE test strip USE TO CHECK BLOOD SUGAR THREE TIMES DAILY 100 each 0   • HYDROcodone-acetaminophen (NORCO) 7.5-325 MG per tablet TK 1 T PO  Q 8 H PRN P     • magnesium oxide (MAG-OX) 400 MG tablet TAKE 1 TABLET BY MOUTH 2 TIMES A DAY 60 tablet 0   • montelukast (SINGULAIR) 10 MG tablet TAKE 1 TABLET AT BEDTIME 30 tablet 0   • ondansetron (ZOFRAN) 8 MG tablet Take 1 tablet by mouth Every 8 (Eight) Hours As Needed for Nausea or Vomiting. 30 tablet 2   • potassium chloride (K-DUR,KLOR-CON) 20 MEQ CR tablet TAKE 1 TABLET TWICE DAILY 60 tablet 0   • promethazine (PHENERGAN) 25 MG tablet Take 1 tablet by mouth Every 6 (Six) Hours As Needed for Nausea or Vomiting. 120 tablet 5   • vitamin C (ASCORBIC ACID) 500 MG tablet Take 1 tablet by mouth 3 (Three) Times a Day. 90 tablet 2   • XARELTO 20 MG tablet TAKE 1 TABLET EVERY DAY 30 tablet 0   • omeprazole (priLOSEC) 40 MG capsule Take 1 capsule by mouth Daily. 30 capsule 5     No current facility-administered medications for this visit.      Facility-Administered Medications Ordered in Other Visits   Medication Dose Route Frequency Provider Last Rate Last Dose   • sodium chloride 0.9 % flush 10 mL  10 mL Intravenous PRN Joselito Waddell MD   10 mL at 01/26/17 1124     Current Outpatient Medications on File Prior to Visit   Medication Sig   • albuterol sulfate HFA (PROAIR HFA) 108 (90 Base) MCG/ACT inhaler Inhale 2 puffs Every 4 (Four) Hours As Needed for  Wheezing.   • BREO ELLIPTA 100-25 MCG/INH inhaler INHALE 1 PUFF BY MOUTH DAILY   • Calcium Carb-Cholecalciferol (CALCIUM 1000 + D) 1000-800 MG-UNIT tablet Take 1 tablet by mouth Daily.   • cetirizine (zyrTEC) 10 MG tablet Take 1 tablet by mouth Daily.   • eszopiclone (LUNESTA) 1 MG tablet Take 1 tablet by mouth Every Night. Take immediately before bedtime   • FEROSUL 325 (65 Fe) MG tablet TAKE 1 TABLET BY MOUTH 3 TIMES A DAY   • fluticasone (FLONASE) 50 MCG/ACT nasal spray 1 spray into the nostril(s) as directed by provider Daily.   • FREESTYLE LITE test strip USE TO CHECK BLOOD SUGAR THREE TIMES DAILY   • HYDROcodone-acetaminophen (NORCO) 7.5-325 MG per tablet TK 1 T PO  Q 8 H PRN P   • magnesium oxide (MAG-OX) 400 MG tablet TAKE 1 TABLET BY MOUTH 2 TIMES A DAY   • montelukast (SINGULAIR) 10 MG tablet TAKE 1 TABLET AT BEDTIME   • ondansetron (ZOFRAN) 8 MG tablet Take 1 tablet by mouth Every 8 (Eight) Hours As Needed for Nausea or Vomiting.   • potassium chloride (K-DUR,KLOR-CON) 20 MEQ CR tablet TAKE 1 TABLET TWICE DAILY   • promethazine (PHENERGAN) 25 MG tablet Take 1 tablet by mouth Every 6 (Six) Hours As Needed for Nausea or Vomiting.   • vitamin C (ASCORBIC ACID) 500 MG tablet Take 1 tablet by mouth 3 (Three) Times a Day.   • XARELTO 20 MG tablet TAKE 1 TABLET EVERY DAY   • [DISCONTINUED] clonazePAM (KlonoPIN) 2 MG tablet Take 1 tablet by mouth 2 (Two) Times a Day As Needed for Anxiety.   • [DISCONTINUED] esomeprazole (nexIUM) 40 MG capsule Take 1 capsule by mouth Every Morning Before Breakfast.   • [DISCONTINUED] raNITIdine (ZANTAC) 150 MG tablet TAKE 1 TABLET BY MOUTH 2 TIMES A DAY     Current Facility-Administered Medications on File Prior to Visit   Medication   • sodium chloride 0.9 % flush 10 mL     She is allergic to lisinopril; flagyl [metronidazole]; fentanyl; and latex..    Review of Systems   Constitutional: Negative.    Respiratory: Negative.    Cardiovascular: Negative.    Gastrointestinal:  "Negative.    Genitourinary: Negative.    Skin: Negative.    Neurological: Positive for dizziness.   Psychiatric/Behavioral: Positive for decreased concentration. Negative for confusion. The patient is nervous/anxious and has insomnia.        Objective    Visit Vitals  /72   Ht 165.1 cm (65\")   Wt 96.6 kg (213 lb)   BMI 35.45 kg/m²       Physical Exam   Constitutional: She is oriented to person, place, and time. She appears well-developed and well-nourished.   HENT:   Head: Normocephalic.   Right Ear: External ear normal.   Left Ear: External ear normal.   Eyes: EOM are normal. Pupils are equal, round, and reactive to light.   Neck: Normal range of motion. Neck supple.   Cardiovascular: Normal rate, regular rhythm and normal heart sounds.   Pulmonary/Chest: Effort normal and breath sounds normal.   Abdominal: Soft. Bowel sounds are normal.   Musculoskeletal: Normal range of motion.   Neurological: She is alert and oriented to person, place, and time.   Skin: Skin is warm. Capillary refill takes less than 2 seconds.   Psychiatric: She has a normal mood and affect. Her behavior is normal.   Nursing note and vitals reviewed.      Assessment/Plan   Problems Addressed this Visit        Digestive    Gastroesophageal reflux disease without esophagitis    Relevant Medications    omeprazole (priLOSEC) 40 MG capsule       Other    STEVEN (generalized anxiety disorder)    Relevant Medications    clonazePAM (KlonoPIN) 2 MG tablet    Other Relevant Orders    Ambulatory Referral to Psychiatry    Primary insomnia - Primary      Other Visit Diagnoses     High risk medication use        Relevant Orders    Urine Drug Screen - Urine, Clean Catch        New Medications Ordered This Visit   Medications   • clonazePAM (KlonoPIN) 2 MG tablet     Sig: Take 1 tablet by mouth 2 (Two) Times a Day As Needed for Anxiety.     Dispense:  60 tablet     Refill:  0   • omeprazole (priLOSEC) 40 MG capsule     Sig: Take 1 capsule by mouth Daily.     " Dispense:  30 capsule     Refill:  5       1.  Generalized anxiety disorder:  Continue on Klonopin as previously prescribed and refill prescription provided  Educated on possible side effects of this medication including but not limited to increased risk for drowsiness, dependency  Educated not to take this medication prior to working or driving due to sedating nature  Referral placed to [facility] for further evaluation and will call to schedule appointment     3.    Primary insomnia:  Discontinue Lunesta due to side effects   Begin Melatonin 10 mg PO nightly   Encouraged to establish regular bedtime routine such as a regular bedtime as well as turning off all lights, television, phone and electronic devices prior to bedtime     3.  Gastroesophageal reflux disease:  Discontinue Nexium as previously prescribed  Begin Prilosec as prescribed be taken 1 p.o. daily  Encouraged to adhere to diet low in fried, fatty, spicy and greasy substances in order to reduce gastrointestinal discomfort  May elevate head of bed 15 degrees to reduce nighttime flareups    Continue on current medications as previously prescribed   I spent 17 minutes in direct face to face contact with patient.  Greater than 50% of this time was spent counseling patient and discussing plan of care.  Return in about 4 weeks (around 11/21/2019) for Recheck.        This document has been electronically signed by BREANN Garcia on October 24, 2019 9:38 AM

## 2019-11-09 NOTE — DISCHARGE INSTRUCTIONS
As we discussed, ask your primary care provider to order an MRI of the head to follow-up on the findings seen on your CT scan.

## 2019-11-09 NOTE — ED PROVIDER NOTES
Subjective   44-year-old female presents to the emergency department chief complaint of fall.  Patient relates she got up to use the bathroom with her walker and she fell injuring her left knee.  Patient complains of headache and neck pain.  Patient takes the blood thinner Xarelto.  Patient denies loss of consciousness or other injury.  Patient relates her last tetanus vaccine was less than 5 years ago.            Review of Systems   Constitutional: Negative for chills, diaphoresis and fever.   Respiratory: Negative for shortness of breath.    Cardiovascular: Negative for chest pain.   Gastrointestinal: Negative for abdominal pain, blood in stool, nausea and vomiting.   Genitourinary: Negative for dysuria and hematuria.   Musculoskeletal: Positive for arthralgias and neck pain. Negative for back pain.   Skin: Positive for wound.   Neurological: Positive for headaches. Negative for syncope, weakness and numbness.   All other systems reviewed and are negative.      Past Medical History:   Diagnosis Date   • Abnormal breathing sounds    • Abscess of skin     and / or subcutaneous tissue   • Acute bronchitis     unspecified   • Adult body mass index 30+     obesity-BMI 33   • Allergic rhinitis    • Anasarca    • Anxiety     currently on xanax   • Anxiety    • Asthenia    • Asthma     moderate persistent   • Asthmatic bronchitis    • Astigmatism    • Bleeding external hemorrhoids    • Body mass index (BMI) of 34.0-34.9 in adult    • Body mass index (BMI) of 35.0-35.9 in adult    • Body mass index (BMI) of 36.0-36.9 in adult    • Body mass index 40.0-44.9, adult (CMS/HCC)     SEVERELY OBESE   • Brain cancer (CMS/[facility]) 11/19/2017   • Breast cancer (CMS/[facility])    • Cellulitis    • Chemotherapy induced nausea and vomiting    • Chronic back pain    • Chronic cough    • Chronic hypoxemic respiratory failure (CMS/[facility])     on NC at 3 LPM   • Cough    • Depressive disorder    • Diabetes mellitus (CMS/[facility])     no retinopathy   • Drug  "therapy    • DVT (deep venous thrombosis) (CMS/HCC)    • Dyslipidemia    • Edema of lower extremity    • Encounter for follow-up examination after completed treatment for malignant neoplasm    • Epidermoid cyst of skin     excision with secondary intention healing   • Excessive and frequent menstruation with irregular cycle     Vaginal bleeding after 4 years of no bleeding secondary to chemotherapy for breast cancer; currently being treated for breast cancer for the second time, mets to bone and liver while on chemotherapy      • Flushing    • 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)    • Gastroesophageal reflux disease    • H/O tubal ligation    • History of blood clots     clots around mediports x 2   • Hx of echocardiogram     w/ color flow, and w/o color flow   • Hx of radiation therapy    • Hyperglycemia    • Hypermetropia    • Hypertension    • Hypokalemia    • Impaired glucose tolerance     hgbalc 6.2   • Infection of skin     and /or subcutaneous tissue-around the catheter exit site   • Influenza     needs influenza immunization   • Irregular periods    • Lesion of lumbar spine     pain controlled with percocet and lidocaine patches   • Lumbago with sciatica    • Malignant neoplasm of female breast (CMS/HCC)     s/p right mastectomy, mets to bone and liver on chemotherapy      • Menopausal flushing    • Muscle weakness    • Muscle weakness (generalized)    • Nonspecific interstitial pneumonia (CMS/HCC)     symptomatically improved   • Pleural effusion     refractory massive right, most likely malignant effusion   • Primary atypical pneumonia    • Renal failure     acute drug-induced renal failure   • Sinus tachycardia    • Tobacco dependence syndrome    • Tobacco non-user    • Upper respiratory infection    • Wheezing        Allergies   Allergen Reactions   • Lisinopril Shortness Of Breath   • Flagyl [Metronidazole] Other (See Comments)     pancreatitis   • Fentanyl Anxiety     \"goes out of her " "mind\"   • Latex Rash       Past Surgical History:   Procedure Laterality Date   • ANKLE LOOSE BODY EXCISION/REMOVAL Right 5/24/2017    Procedure: RIGTH FOOT EXCISION NAVICULAR FRACTURES FRAGMENT  DEBRIDEMENT TALONAVILCULA RJOIN T;  Surgeon: Armani Oliveira DPM;  Location: [facility] OR;  Service:    • BREAST BIOPSY     • BREAST SURGERY Right 2013     reconstruction of right breast, trans flap surgery   • CENTRAL VENOUS CATHETER TUNNELED INSERTION SINGLE LUMEN      Placement of indwelling Pleurx catheter right   • COLONOSCOPY N/A 1/26/2017    Procedure: COLONOSCOPY;  Surgeon: Robert Clifton MD;  Location: [facility] ENDOSCOPY;  Service:    • ENDOSCOPY N/A 1/26/2017    Procedure: ESOPHAGOGASTRODUODENOSCOPY;  Surgeon: Robert Clifton MD;  Location: [facility] ENDOSCOPY;  Service:    • ENDOSCOPY N/A 2/24/2017    Procedure: ESOPHAGOGASTRODUODENOSCOPY;  Surgeon: Robert Clifton MD;  Location: [facility] ENDOSCOPY;  Service:    • HYSTERECTOMY      vaginal   • LIVER BIOPSY  2015   • LUNG VOLUME REDUCTION     • MASTECTOMY      partial   • OTHER SURGICAL HISTORY  05/05/2016    central line insertion , PICC line replacement   • OTHER SURGICAL HISTORY      place breast clip percut   • OTHER SURGICAL HISTORY      pulse oximetry   • OTHER SURGICAL HISTORY      remove cc cath w/ sc port or pump, Removal of right internal jugular MediPort   • OTHER SURGICAL HISTORY      spirometry   • OTHER SURGICAL HISTORY      tubal sterilization   • PAP SMEAR     • THORACENTESIS      aspiration of pleural space   • TUBAL ABDOMINAL LIGATION  2009   • UPPER GASTROINTESTINAL ENDOSCOPY  01/26/2017   • UPPER GASTROINTESTINAL ENDOSCOPY  02/24/2017   • VENOUS ACCESS DEVICE (PORT) INSERTION      Ultrasound guided right internal jugular Mediport placement under fluoroscopy       Family History   Problem Relation Age of Onset   • Coronary artery disease Other    • Diabetes Other    • Hypertension Other    • Amblyopia Other    • Cataracts Maternal Grandfather    • " Cataracts Paternal Grandmother        Social History     Socioeconomic History   • Marital status:      Spouse name: Not on file   • Number of children: Not on file   • Years of education: Not on file   • Highest education level: Not on file   Tobacco Use   • Smoking status: Former Smoker     Last attempt to quit: 2012     Years since quittin.8   • Smokeless tobacco: Never Used   Substance and Sexual Activity   • Alcohol use: No   • Drug use: No   • Sexual activity: Defer           Objective   Physical Exam   Constitutional: She is oriented to person, place, and time. She appears well-developed and well-nourished. No distress.   HENT:   Head: Normocephalic and atraumatic.   Right Ear: External ear normal.   Left Ear: External ear normal.   Nose: Nose normal.   Mouth/Throat: Oropharynx is clear and moist.   Eyes: Conjunctivae and EOM are normal. Pupils are equal, round, and reactive to light.   Neck:   Diffuse tenderness of the cervical spine.  Cervical collar applied in the emergency department.   Cardiovascular: Normal rate, regular rhythm, normal heart sounds and intact distal pulses.   Pulmonary/Chest: Effort normal and breath sounds normal.   Abdominal: Soft. Bowel sounds are normal. She exhibits no distension and no mass. There is no tenderness. There is no guarding.   Musculoskeletal:   Left knee skin tear laceration.  Bleeding controlled.  Limited range of motion of the left knee.  Neurovascular intact distally.   Neurological: She is alert and oriented to person, place, and time. No cranial nerve deficit or sensory deficit. She exhibits normal muscle tone.   GCS 15.   Skin: Skin is warm and dry. She is not diaphoretic.   Psychiatric: She has a normal mood and affect. Her behavior is normal.   Nursing note and vitals reviewed.      Procedures           ED Course  ED Course as of 342   Sat 2019   0337 Patient is alert and resting comfortably. I reviewed the results of the emergency  department evaluation with the patient.  I recommended primary care follow-up including an outpatient MRI of the head.  I advised the patient to return to the emergency department if their symptoms change or worsen.   [DR]      ED Course User Index  [DR] Anders Sousa MD      Labs Reviewed - No data to display  Xr Knee 4+ View Left    Result Date: 11/9/2019  Narrative: Left knee four view on 11/9/2019 CLINICAL INDICATION: Pain after fall, laceration COMPARISON: None FINDINGS: No joint effusion is noted. There are no fractures. There is slight joint space narrowing with minimal osteophyte formation consistent with very mild changes of osteoarthritis. Visualized joints are well aligned.     Impression: No acute abnormality. Electronically signed by:  Deondre Awad  11/9/2019 3:36 AM CST Workstation: [id number]    Xr Foot 3+ View Left    Result Date: 11/4/2019  Narrative: Procedure:  Left foot    Indication:  Left foot pain   . Technique:  Three views   . Prior relevant exam:  None. Small plantar calcaneal spur. Small bony chip adjacent to the PIP joint of the left fifth toe. Medial aspect. This probably represent a small avulsion, chip fracture. Left foot is otherwise unremarkable.     Impression: CONCLUSION: Small plantar calcaneal spur. Small bony chip adjacent to the PIP joint fifth toe most likely a small avulsion, chip fracture. Left foot is otherwise remarkable. Electronically signed by:  Tao Olson MD  11/4/2019 11:52 AM CST Workstation: [id number]    Ct Head Without Contrast    Result Date: 11/9/2019  Narrative: CT head without contrast on 11/9/2019 CLINICAL INDICATION: Head injury, patient on blood thinners, per protocol for mechanism of injury, history of metastatic breast cancer in the past TECHNIQUE: Multiple axial images are obtained throughout the head without the administration of contrast. This exam was performed according to our departmental dose-optimization program, which includes automated exposure  control, adjustment of the mA and/or kV according to patient size and/or use of iterative reconstruction technique. Total DLP is 955.3 mGy*cm. COMPARISON: 1/20/2018 FINDINGS: There are new low-density areas in the left greater than right cerebellum that may represent age-indeterminate cerebellar infarcts. After phone call discussion with Dr. Sousa, the patient does not have any current acute symptoms to suggest any of the cerebellar abnormality is acute. Would recommend follow-up outpatient MRI of the head with and without contrast to better evaluate the cerebellum. There is low-density in the periventricular white matter consistent with chronic small vessel ischemic changes. There is no hydrocephalus. There is no hemorrhage. There are no abnormal extra-axial fluid collections. No bony abnormality is noted.     Impression: Interval development of low density areas in the left greater than right cerebellum that could represent age-indeterminate infarcts. This also could be related to the patient's reported prior history of metastatic disease. Would recommend follow-up MRI of the head with and without contrast to better evaluate. Electronically signed by:  Deondre Awad  11/9/2019 3:22 AM CST Workstation: [id number]    Ct Cervical Spine Without Contrast    Result Date: 11/9/2019  Narrative: CT cervical spine without contrast on 11/9/2019 CLINICAL INDICATION: Neck injury, per protocol for mechanism of injury TECHNIQUE: Multiple axial images are obtained throughout the cervical spine without the administration of contrast. Sagittal and coronal reformatted images are also performed and reviewed. This exam was performed according to our departmental dose-optimization program, which includes automated exposure control, adjustment of the mA and/or kV according to patient size and/or use of iterative reconstruction technique. Total DLP is 775.2 mGy*cm. COMPARISON: None FINDINGS: Very mild degenerative disc disease is noted  at C5-6. Reformatted images reveal normal alignment of the cervical spine. There is no prevertebral soft tissue swelling. There are no acute fracture lines. No definite disc herniation is noted.     Impression: Very mild degenerative changes with no acute abnormality. Electronically signed by:  Deondre Awad  11/9/2019 3:13 AM [facility] Workstation: [id number]              [facility]    Final diagnoses:   Knee laceration, left, initial encounter              Anders Sousa MD  11/09/19 0342

## 2019-11-09 NOTE — ED NOTES
"Pt stated that she fell while attempting to ambulate with walker to bathroom. Stated that she may have hit her head \"a little\"     Lorna Randall, RN  11/09/19 0216    "

## 2019-11-12 NOTE — OUTREACH NOTE
Care Coordination Note    RN-ACM scheduled f/u appointment for patient 11/13/2019 at 4:00 at the patient's request  (and after speaking with provider staff).  Patient then called back and ask that the appointment be scheduled earlier in the day due to transportation concerns.  Appointment rescheduled for 2:00 pm.     Stephanie Mckenzie RN  Community Care Coordinator    11/12/2019, 1:27 PM

## 2019-11-12 NOTE — OUTREACH NOTE
Care Plan Note      Responses   Lifestyle Goals  Decrease falls risk, Eat a healthy diet, Medication management, Routine follow-up with doctor(s)   Barriers  Disease education, Financial, Transportation issues   Self Management  Get flu/pneumonia shot, Medication Adherence   Suggested Appointments  Other (See Comment) [Follow with providers as scheduled.]   AWV Materials  Send Materials   Other Patient Education/Resources   24/7 [facility] Nurse Call Line   24/7 Nurse Call Line Education Method  Send Materials   Advanced Directives:  Not Interested At This Time        Care Coordination Assessment    Documented/Reviewed By:  Stephanie Mckenzie RN Date/time:  11/12/2019  1:05 PM   Assessment completed with:  patient  Living arrangement:  family members  Support system:  family, children  Type of residence:  apartment  Home care services:  No  Equipment used at home:  walker  Other issues:  literacy barrier  History of fall(s) in last 6 months:  Yes         RN-ACM outreach to patient.  Patient had Ed visit at [facility] on 11/9/2019.  Patient presented after a fall.  Patient has knee laceration.  Patient stated she is changing the bandage on her knee but some of the steri-strips are loosened.  RN-ACM will arrange PCP appointment for evaluation.  Recommendation for MRI of head as f/u to CT results noted in provider notes.  Patient has history of breast cancer with recent oncology f/u.  Patient also reports injured toes and left hand/fingers from previous incidents earlier this fall.  Patient follows with ortho for these injuries and has participated in outpatient physical therapy.  Patient depends on family and friends for transportation and support.  Notes indicate  from [facility]/Oncology are working with patient to address financial concerns.  Patient denies needs at this time.        Stephanie Mckenzie RN  Community Care Coordinator    11/12/2019, 1:14 PM

## 2019-11-15 NOTE — TELEPHONE ENCOUNTER
TEMO CALLED IN FOR PATIENT. SHE STATES THAT SHE BROKE HER WALKER AND IS NEEDING A NEW ORDER. PLEASE CALL PATIENT [phone]

## 2019-11-18 NOTE — PROGRESS NOTES
"Subjective   Andra Villareal is a 44 y.o. female.  Urgent Care Follow-up.  Seen on 11/12/2019 after falling while walking.  Arrives today with steri-sips in place to left knee.  Stuck her left knee along with \"my back, myself shoulder and my neck.\"  Will need a prescription for a new walker as hers broke during the fall.  Needs refill on her Breo.    Wound Check   She was originally treated 5 to 10 days ago. Previous treatment included laceration repair and wound cleansing or irrigation. There has been no drainage from the wound. The redness has not changed. The swelling has not changed. The pain has not changed. There is difficulty moving the extremity or digit due to pain.        The following portions of the patient's history were reviewed and updated as appropriate:     Current Outpatient Medications   Medication Sig Dispense Refill   • albuterol sulfate HFA (PROAIR HFA) 108 (90 Base) MCG/ACT inhaler Inhale 2 puffs Every 4 (Four) Hours As Needed for Wheezing. 1 inhaler 0   • Calcium Carb-Cholecalciferol (CALCIUM 1000 + D) 1000-800 MG-UNIT tablet Take 1 tablet by mouth Daily. 90 tablet 2   • cetirizine (zyrTEC) 10 MG tablet Take 1 tablet by mouth Daily. 90 tablet 3   • clonazePAM (KlonoPIN) 2 MG tablet Take 1 tablet by mouth 2 (Two) Times a Day As Needed for Anxiety. 60 tablet 0   • FEROSUL 325 (65 Fe) MG tablet TAKE 1 TABLET BY MOUTH 3 TIMES A DAY 90 tablet 0   • fluticasone (FLONASE) 50 MCG/ACT nasal spray 1 spray into the nostril(s) as directed by provider Daily. 1 bottle 0   • Fluticasone Furoate-Vilanterol (BREO ELLIPTA) 100-25 MCG/INH inhaler Inhale 1 puff Daily. 60 each 2   • FREESTYLE LITE test strip USE TO CHECK BLOOD SUGAR THREE TIMES DAILY 100 each 0   • HYDROcodone-acetaminophen (NORCO) 7.5-325 MG per tablet TK 1 T PO  Q 8 H PRN P     • magnesium oxide (MAG-OX) 400 MG tablet TAKE 1 TABLET BY MOUTH 2 TIMES A DAY 60 tablet 0   • Misc. Devices (ROLLATOR) misc 1 Device Daily. 1 each 0   • montelukast " (SINGULAIR) 10 MG tablet TAKE 1 TABLET AT BEDTIME 30 tablet 0   • omeprazole (priLOSEC) 40 MG capsule Take 1 capsule by mouth Daily. 30 capsule 5   • ondansetron (ZOFRAN) 8 MG tablet Take 1 tablet by mouth Every 8 (Eight) Hours As Needed for Nausea or Vomiting. 30 tablet 2   • potassium chloride (K-DUR,KLOR-CON) 20 MEQ CR tablet TAKE 1 TABLET TWICE DAILY 60 tablet 0   • promethazine (PHENERGAN) 25 MG tablet Take 1 tablet by mouth Every 6 (Six) Hours As Needed for Nausea or Vomiting. 120 tablet 5   • vitamin C (ASCORBIC ACID) 500 MG tablet Take 1 tablet by mouth 3 (Three) Times a Day. 90 tablet 2   • XARELTO 20 MG tablet TAKE 1 TABLET EVERY DAY 30 tablet 0   • doxycycline (VIBRAMYCIN) 100 MG capsule Take 1 capsule by mouth 2 (Two) Times a Day for 7 days. 14 capsule 0     No current facility-administered medications for this visit.      Facility-Administered Medications Ordered in Other Visits   Medication Dose Route Frequency Provider Last Rate Last Dose   • sodium chloride 0.9 % flush 10 mL  10 mL Intravenous PRN Joselito Waddell MD   10 mL at 01/26/17 1129     Current Outpatient Medications on File Prior to Visit   Medication Sig   • albuterol sulfate HFA (PROAIR HFA) 108 (90 Base) MCG/ACT inhaler Inhale 2 puffs Every 4 (Four) Hours As Needed for Wheezing.   • Calcium Carb-Cholecalciferol (CALCIUM 1000 + D) 1000-800 MG-UNIT tablet Take 1 tablet by mouth Daily.   • cetirizine (zyrTEC) 10 MG tablet Take 1 tablet by mouth Daily.   • clonazePAM (KlonoPIN) 2 MG tablet Take 1 tablet by mouth 2 (Two) Times a Day As Needed for Anxiety.   • FEROSUL 325 (65 Fe) MG tablet TAKE 1 TABLET BY MOUTH 3 TIMES A DAY   • fluticasone (FLONASE) 50 MCG/ACT nasal spray 1 spray into the nostril(s) as directed by provider Daily.   • FREESTYLE LITE test strip USE TO CHECK BLOOD SUGAR THREE TIMES DAILY   • HYDROcodone-acetaminophen (NORCO) 7.5-325 MG per tablet TK 1 T PO  Q 8 H PRN P   • magnesium oxide (MAG-OX) 400 MG tablet TAKE 1 TABLET BY  "MOUTH 2 TIMES A DAY   • montelukast (SINGULAIR) 10 MG tablet TAKE 1 TABLET AT BEDTIME   • omeprazole (priLOSEC) 40 MG capsule Take 1 capsule by mouth Daily.   • ondansetron (ZOFRAN) 8 MG tablet Take 1 tablet by mouth Every 8 (Eight) Hours As Needed for Nausea or Vomiting.   • potassium chloride (K-DUR,KLOR-CON) 20 MEQ CR tablet TAKE 1 TABLET TWICE DAILY   • promethazine (PHENERGAN) 25 MG tablet Take 1 tablet by mouth Every 6 (Six) Hours As Needed for Nausea or Vomiting.   • vitamin C (ASCORBIC ACID) 500 MG tablet Take 1 tablet by mouth 3 (Three) Times a Day.   • XARELTO 20 MG tablet TAKE 1 TABLET EVERY DAY   • [DISCONTINUED] BREO ELLIPTA 100-25 MCG/INH inhaler INHALE 1 PUFF BY MOUTH DAILY     Current Facility-Administered Medications on File Prior to Visit   Medication   • sodium chloride 0.9 % flush 10 mL     She is allergic to lisinopril; flagyl [metronidazole]; fentanyl; and latex..    Review of Systems   Constitutional: Negative.  Negative for chills, diaphoresis, fatigue and fever.   Respiratory: Negative.    Cardiovascular: Negative.    Gastrointestinal: Negative.    Musculoskeletal: Negative.    Skin: Negative.    Neurological: Negative.    Psychiatric/Behavioral: Negative.  Negative for confusion.       Objective    Visit Vitals  /84   Ht 162.6 cm (64\")   Wt 100 kg (221 lb)   BMI 37.93 kg/m²       Physical Exam   Constitutional: She is oriented to person, place, and time. She appears well-developed and well-nourished.   Cardiovascular: Normal rate, regular rhythm and normal heart sounds.   Pulmonary/Chest: Effort normal and breath sounds normal.   Abdominal: Soft. Bowel sounds are normal.   Musculoskeletal: Normal range of motion.   Neurological: She is alert and oriented to person, place, and time.   Skin: Skin is warm. Capillary refill takes less than 2 seconds.        Psychiatric: She has a normal mood and affect. Her behavior is normal.   Nursing note and vitals reviewed.      Assessment/Plan "   Problems Addressed this Visit        Respiratory    Chronic bronchitis (CMS/HCC)    Relevant Medications    Fluticasone Furoate-Vilanterol (BREO ELLIPTA) 100-25 MCG/INH inhaler      Other Visit Diagnoses     Laceration of left knee, subsequent encounter    -  Primary    Cellulitis of left lower extremity        Relevant Medications    doxycycline (VIBRAMYCIN) 100 MG capsule    Physical deconditioning        Relevant Medications    Misc. Devices (ROLLATOR) misc        New Medications Ordered This Visit   Medications   • Misc. Devices (ROLLATOR) misc     Si Device Daily.     Dispense:  1 each     Refill:  0   • doxycycline (VIBRAMYCIN) 100 MG capsule     Sig: Take 1 capsule by mouth 2 (Two) Times a Day for 7 days.     Dispense:  14 capsule     Refill:  0   • Fluticasone Furoate-Vilanterol (BREO ELLIPTA) 100-25 MCG/INH inhaler     Sig: Inhale 1 puff Daily.     Dispense:  60 each     Refill:  2       1.  Laceration of left knee, subsequent encounter:  Encouraged to cleanse area with antibacterial soap and water and leave open to air as much as possible  Educated on importance of daily dressing change  Discussed possibility of referral to wound care if no improvement or worsening of symptoms at recheck in 72 hours    2.  Cellulitis of left lower extremity:  Begin doxycycline as prescribed to be taken 1 p.o. twice daily x7 days  Educated on importance of completing full dose of anabolic therapy  Encouraged to seek emergency medical treatment for any new or worsening signs of infection such as increasing redness, warmth or drainage    3.  Physical deconditioning:  Prescription provided for new Rollator walker    4.  Chronic bronchitis:  Continue on Breo as perviously prescribed and refill prescription sent to pharmacy    Continue on current medications as previously prescribed   I spent 21 minutes in direct face to face contact with patient.  Greater than 50% of this time was spent counseling patient and discussing  plan of care.  Return if symptoms worsen or fail to improve, for Next scheduled follow up.        This document has been electronically signed by BREANN Garcia on November 18, 2019 12:51 PM

## 2019-11-18 NOTE — TELEPHONE ENCOUNTER
Andra said, that because she was in the hospital in 2015 and someone got her a walker at that time.  Said, she isn't eligible for another one at [facility] for 5 years.l    Wanted to know if she might be able to get one from somewhere else?

## 2019-11-18 NOTE — TELEPHONE ENCOUNTER
New prescription has been printed.  She can stop by the office or we can fax it to home medical supply store

## 2020-01-01 ENCOUNTER — APPOINTMENT (OUTPATIENT)
Dept: CT IMAGING | Facility: HOSPITAL | Age: 45
End: 2020-01-01

## 2020-01-01 ENCOUNTER — APPOINTMENT (OUTPATIENT)
Dept: MRI IMAGING | Facility: HOSPITAL | Age: 45
End: 2020-01-01

## 2020-01-01 ENCOUNTER — TELEPHONE (OUTPATIENT)
Dept: FAMILY MEDICINE CLINIC | Facility: CLINIC | Age: 45
End: 2020-01-01

## 2020-01-01 ENCOUNTER — APPOINTMENT (OUTPATIENT)
Dept: GENERAL RADIOLOGY | Facility: HOSPITAL | Age: 45
End: 2020-01-01

## 2020-01-01 ENCOUNTER — HOSPITAL ENCOUNTER (INPATIENT)
Facility: HOSPITAL | Age: 45
LOS: 2 days | Discharge: ANOTHER HOSPITAL | End: 2020-02-29
Attending: EMERGENCY MEDICINE | Admitting: INTERNAL MEDICINE

## 2020-01-01 ENCOUNTER — HOSPITAL ENCOUNTER (INPATIENT)
Facility: HOSPITAL | Age: 45
LOS: 3 days | Discharge: NURSING, REHAB OR LONG-TERM CARE | End: 2020-01-30
Attending: EMERGENCY MEDICINE | Admitting: INTERNAL MEDICINE

## 2020-01-01 VITALS
RESPIRATION RATE: 18 BRPM | HEIGHT: 65 IN | DIASTOLIC BLOOD PRESSURE: 77 MMHG | WEIGHT: 203.7 LBS | SYSTOLIC BLOOD PRESSURE: 116 MMHG | OXYGEN SATURATION: 100 % | BODY MASS INDEX: 33.94 KG/M2 | HEART RATE: 75 BPM | TEMPERATURE: 97.8 F

## 2020-01-01 VITALS
TEMPERATURE: 97.8 F | DIASTOLIC BLOOD PRESSURE: 78 MMHG | SYSTOLIC BLOOD PRESSURE: 120 MMHG | OXYGEN SATURATION: 94 % | RESPIRATION RATE: 18 BRPM | HEIGHT: 65 IN | WEIGHT: 195.3 LBS | HEART RATE: 62 BPM | BODY MASS INDEX: 32.54 KG/M2

## 2020-01-01 DIAGNOSIS — K21.9 GASTROESOPHAGEAL REFLUX DISEASE WITHOUT ESOPHAGITIS: ICD-10-CM

## 2020-01-01 DIAGNOSIS — J30.9 CHRONIC ALLERGIC RHINITIS: ICD-10-CM

## 2020-01-01 DIAGNOSIS — G93.6 VASOGENIC BRAIN EDEMA (HCC): ICD-10-CM

## 2020-01-01 DIAGNOSIS — T45.1X5A NEUROPATHY DUE TO CHEMOTHERAPEUTIC DRUG (HCC): ICD-10-CM

## 2020-01-01 DIAGNOSIS — D69.6 THROMBOCYTOPENIA (HCC): ICD-10-CM

## 2020-01-01 DIAGNOSIS — J42 CHRONIC BRONCHITIS, UNSPECIFIED CHRONIC BRONCHITIS TYPE (HCC): ICD-10-CM

## 2020-01-01 DIAGNOSIS — E83.42 HYPOMAGNESEMIA: ICD-10-CM

## 2020-01-01 DIAGNOSIS — Z79.4 TYPE 2 DIABETES MELLITUS WITH HYPERGLYCEMIA, WITH LONG-TERM CURRENT USE OF INSULIN (HCC): ICD-10-CM

## 2020-01-01 DIAGNOSIS — S70.11XA CONTUSION, HIP AND THIGH, RIGHT, INITIAL ENCOUNTER: ICD-10-CM

## 2020-01-01 DIAGNOSIS — R11.0 CHRONIC NAUSEA: ICD-10-CM

## 2020-01-01 DIAGNOSIS — G89.3 CANCER ASSOCIATED PAIN: ICD-10-CM

## 2020-01-01 DIAGNOSIS — T50.905A HYPERGLYCEMIA, DRUG-INDUCED: ICD-10-CM

## 2020-01-01 DIAGNOSIS — Z74.09 IMPAIRED PHYSICAL MOBILITY: ICD-10-CM

## 2020-01-01 DIAGNOSIS — M51.34 DEGENERATIVE DISC DISEASE, THORACIC: ICD-10-CM

## 2020-01-01 DIAGNOSIS — C79.31 MALIGNANT NEOPLASM OF BREAST METASTATIC TO BRAIN, UNSPECIFIED LATERALITY (HCC): ICD-10-CM

## 2020-01-01 DIAGNOSIS — R26.2 UNABLE TO AMBULATE: ICD-10-CM

## 2020-01-01 DIAGNOSIS — Z87.891 FORMER SMOKER, STOPPED SMOKING IN DISTANT PAST: ICD-10-CM

## 2020-01-01 DIAGNOSIS — J18.9 PNEUMONIA OF RIGHT LOWER LOBE DUE TO INFECTIOUS ORGANISM: Primary | ICD-10-CM

## 2020-01-01 DIAGNOSIS — F51.01 PRIMARY INSOMNIA: ICD-10-CM

## 2020-01-01 DIAGNOSIS — K59.03 CONSTIPATION DUE TO OPIOID THERAPY: ICD-10-CM

## 2020-01-01 DIAGNOSIS — M79.642 LEFT HAND PAIN: ICD-10-CM

## 2020-01-01 DIAGNOSIS — R53.1 GENERALIZED WEAKNESS: Primary | ICD-10-CM

## 2020-01-01 DIAGNOSIS — I82.409 RECURRENT DEEP VEIN THROMBOSIS (DVT) (HCC): ICD-10-CM

## 2020-01-01 DIAGNOSIS — Z74.09 IMPAIRED MOBILITY AND ACTIVITIES OF DAILY LIVING: ICD-10-CM

## 2020-01-01 DIAGNOSIS — K59.09 CHRONIC CONSTIPATION: ICD-10-CM

## 2020-01-01 DIAGNOSIS — Z78.9 IMPAIRED MOBILITY AND ACTIVITIES OF DAILY LIVING: ICD-10-CM

## 2020-01-01 DIAGNOSIS — E11.65 TYPE 2 DIABETES MELLITUS WITH HYPERGLYCEMIA, WITH LONG-TERM CURRENT USE OF INSULIN (HCC): ICD-10-CM

## 2020-01-01 DIAGNOSIS — C79.51 CARCINOMA OF LEFT BREAST METASTATIC TO BONE (HCC): ICD-10-CM

## 2020-01-01 DIAGNOSIS — R73.9 HYPERGLYCEMIA, DRUG-INDUCED: ICD-10-CM

## 2020-01-01 DIAGNOSIS — E66.9 CLASS 1 OBESITY WITHOUT SERIOUS COMORBIDITY WITH BODY MASS INDEX (BMI) OF 33.0 TO 33.9 IN ADULT, UNSPECIFIED OBESITY TYPE: ICD-10-CM

## 2020-01-01 DIAGNOSIS — C50.919 MALIGNANT NEOPLASM OF BREAST METASTATIC TO BRAIN, UNSPECIFIED LATERALITY (HCC): ICD-10-CM

## 2020-01-01 DIAGNOSIS — G62.0 NEUROPATHY DUE TO CHEMOTHERAPEUTIC DRUG (HCC): ICD-10-CM

## 2020-01-01 DIAGNOSIS — E87.6 HYPOKALEMIA: ICD-10-CM

## 2020-01-01 DIAGNOSIS — C50.919 METASTATIC BREAST CANCER: ICD-10-CM

## 2020-01-01 DIAGNOSIS — T40.2X5A CONSTIPATION DUE TO OPIOID THERAPY: ICD-10-CM

## 2020-01-01 DIAGNOSIS — C50.912 CARCINOMA OF LEFT BREAST METASTATIC TO BONE (HCC): ICD-10-CM

## 2020-01-01 DIAGNOSIS — Z92.3 HX OF RADIATION THERAPY: ICD-10-CM

## 2020-01-01 DIAGNOSIS — E61.1 IRON DEFICIENCY: ICD-10-CM

## 2020-01-01 DIAGNOSIS — N39.0 UTI (URINARY TRACT INFECTION) WITH PYURIA: ICD-10-CM

## 2020-01-01 DIAGNOSIS — F33.2 SEVERE EPISODE OF RECURRENT MAJOR DEPRESSIVE DISORDER, WITHOUT PSYCHOTIC FEATURES (HCC): ICD-10-CM

## 2020-01-01 DIAGNOSIS — C78.7 LIVER METASTASES: ICD-10-CM

## 2020-01-01 DIAGNOSIS — B37.9 CANDIDA ALBICANS INFECTION: ICD-10-CM

## 2020-01-01 DIAGNOSIS — F41.1 GAD (GENERALIZED ANXIETY DISORDER): ICD-10-CM

## 2020-01-01 DIAGNOSIS — S70.01XA CONTUSION, HIP AND THIGH, RIGHT, INITIAL ENCOUNTER: ICD-10-CM

## 2020-01-01 DIAGNOSIS — G93.89 BRAIN MASS: ICD-10-CM

## 2020-01-01 LAB
ALBUMIN SERPL-MCNC: 3.4 G/DL (ref 3.5–5.2)
ALBUMIN SERPL-MCNC: 3.8 G/DL (ref 3.5–5.2)
ALBUMIN SERPL-MCNC: 3.8 G/DL (ref 3.5–5.2)
ALBUMIN SERPL-MCNC: 4 G/DL (ref 3.5–5.2)
ALBUMIN SERPL-MCNC: 4 G/DL (ref 3.5–5.2)
ALBUMIN SERPL-MCNC: 4.1 G/DL (ref 3.5–5.2)
ALBUMIN SERPL-MCNC: 4.2 G/DL (ref 3.5–5.2)
ALBUMIN/GLOB SERPL: 1.2 G/DL
ALBUMIN/GLOB SERPL: 1.2 G/DL
ALBUMIN/GLOB SERPL: 1.3 G/DL
ALBUMIN/GLOB SERPL: 1.3 G/DL
ALBUMIN/GLOB SERPL: 1.4 G/DL
ALP SERPL-CCNC: 104 U/L (ref 39–117)
ALP SERPL-CCNC: 124 U/L (ref 39–117)
ALP SERPL-CCNC: 74 U/L (ref 39–117)
ALP SERPL-CCNC: 81 U/L (ref 39–117)
ALP SERPL-CCNC: 93 U/L (ref 39–117)
ALP SERPL-CCNC: 97 U/L (ref 39–117)
ALP SERPL-CCNC: 99 U/L (ref 39–117)
ALT SERPL W P-5'-P-CCNC: 11 U/L (ref 1–33)
ALT SERPL W P-5'-P-CCNC: 14 U/L (ref 1–33)
ALT SERPL W P-5'-P-CCNC: 15 U/L (ref 1–33)
ALT SERPL W P-5'-P-CCNC: 15 U/L (ref 1–33)
ALT SERPL W P-5'-P-CCNC: 18 U/L (ref 1–33)
ANION GAP SERPL CALCULATED.3IONS-SCNC: 12 MMOL/L (ref 5–15)
ANION GAP SERPL CALCULATED.3IONS-SCNC: 12 MMOL/L (ref 5–15)
ANION GAP SERPL CALCULATED.3IONS-SCNC: 14 MMOL/L (ref 5–15)
ANION GAP SERPL CALCULATED.3IONS-SCNC: 15 MMOL/L (ref 5–15)
ANION GAP SERPL CALCULATED.3IONS-SCNC: 17 MMOL/L (ref 5–15)
APTT PPP: 29.7 SECONDS (ref 20–40.3)
AST SERPL-CCNC: 12 U/L (ref 1–32)
AST SERPL-CCNC: 12 U/L (ref 1–32)
AST SERPL-CCNC: 13 U/L (ref 1–32)
AST SERPL-CCNC: 14 U/L (ref 1–32)
AST SERPL-CCNC: 16 U/L (ref 1–32)
AST SERPL-CCNC: 17 U/L (ref 1–32)
AST SERPL-CCNC: 21 U/L (ref 1–32)
B PERT DNA SPEC QL NAA+PROBE: NOT DETECTED
BACTERIA SPEC AEROBE CULT: ABNORMAL
BACTERIA SPEC AEROBE CULT: NORMAL
BACTERIA SPEC AEROBE CULT: NORMAL
BACTERIA UR QL AUTO: ABNORMAL /HPF
BACTERIA UR QL AUTO: NORMAL /HPF
BASOPHILS # BLD AUTO: 0.01 10*3/MM3 (ref 0–0.2)
BASOPHILS # BLD AUTO: 0.01 10*3/MM3 (ref 0–0.2)
BASOPHILS # BLD AUTO: 0.02 10*3/MM3 (ref 0–0.2)
BASOPHILS # BLD AUTO: 0.04 10*3/MM3 (ref 0–0.2)
BASOPHILS NFR BLD AUTO: 0.1 % (ref 0–1.5)
BASOPHILS NFR BLD AUTO: 0.1 % (ref 0–1.5)
BASOPHILS NFR BLD AUTO: 0.2 % (ref 0–1.5)
BASOPHILS NFR BLD AUTO: 0.4 % (ref 0–1.5)
BILIRUB SERPL-MCNC: 0.2 MG/DL (ref 0.2–1.2)
BILIRUB SERPL-MCNC: 0.2 MG/DL (ref 0.2–1.2)
BILIRUB SERPL-MCNC: 0.3 MG/DL (ref 0.2–1.2)
BILIRUB SERPL-MCNC: <0.2 MG/DL (ref 0.2–1.2)
BILIRUB UR QL STRIP: NEGATIVE
BILIRUB UR QL STRIP: NEGATIVE
BUN BLD-MCNC: 10 MG/DL (ref 6–20)
BUN BLD-MCNC: 10 MG/DL (ref 6–20)
BUN BLD-MCNC: 11 MG/DL (ref 6–20)
BUN BLD-MCNC: 11 MG/DL (ref 6–20)
BUN BLD-MCNC: 12 MG/DL (ref 6–20)
BUN BLD-MCNC: 17 MG/DL (ref 6–20)
BUN BLD-MCNC: 18 MG/DL (ref 6–20)
BUN BLD-MCNC: 18 MG/DL (ref 6–20)
BUN BLD-MCNC: 20 MG/DL (ref 6–20)
BUN BLD-MCNC: 9 MG/DL (ref 6–20)
BUN/CREAT SERPL: 11.7 (ref 7–25)
BUN/CREAT SERPL: 13.6 (ref 7–25)
BUN/CREAT SERPL: 13.8 (ref 7–25)
BUN/CREAT SERPL: 13.9 (ref 7–25)
BUN/CREAT SERPL: 14.5 (ref 7–25)
BUN/CREAT SERPL: 17.4 (ref 7–25)
BUN/CREAT SERPL: 18.6 (ref 7–25)
BUN/CREAT SERPL: 20.5 (ref 7–25)
BUN/CREAT SERPL: 22.8 (ref 7–25)
BUN/CREAT SERPL: 24.7 (ref 7–25)
C PNEUM DNA NPH QL NAA+NON-PROBE: NOT DETECTED
CALCIUM SPEC-SCNC: 8.7 MG/DL (ref 8.6–10.5)
CALCIUM SPEC-SCNC: 8.8 MG/DL (ref 8.6–10.5)
CALCIUM SPEC-SCNC: 8.9 MG/DL (ref 8.6–10.5)
CALCIUM SPEC-SCNC: 9.1 MG/DL (ref 8.6–10.5)
CALCIUM SPEC-SCNC: 9.5 MG/DL (ref 8.6–10.5)
CALCIUM SPEC-SCNC: 9.7 MG/DL (ref 8.6–10.5)
CHLORIDE SERPL-SCNC: 100 MMOL/L (ref 98–107)
CHLORIDE SERPL-SCNC: 100 MMOL/L (ref 98–107)
CHLORIDE SERPL-SCNC: 101 MMOL/L (ref 98–107)
CHLORIDE SERPL-SCNC: 103 MMOL/L (ref 98–107)
CHLORIDE SERPL-SCNC: 103 MMOL/L (ref 98–107)
CHLORIDE SERPL-SCNC: 97 MMOL/L (ref 98–107)
CHLORIDE SERPL-SCNC: 98 MMOL/L (ref 98–107)
CHLORIDE SERPL-SCNC: 98 MMOL/L (ref 98–107)
CHLORIDE SERPL-SCNC: 99 MMOL/L (ref 98–107)
CHLORIDE SERPL-SCNC: 99 MMOL/L (ref 98–107)
CLARITY UR: ABNORMAL
CLARITY UR: CLEAR
CO2 SERPL-SCNC: 17 MMOL/L (ref 22–29)
CO2 SERPL-SCNC: 19 MMOL/L (ref 22–29)
CO2 SERPL-SCNC: 21 MMOL/L (ref 22–29)
CO2 SERPL-SCNC: 22 MMOL/L (ref 22–29)
CO2 SERPL-SCNC: 24 MMOL/L (ref 22–29)
CO2 SERPL-SCNC: 24 MMOL/L (ref 22–29)
CO2 SERPL-SCNC: 25 MMOL/L (ref 22–29)
CO2 SERPL-SCNC: 26 MMOL/L (ref 22–29)
COLOR UR: YELLOW
COLOR UR: YELLOW
CREAT BLD-MCNC: 0.69 MG/DL (ref 0.57–1)
CREAT BLD-MCNC: 0.69 MG/DL (ref 0.57–1)
CREAT BLD-MCNC: 0.72 MG/DL (ref 0.57–1)
CREAT BLD-MCNC: 0.77 MG/DL (ref 0.57–1)
CREAT BLD-MCNC: 0.79 MG/DL (ref 0.57–1)
CREAT BLD-MCNC: 0.8 MG/DL (ref 0.57–1)
CREAT BLD-MCNC: 0.81 MG/DL (ref 0.57–1)
CREAT BLD-MCNC: 0.81 MG/DL (ref 0.57–1)
CREAT BLD-MCNC: 0.83 MG/DL (ref 0.57–1)
CREAT BLD-MCNC: 0.97 MG/DL (ref 0.57–1)
D-LACTATE SERPL-SCNC: 1.7 MMOL/L (ref 0.5–2)
DEPRECATED RDW RBC AUTO: 37.5 FL (ref 37–54)
DEPRECATED RDW RBC AUTO: 38.1 FL (ref 37–54)
DEPRECATED RDW RBC AUTO: 38.7 FL (ref 37–54)
DEPRECATED RDW RBC AUTO: 39.1 FL (ref 37–54)
DEPRECATED RDW RBC AUTO: 39.4 FL (ref 37–54)
DEPRECATED RDW RBC AUTO: 39.5 FL (ref 37–54)
DEPRECATED RDW RBC AUTO: 40.3 FL (ref 37–54)
DEPRECATED RDW RBC AUTO: 40.5 FL (ref 37–54)
DEPRECATED RDW RBC AUTO: 41.1 FL (ref 37–54)
EOSINOPHIL # BLD AUTO: 0 10*3/MM3 (ref 0–0.4)
EOSINOPHIL # BLD AUTO: 0.02 10*3/MM3 (ref 0–0.4)
EOSINOPHIL # BLD AUTO: 0.02 10*3/MM3 (ref 0–0.4)
EOSINOPHIL # BLD AUTO: 0.06 10*3/MM3 (ref 0–0.4)
EOSINOPHIL NFR BLD AUTO: 0 % (ref 0.3–6.2)
EOSINOPHIL NFR BLD AUTO: 0.2 % (ref 0.3–6.2)
EOSINOPHIL NFR BLD AUTO: 0.2 % (ref 0.3–6.2)
EOSINOPHIL NFR BLD AUTO: 0.7 % (ref 0.3–6.2)
ERYTHROCYTE [DISTWIDTH] IN BLOOD BY AUTOMATED COUNT: 12.3 % (ref 12.3–15.4)
ERYTHROCYTE [DISTWIDTH] IN BLOOD BY AUTOMATED COUNT: 12.3 % (ref 12.3–15.4)
ERYTHROCYTE [DISTWIDTH] IN BLOOD BY AUTOMATED COUNT: 12.4 % (ref 12.3–15.4)
ERYTHROCYTE [DISTWIDTH] IN BLOOD BY AUTOMATED COUNT: 12.5 % (ref 12.3–15.4)
ERYTHROCYTE [DISTWIDTH] IN BLOOD BY AUTOMATED COUNT: 12.6 % (ref 12.3–15.4)
ERYTHROCYTE [DISTWIDTH] IN BLOOD BY AUTOMATED COUNT: 12.6 % (ref 12.3–15.4)
ERYTHROCYTE [DISTWIDTH] IN BLOOD BY AUTOMATED COUNT: 12.7 % (ref 12.3–15.4)
ERYTHROCYTE [DISTWIDTH] IN BLOOD BY AUTOMATED COUNT: 12.7 % (ref 12.3–15.4)
ERYTHROCYTE [DISTWIDTH] IN BLOOD BY AUTOMATED COUNT: 12.9 % (ref 12.3–15.4)
FLUAV AG NPH QL: NEGATIVE
FLUAV H1 2009 PAND RNA NPH QL NAA+PROBE: NOT DETECTED
FLUAV H1 HA GENE NPH QL NAA+PROBE: NOT DETECTED
FLUAV H3 RNA NPH QL NAA+PROBE: NOT DETECTED
FLUAV SUBTYP SPEC NAA+PROBE: NOT DETECTED
FLUBV AG NPH QL IA: NEGATIVE
FLUBV RNA ISLT QL NAA+PROBE: NOT DETECTED
GFR SERPL CREATININE-BSD FRML MDRD: 107 ML/MIN/1.73
GFR SERPL CREATININE-BSD FRML MDRD: 113 ML/MIN/1.73
GFR SERPL CREATININE-BSD FRML MDRD: 113 ML/MIN/1.73
GFR SERPL CREATININE-BSD FRML MDRD: 76 ML/MIN/1.73
GFR SERPL CREATININE-BSD FRML MDRD: 91 ML/MIN/1.73
GFR SERPL CREATININE-BSD FRML MDRD: 94 ML/MIN/1.73
GFR SERPL CREATININE-BSD FRML MDRD: 94 ML/MIN/1.73
GFR SERPL CREATININE-BSD FRML MDRD: 95 ML/MIN/1.73
GFR SERPL CREATININE-BSD FRML MDRD: 96 ML/MIN/1.73
GFR SERPL CREATININE-BSD FRML MDRD: 99 ML/MIN/1.73
GLOBULIN UR ELPH-MCNC: 2.5 GM/DL
GLOBULIN UR ELPH-MCNC: 2.8 GM/DL
GLOBULIN UR ELPH-MCNC: 2.9 GM/DL
GLOBULIN UR ELPH-MCNC: 3 GM/DL
GLOBULIN UR ELPH-MCNC: 3.1 GM/DL
GLOBULIN UR ELPH-MCNC: 3.1 GM/DL
GLOBULIN UR ELPH-MCNC: 3.4 GM/DL
GLUCOSE BLD-MCNC: 189 MG/DL (ref 65–99)
GLUCOSE BLD-MCNC: 216 MG/DL (ref 65–99)
GLUCOSE BLD-MCNC: 239 MG/DL (ref 65–99)
GLUCOSE BLD-MCNC: 260 MG/DL (ref 65–99)
GLUCOSE BLD-MCNC: 268 MG/DL (ref 65–99)
GLUCOSE BLD-MCNC: 308 MG/DL (ref 65–99)
GLUCOSE BLD-MCNC: 330 MG/DL (ref 65–99)
GLUCOSE BLD-MCNC: 368 MG/DL (ref 65–99)
GLUCOSE BLD-MCNC: 457 MG/DL (ref 65–99)
GLUCOSE BLD-MCNC: 474 MG/DL (ref 65–99)
GLUCOSE BLD-MCNC: 599 MG/DL (ref 65–99)
GLUCOSE BLDC GLUCOMTR-MCNC: 201 MG/DL (ref 70–130)
GLUCOSE BLDC GLUCOMTR-MCNC: 207 MG/DL (ref 70–130)
GLUCOSE BLDC GLUCOMTR-MCNC: 220 MG/DL (ref 70–130)
GLUCOSE BLDC GLUCOMTR-MCNC: 221 MG/DL (ref 70–130)
GLUCOSE BLDC GLUCOMTR-MCNC: 239 MG/DL (ref 70–130)
GLUCOSE BLDC GLUCOMTR-MCNC: 242 MG/DL (ref 70–130)
GLUCOSE BLDC GLUCOMTR-MCNC: 242 MG/DL (ref 70–130)
GLUCOSE BLDC GLUCOMTR-MCNC: 273 MG/DL (ref 70–130)
GLUCOSE BLDC GLUCOMTR-MCNC: 284 MG/DL (ref 70–130)
GLUCOSE BLDC GLUCOMTR-MCNC: 291 MG/DL (ref 70–130)
GLUCOSE BLDC GLUCOMTR-MCNC: 295 MG/DL (ref 70–130)
GLUCOSE BLDC GLUCOMTR-MCNC: 299 MG/DL (ref 70–130)
GLUCOSE BLDC GLUCOMTR-MCNC: 302 MG/DL (ref 70–130)
GLUCOSE BLDC GLUCOMTR-MCNC: 306 MG/DL (ref 70–130)
GLUCOSE BLDC GLUCOMTR-MCNC: 308 MG/DL (ref 70–130)
GLUCOSE BLDC GLUCOMTR-MCNC: 311 MG/DL (ref 70–130)
GLUCOSE BLDC GLUCOMTR-MCNC: 329 MG/DL (ref 70–130)
GLUCOSE BLDC GLUCOMTR-MCNC: 332 MG/DL (ref 70–130)
GLUCOSE BLDC GLUCOMTR-MCNC: 344 MG/DL (ref 70–130)
GLUCOSE BLDC GLUCOMTR-MCNC: 345 MG/DL (ref 70–130)
GLUCOSE BLDC GLUCOMTR-MCNC: 352 MG/DL (ref 70–130)
GLUCOSE BLDC GLUCOMTR-MCNC: 355 MG/DL (ref 70–130)
GLUCOSE BLDC GLUCOMTR-MCNC: 367 MG/DL (ref 70–130)
GLUCOSE BLDC GLUCOMTR-MCNC: 401 MG/DL (ref 70–130)
GLUCOSE BLDC GLUCOMTR-MCNC: 419 MG/DL (ref 70–130)
GLUCOSE BLDC GLUCOMTR-MCNC: 424 MG/DL (ref 70–130)
GLUCOSE BLDC GLUCOMTR-MCNC: 449 MG/DL (ref 70–130)
GLUCOSE BLDC GLUCOMTR-MCNC: 518 MG/DL (ref 70–130)
GLUCOSE BLDC GLUCOMTR-MCNC: 576 MG/DL (ref 70–130)
GLUCOSE UR STRIP-MCNC: ABNORMAL MG/DL
GLUCOSE UR STRIP-MCNC: NEGATIVE MG/DL
HADV DNA SPEC NAA+PROBE: NOT DETECTED
HCOV 229E RNA SPEC QL NAA+PROBE: NOT DETECTED
HCOV HKU1 RNA SPEC QL NAA+PROBE: NOT DETECTED
HCOV NL63 RNA SPEC QL NAA+PROBE: NOT DETECTED
HCOV OC43 RNA SPEC QL NAA+PROBE: NOT DETECTED
HCT VFR BLD AUTO: 36.8 % (ref 34–46.6)
HCT VFR BLD AUTO: 37.3 % (ref 34–46.6)
HCT VFR BLD AUTO: 38.5 % (ref 34–46.6)
HCT VFR BLD AUTO: 39.2 % (ref 34–46.6)
HCT VFR BLD AUTO: 40.1 % (ref 34–46.6)
HCT VFR BLD AUTO: 40.9 % (ref 34–46.6)
HCT VFR BLD AUTO: 40.9 % (ref 34–46.6)
HCT VFR BLD AUTO: 41.5 % (ref 34–46.6)
HCT VFR BLD AUTO: 42.3 % (ref 34–46.6)
HGB BLD-MCNC: 12.6 G/DL (ref 12–15.9)
HGB BLD-MCNC: 12.7 G/DL (ref 12–15.9)
HGB BLD-MCNC: 13.2 G/DL (ref 12–15.9)
HGB BLD-MCNC: 13.3 G/DL (ref 12–15.9)
HGB BLD-MCNC: 13.8 G/DL (ref 12–15.9)
HGB BLD-MCNC: 13.8 G/DL (ref 12–15.9)
HGB BLD-MCNC: 14.1 G/DL (ref 12–15.9)
HGB BLD-MCNC: 14.2 G/DL (ref 12–15.9)
HGB BLD-MCNC: 14.4 G/DL (ref 12–15.9)
HGB UR QL STRIP.AUTO: NEGATIVE
HGB UR QL STRIP.AUTO: NEGATIVE
HMPV RNA NPH QL NAA+NON-PROBE: NOT DETECTED
HOLD SPECIMEN: NORMAL
HPIV1 RNA SPEC QL NAA+PROBE: NOT DETECTED
HPIV2 RNA SPEC QL NAA+PROBE: NOT DETECTED
HPIV3 RNA NPH QL NAA+PROBE: NOT DETECTED
HPIV4 P GENE NPH QL NAA+PROBE: NOT DETECTED
HYALINE CASTS UR QL AUTO: ABNORMAL /LPF
HYALINE CASTS UR QL AUTO: NORMAL /LPF
IMM GRANULOCYTES # BLD AUTO: 0.03 10*3/MM3 (ref 0–0.05)
IMM GRANULOCYTES # BLD AUTO: 0.04 10*3/MM3 (ref 0–0.05)
IMM GRANULOCYTES # BLD AUTO: 0.06 10*3/MM3 (ref 0–0.05)
IMM GRANULOCYTES # BLD AUTO: 0.07 10*3/MM3 (ref 0–0.05)
IMM GRANULOCYTES # BLD AUTO: 0.07 10*3/MM3 (ref 0–0.05)
IMM GRANULOCYTES # BLD AUTO: 0.08 10*3/MM3 (ref 0–0.05)
IMM GRANULOCYTES # BLD AUTO: 0.11 10*3/MM3 (ref 0–0.05)
IMM GRANULOCYTES NFR BLD AUTO: 0.3 % (ref 0–0.5)
IMM GRANULOCYTES NFR BLD AUTO: 0.4 % (ref 0–0.5)
IMM GRANULOCYTES NFR BLD AUTO: 0.6 % (ref 0–0.5)
IMM GRANULOCYTES NFR BLD AUTO: 0.7 % (ref 0–0.5)
IMM GRANULOCYTES NFR BLD AUTO: 0.8 % (ref 0–0.5)
IMM GRANULOCYTES NFR BLD AUTO: 0.9 % (ref 0–0.5)
IMM GRANULOCYTES NFR BLD AUTO: 1.2 % (ref 0–0.5)
INR PPP: 1.16 (ref 0.8–1.2)
KETONES UR QL STRIP: ABNORMAL
KETONES UR QL STRIP: NEGATIVE
LEUKOCYTE ESTERASE UR QL STRIP.AUTO: ABNORMAL
LEUKOCYTE ESTERASE UR QL STRIP.AUTO: NEGATIVE
LYMPHOCYTES # BLD AUTO: 0.72 10*3/MM3 (ref 0.7–3.1)
LYMPHOCYTES # BLD AUTO: 0.74 10*3/MM3 (ref 0.7–3.1)
LYMPHOCYTES # BLD AUTO: 0.96 10*3/MM3 (ref 0.7–3.1)
LYMPHOCYTES # BLD AUTO: 0.99 10*3/MM3 (ref 0.7–3.1)
LYMPHOCYTES # BLD AUTO: 1.02 10*3/MM3 (ref 0.7–3.1)
LYMPHOCYTES # BLD AUTO: 1.03 10*3/MM3 (ref 0.7–3.1)
LYMPHOCYTES # BLD AUTO: 1.05 10*3/MM3 (ref 0.7–3.1)
LYMPHOCYTES NFR BLD AUTO: 10.4 % (ref 19.6–45.3)
LYMPHOCYTES NFR BLD AUTO: 11.7 % (ref 19.6–45.3)
LYMPHOCYTES NFR BLD AUTO: 11.8 % (ref 19.6–45.3)
LYMPHOCYTES NFR BLD AUTO: 12.2 % (ref 19.6–45.3)
LYMPHOCYTES NFR BLD AUTO: 7.4 % (ref 19.6–45.3)
LYMPHOCYTES NFR BLD AUTO: 8.4 % (ref 19.6–45.3)
LYMPHOCYTES NFR BLD AUTO: 9.2 % (ref 19.6–45.3)
M PNEUMO IGG SER IA-ACNC: NOT DETECTED
MAGNESIUM SERPL-MCNC: 1.4 MG/DL (ref 1.6–2.6)
MAGNESIUM SERPL-MCNC: 1.7 MG/DL (ref 1.6–2.6)
MAGNESIUM SERPL-MCNC: 2 MG/DL (ref 1.6–2.6)
MCH RBC QN AUTO: 29 PG (ref 26.6–33)
MCH RBC QN AUTO: 29.2 PG (ref 26.6–33)
MCH RBC QN AUTO: 29.3 PG (ref 26.6–33)
MCH RBC QN AUTO: 29.5 PG (ref 26.6–33)
MCH RBC QN AUTO: 29.5 PG (ref 26.6–33)
MCH RBC QN AUTO: 29.7 PG (ref 26.6–33)
MCH RBC QN AUTO: 29.7 PG (ref 26.6–33)
MCH RBC QN AUTO: 29.8 PG (ref 26.6–33)
MCH RBC QN AUTO: 30 PG (ref 26.6–33)
MCHC RBC AUTO-ENTMCNC: 33.7 G/DL (ref 31.5–35.7)
MCHC RBC AUTO-ENTMCNC: 33.7 G/DL (ref 31.5–35.7)
MCHC RBC AUTO-ENTMCNC: 34 G/DL (ref 31.5–35.7)
MCHC RBC AUTO-ENTMCNC: 34.2 G/DL (ref 31.5–35.7)
MCHC RBC AUTO-ENTMCNC: 34.4 G/DL (ref 31.5–35.7)
MCHC RBC AUTO-ENTMCNC: 34.5 G/DL (ref 31.5–35.7)
MCHC RBC AUTO-ENTMCNC: 34.7 G/DL (ref 31.5–35.7)
MCV RBC AUTO: 84.6 FL (ref 79–97)
MCV RBC AUTO: 85 FL (ref 79–97)
MCV RBC AUTO: 85.9 FL (ref 79–97)
MCV RBC AUTO: 86.3 FL (ref 79–97)
MCV RBC AUTO: 86.5 FL (ref 79–97)
MCV RBC AUTO: 86.8 FL (ref 79–97)
MCV RBC AUTO: 87.4 FL (ref 79–97)
MCV RBC AUTO: 87.6 FL (ref 79–97)
MCV RBC AUTO: 87.7 FL (ref 79–97)
MONOCYTES # BLD AUTO: 0.21 10*3/MM3 (ref 0.1–0.9)
MONOCYTES # BLD AUTO: 0.39 10*3/MM3 (ref 0.1–0.9)
MONOCYTES # BLD AUTO: 0.39 10*3/MM3 (ref 0.1–0.9)
MONOCYTES # BLD AUTO: 0.6 10*3/MM3 (ref 0.1–0.9)
MONOCYTES # BLD AUTO: 0.61 10*3/MM3 (ref 0.1–0.9)
MONOCYTES # BLD AUTO: 0.65 10*3/MM3 (ref 0.1–0.9)
MONOCYTES # BLD AUTO: 0.76 10*3/MM3 (ref 0.1–0.9)
MONOCYTES NFR BLD AUTO: 2.4 % (ref 5–12)
MONOCYTES NFR BLD AUTO: 4 % (ref 5–12)
MONOCYTES NFR BLD AUTO: 4.4 % (ref 5–12)
MONOCYTES NFR BLD AUTO: 5.8 % (ref 5–12)
MONOCYTES NFR BLD AUTO: 7.1 % (ref 5–12)
MONOCYTES NFR BLD AUTO: 7.5 % (ref 5–12)
MONOCYTES NFR BLD AUTO: 8 % (ref 5–12)
NEUTROPHILS # BLD AUTO: 6.85 10*3/MM3 (ref 1.7–7)
NEUTROPHILS # BLD AUTO: 6.86 10*3/MM3 (ref 1.7–7)
NEUTROPHILS # BLD AUTO: 7.28 10*3/MM3 (ref 1.7–7)
NEUTROPHILS # BLD AUTO: 7.7 10*3/MM3 (ref 1.7–7)
NEUTROPHILS # BLD AUTO: 7.78 10*3/MM3 (ref 1.7–7)
NEUTROPHILS # BLD AUTO: 8.54 10*3/MM3 (ref 1.7–7)
NEUTROPHILS # BLD AUTO: 8.69 10*3/MM3 (ref 1.7–7)
NEUTROPHILS NFR BLD AUTO: 79.5 % (ref 42.7–76)
NEUTROPHILS NFR BLD AUTO: 79.7 % (ref 42.7–76)
NEUTROPHILS NFR BLD AUTO: 81 % (ref 42.7–76)
NEUTROPHILS NFR BLD AUTO: 82.5 % (ref 42.7–76)
NEUTROPHILS NFR BLD AUTO: 83.7 % (ref 42.7–76)
NEUTROPHILS NFR BLD AUTO: 87.9 % (ref 42.7–76)
NEUTROPHILS NFR BLD AUTO: 88 % (ref 42.7–76)
NITRITE UR QL STRIP: NEGATIVE
NITRITE UR QL STRIP: NEGATIVE
NRBC BLD AUTO-RTO: 0 /100 WBC (ref 0–0.2)
PH UR STRIP.AUTO: 6 [PH] (ref 5–9)
PH UR STRIP.AUTO: 8 [PH] (ref 5–9)
PLATELET # BLD AUTO: 113 10*3/MM3 (ref 140–450)
PLATELET # BLD AUTO: 115 10*3/MM3 (ref 140–450)
PLATELET # BLD AUTO: 123 10*3/MM3 (ref 140–450)
PLATELET # BLD AUTO: 124 10*3/MM3 (ref 140–450)
PLATELET # BLD AUTO: 139 10*3/MM3 (ref 140–450)
PLATELET # BLD AUTO: 142 10*3/MM3 (ref 140–450)
PLATELET # BLD AUTO: 163 10*3/MM3 (ref 140–450)
PLATELET # BLD AUTO: 168 10*3/MM3 (ref 140–450)
PLATELET # BLD AUTO: 188 10*3/MM3 (ref 140–450)
PMV BLD AUTO: 11.3 FL (ref 6–12)
PMV BLD AUTO: 11.6 FL (ref 6–12)
PMV BLD AUTO: 11.6 FL (ref 6–12)
PMV BLD AUTO: 12.1 FL (ref 6–12)
PMV BLD AUTO: 12.5 FL (ref 6–12)
PMV BLD AUTO: 12.6 FL (ref 6–12)
PMV BLD AUTO: 13 FL (ref 6–12)
PMV BLD AUTO: 13.1 FL (ref 6–12)
PMV BLD AUTO: 13.2 FL (ref 6–12)
POTASSIUM BLD-SCNC: 3.1 MMOL/L (ref 3.5–5.2)
POTASSIUM BLD-SCNC: 3.4 MMOL/L (ref 3.5–5.2)
POTASSIUM BLD-SCNC: 3.6 MMOL/L (ref 3.5–5.2)
POTASSIUM BLD-SCNC: 3.7 MMOL/L (ref 3.5–5.2)
POTASSIUM BLD-SCNC: 3.8 MMOL/L (ref 3.5–5.2)
POTASSIUM BLD-SCNC: 4 MMOL/L (ref 3.5–5.2)
POTASSIUM BLD-SCNC: 4.1 MMOL/L (ref 3.5–5.2)
POTASSIUM BLD-SCNC: 4.2 MMOL/L (ref 3.5–5.2)
POTASSIUM BLD-SCNC: 4.6 MMOL/L (ref 3.5–5.2)
PROT SERPL-MCNC: 5.9 G/DL (ref 6–8.5)
PROT SERPL-MCNC: 6.6 G/DL (ref 6–8.5)
PROT SERPL-MCNC: 6.9 G/DL (ref 6–8.5)
PROT SERPL-MCNC: 6.9 G/DL (ref 6–8.5)
PROT SERPL-MCNC: 7 G/DL (ref 6–8.5)
PROT SERPL-MCNC: 7.2 G/DL (ref 6–8.5)
PROT SERPL-MCNC: 7.6 G/DL (ref 6–8.5)
PROT UR QL STRIP: ABNORMAL
PROT UR QL STRIP: ABNORMAL
PROTHROMBIN TIME: 14.6 SECONDS (ref 11.1–15.3)
RBC # BLD AUTO: 4.31 10*6/MM3 (ref 3.77–5.28)
RBC # BLD AUTO: 4.35 10*6/MM3 (ref 3.77–5.28)
RBC # BLD AUTO: 4.47 10*6/MM3 (ref 3.77–5.28)
RBC # BLD AUTO: 4.48 10*6/MM3 (ref 3.77–5.28)
RBC # BLD AUTO: 4.71 10*6/MM3 (ref 3.77–5.28)
RBC # BLD AUTO: 4.72 10*6/MM3 (ref 3.77–5.28)
RBC # BLD AUTO: 4.74 10*6/MM3 (ref 3.77–5.28)
RBC # BLD AUTO: 4.74 10*6/MM3 (ref 3.77–5.28)
RBC # BLD AUTO: 4.84 10*6/MM3 (ref 3.77–5.28)
RBC # UR: ABNORMAL /HPF
RBC # UR: NORMAL /HPF
RBC MORPH BLD: NORMAL
REF LAB TEST METHOD: ABNORMAL
REF LAB TEST METHOD: NORMAL
RHINOVIRUS RNA SPEC NAA+PROBE: NOT DETECTED
RSV RNA NPH QL NAA+NON-PROBE: NOT DETECTED
SMALL PLATELETS BLD QL SMEAR: NORMAL
SODIUM BLD-SCNC: 132 MMOL/L (ref 136–145)
SODIUM BLD-SCNC: 134 MMOL/L (ref 136–145)
SODIUM BLD-SCNC: 135 MMOL/L (ref 136–145)
SODIUM BLD-SCNC: 136 MMOL/L (ref 136–145)
SODIUM BLD-SCNC: 136 MMOL/L (ref 136–145)
SODIUM BLD-SCNC: 137 MMOL/L (ref 136–145)
SODIUM BLD-SCNC: 137 MMOL/L (ref 136–145)
SODIUM BLD-SCNC: 139 MMOL/L (ref 136–145)
SODIUM BLD-SCNC: 140 MMOL/L (ref 136–145)
SODIUM BLD-SCNC: 141 MMOL/L (ref 136–145)
SP GR UR STRIP: 1.02 (ref 1–1.03)
SP GR UR STRIP: 1.06 (ref 1–1.03)
SQUAMOUS #/AREA URNS HPF: ABNORMAL /HPF
SQUAMOUS #/AREA URNS HPF: NORMAL /HPF
UROBILINOGEN UR QL STRIP: ABNORMAL
UROBILINOGEN UR QL STRIP: ABNORMAL
WBC MORPH BLD: NORMAL
WBC NRBC COR # BLD: 10.38 10*3/MM3 (ref 3.4–10.8)
WBC NRBC COR # BLD: 5.16 10*3/MM3 (ref 3.4–10.8)
WBC NRBC COR # BLD: 6.06 10*3/MM3 (ref 3.4–10.8)
WBC NRBC COR # BLD: 8.61 10*3/MM3 (ref 3.4–10.8)
WBC NRBC COR # BLD: 8.63 10*3/MM3 (ref 3.4–10.8)
WBC NRBC COR # BLD: 8.83 10*3/MM3 (ref 3.4–10.8)
WBC NRBC COR # BLD: 8.84 10*3/MM3 (ref 3.4–10.8)
WBC NRBC COR # BLD: 9.51 10*3/MM3 (ref 3.4–10.8)
WBC NRBC COR # BLD: 9.72 10*3/MM3 (ref 3.4–10.8)
WBC UR QL AUTO: ABNORMAL /HPF
WBC UR QL AUTO: NORMAL /HPF
WHOLE BLOOD HOLD SPECIMEN: NORMAL

## 2020-01-01 PROCEDURE — P9612 CATHETERIZE FOR URINE SPEC: HCPCS

## 2020-01-01 PROCEDURE — 94799 UNLISTED PULMONARY SVC/PX: CPT

## 2020-01-01 PROCEDURE — 99285 EMERGENCY DEPT VISIT HI MDM: CPT

## 2020-01-01 PROCEDURE — 97162 PT EVAL MOD COMPLEX 30 MIN: CPT | Performed by: PHYSICAL THERAPIST

## 2020-01-01 PROCEDURE — 63710000001 INSULIN REGULAR HUMAN PER 5 UNITS: Performed by: PHYSICIAN ASSISTANT

## 2020-01-01 PROCEDURE — 83605 ASSAY OF LACTIC ACID: CPT | Performed by: EMERGENCY MEDICINE

## 2020-01-01 PROCEDURE — 82962 GLUCOSE BLOOD TEST: CPT

## 2020-01-01 PROCEDURE — 97530 THERAPEUTIC ACTIVITIES: CPT

## 2020-01-01 PROCEDURE — 85610 PROTHROMBIN TIME: CPT | Performed by: NURSE PRACTITIONER

## 2020-01-01 PROCEDURE — 63710000001 INSULIN ASPART PER 5 UNITS: Performed by: INTERNAL MEDICINE

## 2020-01-01 PROCEDURE — 85027 COMPLETE CBC AUTOMATED: CPT | Performed by: PHYSICIAN ASSISTANT

## 2020-01-01 PROCEDURE — 70450 CT HEAD/BRAIN W/O DYE: CPT

## 2020-01-01 PROCEDURE — 87086 URINE CULTURE/COLONY COUNT: CPT | Performed by: NURSE PRACTITIONER

## 2020-01-01 PROCEDURE — 85007 BL SMEAR W/DIFF WBC COUNT: CPT | Performed by: PHYSICIAN ASSISTANT

## 2020-01-01 PROCEDURE — 85027 COMPLETE CBC AUTOMATED: CPT | Performed by: INTERNAL MEDICINE

## 2020-01-01 PROCEDURE — 94760 N-INVAS EAR/PLS OXIMETRY 1: CPT

## 2020-01-01 PROCEDURE — 25010000002 CEFTRIAXONE PER 250 MG: Performed by: NURSE PRACTITIONER

## 2020-01-01 PROCEDURE — 72125 CT NECK SPINE W/O DYE: CPT

## 2020-01-01 PROCEDURE — 85025 COMPLETE CBC W/AUTO DIFF WBC: CPT | Performed by: EMERGENCY MEDICINE

## 2020-01-01 PROCEDURE — 63710000001 INSULIN ASPART PER 5 UNITS: Performed by: NURSE PRACTITIONER

## 2020-01-01 PROCEDURE — 93010 ELECTROCARDIOGRAM REPORT: CPT | Performed by: INTERNAL MEDICINE

## 2020-01-01 PROCEDURE — 87804 INFLUENZA ASSAY W/OPTIC: CPT | Performed by: EMERGENCY MEDICINE

## 2020-01-01 PROCEDURE — 80053 COMPREHEN METABOLIC PANEL: CPT | Performed by: PHYSICIAN ASSISTANT

## 2020-01-01 PROCEDURE — 25010000002 LEVOFLOXACIN PER 250 MG: Performed by: EMERGENCY MEDICINE

## 2020-01-01 PROCEDURE — 63710000001 INSULIN ASPART PER 5 UNITS: Performed by: PHYSICIAN ASSISTANT

## 2020-01-01 PROCEDURE — 85025 COMPLETE CBC W/AUTO DIFF WBC: CPT | Performed by: NURSE PRACTITIONER

## 2020-01-01 PROCEDURE — 85730 THROMBOPLASTIN TIME PARTIAL: CPT | Performed by: NURSE PRACTITIONER

## 2020-01-01 PROCEDURE — 25010000002 DEXAMETHASONE PER 1 MG: Performed by: PHYSICIAN ASSISTANT

## 2020-01-01 PROCEDURE — G0425 INPT/ED TELECONSULT30: HCPCS | Performed by: PSYCHIATRY & NEUROLOGY

## 2020-01-01 PROCEDURE — 81001 URINALYSIS AUTO W/SCOPE: CPT | Performed by: EMERGENCY MEDICINE

## 2020-01-01 PROCEDURE — 85025 COMPLETE CBC W/AUTO DIFF WBC: CPT | Performed by: PHYSICIAN ASSISTANT

## 2020-01-01 PROCEDURE — 80048 BASIC METABOLIC PNL TOTAL CA: CPT | Performed by: INTERNAL MEDICINE

## 2020-01-01 PROCEDURE — 97110 THERAPEUTIC EXERCISES: CPT

## 2020-01-01 PROCEDURE — 83735 ASSAY OF MAGNESIUM: CPT | Performed by: NURSE PRACTITIONER

## 2020-01-01 PROCEDURE — 25010000002 GADOTERIDOL PER 1 ML: Performed by: INTERNAL MEDICINE

## 2020-01-01 PROCEDURE — 25010000002 AZITHROMYCIN PER 500 MG: Performed by: NURSE PRACTITIONER

## 2020-01-01 PROCEDURE — 97166 OT EVAL MOD COMPLEX 45 MIN: CPT

## 2020-01-01 PROCEDURE — 63710000001 INSULIN DETEMIR PER 5 UNITS: Performed by: PHYSICIAN ASSISTANT

## 2020-01-01 PROCEDURE — 25010000002 DEXAMETHASONE PER 1 MG: Performed by: NURSE PRACTITIONER

## 2020-01-01 PROCEDURE — 82947 ASSAY GLUCOSE BLOOD QUANT: CPT | Performed by: PHYSICIAN ASSISTANT

## 2020-01-01 PROCEDURE — 97535 SELF CARE MNGMENT TRAINING: CPT

## 2020-01-01 PROCEDURE — 71045 X-RAY EXAM CHEST 1 VIEW: CPT

## 2020-01-01 PROCEDURE — 99284 EMERGENCY DEPT VISIT MOD MDM: CPT

## 2020-01-01 PROCEDURE — G0378 HOSPITAL OBSERVATION PER HR: HCPCS

## 2020-01-01 PROCEDURE — 80053 COMPREHEN METABOLIC PANEL: CPT | Performed by: EMERGENCY MEDICINE

## 2020-01-01 PROCEDURE — 70553 MRI BRAIN STEM W/O & W/DYE: CPT

## 2020-01-01 PROCEDURE — 84132 ASSAY OF SERUM POTASSIUM: CPT | Performed by: INTERNAL MEDICINE

## 2020-01-01 PROCEDURE — 83735 ASSAY OF MAGNESIUM: CPT | Performed by: EMERGENCY MEDICINE

## 2020-01-01 PROCEDURE — 93005 ELECTROCARDIOGRAM TRACING: CPT | Performed by: EMERGENCY MEDICINE

## 2020-01-01 PROCEDURE — 87040 BLOOD CULTURE FOR BACTERIA: CPT | Performed by: EMERGENCY MEDICINE

## 2020-01-01 PROCEDURE — A9576 INJ PROHANCE MULTIPACK: HCPCS | Performed by: INTERNAL MEDICINE

## 2020-01-01 PROCEDURE — 97167 OT EVAL HIGH COMPLEX 60 MIN: CPT

## 2020-01-01 PROCEDURE — 94640 AIRWAY INHALATION TREATMENT: CPT

## 2020-01-01 PROCEDURE — 97116 GAIT TRAINING THERAPY: CPT

## 2020-01-01 PROCEDURE — 0099U HC BIOFIRE FILMARRAY RESP PANEL 1: CPT | Performed by: NURSE PRACTITIONER

## 2020-01-01 PROCEDURE — 87186 SC STD MICRODIL/AGAR DIL: CPT | Performed by: NURSE PRACTITIONER

## 2020-01-01 PROCEDURE — 83735 ASSAY OF MAGNESIUM: CPT | Performed by: INTERNAL MEDICINE

## 2020-01-01 PROCEDURE — 72131 CT LUMBAR SPINE W/O DYE: CPT

## 2020-01-01 PROCEDURE — 80053 COMPREHEN METABOLIC PANEL: CPT | Performed by: NURSE PRACTITIONER

## 2020-01-01 PROCEDURE — 72128 CT CHEST SPINE W/O DYE: CPT

## 2020-01-01 PROCEDURE — 70551 MRI BRAIN STEM W/O DYE: CPT

## 2020-01-01 PROCEDURE — 25010000002 MAGNESIUM SULFATE 2 GM/50ML SOLUTION: Performed by: EMERGENCY MEDICINE

## 2020-01-01 PROCEDURE — 80048 BASIC METABOLIC PNL TOTAL CA: CPT | Performed by: NURSE PRACTITIONER

## 2020-01-01 PROCEDURE — 87088 URINE BACTERIA CULTURE: CPT | Performed by: NURSE PRACTITIONER

## 2020-01-01 RX ORDER — DEXAMETHASONE SODIUM PHOSPHATE 4 MG/ML
2 INJECTION, SOLUTION INTRA-ARTICULAR; INTRALESIONAL; INTRAMUSCULAR; INTRAVENOUS; SOFT TISSUE EVERY 12 HOURS
Status: DISCONTINUED | OUTPATIENT
Start: 2020-01-01 | End: 2020-01-01 | Stop reason: HOSPADM

## 2020-01-01 RX ORDER — POTASSIUM CHLORIDE 7.45 MG/ML
10 INJECTION INTRAVENOUS
Status: DISCONTINUED | OUTPATIENT
Start: 2020-01-01 | End: 2020-01-01 | Stop reason: HOSPADM

## 2020-01-01 RX ORDER — FLUCONAZOLE 200 MG/1
TABLET ORAL
Qty: 2 TABLET | Refills: 0 | Status: SHIPPED | OUTPATIENT
Start: 2020-01-01

## 2020-01-01 RX ORDER — TRAZODONE HYDROCHLORIDE 50 MG/1
25 TABLET ORAL NIGHTLY
Qty: 30 TABLET | Refills: 0 | Status: SHIPPED | OUTPATIENT
Start: 2020-01-01

## 2020-01-01 RX ORDER — NALOXONE HCL 0.4 MG/ML
0.4 VIAL (ML) INJECTION
Status: DISCONTINUED | OUTPATIENT
Start: 2020-01-01 | End: 2020-01-01 | Stop reason: HOSPADM

## 2020-01-01 RX ORDER — ALBUTEROL SULFATE 90 UG/1
2 AEROSOL, METERED RESPIRATORY (INHALATION) EVERY 4 HOURS PRN
Status: DISCONTINUED | OUTPATIENT
Start: 2020-01-01 | End: 2020-01-01 | Stop reason: CLARIF

## 2020-01-01 RX ORDER — BUDESONIDE AND FORMOTEROL FUMARATE DIHYDRATE 80; 4.5 UG/1; UG/1
2 AEROSOL RESPIRATORY (INHALATION)
Status: DISCONTINUED | OUTPATIENT
Start: 2020-01-01 | End: 2020-01-01

## 2020-01-01 RX ORDER — ALBUTEROL SULFATE 2.5 MG/3ML
2.5 SOLUTION RESPIRATORY (INHALATION) EVERY 4 HOURS PRN
Status: DISCONTINUED | OUTPATIENT
Start: 2020-01-01 | End: 2020-01-01 | Stop reason: HOSPADM

## 2020-01-01 RX ORDER — CETIRIZINE HYDROCHLORIDE 10 MG/1
TABLET ORAL
Qty: 30 TABLET | Refills: 1 | Status: SHIPPED | OUTPATIENT
Start: 2020-01-01

## 2020-01-01 RX ORDER — ONDANSETRON 4 MG/1
4 TABLET, FILM COATED ORAL EVERY 6 HOURS PRN
Status: DISCONTINUED | OUTPATIENT
Start: 2020-01-01 | End: 2020-01-01 | Stop reason: HOSPADM

## 2020-01-01 RX ORDER — POTASSIUM CHLORIDE 20 MEQ/1
TABLET, EXTENDED RELEASE ORAL
Qty: 60 TABLET | Refills: 1 | Status: SHIPPED | OUTPATIENT
Start: 2020-01-01

## 2020-01-01 RX ORDER — LORAZEPAM 1 MG/1
1 TABLET ORAL EVERY 6 HOURS PRN
Status: DISCONTINUED | OUTPATIENT
Start: 2020-01-01 | End: 2020-01-01 | Stop reason: HOSPADM

## 2020-01-01 RX ORDER — PANTOPRAZOLE SODIUM 40 MG/1
40 TABLET, DELAYED RELEASE ORAL
Status: DISCONTINUED | OUTPATIENT
Start: 2020-01-01 | End: 2020-01-01

## 2020-01-01 RX ORDER — MAGNESIUM SULFATE HEPTAHYDRATE 40 MG/ML
2 INJECTION, SOLUTION INTRAVENOUS AS NEEDED
Status: DISCONTINUED | OUTPATIENT
Start: 2020-01-01 | End: 2020-01-01 | Stop reason: HOSPADM

## 2020-01-01 RX ORDER — FERROUS SULFATE TAB EC 324 MG (65 MG FE EQUIVALENT) 324 (65 FE) MG
324 TABLET DELAYED RESPONSE ORAL
Status: DISCONTINUED | OUTPATIENT
Start: 2020-01-01 | End: 2020-01-01 | Stop reason: HOSPADM

## 2020-01-01 RX ORDER — ALBUTEROL SULFATE 2.5 MG/3ML
2.5 SOLUTION RESPIRATORY (INHALATION) EVERY 6 HOURS PRN
Status: DISCONTINUED | OUTPATIENT
Start: 2020-01-01 | End: 2020-01-01 | Stop reason: HOSPADM

## 2020-01-01 RX ORDER — SODIUM CHLORIDE 9 MG/ML
50 INJECTION, SOLUTION INTRAVENOUS CONTINUOUS
Status: DISPENSED | OUTPATIENT
Start: 2020-01-01 | End: 2020-01-01

## 2020-01-01 RX ORDER — SODIUM CHLORIDE 0.9 % (FLUSH) 0.9 %
10 SYRINGE (ML) INJECTION AS NEEDED
Status: DISCONTINUED | OUTPATIENT
Start: 2020-01-01 | End: 2020-01-01 | Stop reason: HOSPADM

## 2020-01-01 RX ORDER — BISACODYL 5 MG/1
5 TABLET, DELAYED RELEASE ORAL DAILY PRN
Status: DISCONTINUED | OUTPATIENT
Start: 2020-01-01 | End: 2020-01-01 | Stop reason: HOSPADM

## 2020-01-01 RX ORDER — ACETAMINOPHEN 650 MG/1
650 SUPPOSITORY RECTAL EVERY 4 HOURS PRN
Status: DISCONTINUED | OUTPATIENT
Start: 2020-01-01 | End: 2020-01-01 | Stop reason: HOSPADM

## 2020-01-01 RX ORDER — CLONAZEPAM 0.5 MG/1
2 TABLET ORAL 2 TIMES DAILY PRN
Status: DISCONTINUED | OUTPATIENT
Start: 2020-01-01 | End: 2020-01-01 | Stop reason: HOSPADM

## 2020-01-01 RX ORDER — CLONAZEPAM 2 MG/1
2 TABLET ORAL 2 TIMES DAILY PRN
Qty: 6 TABLET | Refills: 0 | Status: SHIPPED | OUTPATIENT
Start: 2020-01-01 | End: 2020-01-01

## 2020-01-01 RX ORDER — ACETAMINOPHEN 325 MG/1
650 TABLET ORAL EVERY 4 HOURS PRN
Status: DISCONTINUED | OUTPATIENT
Start: 2020-01-01 | End: 2020-01-01 | Stop reason: HOSPADM

## 2020-01-01 RX ORDER — LEVOFLOXACIN 5 MG/ML
750 INJECTION, SOLUTION INTRAVENOUS ONCE
Status: COMPLETED | OUTPATIENT
Start: 2020-01-01 | End: 2020-01-01

## 2020-01-01 RX ORDER — SODIUM CHLORIDE 0.9 % (FLUSH) 0.9 %
10 SYRINGE (ML) INJECTION EVERY 12 HOURS SCHEDULED
Status: DISCONTINUED | OUTPATIENT
Start: 2020-01-01 | End: 2020-01-01 | Stop reason: HOSPADM

## 2020-01-01 RX ORDER — NICOTINE POLACRILEX 4 MG
15 LOZENGE BUCCAL
Status: DISCONTINUED | OUTPATIENT
Start: 2020-01-01 | End: 2020-01-01 | Stop reason: HOSPADM

## 2020-01-01 RX ORDER — MONTELUKAST SODIUM 10 MG/1
TABLET ORAL
Qty: 30 TABLET | Refills: 1 | Status: SHIPPED | OUTPATIENT
Start: 2020-01-01

## 2020-01-01 RX ORDER — DEXAMETHASONE SODIUM PHOSPHATE 4 MG/ML
2 INJECTION, SOLUTION INTRA-ARTICULAR; INTRALESIONAL; INTRAMUSCULAR; INTRAVENOUS; SOFT TISSUE EVERY 12 HOURS
Start: 2020-01-01

## 2020-01-01 RX ORDER — DEXAMETHASONE SODIUM PHOSPHATE 4 MG/ML
4 INJECTION, SOLUTION INTRA-ARTICULAR; INTRALESIONAL; INTRAMUSCULAR; INTRAVENOUS; SOFT TISSUE EVERY 6 HOURS
Status: DISCONTINUED | OUTPATIENT
Start: 2020-01-01 | End: 2020-01-01

## 2020-01-01 RX ORDER — HYDROCODONE BITARTRATE AND ACETAMINOPHEN 7.5; 325 MG/1; MG/1
1 TABLET ORAL EVERY 4 HOURS PRN
Qty: 18 TABLET | Refills: 0 | Status: SHIPPED | OUTPATIENT
Start: 2020-01-01 | End: 2020-01-01

## 2020-01-01 RX ORDER — SODIUM CHLORIDE 0.9 % (FLUSH) 0.9 %
10 SYRINGE (ML) INJECTION EVERY 12 HOURS SCHEDULED
Status: DISCONTINUED | OUTPATIENT
Start: 2020-01-01 | End: 2020-01-01

## 2020-01-01 RX ORDER — FLUTICASONE PROPIONATE 50 MCG
1 SPRAY, SUSPENSION (ML) NASAL DAILY
Status: DISCONTINUED | OUTPATIENT
Start: 2020-01-01 | End: 2020-01-01

## 2020-01-01 RX ORDER — FAMOTIDINE 40 MG/1
40 TABLET, FILM COATED ORAL DAILY
Status: DISCONTINUED | OUTPATIENT
Start: 2020-01-01 | End: 2020-01-01 | Stop reason: HOSPADM

## 2020-01-01 RX ORDER — HYDROCODONE BITARTRATE AND ACETAMINOPHEN 7.5; 325 MG/1; MG/1
1 TABLET ORAL EVERY 6 HOURS PRN
Status: DISCONTINUED | OUTPATIENT
Start: 2020-01-01 | End: 2020-01-01 | Stop reason: HOSPADM

## 2020-01-01 RX ORDER — IPRATROPIUM BROMIDE AND ALBUTEROL SULFATE 2.5; .5 MG/3ML; MG/3ML
3 SOLUTION RESPIRATORY (INHALATION)
Status: DISCONTINUED | OUTPATIENT
Start: 2020-01-01 | End: 2020-01-01

## 2020-01-01 RX ORDER — NICOTINE POLACRILEX 4 MG
15 LOZENGE BUCCAL
Status: DISCONTINUED | OUTPATIENT
Start: 2020-01-01 | End: 2020-01-01

## 2020-01-01 RX ORDER — SODIUM CHLORIDE 9 MG/ML
125 INJECTION, SOLUTION INTRAVENOUS CONTINUOUS
Status: DISCONTINUED | OUTPATIENT
Start: 2020-01-01 | End: 2020-01-01

## 2020-01-01 RX ORDER — DEXTROSE MONOHYDRATE 25 G/50ML
25 INJECTION, SOLUTION INTRAVENOUS
Status: DISCONTINUED | OUTPATIENT
Start: 2020-01-01 | End: 2020-01-01

## 2020-01-01 RX ORDER — CEFTRIAXONE 1 G/1
1 INJECTION, POWDER, FOR SOLUTION INTRAMUSCULAR; INTRAVENOUS EVERY 24 HOURS
Qty: 7 G | Refills: 0
Start: 2020-01-01 | End: 2020-01-01

## 2020-01-01 RX ORDER — LORAZEPAM 0.5 MG/1
0.5 TABLET ORAL EVERY 12 HOURS SCHEDULED
Status: DISCONTINUED | OUTPATIENT
Start: 2020-01-01 | End: 2020-01-01 | Stop reason: HOSPADM

## 2020-01-01 RX ORDER — MAGNESIUM SULFATE HEPTAHYDRATE 40 MG/ML
2 INJECTION, SOLUTION INTRAVENOUS ONCE
Status: COMPLETED | OUTPATIENT
Start: 2020-01-01 | End: 2020-01-01

## 2020-01-01 RX ORDER — ASCORBIC ACID 500 MG
500 TABLET ORAL 3 TIMES DAILY
Status: DISCONTINUED | OUTPATIENT
Start: 2020-01-01 | End: 2020-01-01

## 2020-01-01 RX ORDER — CETIRIZINE HYDROCHLORIDE 10 MG/1
10 TABLET ORAL DAILY
Status: DISCONTINUED | OUTPATIENT
Start: 2020-01-01 | End: 2020-01-01 | Stop reason: HOSPADM

## 2020-01-01 RX ORDER — TRAZODONE HYDROCHLORIDE 50 MG/1
25 TABLET ORAL NIGHTLY
Status: DISCONTINUED | OUTPATIENT
Start: 2020-01-01 | End: 2020-01-01 | Stop reason: HOSPADM

## 2020-01-01 RX ORDER — MONTELUKAST SODIUM 10 MG/1
10 TABLET ORAL NIGHTLY
Status: DISCONTINUED | OUTPATIENT
Start: 2020-01-01 | End: 2020-01-01

## 2020-01-01 RX ORDER — RIVAROXABAN 20 MG/1
TABLET, FILM COATED ORAL
Qty: 30 TABLET | Refills: 1 | Status: SHIPPED | OUTPATIENT
Start: 2020-01-01

## 2020-01-01 RX ORDER — ONDANSETRON 2 MG/ML
4 INJECTION INTRAMUSCULAR; INTRAVENOUS EVERY 6 HOURS PRN
Status: DISCONTINUED | OUTPATIENT
Start: 2020-01-01 | End: 2020-01-01 | Stop reason: HOSPADM

## 2020-01-01 RX ORDER — IBUPROFEN 600 MG/1
600 TABLET ORAL ONCE
Status: COMPLETED | OUTPATIENT
Start: 2020-01-01 | End: 2020-01-01

## 2020-01-01 RX ORDER — MAGNESIUM SULFATE HEPTAHYDRATE 40 MG/ML
4 INJECTION, SOLUTION INTRAVENOUS AS NEEDED
Status: DISCONTINUED | OUTPATIENT
Start: 2020-01-01 | End: 2020-01-01 | Stop reason: HOSPADM

## 2020-01-01 RX ORDER — POTASSIUM CHLORIDE 750 MG/1
40 CAPSULE, EXTENDED RELEASE ORAL AS NEEDED
Status: DISCONTINUED | OUTPATIENT
Start: 2020-01-01 | End: 2020-01-01 | Stop reason: HOSPADM

## 2020-01-01 RX ORDER — FERROUS SULFATE TAB EC 324 MG (65 MG FE EQUIVALENT) 324 (65 FE) MG
324 TABLET DELAYED RESPONSE ORAL
Status: DISCONTINUED | OUTPATIENT
Start: 2020-01-01 | End: 2020-01-01

## 2020-01-01 RX ORDER — ACETAMINOPHEN 325 MG/1
650 TABLET ORAL ONCE
Status: COMPLETED | OUTPATIENT
Start: 2020-01-01 | End: 2020-01-01

## 2020-01-01 RX ORDER — TRAZODONE HYDROCHLORIDE 50 MG/1
25 TABLET ORAL NIGHTLY
Status: DISCONTINUED | OUTPATIENT
Start: 2020-01-01 | End: 2020-01-01

## 2020-01-01 RX ORDER — MAGNESIUM OXIDE 400 MG/1
400 TABLET ORAL 2 TIMES DAILY
Status: DISCONTINUED | OUTPATIENT
Start: 2020-01-01 | End: 2020-01-01

## 2020-01-01 RX ORDER — CETIRIZINE HYDROCHLORIDE 10 MG/1
10 TABLET ORAL DAILY
Status: DISCONTINUED | OUTPATIENT
Start: 2020-01-01 | End: 2020-01-01

## 2020-01-01 RX ORDER — POTASSIUM CHLORIDE 1.5 G/1.77G
40 POWDER, FOR SOLUTION ORAL AS NEEDED
Status: DISCONTINUED | OUTPATIENT
Start: 2020-01-01 | End: 2020-01-01 | Stop reason: HOSPADM

## 2020-01-01 RX ORDER — ACETAMINOPHEN 160 MG/5ML
650 SOLUTION ORAL EVERY 4 HOURS PRN
Status: DISCONTINUED | OUTPATIENT
Start: 2020-01-01 | End: 2020-01-01 | Stop reason: SDUPTHER

## 2020-01-01 RX ORDER — MORPHINE SULFATE 2 MG/ML
1 INJECTION, SOLUTION INTRAMUSCULAR; INTRAVENOUS EVERY 4 HOURS PRN
Status: DISCONTINUED | OUTPATIENT
Start: 2020-01-01 | End: 2020-01-01 | Stop reason: HOSPADM

## 2020-01-01 RX ORDER — DEXTROSE MONOHYDRATE 25 G/50ML
25 INJECTION, SOLUTION INTRAVENOUS
Status: DISCONTINUED | OUTPATIENT
Start: 2020-01-01 | End: 2020-01-01 | Stop reason: HOSPADM

## 2020-01-01 RX ORDER — POTASSIUM CHLORIDE 750 MG/1
20 CAPSULE, EXTENDED RELEASE ORAL 2 TIMES DAILY WITH MEALS
Status: DISCONTINUED | OUTPATIENT
Start: 2020-01-01 | End: 2020-01-01

## 2020-01-01 RX ADMIN — FLUTICASONE PROPIONATE 1 SPRAY: 50 SPRAY, METERED NASAL at 10:03

## 2020-01-01 RX ADMIN — FERROUS SULFATE TAB EC 324 MG (65 MG FE EQUIVALENT) 324 MG: 324 (65 FE) TABLET DELAYED RESPONSE at 08:07

## 2020-01-01 RX ADMIN — DEXAMETHASONE SODIUM PHOSPHATE 4 MG: 4 INJECTION, SOLUTION INTRAMUSCULAR; INTRAVENOUS at 00:17

## 2020-01-01 RX ADMIN — MAGNESIUM SULFATE HEPTAHYDRATE 4 G: 40 INJECTION, SOLUTION INTRAVENOUS at 21:40

## 2020-01-01 RX ADMIN — FAMOTIDINE 40 MG: 40 TABLET ORAL at 08:07

## 2020-01-01 RX ADMIN — POTASSIUM CHLORIDE 20 MEQ: 750 CAPSULE, EXTENDED RELEASE ORAL at 18:05

## 2020-01-01 RX ADMIN — POTASSIUM CHLORIDE 20 MEQ: 750 CAPSULE, EXTENDED RELEASE ORAL at 00:28

## 2020-01-01 RX ADMIN — INSULIN ASPART 4 UNITS: 100 INJECTION, SOLUTION INTRAVENOUS; SUBCUTANEOUS at 18:07

## 2020-01-01 RX ADMIN — RIVAROXABAN 20 MG: 10 TABLET, FILM COATED ORAL at 09:54

## 2020-01-01 RX ADMIN — SODIUM CHLORIDE 50 ML/HR: 9 INJECTION, SOLUTION INTRAVENOUS at 03:27

## 2020-01-01 RX ADMIN — INSULIN ASPART 12 UNITS: 100 INJECTION, SOLUTION INTRAVENOUS; SUBCUTANEOUS at 18:33

## 2020-01-01 RX ADMIN — CEFTRIAXONE SODIUM 1 G: 1 INJECTION, POWDER, FOR SOLUTION INTRAMUSCULAR; INTRAVENOUS at 22:10

## 2020-01-01 RX ADMIN — SODIUM CHLORIDE 125 ML/HR: 900 INJECTION, SOLUTION INTRAVENOUS at 20:03

## 2020-01-01 RX ADMIN — INSULIN ASPART 10 UNITS: 100 INJECTION, SOLUTION INTRAVENOUS; SUBCUTANEOUS at 18:33

## 2020-01-01 RX ADMIN — INSULIN ASPART 24 UNITS: 100 INJECTION, SOLUTION INTRAVENOUS; SUBCUTANEOUS at 11:08

## 2020-01-01 RX ADMIN — Medication 1 TABLET: at 08:40

## 2020-01-01 RX ADMIN — SODIUM CHLORIDE 50 ML/HR: 9 INJECTION, SOLUTION INTRAVENOUS at 03:19

## 2020-01-01 RX ADMIN — Medication 1 TABLET: at 08:32

## 2020-01-01 RX ADMIN — INSULIN ASPART 7 UNITS: 100 INJECTION, SOLUTION INTRAVENOUS; SUBCUTANEOUS at 10:02

## 2020-01-01 RX ADMIN — Medication 400 MG: at 00:30

## 2020-01-01 RX ADMIN — INSULIN ASPART 20 UNITS: 100 INJECTION, SOLUTION INTRAVENOUS; SUBCUTANEOUS at 21:38

## 2020-01-01 RX ADMIN — ACETAMINOPHEN 650 MG: 325 TABLET, FILM COATED ORAL at 17:59

## 2020-01-01 RX ADMIN — Medication 25 MG: at 22:11

## 2020-01-01 RX ADMIN — FAMOTIDINE 40 MG: 40 TABLET ORAL at 08:40

## 2020-01-01 RX ADMIN — OXYCODONE HYDROCHLORIDE AND ACETAMINOPHEN 500 MG: 500 TABLET ORAL at 00:28

## 2020-01-01 RX ADMIN — INSULIN ASPART 24 UNITS: 100 INJECTION, SOLUTION INTRAVENOUS; SUBCUTANEOUS at 20:56

## 2020-01-01 RX ADMIN — INSULIN ASPART 24 UNITS: 100 INJECTION, SOLUTION INTRAVENOUS; SUBCUTANEOUS at 16:19

## 2020-01-01 RX ADMIN — AZITHROMYCIN MONOHYDRATE 500 MG: 500 INJECTION, POWDER, LYOPHILIZED, FOR SOLUTION INTRAVENOUS at 00:31

## 2020-01-01 RX ADMIN — ACETAMINOPHEN 650 MG: 325 TABLET, FILM COATED ORAL at 09:55

## 2020-01-01 RX ADMIN — Medication 25 MG: at 00:30

## 2020-01-01 RX ADMIN — SODIUM CHLORIDE, PRESERVATIVE FREE 10 ML: 5 INJECTION INTRAVENOUS at 08:35

## 2020-01-01 RX ADMIN — FERROUS SULFATE TAB EC 324 MG (65 MG FE EQUIVALENT) 324 MG: 324 (65 FE) TABLET DELAYED RESPONSE at 18:05

## 2020-01-01 RX ADMIN — SODIUM CHLORIDE 125 ML/HR: 900 INJECTION, SOLUTION INTRAVENOUS at 07:02

## 2020-01-01 RX ADMIN — OXYCODONE HYDROCHLORIDE AND ACETAMINOPHEN 500 MG: 500 TABLET ORAL at 18:05

## 2020-01-01 RX ADMIN — POTASSIUM CHLORIDE 20 MEQ: 750 CAPSULE, EXTENDED RELEASE ORAL at 09:30

## 2020-01-01 RX ADMIN — SODIUM CHLORIDE, PRESERVATIVE FREE 10 ML: 5 INJECTION INTRAVENOUS at 08:08

## 2020-01-01 RX ADMIN — INSULIN DETEMIR 15 UNITS: 100 INJECTION, SOLUTION SUBCUTANEOUS at 21:38

## 2020-01-01 RX ADMIN — SODIUM CHLORIDE 125 ML/HR: 900 INJECTION, SOLUTION INTRAVENOUS at 18:00

## 2020-01-01 RX ADMIN — FERROUS SULFATE TAB EC 324 MG (65 MG FE EQUIVALENT) 324 MG: 324 (65 FE) TABLET DELAYED RESPONSE at 08:32

## 2020-01-01 RX ADMIN — RIVAROXABAN 20 MG: 10 TABLET, FILM COATED ORAL at 08:32

## 2020-01-01 RX ADMIN — CETIRIZINE HYDROCHLORIDE 10 MG: 10 TABLET, FILM COATED ORAL at 09:54

## 2020-01-01 RX ADMIN — MONTELUKAST SODIUM 10 MG: 10 TABLET, COATED ORAL at 22:10

## 2020-01-01 RX ADMIN — Medication 400 MG: at 09:30

## 2020-01-01 RX ADMIN — INSULIN ASPART 7 UNITS: 100 INJECTION, SOLUTION INTRAVENOUS; SUBCUTANEOUS at 22:10

## 2020-01-01 RX ADMIN — SODIUM CHLORIDE, PRESERVATIVE FREE 10 ML: 5 INJECTION INTRAVENOUS at 22:10

## 2020-01-01 RX ADMIN — FAMOTIDINE 40 MG: 40 TABLET ORAL at 08:32

## 2020-01-01 RX ADMIN — ACETAMINOPHEN 650 MG: 325 TABLET, FILM COATED ORAL at 09:30

## 2020-01-01 RX ADMIN — CETIRIZINE HYDROCHLORIDE 10 MG: 10 TABLET, FILM COATED ORAL at 08:07

## 2020-01-01 RX ADMIN — SODIUM CHLORIDE, PRESERVATIVE FREE 10 ML: 5 INJECTION INTRAVENOUS at 20:29

## 2020-01-01 RX ADMIN — RIVAROXABAN 20 MG: 10 TABLET, FILM COATED ORAL at 09:30

## 2020-01-01 RX ADMIN — INSULIN ASPART 4 UNITS: 100 INJECTION, SOLUTION INTRAVENOUS; SUBCUTANEOUS at 11:42

## 2020-01-01 RX ADMIN — RIVAROXABAN 20 MG: 10 TABLET, FILM COATED ORAL at 08:07

## 2020-01-01 RX ADMIN — IPRATROPIUM BROMIDE AND ALBUTEROL SULFATE 3 ML: 2.5; .5 SOLUTION RESPIRATORY (INHALATION) at 13:52

## 2020-01-01 RX ADMIN — CETIRIZINE HYDROCHLORIDE 10 MG: 10 TABLET, FILM COATED ORAL at 09:30

## 2020-01-01 RX ADMIN — INSULIN ASPART 8 UNITS: 100 INJECTION, SOLUTION INTRAVENOUS; SUBCUTANEOUS at 07:54

## 2020-01-01 RX ADMIN — SODIUM CHLORIDE, PRESERVATIVE FREE 10 ML: 5 INJECTION INTRAVENOUS at 20:57

## 2020-01-01 RX ADMIN — INSULIN ASPART 10 UNITS: 100 INJECTION, SOLUTION INTRAVENOUS; SUBCUTANEOUS at 08:34

## 2020-01-01 RX ADMIN — INSULIN ASPART 24 UNITS: 100 INJECTION, SOLUTION INTRAVENOUS; SUBCUTANEOUS at 11:37

## 2020-01-01 RX ADMIN — POTASSIUM CHLORIDE 40 MEQ: 750 CAPSULE, EXTENDED RELEASE ORAL at 12:17

## 2020-01-01 RX ADMIN — IPRATROPIUM BROMIDE AND ALBUTEROL SULFATE 3 ML: 2.5; .5 SOLUTION RESPIRATORY (INHALATION) at 18:49

## 2020-01-01 RX ADMIN — Medication 400 MG: at 22:11

## 2020-01-01 RX ADMIN — CLONAZEPAM 2 MG: 0.5 TABLET ORAL at 01:38

## 2020-01-01 RX ADMIN — FAMOTIDINE 40 MG: 40 TABLET ORAL at 09:54

## 2020-01-01 RX ADMIN — SODIUM CHLORIDE, PRESERVATIVE FREE 10 ML: 5 INJECTION INTRAVENOUS at 23:15

## 2020-01-01 RX ADMIN — Medication 1 TABLET: at 09:54

## 2020-01-01 RX ADMIN — ALBUTEROL SULFATE 2.5 MG: 2.5 SOLUTION RESPIRATORY (INHALATION) at 08:58

## 2020-01-01 RX ADMIN — INSULIN ASPART 12 UNITS: 100 INJECTION, SOLUTION INTRAVENOUS; SUBCUTANEOUS at 08:34

## 2020-01-01 RX ADMIN — FERROUS SULFATE TAB EC 324 MG (65 MG FE EQUIVALENT) 324 MG: 324 (65 FE) TABLET DELAYED RESPONSE at 11:42

## 2020-01-01 RX ADMIN — DEXAMETHASONE SODIUM PHOSPHATE 4 MG: 4 INJECTION, SOLUTION INTRAMUSCULAR; INTRAVENOUS at 00:57

## 2020-01-01 RX ADMIN — INSULIN ASPART 10 UNITS: 100 INJECTION, SOLUTION INTRAVENOUS; SUBCUTANEOUS at 17:36

## 2020-01-01 RX ADMIN — SODIUM CHLORIDE 125 ML/HR: 900 INJECTION, SOLUTION INTRAVENOUS at 02:06

## 2020-01-01 RX ADMIN — FLUTICASONE PROPIONATE 1 SPRAY: 50 SPRAY, METERED NASAL at 09:29

## 2020-01-01 RX ADMIN — DEXAMETHASONE SODIUM PHOSPHATE 2 MG: 4 INJECTION, SOLUTION INTRAMUSCULAR; INTRAVENOUS at 18:05

## 2020-01-01 RX ADMIN — Medication 1 TABLET: at 09:30

## 2020-01-01 RX ADMIN — IPRATROPIUM BROMIDE AND ALBUTEROL SULFATE 3 ML: 2.5; .5 SOLUTION RESPIRATORY (INHALATION) at 07:43

## 2020-01-01 RX ADMIN — INSULIN ASPART 24 UNITS: 100 INJECTION, SOLUTION INTRAVENOUS; SUBCUTANEOUS at 11:49

## 2020-01-01 RX ADMIN — PANTOPRAZOLE SODIUM 40 MG: 40 TABLET, DELAYED RELEASE ORAL at 05:58

## 2020-01-01 RX ADMIN — ACETAMINOPHEN 650 MG: 325 TABLET, FILM COATED ORAL at 23:58

## 2020-01-01 RX ADMIN — FERROUS SULFATE TAB EC 324 MG (65 MG FE EQUIVALENT) 324 MG: 324 (65 FE) TABLET DELAYED RESPONSE at 08:40

## 2020-01-01 RX ADMIN — IPRATROPIUM BROMIDE AND ALBUTEROL SULFATE 3 ML: 2.5; .5 SOLUTION RESPIRATORY (INHALATION) at 07:03

## 2020-01-01 RX ADMIN — Medication 1 TABLET: at 08:07

## 2020-01-01 RX ADMIN — FERROUS SULFATE TAB EC 324 MG (65 MG FE EQUIVALENT) 324 MG: 324 (65 FE) TABLET DELAYED RESPONSE at 09:30

## 2020-01-01 RX ADMIN — ALBUTEROL SULFATE 2.5 MG: 2.5 SOLUTION RESPIRATORY (INHALATION) at 03:39

## 2020-01-01 RX ADMIN — INSULIN ASPART 10 UNITS: 100 INJECTION, SOLUTION INTRAVENOUS; SUBCUTANEOUS at 18:43

## 2020-01-01 RX ADMIN — POTASSIUM CHLORIDE 40 MEQ: 750 CAPSULE, EXTENDED RELEASE ORAL at 20:28

## 2020-01-01 RX ADMIN — INSULIN ASPART 10 UNITS: 100 INJECTION, SOLUTION INTRAVENOUS; SUBCUTANEOUS at 11:50

## 2020-01-01 RX ADMIN — ALBUTEROL SULFATE 2.5 MG: 2.5 SOLUTION RESPIRATORY (INHALATION) at 18:55

## 2020-01-01 RX ADMIN — PANTOPRAZOLE SODIUM 40 MG: 40 TABLET, DELAYED RELEASE ORAL at 05:09

## 2020-01-01 RX ADMIN — INSULIN ASPART 16 UNITS: 100 INJECTION, SOLUTION INTRAVENOUS; SUBCUTANEOUS at 16:48

## 2020-01-01 RX ADMIN — INSULIN ASPART 16 UNITS: 100 INJECTION, SOLUTION INTRAVENOUS; SUBCUTANEOUS at 20:27

## 2020-01-01 RX ADMIN — INSULIN ASPART 10 UNITS: 100 INJECTION, SOLUTION INTRAVENOUS; SUBCUTANEOUS at 07:39

## 2020-01-01 RX ADMIN — LEVOFLOXACIN 750 MG: 5 INJECTION, SOLUTION INTRAVENOUS at 20:37

## 2020-01-01 RX ADMIN — FERROUS SULFATE TAB EC 324 MG (65 MG FE EQUIVALENT) 324 MG: 324 (65 FE) TABLET DELAYED RESPONSE at 09:54

## 2020-01-01 RX ADMIN — CEFTRIAXONE SODIUM 1 G: 1 INJECTION, POWDER, FOR SOLUTION INTRAMUSCULAR; INTRAVENOUS at 23:49

## 2020-01-01 RX ADMIN — FLUTICASONE FUROATE AND VILANTEROL TRIFENATATE 1 PUFF: 100; 25 POWDER RESPIRATORY (INHALATION) at 08:30

## 2020-01-01 RX ADMIN — DEXAMETHASONE SODIUM PHOSPHATE 2 MG: 4 INJECTION, SOLUTION INTRAMUSCULAR; INTRAVENOUS at 02:13

## 2020-01-01 RX ADMIN — HUMAN INSULIN 10 UNITS: 100 INJECTION, SOLUTION SUBCUTANEOUS at 14:19

## 2020-01-01 RX ADMIN — ACETAMINOPHEN 650 MG: 325 TABLET, FILM COATED ORAL at 04:28

## 2020-01-01 RX ADMIN — INSULIN DETEMIR 10 UNITS: 100 INJECTION, SOLUTION SUBCUTANEOUS at 20:56

## 2020-01-01 RX ADMIN — OXYCODONE HYDROCHLORIDE AND ACETAMINOPHEN 500 MG: 500 TABLET ORAL at 09:30

## 2020-01-01 RX ADMIN — Medication 25 MG: at 21:38

## 2020-01-01 RX ADMIN — RIVAROXABAN 20 MG: 10 TABLET, FILM COATED ORAL at 08:40

## 2020-01-01 RX ADMIN — OXYCODONE HYDROCHLORIDE AND ACETAMINOPHEN 500 MG: 500 TABLET ORAL at 22:10

## 2020-01-01 RX ADMIN — DEXAMETHASONE SODIUM PHOSPHATE 4 MG: 4 INJECTION, SOLUTION INTRAMUSCULAR; INTRAVENOUS at 16:49

## 2020-01-01 RX ADMIN — AZITHROMYCIN MONOHYDRATE 500 MG: 500 INJECTION, POWDER, LYOPHILIZED, FOR SOLUTION INTRAVENOUS at 00:00

## 2020-01-01 RX ADMIN — DEXAMETHASONE SODIUM PHOSPHATE 4 MG: 4 INJECTION, SOLUTION INTRAMUSCULAR; INTRAVENOUS at 11:37

## 2020-01-01 RX ADMIN — CETIRIZINE HYDROCHLORIDE 10 MG: 10 TABLET, FILM COATED ORAL at 08:40

## 2020-01-01 RX ADMIN — DEXAMETHASONE SODIUM PHOSPHATE 4 MG: 4 INJECTION, SOLUTION INTRAMUSCULAR; INTRAVENOUS at 06:33

## 2020-01-01 RX ADMIN — POTASSIUM CHLORIDE 40 MEQ: 750 CAPSULE, EXTENDED RELEASE ORAL at 16:48

## 2020-01-01 RX ADMIN — CETIRIZINE HYDROCHLORIDE 10 MG: 10 TABLET, FILM COATED ORAL at 08:32

## 2020-01-01 RX ADMIN — INSULIN ASPART 4 UNITS: 100 INJECTION, SOLUTION INTRAVENOUS; SUBCUTANEOUS at 00:30

## 2020-01-01 RX ADMIN — DEXAMETHASONE SODIUM PHOSPHATE 4 MG: 4 INJECTION, SOLUTION INTRAMUSCULAR; INTRAVENOUS at 12:17

## 2020-01-01 RX ADMIN — SODIUM CHLORIDE, PRESERVATIVE FREE 10 ML: 5 INJECTION INTRAVENOUS at 09:53

## 2020-01-01 RX ADMIN — LORAZEPAM 0.5 MG: 0.5 TABLET ORAL at 14:35

## 2020-01-01 RX ADMIN — CLONAZEPAM 2 MG: 0.5 TABLET ORAL at 20:26

## 2020-01-01 RX ADMIN — SODIUM CHLORIDE, PRESERVATIVE FREE 10 ML: 5 INJECTION INTRAVENOUS at 21:38

## 2020-01-01 RX ADMIN — INSULIN ASPART 4 UNITS: 100 INJECTION, SOLUTION INTRAVENOUS; SUBCUTANEOUS at 09:31

## 2020-01-01 RX ADMIN — MONTELUKAST SODIUM 10 MG: 10 TABLET, COATED ORAL at 00:29

## 2020-01-01 RX ADMIN — INSULIN ASPART 8 UNITS: 100 INJECTION, SOLUTION INTRAVENOUS; SUBCUTANEOUS at 07:39

## 2020-01-01 RX ADMIN — CLONAZEPAM 2 MG: 0.5 TABLET ORAL at 21:03

## 2020-01-01 RX ADMIN — GADOTERIDOL 20 ML: 279.3 INJECTION, SOLUTION INTRAVENOUS at 13:30

## 2020-01-01 RX ADMIN — MAGNESIUM SULFATE HEPTAHYDRATE 2 G: 40 INJECTION, SOLUTION INTRAVENOUS at 19:04

## 2020-01-01 RX ADMIN — IBUPROFEN 600 MG: 600 TABLET ORAL at 05:09

## 2020-01-01 RX ADMIN — INSULIN ASPART 20 UNITS: 100 INJECTION, SOLUTION INTRAVENOUS; SUBCUTANEOUS at 08:09

## 2020-01-27 PROBLEM — R53.1 GENERALIZED WEAKNESS: Status: ACTIVE | Noted: 2020-01-01

## 2020-01-27 NOTE — TELEPHONE ENCOUNTER
Elizabeth from [facility] called to let you know that Andra is in [facility] here.  Said, they are supposed to call the family doctor and let them know when a patient goes in the hospital.

## 2020-02-27 PROBLEM — J18.9 PNEUMONIA OF RIGHT LOWER LOBE DUE TO INFECTIOUS ORGANISM: Status: ACTIVE | Noted: 2020-01-01

## 2020-02-27 PROBLEM — N30.00 ACUTE CYSTITIS: Status: ACTIVE | Noted: 2020-01-01
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
## 2020-04-01 ENCOUNTER — TELEPHONE (OUTPATIENT)
Dept: FAMILY MEDICINE CLINIC | Facility: CLINIC | Age: 45
End: 2020-04-01

## 2020-04-01 NOTE — TELEPHONE ENCOUNTER
TRIED TO CALL PATIENT TO RESCHEDULE WITH DR FOWLER ON 04/14; WAS UNABLE TO REACH BUT LEFT VOICEMAIL.      THANK YOU,        MAME

## 2020-04-02 ENCOUNTER — TELEPHONE (OUTPATIENT)
Dept: FAMILY MEDICINE CLINIC | Facility: CLINIC | Age: 45
End: 2020-04-02

## 2020-04-02 NOTE — TELEPHONE ENCOUNTER
CALLED PATIENT A 2ND TIME TO RESCHEDULE APT WITH PROVIDER. UNABLE TO REACH PATIENT, LETTER SENT TO INFORM THEM THAT APT WAS CANCELLED AND TO CALL OUR OFFICE IF THEY WANT TO RESCHEDULE.    THANKS,  CECIL

## 2020-04-23 ENCOUNTER — OUTSIDE FACILITY SERVICE (OUTPATIENT)
Dept: FAMILY MEDICINE CLINIC | Facility: CLINIC | Age: 45
End: 2020-04-23

## 2023-01-13 NOTE — PROGRESS NOTES
DATE OF VISIT: 3/30/2017    REASON FOR VISIT:  Metastatic right breast cancer    HISTORY OF PRESENT ILLNESS:    41-year-old female with a past medical history significant for metastatic right breast cancer with liver and bone metastases currently on treatment with Herceptin and perjeta with Faslodex.  She is here for follow-up visit today.  States she had some shortness of breath recently for which she was given inhalers by medical doctor.  She is also complaining of discomfort in right ankle for which she is being followed by podiatrist.  Denies any fever or chills.  Denies any more bleeding in the stool.      PAST MEDICAL HISTORY:    1.  Metastatic right breast cancer with liver and bone metastasis  2.  History of pneumonia  3.  Right internal jugular vein DVT status post around to  4.  Rectal bleeding  5.  Dyslipidemia  6.  Diabetes mellitus  7.  Anxiety  8.  Bone metastasis    SOCIAL HISTORY:    Social History   Substance Use Topics   • Smoking status: Former Smoker   • Smokeless tobacco: Never Used   • Alcohol use No       Surgical History :  Past Surgical History:   Procedure Laterality Date   • CENTRAL VENOUS CATHETER TUNNELED INSERTION SINGLE LUMEN      Placement of indwelling Pleurx catheter right   • COLONOSCOPY N/A 1/26/2017    Procedure: COLONOSCOPY;  Surgeon: Robert Clifton MD;  Location: [facility] ENDOSCOPY;  Service:    • ENDOSCOPY N/A 1/26/2017    Procedure: ESOPHAGOGASTRODUODENOSCOPY;  Surgeon: Robert Clifton MD;  Location: [facility] ENDOSCOPY;  Service:    • ENDOSCOPY N/A 2/24/2017    Procedure: ESOPHAGOGASTRODUODENOSCOPY;  Surgeon: Robert Clifton MD;  Location: [facility] ENDOSCOPY;  Service:    • HYSTERECTOMY      vaginal   • LUNG VOLUME REDUCTION     • MASTECTOMY      partial   • OTHER SURGICAL HISTORY  05/05/2016    central line insertion , PICC line replacement   • OTHER SURGICAL HISTORY      place breast clip percut   • OTHER SURGICAL HISTORY      pulse oximetry   • OTHER SURGICAL HISTORY       "remove cc cath w/ sc port or pump, Removal of right internal jugular MediPort   • OTHER SURGICAL HISTORY      spirometry   • OTHER SURGICAL HISTORY      tubal sterilization   • PAP SMEAR     • THORACENTESIS      aspiration of pleural space   • UPPER GASTROINTESTINAL ENDOSCOPY  01/26/2017   • UPPER GASTROINTESTINAL ENDOSCOPY  02/24/2017   • VENOUS ACCESS DEVICE (PORT) INSERTION      Ultrasound guided right internal jugular Mediport placement under fluoroscopy       ALLERGIES:    Allergies   Allergen Reactions   • Lisinopril Shortness Of Breath   • Flagyl [Metronidazole] Other (See Comments)     pancreatitis   • Fentanyl Anxiety     \"goes out of her mind\"   • Latex Rash       REVIEW OF SYSTEMS:      CONSTITUTIONAL: Positive for fatigue.  No fever, chills, or night sweats.     HEENT:  No epistaxis, mouth sores, or difficulty swallowing.    RESPIRATORY:  No new shortness of breath or cough at present.    CARDIOVASCULAR:  No chest pain or palpitations.    GASTROINTESTINAL:  No abdominal pain, nausea, vomiting, or blood in the stool.    GENITOURINARY:  No dysuria or hematuria.    MUSCULOSKELETAL: Complains of discomfort in right ankle.    NEUROLOGICAL: Positive for intermittent tingling and numbness.. No new headache or dizziness.     LYMPHATICS:  Denies any abnormal swollen and anywhere in the body.    SKIN:  Denies any new skin rash.    PHYSICAL EXAMINATION:      VITAL SIGNS:    /91  Pulse 80  Temp 97.1 °F (36.2 °C)  Resp 18  Wt 228 lb 14.4 oz (104 kg)  BMI 38.09 kg/m2    GENERAL:  Not in any distress.     EYES:  Mild pallor. No icterus.    NECK:  No adenopathy in cervical or supraclavicular area.    RESPIRATORY:  Fair air entry bilaterally. No rhonchi or wheezing.    CARDIOVASCULAR:  S1, S2. Regular rate and rhythm.    ABDOMEN:  Soft, nontender. Bowel sounds present.    EXTREMITIES:  No edema.    NEUROLOGICAL:  Alert, awake, oriented x3.  No motor or sensory deficit.  Cranial nerves II-12 grossly " intact.    DIAGNOSTIC DATA:    Glucose   Date Value Ref Range Status   03/30/2017 144 (H) 60 - 100 mg/dL Final     Sodium   Date Value Ref Range Status   03/30/2017 138 137 - 145 mmol/L Final     Potassium   Date Value Ref Range Status   03/30/2017 4.0 3.5 - 5.1 mmol/L Final     CO2   Date Value Ref Range Status   03/30/2017 25.0 22.0 - 31.0 mmol/L Final     Chloride   Date Value Ref Range Status   03/30/2017 101 95 - 110 mmol/L Final     Anion Gap   Date Value Ref Range Status   03/30/2017 12.0 5.0 - 15.0 mmol/L Final     Creatinine   Date Value Ref Range Status   03/30/2017 0.88 0.50 - 1.00 mg/dL Final     BUN   Date Value Ref Range Status   03/30/2017 22 (H) 7 - 21 mg/dL Final     BUN/Creatinine Ratio   Date Value Ref Range Status   03/30/2017 25.0 7.0 - 25.0 Final     Calcium   Date Value Ref Range Status   03/30/2017 8.9 8.4 - 10.2 mg/dL Final     Alkaline Phosphatase   Date Value Ref Range Status   03/30/2017 105 38 - 126 U/L Final     Total Protein   Date Value Ref Range Status   03/30/2017 7.2 6.3 - 8.6 g/dL Final     ALT (SGPT)   Date Value Ref Range Status   03/30/2017 43 9 - 52 U/L Final     AST (SGOT)   Date Value Ref Range Status   03/30/2017 58 (H) 14 - 36 U/L Final     Total Bilirubin   Date Value Ref Range Status   03/30/2017 0.4 0.2 - 1.3 mg/dL Final     Albumin   Date Value Ref Range Status   03/30/2017 4.40 3.40 - 4.80 g/dL Final     Globulin   Date Value Ref Range Status   03/30/2017 2.8 2.3 - 3.5 gm/dL Final     A/G Ratio   Date Value Ref Range Status   03/30/2017 1.6 1.1 - 1.8 g/dL Final     Lab Results   Component Value Date    WBC 6.67 03/30/2017    HGB 12.4 03/30/2017    HCT 36.9 03/30/2017    MCV 87.2 03/30/2017     03/30/2017     Lab Results   Component Value Date    NEUTROABS 4.11 03/30/2017    IRON 106 02/09/2017    TIBC 304 01/25/2017    LABIRON 6 (L) 01/25/2017    FERRITIN 51.50 01/25/2017    FOQYJKBN54 559 06/17/2016    FOLATE 14.10 06/17/2016     Lab Results   Component Value  Date     22.4 03/09/2017    LABCA2 24.0 03/09/2017    AFPTM 156 01/25/2017    HCGQUANT 1 12/12/2016    CEA 3.2 09/22/2015   ]    PATHOLOGY:  Pathology result from EGD and colonoscopy biopsy on January 26, 2017 shows:  Final Diagnosis   1. GASTRIC ANTRUM, MUCOSAL BIOPSY:   MILD CHRONIC GASTRITIS.   NO EVIDENCE OF HELICOBACTER PYLORI (IP STAIN PERFORMED).      2. DUODENUM, MUCOSAL BIOPSY:   MILD CHRONIC INFLAMMATION.      3. GASTRIC BODY, MUCOSAL BIOPSY:   MILD CHRONIC GASTRITIS.   NO EVIDENCE OF HELICOBACTER PYLORI (IP STAIN PERFORMED).      4. COLONIC MUCOSAL BIOPSY, RANDOM:   NO SIGNIFICANT PATHOLOGIC DIAGNOSIS.          RADIOLOGY DATA :  Restaging CT chest abdomen and pelvis with contrast was done on February 2, 2017 it was reviewed and discussed with patient it showed:  CONCLUSION:   1. Subtle low-density area in the right hepatic dome that appears slightly increased in size. While this could be related to an area of fibrosis from the patient's cirrhosis this raises a question of metastatic disease and consider follow-up liver   protocol MRI with contrast in this patient with history of breast cancer.  2. Changes of cirrhosis of the liver again noted.  3. Decrease in size of anterior mediastinal soft tissue density that on the previous examination had an appearance more suggestive of thymic tissue than definite metastatic disease. This has nearly resolved.      ASSESSMENT AND PLAN:      1.  Stage IV metastatic right breast cancer with liver and bone metastasis.  Patient was initially diagnosed in 2011 with a stage III disease.  Initially patient had a Taxotere carboplatin and Herceptin for 6 cycles followed by 1 year of Herceptin maintenance.  After that patient was getting Lupron and tamoxifen.  In March 2015 she was diagnosed with a metastatic disease involving liver and bone.  Patient was again started on Taxotere Herceptin and perjeta.  She could not tolerate Taxotere at that point she had a recurrent  pneumonias and cytopenias.  Subsequently she was maintained on Herceptin and perjeta until October 2016, at that point restaging CAT scan showed there was anterior mediastinal mass for which patient was started on Taxol Herceptin and perjeta.  Patient received her last dose of Taxol on January 12, 2017.  Patient was started back on Herceptin and perjeta on February 9, 2017.  We'll go head with a fourth cycle with Herceptin and perjeta density starting on February 9, 2017.  She was started back on Faslodex on each 23rd 2017.  At this point plan is to continue with Herceptin, perjeta and Faslodex as scheduled.  We'll repeat neck CT of chest abdomen and pelvis in around May 2017.    2.  Rectal bleeding: Patient is status post argon coagulation treatment on February 23, 2017.  Denies any rectal bleeding since then.    3.  Recurrent DVT: Patient was on Xarelto which was held secondary to rectal bleeding.  Patient is status post argon laser treatment on February 23, 2017.  Recommendation would be to support her on Lovenox and Coumadin Clinic visit if she does not have any bleeding at that point.  She is high risk of recurrent thrombosis due to active cancer.  Patient states it would be very difficult for her to come to Coumadin clinic for checks of INR.  At this point we'll start her on back on Xarelto 20 mg by mouth daily.  She does understand that is 20.available for Xarelto in case of any bleeding.  She wants to proceed with Xarelto.  Prescription for Xarelto has been sent to her pharmacy today.    4.  Bone metastasis: Continue with Zometa as scheduled for now.    5.  Health maintenance: Patient does not smoke.  She just had a colonoscopy done on January 2017 which was negative for any malignancy.  She remains full code.    6.  Anemia: Anemia workup has been done today we will follow up on the result if she has a significant iron deficiency she might need some intravenous Feraheme as she was not able to tolerate oral  iron in the past.       Joselito Waddell MD  3/30/2017  4:40 PM   Nsaids Pregnancy And Lactation Text: These medications are considered safe up to 30 weeks gestation. It is excreted in breast milk.

## 2024-01-01 NOTE — DISCHARGE INSTRUCTIONS
Outpatient Instructions for Monitored Anesthesia Care (MAC)    1. You will be released from the hospital in the care of a responsible adult who should remain with you for at least 6 hours.    2. You are at an increased risk for falls following anesthesia. Use care when changing from a lying to a sitting position. Use your assistive devices ( example: cane, walker or family member).    3. You must NOT drive a car, climb high places such as a ladder, or operate equipment such as electric knives,stoves etc...for at least 12 hours. If you are dizzy for longer than 24 hours, notify your doctor.    4. DO NOT drink any alcoholic beverages for at least 24 hours. Anesthesia may impair your judgement.    5. If you smoke, do not smoke alone due to increased risk of burns/fires.    6. DO NOT undertake any legally binding commitment for at least 24 hours. Anesthesia may impair your judgement. Outpatient Instructions for Monitored Anesthesia Care (MAC)    1. You will be released from the hospital in the care of a responsible adult who should remain with you for at least 6 hours.    2. You are at an increased risk for falls following anesthesia. Use care when changing from a lying to a sitting position. Use your assistive devices ( example: cane, walker or family member).    3. You must NOT drive a car, climb high places such as a ladder, or operate equipment such as electric knives,stoves etc...for at least 12 hours. If you are dizzy for longer than 24 hours, notify your doctor.    4. DO NOT drink any alcoholic beverages for at least 24 hours. Anesthesia may impair your judgement.    5. If you smoke, do not smoke alone due to increased risk of burns/fires.    6. DO NOT undertake any legally binding commitment for at least 24 hours. Anesthesia may impair your judgement.   
No

## (undated) DEVICE — SPNG GZ WOVN 4X4IN 12PLY 10/BX STRL

## (undated) DEVICE — PAD GRND REM POLYHESIVE A/ DISP

## (undated) DEVICE — NDL HYPO ECLPS SFTY 25G 1 1/2IN

## (undated) DEVICE — SUT ETHLN 3-0 FS118IN 663H

## (undated) DEVICE — PK POD 60

## (undated) DEVICE — DRAPE,U/ SHT,SPLIT,PLAS,STERIL: Brand: MEDLINE

## (undated) DEVICE — BITEBLOCK ENDO W/STRAP 60F A/ LF DISP

## (undated) DEVICE — GLV SURG SENSICARE GREEN W/ALOE PF LF 6.5 STRL

## (undated) DEVICE — STERILE POLYISOPRENE POWDER-FREE SURGICAL GLOVES WITH EMOLLIENT COATING: Brand: PROTEXIS

## (undated) DEVICE — DRSNG GZ CURAD XEROFORM NONADHS 5X9IN STRL

## (undated) DEVICE — SUT ETHLN 4/0 FS2 18IN 662G

## (undated) DEVICE — UNDRPD BREATH 23X36 BG/10

## (undated) DEVICE — DISPOSABLE TOURNIQUET CUFF SINGLE BLADDER, DUAL PORT AND QUICK CONNECT CONNECTOR: Brand: COLOR CUFF

## (undated) DEVICE — CANN SMPL SOFTECH BIFLO ETCO2 A/M 7FT

## (undated) DEVICE — SOL IRR NACL 0.9PCT 3000ML

## (undated) DEVICE — PAD UNDERCAST WYTEX 4IN 4YD LF STRL

## (undated) DEVICE — BANDAGE,GAUZE,BULKEE II,4.5"X4.1YD,STRL: Brand: MEDLINE

## (undated) DEVICE — DRSNG WND GZ CURAD OIL EMULSION 3X3IN STRL

## (undated) DEVICE — SUT VIC 4/0 RB1 27IN J214H

## (undated) DEVICE — BNDG ELAS CO-FLEX SLF ADHR 3IN5YD LF2 STRL

## (undated) DEVICE — SHEET, DRAPE, SPLIT, STERILE: Brand: MEDLINE

## (undated) DEVICE — LARGE TEAR CROSS CUT RASP (14.0 X 7.0MM)

## (undated) DEVICE — SOL IRR NACL 0.9PCT BT 1000ML

## (undated) DEVICE — FIAPC® PROBE W/ FILTER 2200 C OD 2.3MM/6.9FR; L 2.2M/7.2FT: Brand: ERBE

## (undated) DEVICE — BNDG ELAS CO-FLEX SLF ADHR 4IN5YD LF STRL

## (undated) DEVICE — SPLNT ORTHOGLASS P/C 34X30IN LF

## (undated) DEVICE — SINGLE-USE BIOPSY FORCEPS: Brand: RADIAL JAW 4

## (undated) DEVICE — CONTAINER,SPECIMEN,OR STERILE,4OZ: Brand: MEDLINE

## (undated) DEVICE — BNDG ELAS ELITE V/CLOSE 4IN 5YD LF STRL

## (undated) DEVICE — SUT VIC 3/0 RB1 27IN J215H

## (undated) DEVICE — APPL CHLORAPREP W/TINT 26ML ORNG

## (undated) DEVICE — SUT VIC 3/0 SH 27IN J416H

## (undated) DEVICE — STERILE POLYISOPRENE POWDER-FREE SURGICAL GLOVES: Brand: PROTEXIS

## (undated) DEVICE — PAD,ABDOMINAL,8"X10",ST,LF: Brand: MEDLINE